# Patient Record
Sex: FEMALE | Race: WHITE | Employment: OTHER | URBAN - METROPOLITAN AREA
[De-identification: names, ages, dates, MRNs, and addresses within clinical notes are randomized per-mention and may not be internally consistent; named-entity substitution may affect disease eponyms.]

---

## 2017-01-08 DIAGNOSIS — I10 ESSENTIAL HYPERTENSION: Chronic | ICD-10-CM

## 2017-01-08 RX ORDER — LOSARTAN POTASSIUM AND HYDROCHLOROTHIAZIDE 25; 100 MG/1; MG/1
TABLET ORAL
Qty: 90 TAB | Refills: 2 | Status: SHIPPED | OUTPATIENT
Start: 2017-01-08 | End: 2017-10-09 | Stop reason: SDUPTHER

## 2017-01-18 RX ORDER — FENOFIBRATE 145 MG/1
145 TABLET, COATED ORAL DAILY
Qty: 30 TAB | Refills: 5 | Status: SHIPPED | OUTPATIENT
Start: 2017-01-18 | End: 2017-10-01 | Stop reason: SDUPTHER

## 2017-01-18 NOTE — TELEPHONE ENCOUNTER
[place] [facility] Office Pool                     Pt [facility] for \"Tricor 145 mg\".  She is requesting a 30 day supply be sent to [place] in [place].    She can be reached at [phone].

## 2017-02-02 ENCOUNTER — TELEPHONE (OUTPATIENT)
Dept: ENDOCRINOLOGY | Age: 77
End: 2017-02-02

## 2017-02-02 RX ORDER — ONDANSETRON 4 MG/1
4 TABLET, FILM COATED ORAL
Qty: 15 TAB | Refills: 1 | Status: SHIPPED | OUTPATIENT
Start: 2017-02-02 | End: 2018-06-20

## 2017-02-02 NOTE — TELEPHONE ENCOUNTER
Zofran 4 mg tab sent - if nausea doesnot improve in 2 weeks to call - may have to stop  Medication    Emelina to talk to pt

## 2017-02-02 NOTE — TELEPHONE ENCOUNTER
Patient says she is experiencing nausae from [facility] and wants Zofran sent to [facility].

## 2017-02-03 ENCOUNTER — OFFICE VISIT (OUTPATIENT)
Dept: FAMILY MEDICINE CLINIC | Age: 77
End: 2017-02-03

## 2017-02-03 VITALS
SYSTOLIC BLOOD PRESSURE: 133 MMHG | OXYGEN SATURATION: 94 % | BODY MASS INDEX: 42.13 KG/M2 | HEIGHT: 59 IN | RESPIRATION RATE: 22 BRPM | HEART RATE: 92 BPM | TEMPERATURE: 98.7 F | WEIGHT: 209 LBS | DIASTOLIC BLOOD PRESSURE: 76 MMHG

## 2017-02-03 DIAGNOSIS — Z13.39 SCREENING FOR ALCOHOLISM: ICD-10-CM

## 2017-02-03 DIAGNOSIS — K21.9 GASTROESOPHAGEAL REFLUX DISEASE WITHOUT ESOPHAGITIS: Chronic | ICD-10-CM

## 2017-02-03 DIAGNOSIS — Z13.31 SCREENING FOR DEPRESSION: ICD-10-CM

## 2017-02-03 DIAGNOSIS — E11.8 TYPE 2 DIABETES MELLITUS WITH COMPLICATION, WITHOUT LONG-TERM CURRENT USE OF INSULIN (HCC): Chronic | ICD-10-CM

## 2017-02-03 DIAGNOSIS — Z00.00 MEDICARE ANNUAL WELLNESS VISIT, SUBSEQUENT: Primary | ICD-10-CM

## 2017-02-03 RX ORDER — EZETIMIBE 10 MG
TABLET ORAL
Qty: 90 TAB | Refills: 2 | Status: SHIPPED | OUTPATIENT
Start: 2017-02-03 | End: 2017-11-09 | Stop reason: SDUPTHER

## 2017-02-03 NOTE — MR AVS SNAPSHOT
Visit Information Date & Time Provider Department Dept. Phone Encounter #  
 2/3/2017  3:05 PM Alfredo Cooper  [facility] [phone] [id number] Follow-up Instructions Return in about 3 months (around 5/3/2017). Your Appointments 3/20/2017  1:30 PM  
ROUTINE CARE with Amy Pedro MD  
[facility] [address] [place] [address]) Appt Note: 3mo fu dm II  
 [address] [facility] [address]  
[phone]  
  
   
 [address] [address]  
  
    
 4/3/2017 10:10 AM  
ROUTINE CARE with Alfredo Cooper MD  
704 [facility] ([address]) Appt Note: 4 mo f/u-Diabetes HTN  
 [address] [address]  
[place] [address] [place] [id number] Upcoming Health Maintenance Date Due  
 MEDICARE YEARLY EXAM 2/3/2017* HEMOGLOBIN A1C Q6M 6/2/2017 EYE EXAM RETINAL OR DILATED Q1 10/27/2017 FOOT EXAM Q1 12/2/2017 MICROALBUMIN Q1 12/2/2017 LIPID PANEL Q1 12/2/2017 GLAUCOMA SCREENING Q2Y 10/27/2018 COLONOSCOPY 6/27/2019 DTaP/Tdap/Td series (2 - Td) 12/14/2021 *Topic was postponed. The date shown is not the original due date. Allergies as of 2/3/2017  Review Complete On: 2/3/2017 By: Alfredo Cooper MD  
  
 Severity Noted Reaction Type Reactions Ciprofloxacin  05/20/2010    Nausea Only Erythromycin  05/20/2010    Nausea Only Metformin  09/03/2012    Other (comments) Felt bad  
 Pcn [Penicillins]  05/20/2010    Nausea Only, Swelling Current Immunizations  Reviewed on 12/2/2016 Name Date H1N1 FLU VACCINE 3/4/2010 Influenza Vaccine 10/8/2015, 10/17/2014  2:48 PM, 9/26/2013 Influenza Vaccine (Quad) PF 12/2/2016 Influenza Vaccine Split 11/16/2012, 11/18/2011, 10/28/2010 Pneumococcal Conjugate (PCV-13) 5/5/2016 Pneumococcal Polysaccharide (PPSV-23) 9/9/2015 Pneumococcal Vaccine (Unspecified Type) 12/1/2010 TDAP Vaccine 12/14/2011 Zoster Vaccine, Live 1/10/2013 Not reviewed this visit You Were Diagnosed With   
  
 Codes Comments Medicare annual wellness visit, subsequent    -  Primary ICD-10-CM: Z00.00 ICD-9-CM: V70.0 Screening for alcoholism     ICD-10-CM: Z13.89 ICD-9-CM: V79.1 Screening for depression     ICD-10-CM: Z13.89 ICD-9-CM: V79.0 Type 2 diabetes mellitus with complication, without long-term current use of insulin (HCC)     ICD-10-CM: E11.8 ICD-9-CM: 250.90 Gastroesophageal reflux disease without esophagitis     ICD-10-CM: K21.9 ICD-9-CM: 530.81 Vitals BP Pulse Temp Resp Height(growth percentile) Weight(growth percentile) 133/76 (BP 1 Location: Left arm, BP Patient Position: Sitting) 92 98.7 °F (37.1 °C) (Oral) 22 4' 11\" (1.499 m) 209 lb (94.8 kg) SpO2 BMI OB Status Smoking Status 94% 42.21 kg/m2 Hysterectomy Former Smoker Vitals History BMI and BSA Data Body Mass Index Body Surface Area  
 42.21 kg/m 2 1.99 m 2 Preferred Pharmacy Pharmacy Name Phone [facility] [address] [place] [address] [address] [phone] Your Updated Medication List  
  
   
This list is accurate as of: 2/3/17  3:54 PM.  Always use your most recent med list.  
  
  
  
  
 aspirin delayed-release 81 mg tablet Take 325 mg by mouth daily. cloNIDine HCl 0.1 mg tablet Commonly known as:  CATAPRES  
TAKE 1 TABLET NIGHTLY  
  
 dilTIAZem  mg ER capsule Commonly known as:  CARDIZEM CD  
TAKE 1 CAPSULE DAILY exenatide microspheres 2 mg/0.65 mL Pnij Commonly known as:  BYDUREON  
1 Syringe by SubCUTAneous route every seven (7) days. fenofibrate nanocrystallized 145 mg tablet Commonly known as:  [employer] Take 1 Tab by mouth daily. FISH OIL 1,000 mg Cap Generic drug:  omega-3 fatty acids-vitamin e Take 1 Cap by mouth. FREESTYLE LITE STRIPS strip Generic drug:  glucose blood VI test strips TEST TWICE A DAY  
  
 JANUVIA 100 mg tablet Generic drug:  SITagliptin TAKE 1 TABLET EVERY MORNING  
  
 loratadine 10 mg tablet Commonly known as:  CLARITIN  
TAKE 1 TABLET DAILY losartan-hydroCHLOROthiazide 100-25 mg per tablet Commonly known as:  HYZAAR  
TAKE 1 TABLET DAILY (STOP TRIAMTERENE/HCTZ) * \[facility] [place] [facility]\"" Generic drug:  Lancets USE TO TEST TWICE A DAY  
  
 * FREESTYLE LANCETS 28 gauge Misc Generic drug:  lancets USE TO TEST TWICE A DAY NexIUM 20 mg capsule Generic drug:  esomeprazole Take  by mouth daily. ondansetron hcl 4 mg tablet Commonly known as:  Lutricia Rye Take 1 Tab by mouth every eight (8) hours as needed for Nausea. pioglitazone 30 mg tablet Commonly known as:  ACTOS  
TAKE 1 TABLET DAILY  
  
 traMADol 50 mg tablet Commonly known as:  ULTRAM  
Take 1 Tab by mouth every six (6) hours as needed for Pain. Max Daily Amount: 200 mg. VITAMIN D3 1,000 unit tablet Generic drug:  cholecalciferol Take 1,000 Units by mouth two (2) times a day. ZETIA 10 mg tablet Generic drug:  ezetimibe TAKE 1 TABLET DAILY * Notice: This list has 2 medication(s) that are the same as other medications prescribed for you. Read the directions carefully, and ask your doctor or other care provider to review them with you. We Performed the Following SD ANNUAL ALCOHOL SCREEN 15 MIN [id number] [facility]] Follow-up Instructions Return in about 3 months (around 5/3/2017). Please provide this summary of care documentation to your next provider. Your primary care clinician is listed as Πάνου 90. If you have any questions after today's visit, please call [phone].

## 2017-02-03 NOTE — PROGRESS NOTES
Chief Complaint   Patient presents with    Annual Wellness Visit     Reviewed record in preparation for visit and have necessary documentation  Pt did not bring medication to office visit for review  Opportunity was given for questions  Goals that were addressed and/or need to be completed after this appointment include: There are no preventive care reminders to display for this patient. Body mass index is 42.21 kg/(m^2).

## 2017-02-03 NOTE — PROGRESS NOTES
[address]  [phone]           Date of visit: 2/3/2017     Josh French MD    This is an Subsequent Medicare Annual Wellness Visit (AWV), (Performed more than 12 months after effective date of Medicare Part B enrollment and 12 months after last preventive visit, Once in a lifetime)    I have reviewed the patient's medical history in detail and updated the computerized patient record. Daniela See is a 77 y.o. female   History obtained from: the patient. 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    Essential hypertension I10    Mixed hyperlipidemia E78.2    Morbid obesity ([address] E66.01    Type 2 diabetes mellitus with hyperglycemia, without long-term current use of insulin (HCC) E11.65     Past Medical History   Diagnosis Date    Arthritis 2/28/2011     OSTEO    Chronic obstructive pulmonary disease (HCC)      VERY MILD    Chronic pain      LEFT KNEE    Diabetes (HCC)     GERD (gastroesophageal reflux disease)     Hot flashes, menopausal     Hypercholesterolemia     Hypertension     Seizures ([address] 1980'S     AFTER DRINKING 2 MARGARITAS    Vitamin D deficiency 6/28/2010      Past Surgical History   Procedure Laterality Date    Hx appendectomy  2001    Hx orthopaedic       rotator cuff- jorge.     Hx hysterectomy  1975    Hx oophorectomy  2001     bilateral    Hx cataract removal Bilateral     Hx other surgical       LIPOMA LEFT SIDE    Hx knee replacement       left knee     Allergies   Allergen Reactions    Ciprofloxacin Nausea Only    Erythromycin Nausea Only    Metformin Other (comments)     Felt bad      Pcn [Penicillins] Nausea Only and Swelling     Current Outpatient Prescriptions   Medication Sig Dispense Refill    ZETIA 10 mg tablet TAKE 1 TABLET DAILY 90 Tab 2    ondansetron hcl (ZOFRAN) 4 mg tablet Take 1 Tab by mouth every eight (8) hours as needed for Nausea. 15 Tab 1    fenofibrate nanocrystallized (TRICOR) 145 mg tablet Take 1 Tab by mouth daily. 30 Tab 5    losartan-hydroCHLOROthiazide (HYZAAR) 100-25 mg per tablet TAKE 1 TABLET DAILY (STOP TRIAMTERENE/HCTZ) 90 Tab 2    FREESTYLE LANCETS 28 gauge misc USE TO TEST TWICE A  Lancet 0    exenatide microspheres (BYDUREON) 2 mg/0.65 mL pnij 1 Syringe by SubCUTAneous route every seven (7) days. 7.8 mL 3    FREESTYLE LITE STRIPS strip TEST TWICE A  Strip 10    cloNIDine HCl (CATAPRES) 0.1 mg tablet TAKE 1 TABLET NIGHTLY 90 Tab 2    loratadine (CLARITIN) 10 mg tablet TAKE 1 TABLET DAILY 90 Tab 3    pioglitazone (ACTOS) 30 mg tablet TAKE 1 TABLET DAILY 90 Tab 2    traMADol (ULTRAM) 50 mg tablet Take 1 Tab by mouth every six (6) hours as needed for Pain. Max Daily Amount: 200 mg. 90 Tab 4    diltiazem CD (CARDIZEM CD) 180 mg ER capsule TAKE 1 CAPSULE DAILY 90 Cap 3    MICROLET LANCET misc USE TO TEST TWICE A DAY 2 Package 2    aspirin delayed-release 81 mg tablet Take 325 mg by mouth daily.  esomeprazole (NEXIUM) 20 mg capsule Take  by mouth daily.  omega-3 fatty acids-vitamin e (FISH OIL) 1,000 mg cap Take 1 Cap by mouth.  cholecalciferol, vitamin d3, (VITAMIN D) 1,000 unit tablet Take 1,000 Units by mouth two (2) times a day.       JANUVIA 100 mg tablet TAKE 1 TABLET EVERY MORNING 90 Tab 2     Family History   Problem Relation Age of Onset    Pulmonary Embolism Father    [facility] Arthritis-rheumatoid Paternal Grandmother     Alzheimer Mother     Arthritis-rheumatoid Sister     Emphysema Brother     Arthritis-rheumatoid Sister     Anesth Problems Neg Hx      Social History   Substance Use Topics    Smoking status: Former Smoker     Packs/day: 1.00     Years: 20.00     Quit date: 11/18/1984    Smokeless tobacco: Never Used    Alcohol use Yes      Comment: rarely       Specialists/Care Team   Buddy Little has established care with the following healthcare providers:  Patient Care Team:  Ifeoma Mccallum MD as PCP - Emmanuel Be MD as Physician (Cardiology)  Fredis Handley MD as Physician (Orthopedic Surgery)  Coni Bustamante MD (Endocrinology)      Health Risk Assessment     Demographics   female  77 y.o. General Health Questions   -During the past 4 weeks:   -how would you rate your health in general? Good   -how often have you been bothered by feeling dizzy when standing up? never   -how much have you been bothered by bodily pain? moderately   -Have you noticed any hearing difficulties? yes   -has your physical and emotional health limited your social activities with family or friends? no    Emotional Health Questions   -Do you have a history of depression, anxiety, or emotional problems? no  -Over the past 2 weeks, have you felt down, depressed or hopeless? no  -Over the past 2 weeks, have you felt little interest or pleasure in doing things? no    Health Habits   Please describe your diet habits: A-  Do you get 5 servings of fruits or vegetables daily? yes  Do you exercise regularly? yes    Alcohol Risk Factor Screening: On any occasion during the past 3 months, have you had more than 4 drinks containing alcohol? No     Do you average more than 14 drinks per week?  No       Activities of Daily Living and Functional Status   -Do you need help with eating, walking, dressing, bathing, toileting, the phone, transportation, shopping, preparing meals, housework, laundry, medications or managing money? no  -In the past four weeks, was someone available to help you if you needed and wanted help with anything? yes  -Are you confident are you that you can control and manage most of your health problems? yes  -Have you been given information to help you keep track of your medications? yes  -How often do you have trouble taking your medications as prescribed? never    Fall Risk and Home Safety   Have you fallen 2 or more times in the past year? no  Does your home have rugs in the hallway, lack grab bars in the bathroom, lack handrails on the stairs or have poor lighting? no  Do you have smoke detectors and check them regularly? yes  Do you have difficulties driving a car? no  Do you always fasten your seat belt when you are in a car? yes    Review of Systems (if indicated for problems addressed today)   Review of Systems   Constitutional: Negative. Negative for chills, fever, malaise/fatigue and weight loss. HENT: Negative. Negative for hearing loss. Eyes: Negative. Negative for blurred vision and double vision. Respiratory: Negative. Negative for cough, hemoptysis, sputum production and shortness of breath. Cardiovascular: Negative. Negative for chest pain, palpitations and orthopnea. Gastrointestinal: Negative. Negative for abdominal pain, blood in stool, heartburn, nausea and vomiting. Genitourinary: Negative. Negative for dysuria, frequency and urgency. Musculoskeletal: Positive for joint pain. Negative for back pain, myalgias and neck pain. Right knee pain. Skin: Negative. Negative for rash. Neurological: Negative. Negative for dizziness, tingling, tremors, weakness and headaches. Endo/Heme/Allergies: Negative. Psychiatric/Behavioral: Negative. Negative for depression.      MMSE normal    Physical Examination     Wt Readings from Last 3 Encounters:   02/03/17 209 lb (94.8 kg)   12/19/16 216 lb (98 kg)   12/02/16 213 lb (96.6 kg) Temp Readings from Last 3 Encounters:   02/03/17 98.7 °F (37.1 °C) (Oral)   12/19/16 97.2 °F (36.2 °C) (Oral)   12/02/16 97.4 °F (36.3 °C) (Oral)     BP Readings from Last 3 Encounters:   02/03/17 133/76   12/19/16 129/69   12/02/16 155/80     Pulse Readings from Last 3 Encounters:   02/03/17 92   12/19/16 73   12/02/16 70       Body mass index is 42.21 kg/(m^2). No exam data present  Was the patient's timed Up & Go test unsteady or longer than 10 seconds? no    Evaluation of Cognitive Function   Mood/affect:  happy  Orientation: Person, Place, Time and Situation  Appearance: age appropriate and casually dressed  Family member/caregiver input: no    Additional exam if indicated for problems addressed today:  Physical Exam   Constitutional: She is oriented to person, place, and time. She appears well-developed and well-nourished. No distress. HENT:   Head: Normocephalic and atraumatic. Mouth/Throat: Oropharynx is clear and moist.   Eyes: Conjunctivae are normal. No scleral icterus. Neck: No thyromegaly present. No carotid bruit. Cardiovascular: Normal rate, regular rhythm and normal heart sounds. Exam reveals no gallop and no friction rub. No murmur heard. Pulmonary/Chest: Effort normal and breath sounds normal. She has no wheezes. She has no rales. Abdominal: Soft. Bowel sounds are normal. She exhibits no distension. There is no tenderness. There is no rebound and no guarding. Musculoskeletal: She exhibits no edema. Lymphadenopathy:     She has no cervical adenopathy. Neurological: She is alert and oriented to person, place, and time. Skin: Skin is warm and dry. No rash noted. She is not diaphoretic. Psychiatric: She has a normal mood and affect. Her behavior is normal. Thought content normal.   Nursing note and vitals reviewed.         Advice/Referrals/Counseling (as indicated)   Education and counseling provided for any problems identified above:     Preventive Services (Preventive care checklist to be included in patient instructions)  Discussed today Done Previously Not Needed     x  Pneumococcal vaccines    x  Flu vaccine     x Hepatitis B vaccine (if at risk)    x  Shingles vaccine    x  TDAP vaccine    x  Mammogram     x Pap smear     x Colorectal cancer screening     x Low-dose CT for lung cancer screening     x Bone density test     x Glaucoma screening    x  Cholesterol test    x  Diabetes screening test     x  Diabetes self-management class    x  Nutritionist referral for diabetes or renal disease     Discussion of Advance Directive   Discussed with Jason Velez her ability to prepare and advance directive in the case that an injury or illness causes her to be unable to make health care decisions. Assessment/Plan   Z00.00    ICD-10-CM ICD-9-CM    1. Medicare annual wellness visit, subsequent   Z00.00 V70.0    2. Screening for alcoholism  -negative     Z13.89 V79.1 [place] ANNUAL ALCOHOL SCREEN 15 MIN   3. Screening for depression-negative    Z13.89 V79.0    4. Type 2 diabetes mellitus with complication, without long-term current use of insulin (HCC)-blood sugars are coming down now that she is on Bydureon    E11.8 250.90    5.  Gastroesophageal reflux disease without esophagitis-controlled  K21.9 530.81        Orders Placed This Encounter    [place] ANNUAL ALCOHOL SCREEN 15 MIN       Patient Care Team:  Miguel Angel Evans MD as PCP - Medina Lawler MD as Physician (Cardiology)  Jun Burton MD as Physician (Orthopedic Surgery)  Hector Lee MD (Endocrinology)    Follow-up Disposition: 3 months    Future Appointments  Date Time Provider Hay Nathalia   3/20/2017 1:30 PM Hector Lee MD [facility]   4/3/2017 10:10 AM MD Madelin Downing MD

## 2017-02-03 NOTE — TELEPHONE ENCOUNTER
Called pt and informed her that Zofran was sent in to her pharmacy. She stated she has not had any nausea today but will keep the zofran on hand just in case. Also informed her Martin Sorensen, a [employer] rep will contact her and talk to her about the [address]. Pt verbalized understanding with no further questions or concerns at this time. Called Martin Sorensen and gave her pt's information. She will contact pt. No further questions or concerns at this time.

## 2017-03-16 LAB — HEMOCCULT STL QL IA: NEGATIVE

## 2017-03-20 ENCOUNTER — OFFICE VISIT (OUTPATIENT)
Dept: ENDOCRINOLOGY | Age: 77
End: 2017-03-20

## 2017-03-20 VITALS
HEART RATE: 73 BPM | DIASTOLIC BLOOD PRESSURE: 91 MMHG | TEMPERATURE: 98.1 F | RESPIRATION RATE: 16 BRPM | BODY MASS INDEX: 40.86 KG/M2 | SYSTOLIC BLOOD PRESSURE: 144 MMHG | HEIGHT: 59 IN | WEIGHT: 202.7 LBS

## 2017-03-20 DIAGNOSIS — E11.65 TYPE 2 DIABETES MELLITUS WITH HYPERGLYCEMIA, WITHOUT LONG-TERM CURRENT USE OF INSULIN (HCC): Primary | ICD-10-CM

## 2017-03-20 DIAGNOSIS — E78.00 HYPERCHOLESTEROLEMIA: Chronic | ICD-10-CM

## 2017-03-20 DIAGNOSIS — I10 ESSENTIAL HYPERTENSION: Chronic | ICD-10-CM

## 2017-03-20 LAB
GLUCOSE POC: 103 MG/DL
HBA1C MFR BLD HPLC: 5.6 %

## 2017-03-20 NOTE — PROGRESS NOTES
Maye Armijo is a 77 y.o. female here for   Chief Complaint   Patient presents with    Diabetes    Cholesterol Problem    Hypertension       Functional glucose monitor and record keeping system? - yes  Eye exam within last year? - yes  Foot exam within last year? - yes, PCP    Lab Results   Component Value Date/Time    Hemoglobin A1c 6.4 12/02/2016 11:35 AM    Hemoglobin A1c (POC) 6.0 07/25/2016 02:57 PM       Wt Readings from Last 3 Encounters:   02/03/17 209 lb (94.8 kg)   12/19/16 216 lb (98 kg)   12/02/16 213 lb (96.6 kg)     Temp Readings from Last 3 Encounters:   02/03/17 98.7 °F (37.1 °C) (Oral)   12/19/16 97.2 °F (36.2 °C) (Oral)   12/02/16 97.4 °F (36.3 °C) (Oral)     BP Readings from Last 3 Encounters:   02/03/17 133/76   12/19/16 129/69   12/02/16 155/80     Pulse Readings from Last 3 Encounters:   02/03/17 92   12/19/16 73   12/02/16 70

## 2017-03-20 NOTE — MR AVS SNAPSHOT
Visit Information Date & Time Provider Department Dept. Phone Encounter #  
 3/20/2017  1:30 PM Ana Cristina Bennett MD [facility] [phone] [id number] Follow-up Instructions Return in about 4 months (around 7/20/2017). Your Appointments 4/3/2017 10:10 AM  
ROUTINE CARE with Pascual Presley MD  
[facility]) Appt Note: 4 mo f/u-Diabetes HTN  
 [address] [address]  
[place] [address] [address] Upcoming Health Maintenance Date Due HEMOGLOBIN A1C Q6M 6/2/2017 EYE EXAM RETINAL OR DILATED Q1 10/27/2017 FOOT EXAM Q1 12/2/2017 MICROALBUMIN Q1 12/2/2017 LIPID PANEL Q1 12/2/2017 MEDICARE YEARLY EXAM 2/4/2018 GLAUCOMA SCREENING Q2Y 10/27/2018 COLONOSCOPY 6/27/2019 DTaP/Tdap/Td series (2 - Td) 12/14/2021 Allergies as of 3/20/2017  Review Complete On: 3/20/2017 By: Mallika Gage LPN Severity Noted Reaction Type Reactions Ciprofloxacin  05/20/2010    Nausea Only Erythromycin  05/20/2010    Nausea Only Metformin  09/03/2012    Other (comments) Felt bad  
 Pcn [Penicillins]  05/20/2010    Nausea Only, Swelling Current Immunizations  Reviewed on 12/2/2016 Name Date H1N1 FLU VACCINE 3/4/2010 Influenza Vaccine 10/8/2015, 10/17/2014  2:48 PM, 9/26/2013 Influenza Vaccine (Quad) PF 12/2/2016 Influenza Vaccine Split 11/16/2012, 11/18/2011, 10/28/2010 Pneumococcal Conjugate (PCV-13) 5/5/2016 Pneumococcal Polysaccharide (PPSV-23) 9/9/2015 Pneumococcal Vaccine (Unspecified Type) 12/1/2010 TDAP Vaccine 12/14/2011 Zoster Vaccine, Live 1/10/2013 Not reviewed this visit You Were Diagnosed With   
  
 Codes Comments  Type 2 diabetes mellitus with hyperglycemia, without long-term current use of insulin (HCC)    -  Primary ICD-10-CM: E11.65 
 ICD-9-CM: 250.00, 790.29 Essential hypertension     ICD-10-CM: I10 
ICD-9-CM: 401.9 Hypercholesterolemia     ICD-10-CM: E78.00 ICD-9-CM: 272.0 Vitals BP Pulse Temp Resp Height(growth percentile) Weight(growth percentile) (!) 144/91 (BP 1 Location: Right arm, BP Patient Position: Sitting) 73 98.1 °F (36.7 °C) (Oral) 16 4' 11\" (1.499 m) 202 lb 11.2 oz (91.9 kg) BMI OB Status Smoking Status 40.94 kg/m2 Hysterectomy Former Smoker Vitals History BMI and BSA Data Body Mass Index Body Surface Area 40.94 kg/m 2 1.96 m 2 Preferred Pharmacy Pharmacy Name Phone [address] [facility] [phone] Your Updated Medication List  
  
   
This list is accurate as of: 3/20/17  1:39 PM.  Always use your most recent med list.  
  
  
  
  
 aspirin delayed-release 81 mg tablet Take 325 mg by mouth daily. cloNIDine HCl 0.1 mg tablet Commonly known as:  CATAPRES  
TAKE 1 TABLET NIGHTLY  
  
 dilTIAZem  mg ER capsule Commonly known as:  CARDIZEM CD  
TAKE 1 CAPSULE DAILY exenatide microspheres 2 mg/0.65 mL Pnij Commonly known as:  BYDUREON  
1 Syringe by SubCUTAneous route every seven (7) days. fenofibrate nanocrystallized 145 mg tablet Commonly known as:  [employer] Take 1 Tab by mouth daily. FISH OIL 1,000 mg Cap Generic drug:  omega-3 fatty acids-vitamin e Take 1 Cap by mouth. FREESTYLE LITE STRIPS strip Generic drug:  glucose blood VI test strips TEST TWICE A DAY  
  
 loratadine 10 mg tablet Commonly known as:  CLARITIN  
TAKE 1 TABLET DAILY losartan-hydroCHLOROthiazide 100-25 mg per tablet Commonly known as:  HYZAAR  
TAKE 1 TABLET DAILY (STOP TRIAMTERENE/HCTZ) * [facility] Generic drug:  Lancets USE TO TEST TWICE A DAY  
  
 * FREESTYLE LANCETS 28 gauge Misc Generic drug:  lancets USE TO TEST TWICE A DAY  
  
 NexIUM 20 mg capsule Generic drug:  esomeprazole Take  by mouth daily. ondansetron hcl 4 mg tablet Commonly known as:  Pichardo Raciel Take 1 Tab by mouth every eight (8) hours as needed for Nausea. pioglitazone 30 mg tablet Commonly known as:  ACTOS  
TAKE 1 TABLET DAILY  
  
 traMADol 50 mg tablet Commonly known as:  ULTRAM  
Take 1 Tab by mouth every six (6) hours as needed for Pain. Max Daily Amount: 200 mg. VITAMIN D3 1,000 unit tablet Generic drug:  cholecalciferol Take 1,000 Units by mouth two (2) times a day. ZETIA 10 mg tablet Generic drug:  ezetimibe TAKE 1 TABLET DAILY * Notice: This list has 2 medication(s) that are the same as other medications prescribed for you. Read the directions carefully, and ask your doctor or other care provider to review them with you. We Performed the Following AMB POC GLUCOSE, QUANTITATIVE, BLOOD [32804 CPT(R)] AMB POC HEMOGLOBIN A1C [44155 CPT(R)] Follow-up Instructions Return in about 4 months (around 7/20/2017). Please provide this summary of care documentation to your next provider. Your primary care clinician is listed as Πάνου 90. If you have any questions after today's visit, please call [phone].

## 2017-03-20 NOTE — PROGRESS NOTES
[facility]               Gustavo Knig MD        [address] 78 [phone] Fax [phone] ( KPFLV) [address] [address] [address] YL:[phone] Fax [phone] ( Monday)          Patient Information  Date:3/20/2017  Name : Severiano Evener 77 y.o.     YOB: 1940         Referred by: Peace Parish MD         Chief Complaint   Patient presents with    Diabetes    Cholesterol Problem    Hypertension       History of Present Illness: Severiano Evener is a 77 y.o. female here for fu of  Type 2 Diabetes Mellitus. Type 2 Diabetes was diagnosed in 10/2014 . End organ effects of diabetes: none. Cardiovascular risk factors: family history, dyslipidemia, diabetes mellitus   Monitoring frequency:2/ day     . Could not tolerate Metformin IR, XR formualtion and on Actos, glucose not at goal      Checking glucose at home  Has not noticed significant hypoglycemia     Compliant with medications    No acute issues            Hyperlipidemia - was on tricor, zetia, colestipol, Niacin, no statin     Wt Readings from Last 3 Encounters:   03/20/17 202 lb 11.2 oz (91.9 kg)   02/03/17 209 lb (94.8 kg)   12/19/16 216 lb (98 kg)       BP Readings from Last 3 Encounters:   03/20/17 (!) 144/91   02/03/17 133/76   12/19/16 129/69           Past Medical History:   Diagnosis Date    Arthritis 2/28/2011    OSTEO    Chronic obstructive pulmonary disease (HCC)     VERY MILD    Chronic pain     LEFT KNEE    Diabetes (HCC)     GERD (gastroesophageal reflux disease)     Hot flashes, menopausal     Hypercholesterolemia     Hypertension     Seizures (HCC) 1980'S    AFTER DRINKING 2 MARGARITAS    Vitamin D deficiency 6/28/2010     Current Outpatient Prescriptions   Medication Sig    pioglitazone (ACTOS) 30 mg tablet TAKE 1 TABLET DAILY    dilTIAZem CD (CARDIZEM CD) 180 mg ER capsule TAKE 1 CAPSULE DAILY    ZETIA 10 mg tablet TAKE 1 TABLET DAILY    ondansetron hcl (ZOFRAN) 4 mg tablet Take 1 Tab by mouth every eight (8) hours as needed for Nausea.  fenofibrate nanocrystallized (TRICOR) 145 mg tablet Take 1 Tab by mouth daily.  losartan-hydroCHLOROthiazide (HYZAAR) 100-25 mg per tablet TAKE 1 TABLET DAILY (STOP TRIAMTERENE/HCTZ)    FREESTYLE LANCETS 28 gauge misc USE TO TEST TWICE A DAY    exenatide microspheres (BYDUREON) 2 mg/0.65 mL pnij 1 Syringe by SubCUTAneous route every seven (7) days.  FREESTYLE LITE STRIPS strip TEST TWICE A DAY    cloNIDine HCl (CATAPRES) 0.1 mg tablet TAKE 1 TABLET NIGHTLY    loratadine (CLARITIN) 10 mg tablet TAKE 1 TABLET DAILY    traMADol (ULTRAM) 50 mg tablet Take 1 Tab by mouth every six (6) hours as needed for Pain. Max Daily Amount: 200 mg.  MICROLET LANCET misc USE TO TEST TWICE A DAY    aspirin delayed-release 81 mg tablet Take 325 mg by mouth daily.  esomeprazole (NEXIUM) 20 mg capsule Take  by mouth daily.  omega-3 fatty acids-vitamin e (FISH OIL) 1,000 mg cap Take 1 Cap by mouth.  cholecalciferol, vitamin d3, (VITAMIN D) 1,000 unit tablet Take 1,000 Units by mouth two (2) times a day.  JANUVIA 100 mg tablet TAKE 1 TABLET EVERY MORNING     No current facility-administered medications for this visit. Allergies   Allergen Reactions    Ciprofloxacin Nausea Only    Erythromycin Nausea Only    Metformin Other (comments)     Felt bad      Pcn [Penicillins] Nausea Only and Swelling         Review of Systems:  - Constitutional Symptoms: no fevers, no chills,   - Eyes: no blurry vision no double vision  - Cardiovascular: no chest pain ,no palpitations  - Respiratory: no cough no shortness of breath  - Gastrointestinal: no dysphagia no  abdominal pain  - Musculoskeletal: no joint pains no  weakness  - Integumentary: no rashes  -     Physical Examination:   Blood pressure (!) 144/91, pulse 73, temperature 98.1 °F (36.7 °C), temperature source Oral, resp.  rate 16, height 4' 11\" (1.499 m), weight 202 lb 11.2 oz (91.9 kg). Estimated body mass index is 40.94 kg/(m^2) as calculated from the following:    Height as of this encounter: 4' 11\" (1.499 m). -   Weight as of this encounter: 202 lb 11.2 oz (91.9 kg). - General: pleasant, no distress, good eye contact  - HEENT: no pallor, no periorbital edema, EOMI  - Neck: supple, no thyromegaly  - Cardiovascular: regular, normal rate, normal S1 and S2  - Respiratory: clear to auscultation bilaterally  - Gastrointestinal: soft, nontender, nondistended,  BS +  - Musculoskeletal: no proximal muscle weakness in upper or lower extremities  - Integumentary: alert and oriented  - Psychiatric: normal mood and affect  - Skin: color, texture, turgor normal.       Data Reviewed:     [] Glucose records reviewed. [] See glucose records for details (to be scanned). [] A1C  [] Reviewed labs    Lab Results   Component Value Date/Time    Hemoglobin A1c 6.4 12/02/2016 11:35 AM    Hemoglobin A1c 6.5 04/11/2016 11:39 AM    Hemoglobin A1c 6.5 12/10/2015 10:56 AM    Glucose 110 12/02/2016 11:35 AM    Glucose (POC) 146 10/17/2014 11:06 AM    Glucose  03/20/2017 01:11 PM    Microalb/Creat ratio (ug/mg creat.) 269.2 12/02/2016 11:35 AM    LDL, calculated 62 12/02/2016 11:35 AM    Creatinine 1.13 12/02/2016 11:35 AM      Lab Results   Component Value Date/Time    Cholesterol, total 155 12/02/2016 11:35 AM    HDL Cholesterol 57 12/02/2016 11:35 AM    LDL, calculated 62 12/02/2016 11:35 AM    Triglyceride 182 12/02/2016 11:35 AM    CHOL/HDL Ratio 2.4 10/28/2010 09:07 AM     Lab Results   Component Value Date/Time    ALT (SGPT) 30 12/02/2016 11:35 AM    AST (SGOT) 25 12/02/2016 11:35 AM    Alk.  phosphatase 46 12/02/2016 11:35 AM    Bilirubin, total 0.4 12/02/2016 11:35 AM     Lab Results   Component Value Date/Time    TSH 1.280 12/02/2016 11:35 AM    T4, Free 1.55 09/09/2015 02:16 PM      Lab Results   Component Value Date/Time    Glucose 110 12/02/2016 11:35 AM    Glucose (POC) 146 10/17/2014 11:06 AM    Glucose  03/20/2017 01:11 PM        Assessment/Plan:     1. Type 2 diabetes mellitus with hyperglycemia, without long-term current use of insulin ([facility] [address]    2. Essential hypertension    3. Hypercholesterolemia        1. 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 HTN : Continue current therapy     3. Mixed Hyperlipidemia : low carb diet. Statin intolerance hx,   Continue Zetia, Tricor    4. Obesity:Body mass index is 40.94 kg/(m^2). Discussed about the importance of exercise and carbohydrate portion control. There are no Patient Instructions on file for this visit. Follow-up Disposition: Not on File    Thank you for allowing me to participate in the care of this patient.     Shannon Craven MD

## 2017-03-21 RX ORDER — LANCETS 28 GAUGE
EACH MISCELLANEOUS
Qty: 200 LANCET | Refills: 5 | Status: SHIPPED | OUTPATIENT
Start: 2017-03-21 | End: 2018-05-11 | Stop reason: SDUPTHER

## 2017-04-03 ENCOUNTER — OFFICE VISIT (OUTPATIENT)
Dept: FAMILY MEDICINE CLINIC | Age: 77
End: 2017-04-03

## 2017-04-03 VITALS
RESPIRATION RATE: 16 BRPM | BODY MASS INDEX: 40.52 KG/M2 | OXYGEN SATURATION: 94 % | SYSTOLIC BLOOD PRESSURE: 115 MMHG | HEIGHT: 59 IN | TEMPERATURE: 98.5 F | DIASTOLIC BLOOD PRESSURE: 71 MMHG | WEIGHT: 201 LBS | HEART RATE: 69 BPM

## 2017-04-03 DIAGNOSIS — E11.8 TYPE 2 DIABETES MELLITUS WITH COMPLICATION, WITHOUT LONG-TERM CURRENT USE OF INSULIN (HCC): Primary | Chronic | ICD-10-CM

## 2017-04-03 DIAGNOSIS — E78.2 MIXED HYPERLIPIDEMIA: ICD-10-CM

## 2017-04-03 DIAGNOSIS — I10 ESSENTIAL HYPERTENSION: Chronic | ICD-10-CM

## 2017-04-03 DIAGNOSIS — K21.9 GASTROESOPHAGEAL REFLUX DISEASE WITHOUT ESOPHAGITIS: Chronic | ICD-10-CM

## 2017-04-03 DIAGNOSIS — E78.00 HYPERCHOLESTEROLEMIA: Chronic | ICD-10-CM

## 2017-04-03 DIAGNOSIS — D17.1 LIPOMA OF TORSO: ICD-10-CM

## 2017-04-03 DIAGNOSIS — M19.90 ARTHRITIS: Chronic | ICD-10-CM

## 2017-04-03 NOTE — PATIENT INSTRUCTIONS

## 2017-04-03 NOTE — MR AVS SNAPSHOT
Visit Information Date & Time Provider Department Dept. Phone Encounter #  
 4/3/2017 10:10 AM Марина Swann MD [address] [place] [address] [id number] Follow-up Instructions Return in about 5 months (around 9/3/2017) for fasting. Mary Ann Starling Your Appointments 7/21/2017  1:30 PM  
ROUTINE CARE with Anisha Bauer MD  
[facility] [address] [facility]) Appt Note: f/u 4 month DM  
 [address] [facility] [address]  
[phone]  
  
   
 [address] [place] [id number] Upcoming Health Maintenance Date Due HEMOGLOBIN A1C Q6M 9/20/2017 EYE EXAM RETINAL OR DILATED Q1 10/27/2017 FOOT EXAM Q1 12/2/2017 MICROALBUMIN Q1 12/2/2017 LIPID PANEL Q1 12/2/2017 MEDICARE YEARLY EXAM 2/4/2018 GLAUCOMA SCREENING Q2Y 10/27/2018 COLONOSCOPY 6/27/2019 DTaP/Tdap/Td series (2 - Td) 12/14/2021 Allergies as of 4/3/2017  Review Complete On: 4/3/2017 By: Sanam Bardales LPN Severity Noted Reaction Type Reactions Ciprofloxacin  05/20/2010    Nausea Only Erythromycin  05/20/2010    Nausea Only Metformin  09/03/2012    Other (comments) Felt bad  
 Pcn [Penicillins]  05/20/2010    Nausea Only, Swelling Current Immunizations  Reviewed on 12/2/2016 Name Date H1N1 FLU VACCINE 3/4/2010 Influenza Vaccine 10/8/2015, 10/17/2014  2:48 PM, 9/26/2013 Influenza Vaccine (Quad) PF 12/2/2016 Influenza Vaccine Split 11/16/2012, 11/18/2011, 10/28/2010 Pneumococcal Conjugate (PCV-13) 5/5/2016 Pneumococcal Polysaccharide (PPSV-23) 9/9/2015 Pneumococcal Vaccine (Unspecified Type) 12/1/2010 TDAP Vaccine 12/14/2011 Zoster Vaccine, Live 1/10/2013 Not reviewed this visit You Were Diagnosed With   
  
 Codes Comments Type 2 diabetes mellitus with complication, without long-term current use of insulin (HCC)    -  Primary ICD-10-CM: E11.8 ICD-9-CM: 250.90 Essential hypertension     ICD-10-CM: I10 
ICD-9-CM: 401.9 Hypercholesterolemia     ICD-10-CM: E78.00 ICD-9-CM: 272.0 Gastroesophageal reflux disease without esophagitis     ICD-10-CM: K21.9 ICD-9-CM: 530.81 Arthritis     ICD-10-CM: M19.90 ICD-9-CM: 716.90 Mixed hyperlipidemia     ICD-10-CM: E78.2 ICD-9-CM: 272.2 Vitals BP Pulse Temp Resp Height(growth percentile) Weight(growth percentile) 115/71 (BP 1 Location: Right arm, BP Patient Position: Sitting) 69 98.5 °F (36.9 °C) (Oral) 16 4' 11\" (1.499 m) 201 lb (91.2 kg) SpO2 BMI OB Status Smoking Status 94% 40.6 kg/m2 Hysterectomy Former Smoker Vitals History BMI and BSA Data Body Mass Index Body Surface Area  
 40.6 kg/m 2 1.95 m 2 Preferred Pharmacy Pharmacy Name Phone [address] [facility] [phone] Your Updated Medication List  
  
   
This list is accurate as of: 4/3/17 10:46 AM.  Always use your most recent med list.  
  
  
  
  
 aspirin delayed-release 81 mg tablet Take 325 mg by mouth daily. cloNIDine HCl 0.1 mg tablet Commonly known as:  CATAPRES  
TAKE 1 TABLET NIGHTLY  
  
 dilTIAZem  mg ER capsule Commonly known as:  CARDIZEM CD  
TAKE 1 CAPSULE DAILY exenatide microspheres 2 mg/0.65 mL Pnij Commonly known as:  BYDUREON  
1 Syringe by SubCUTAneous route every seven (7) days. fenofibrate nanocrystallized 145 mg tablet Commonly known as:  [employer] Take 1 Tab by mouth daily. FISH OIL 1,000 mg Cap Generic drug:  omega-3 fatty acids-vitamin e Take 1 Cap by mouth. FREESTYLE LITE STRIPS strip Generic drug:  glucose blood VI test strips TEST TWICE A DAY  
  
 loratadine 10 mg tablet Commonly known as:  CLARITIN  
TAKE 1 TABLET DAILY losartan-hydroCHLOROthiazide 100-25 mg per tablet Commonly known as:  HYZAAR  
 TAKE 1 TABLET DAILY (STOP TRIAMTERENE/HCTZ) * [facility] [place] Generic drug:  Lancets USE TO TEST TWICE A DAY  
  
 * lancets 28 gauge Misc Commonly known as:  FREESTYLE LANCETS Use as directed to check blood sugar twice daily. DX: E11.65 NexIUM 20 mg capsule Generic drug:  esomeprazole Take  by mouth daily. ondansetron hcl 4 mg tablet Commonly known as:  Ardia Pineda Take 1 Tab by mouth every eight (8) hours as needed for Nausea. pioglitazone 30 mg tablet Commonly known as:  ACTOS  
TAKE 1 TABLET DAILY  
  
 traMADol 50 mg tablet Commonly known as:  ULTRAM  
Take 1 Tab by mouth every six (6) hours as needed for Pain. Max Daily Amount: 200 mg. VITAMIN D3 1,000 unit tablet Generic drug:  cholecalciferol Take 1,000 Units by mouth two (2) times a day. ZETIA 10 mg tablet Generic drug:  ezetimibe TAKE 1 TABLET DAILY * Notice: This list has 2 medication(s) that are the same as other medications prescribed for you. Read the directions carefully, and ask your doctor or other care provider to review them with you. We Performed the Following HEMOGLOBIN A1C WITH EAG [12130 CPT(R)] LIPID PANEL [25422 CPT(R)] METABOLIC PANEL, COMPREHENSIVE [21037 CPT(R)] Follow-up Instructions Return in about 5 months (around 9/3/2017) for fasting. Mary Ann Varela Patient Instructions Learning About Diabetes Food Guidelines Your Care Instructions Meal planning is important to manage diabetes. It helps keep your blood sugar at a target level (which you set with your doctor). You don't have to eat special foods. You can eat what your family eats, including sweets once in a while. But you do have to pay attention to how often you eat and how much you eat of certain foods. You may want to work with a dietitian or a certified diabetes educator (CDE) to help you plan meals and snacks.  A dietitian or CDE can also help you lose weight if that is one of your goals. What should you know about eating carbs? Managing the amount of carbohydrate (carbs) you eat is an important part of healthy meals when you have diabetes. Carbohydrate is found in many foods. · Learn which foods have carbs. And learn the amounts of carbs in different foods. ¨ Bread, cereal, pasta, and rice have about 15 grams of carbs in a serving. A serving is 1 slice of bread (1 ounce), ½ cup of cooked cereal, or 1/3 cup of cooked pasta or rice. ¨ Fruits have 15 grams of carbs in a serving. A serving is 1 small fresh fruit, such as an apple or orange; ½ of a banana; ½ cup of cooked or canned fruit; ½ cup of fruit juice; 1 cup of melon or raspberries; or 2 tablespoons of dried fruit. ¨ Milk and no-sugar-added yogurt have 15 grams of carbs in a serving. A serving is 1 cup of milk or 2/3 cup of no-sugar-added yogurt. ¨ Starchy vegetables have 15 grams of carbs in a serving. A serving is ½ cup of mashed potatoes or sweet potato; 1 cup winter squash; ½ of a small baked potato; ½ cup of cooked beans; or ½ cup cooked corn or green peas. · Learn how much carbs to eat each day and at each meal. A dietitian or CDE can teach you how to keep track of the amount of carbs you eat. This is called carbohydrate counting. · If you are not sure how to count carbohydrate grams, use the Plate Method to plan meals. It is a good, quick way to make sure that you have a balanced meal. It also helps you spread carbs throughout the day. ¨ Divide your plate by types of foods. Put non-starchy vegetables on half the plate, meat or other protein food on one-quarter of the plate, and a grain or starchy vegetable in the final quarter of the plate.  To this you can add a small piece of fruit and 1 cup of milk or yogurt, depending on how many carbs you are supposed to eat at a meal. 
· Try to eat about the same amount of carbs at each meal. Do not \"save up\" your daily allowance of carbs to eat at one meal. 
· Proteins have very little or no carbs per serving. Examples of proteins are beef, chicken, turkey, fish, eggs, tofu, cheese, cottage cheese, and peanut butter. A serving size of meat is 3 ounces, which is about the size of a deck of cards. Examples of meat substitute serving sizes (equal to 1 ounce of meat) are 1/4 cup of cottage cheese, 1 egg, 1 tablespoon of peanut butter, and ½ cup of tofu. How can you eat out and still eat healthy? · Learn to estimate the serving sizes of foods that have carbohydrate. If you measure food at home, it will be easier to estimate the amount in a serving of restaurant food. · If the meal you order has too much carbohydrate (such as potatoes, corn, or baked beans), ask to have a low-carbohydrate food instead. Ask for a salad or green vegetables. · If you use insulin, check your blood sugar before and after eating out to help you plan how much to eat in the future. · If you eat more carbohydrate at a meal than you had planned, take a walk or do other exercise. This will help lower your blood sugar. What else should you know? · Limit saturated fat, such as the fat from meat and dairy products. This is a healthy choice because people who have diabetes are at higher risk of heart disease. So choose lean cuts of meat and nonfat or low-fat dairy products. Use olive or canola oil instead of butter or shortening when cooking. · Don't skip meals. Your blood sugar may drop too low if you skip meals and take insulin or certain medicines for diabetes. · Check with your doctor before you drink alcohol. Alcohol can cause your blood sugar to drop too low. Alcohol can also cause a bad reaction if you take certain diabetes medicines. Follow-up care is a key part of your treatment and safety.  Be sure to make and go to all appointments, and call your doctor if you are having problems. It's also a good idea to know your test results and keep a list of the medicines you take. Where can you learn more? Go to http://reid-shanell.info/. Enter X823 in the search box to learn more about \"Learning About Diabetes Food Guidelines. \" Current as of: May 23, 2016 Content Version: 11.2 © 2615-2394 [facility]. Care instructions adapted under license by [employer] (which disclaims liability or warranty for this information). If you have questions about a medical condition or this instruction, always ask your healthcare professional. [place] [address] any warranty or liability for your use of this information. Please provide this summary of care documentation to your next provider. Your primary care clinician is listed as [address]. If you have any questions after today's visit, please call [phone].

## 2017-04-03 NOTE — PROGRESS NOTES
Reviewed record in preparation for visit and have necessary documentation  Pt did not bring medication to office visit for review    Goals that were addressed and/or need to be completed during or after this appointment include   There are no preventive care reminders to display for this patient. - check for functional glucose monitor and record keeping system- yes and testing BID  Pt was given BS record log to document home readings and return to office for review  Diabetic Bundle:  LDL-62  A1C-6.4  BP-  Smoking?no  Anticoagulation medication? yes  Eye exam dilated? completed  Foot exam?completed

## 2017-04-04 LAB
ALBUMIN SERPL-MCNC: 4.4 G/DL (ref 3.5–4.8)
ALBUMIN/GLOB SERPL: 1.8 {RATIO} (ref 1.2–2.2)
ALP SERPL-CCNC: 51 IU/L (ref 39–117)
ALT SERPL-CCNC: 31 IU/L (ref 0–32)
AST SERPL-CCNC: 22 IU/L (ref 0–40)
BILIRUB SERPL-MCNC: 0.4 MG/DL (ref 0–1.2)
BUN SERPL-MCNC: 17 MG/DL (ref 8–27)
BUN/CREAT SERPL: 18 (ref 12–28)
CALCIUM SERPL-MCNC: 10.1 MG/DL (ref 8.7–10.3)
CHLORIDE SERPL-SCNC: 96 MMOL/L (ref 96–106)
CHOLEST SERPL-MCNC: 141 MG/DL (ref 100–199)
CO2 SERPL-SCNC: 28 MMOL/L (ref 18–29)
CREAT SERPL-MCNC: 0.96 MG/DL (ref 0.57–1)
EST. AVERAGE GLUCOSE BLD GHB EST-MCNC: 131 MG/DL
GLOBULIN SER CALC-MCNC: 2.5 G/DL (ref 1.5–4.5)
GLUCOSE SERPL-MCNC: 99 MG/DL (ref 65–99)
HBA1C MFR BLD: 6.2 % (ref 4.8–5.6)
HDLC SERPL-MCNC: 49 MG/DL
LDLC SERPL CALC-MCNC: 58 MG/DL (ref 0–99)
POTASSIUM SERPL-SCNC: 4.1 MMOL/L (ref 3.5–5.2)
PROT SERPL-MCNC: 6.9 G/DL (ref 6–8.5)
SODIUM SERPL-SCNC: 140 MMOL/L (ref 134–144)
TRIGL SERPL-MCNC: 169 MG/DL (ref 0–149)
VLDLC SERPL CALC-MCNC: 34 MG/DL (ref 5–40)

## 2017-04-04 NOTE — PROGRESS NOTES
Progress Note    Patient: Ervin Abarca MRN: [id number]  SSN: [id number]    YOB: 1940  Age: 77 y.o. Sex: female        Chief Complaint   Patient presents with    Diabetes    Hypertension    Skin Problem     area to back         Subjective:     Encounter Diagnoses   Name Primary?  Type 2 diabetes mellitus with complication, without long-term current use of insulin ([facility] [address]): This patient is managed under a comprehensive plan of care for Diabetes. Overall the patient feels well with good energy level. Pertinent Labs:   Lab Results   Component Value Date/Time    Hemoglobin A1c 6.4 12/02/2016 11:35 AM    Hemoglobin A1c 6.5 04/11/2016 11:39 AM    Hemoglobin A1c 6.5 12/10/2015 10:56 AM      Body mass index is 40.6 kg/(m^2). Lab Results   Component Value Date/Time    LDL, calculated 62 12/02/2016 11:35 AM         Lab Results   Component Value Date/Time    Sodium 141 12/02/2016 11:35 AM    Potassium 4.0 12/02/2016 11:35 AM    Chloride 96 12/02/2016 11:35 AM    CO2 26 12/02/2016 11:35 AM    Anion gap 5 10/17/2014 03:31 AM    Glucose 110 12/02/2016 11:35 AM    BUN 25 12/02/2016 11:35 AM    Creatinine 1.13 12/02/2016 11:35 AM    BUN/Creatinine ratio 22 12/02/2016 11:35 AM    GFR est AA 55 12/02/2016 11:35 AM    GFR est non-AA 48 12/02/2016 11:35 AM    Calcium 9.9 12/02/2016 11:35 AM    AST (SGOT) 25 12/02/2016 11:35 AM    Alk. phosphatase 46 12/02/2016 11:35 AM    Protein, total 6.9 12/02/2016 11:35 AM    Albumin 4.4 12/02/2016 11:35 AM    Globulin 3.2 10/28/2010 09:07 AM    A-G Ratio 1.8 12/02/2016 11:35 AM    ALT (SGPT) 30 12/02/2016 11:35 AM     Lab Results   Component Value Date/Time    Microalb/Creat ratio (ug/mg creat.) 269.2 12/02/2016 11:35 AM      Frequency of home glucose testing:daily   Blood Sugar range at home: Less than 130 in general   Last eye exam: In past 12 months. Last foot exam: This year.    Polyuria, polyphagia or polydipsia: No   Retinopathy: No   Neuropathy SX: No    Low blood sugar symptoms: No   Dietary compliance: Good   Medication compliance:Good   On ASA: Yes   Depression: No   CKD:no     Wt Readings from Last 3 Encounters:   04/03/17 201 lb (91.2 kg)   03/20/17 202 lb 11.2 oz (91.9 kg)   02/03/17 209 lb (94.8 kg)        History   Smoking Status    Former Smoker    Packs/day: 1.00    Years: 20.00    Quit date: 11/18/1984   Smokeless Tobacco    Never Used         All the patient's questions regarding medications, diet and exercise were answered. Goal of A1C of less than 7.5% is our goal.   Our overall goal is to reduce or eliminate the long term consequences of poorly controlled diabetes. Yes    Essential hypertension:   BP Readings from Last 3 Encounters:   04/03/17 115/71   03/20/17 (!) 144/91   02/03/17 133/76     The patient reports:  taking medications as instructed, no medication side effects noted, no TIA's, no chest pain on exertion, no dyspnea on exertion, no swelling of ankles. Lab Results   Component Value Date/Time    Sodium 141 12/02/2016 11:35 AM    Potassium 4.0 12/02/2016 11:35 AM    Chloride 96 12/02/2016 11:35 AM    CO2 26 12/02/2016 11:35 AM    Anion gap 5 10/17/2014 03:31 AM    Glucose 110 12/02/2016 11:35 AM    BUN 25 12/02/2016 11:35 AM    Creatinine 1.13 12/02/2016 11:35 AM    BUN/Creatinine ratio 22 12/02/2016 11:35 AM    GFR est AA 55 12/02/2016 11:35 AM    GFR est non-AA 48 12/02/2016 11:35 AM    Calcium 9.9 12/02/2016 11:35 AM    Bilirubin, total 0.4 12/02/2016 11:35 AM    AST (SGOT) 25 12/02/2016 11:35 AM    Alk. phosphatase 46 12/02/2016 11:35 AM    Protein, total 6.9 12/02/2016 11:35 AM    Albumin 4.4 12/02/2016 11:35 AM    Globulin 3.2 10/28/2010 09:07 AM    A-G Ratio 1.8 12/02/2016 11:35 AM    ALT (SGPT) 30 12/02/2016 11:35 AM     Our goal is to normalize the blood pressure to decrease the risks of strokes and heart attacks. The patient is in agreement with the plan.        Hypercholesterolemia:  Cardiovascular risks for her are: LDL goal is under 80  diabetic  hypertension  hyperlipidemia. Currently she takes zetia and fenofibrate. Lab Results   Component Value Date/Time    Cholesterol, total 155 12/02/2016 11:35 AM    HDL Cholesterol 57 12/02/2016 11:35 AM    LDL, calculated 62 12/02/2016 11:35 AM    Triglyceride 182 12/02/2016 11:35 AM    CHOL/HDL Ratio 2.4 10/28/2010 09:07 AM     Lab Results   Component Value Date/Time    ALT (SGPT) 30 12/02/2016 11:35 AM    AST (SGOT) 25 12/02/2016 11:35 AM    Alk. phosphatase 46 12/02/2016 11:35 AM    Bilirubin, total 0.4 12/02/2016 11:35 AM      Myalgias: No   Fatigue: No   Other side effects: no  Wt Readings from Last 3 Encounters:   04/03/17 201 lb (91.2 kg)   03/20/17 202 lb 11.2 oz (91.9 kg)   02/03/17 209 lb (94.8 kg)     The patient is aware of our goal to reduce or eliminate the long term problems (such as strokes and heart attacks) related to poorly controlled hyperlipidemia.  Gastroesophageal reflux disease without esophagitis:  Current control of Symptoms:good  Hiatal Hernia:no  Current Medications:omeprazole  The patient has no history melena or bright red blood in the stools. The patient avoids high dose aspirin and NSAID therapy. The patient is aware of diet changes needed, elevating the head of the bed and appropriate use of antacids.  Arthritis:   Worst symptoms:knee, back, hips. Sx Stable.  Mixed hyperlipidemia:see above  Lab Results   Component Value Date/Time    Cholesterol, total 155 12/02/2016 11:35 AM    HDL Cholesterol 57 12/02/2016 11:35 AM    LDL, calculated 62 12/02/2016 11:35 AM    Triglyceride 182 12/02/2016 11:35 AM    CHOL/HDL Ratio 2.4 10/28/2010 09:07 AM     Lab Results   Component Value Date/Time    ALT (SGPT) 30 12/02/2016 11:35 AM    AST (SGOT) 25 12/02/2016 11:35 AM    Alk.  phosphatase 46 12/02/2016 11:35 AM    Bilirubin, total 0.4 12/02/2016 11:35 AM      Myalgias: No   Fatigue: No   Other side effects: no  Wt Readings from Last 3 Encounters: 04/03/17 201 lb (91.2 kg)   03/20/17 202 lb 11.2 oz (91.9 kg)   02/03/17 209 lb (94.8 kg)     The patient is aware of our goal to reduce or eliminate the long term problems (such as strokes and heart attacks) related to poorly controlled hyperlipidemia.  Lipoma of torso: she has a 1 x 2 cm  Lump on her lower back. Is not changed since her last office visit. There is no cyst tract. Will observe for now. Current and past medical information:    Current Medications after this visit[de-identified]   Current Outpatient Prescriptions   Medication Sig    lancets (FREESTYLE LANCETS) 28 gauge misc Use as directed to check blood sugar twice daily. DX: E11.65    dilTIAZem CD (CARDIZEM CD) 180 mg ER capsule TAKE 1 CAPSULE DAILY    ZETIA 10 mg tablet TAKE 1 TABLET DAILY    ondansetron hcl (ZOFRAN) 4 mg tablet Take 1 Tab by mouth every eight (8) hours as needed for Nausea.  fenofibrate nanocrystallized (TRICOR) 145 mg tablet Take 1 Tab by mouth daily.  losartan-hydroCHLOROthiazide (HYZAAR) 100-25 mg per tablet TAKE 1 TABLET DAILY (STOP TRIAMTERENE/HCTZ)    exenatide microspheres (BYDUREON) 2 mg/0.65 mL pnij 1 Syringe by SubCUTAneous route every seven (7) days.  FREESTYLE LITE STRIPS strip TEST TWICE A DAY    cloNIDine HCl (CATAPRES) 0.1 mg tablet TAKE 1 TABLET NIGHTLY    loratadine (CLARITIN) 10 mg tablet TAKE 1 TABLET DAILY    traMADol (ULTRAM) 50 mg tablet Take 1 Tab by mouth every six (6) hours as needed for Pain. Max Daily Amount: 200 mg.  MICROLET LANCET misc USE TO TEST TWICE A DAY    aspirin delayed-release 81 mg tablet Take 325 mg by mouth daily.  esomeprazole (NEXIUM) 20 mg capsule Take  by mouth daily.  omega-3 fatty acids-vitamin e (FISH OIL) 1,000 mg cap Take 1 Cap by mouth.  cholecalciferol, vitamin d3, (VITAMIN D) 1,000 unit tablet Take 1,000 Units by mouth two (2) times a day.     pioglitazone (ACTOS) 30 mg tablet TAKE 1 TABLET DAILY     No current facility-administered medications for this visit. Patient Active Problem List    Diagnosis Date Noted    Hypertension      Priority: 1 - One    Hypercholesterolemia      Priority: 1 - One    GERD (gastroesophageal reflux disease)      Priority: 1 - One    Hot flashes, menopausal      Priority: 2 - Two    Type 2 diabetes mellitus with hyperglycemia, without long-term current use of insulin ([address] 12/19/2016    Essential hypertension 10/18/2015    Mixed hyperlipidemia 10/18/2015    Morbid obesity ([address] 10/18/2015    S/P total knee replacement using cement 09/09/2015    Type 2 diabetes mellitus with complication ([address] 38/11/0836    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    Diabetes mellitus ([address] 09/03/2012    Allergic rhinitis 11/18/2011    Arthritis 02/28/2011    Vitamin D deficiency 06/28/2010       Past Medical History:   Diagnosis Date    Arthritis 2/28/2011    OSTEO    Chronic obstructive pulmonary disease (HCC)     VERY MILD    Chronic pain     LEFT KNEE    Diabetes (HCC)     GERD (gastroesophageal reflux disease)     Hot flashes, menopausal     Hypercholesterolemia     Hypertension     Seizures ([address] 1980'S    AFTER DRINKING 2 MARGARITAS    Vitamin D deficiency 6/28/2010       Allergies   Allergen Reactions    Ciprofloxacin Nausea Only    Erythromycin Nausea Only    Metformin Other (comments)     Felt bad      Pcn [Penicillins] Nausea Only and Swelling       Past Surgical History:   Procedure Laterality Date    HX APPENDECTOMY  2001    HX CATARACT REMOVAL Bilateral     HX HYSTERECTOMY  1975    HX KNEE REPLACEMENT      left knee    HX OOPHORECTOMY  2001    bilateral    HX ORTHOPAEDIC      rotator cuff- jorge.     HX OTHER SURGICAL      LIPOMA LEFT SIDE       Social History     Social History    Marital status:      Spouse name: N/A    Number of children: N/A    Years of education: N/A     Social History Main Topics    Smoking status: Former Smoker     Packs/day: 1.00     Years: 20.00     Quit date: 11/18/1984    Smokeless tobacco: Never Used    Alcohol use Yes      Comment: rarely    Drug use: No    Sexual activity: Yes     Other Topics Concern    None     Social History Narrative       Review of Systems   Constitutional: Negative. Negative for chills, fever, malaise/fatigue and weight loss. HENT: Negative. Negative for hearing loss. Eyes: Negative. Negative for blurred vision and double vision. Respiratory: Negative. Negative for cough, hemoptysis, sputum production and shortness of breath. Cardiovascular: Negative. Negative for chest pain, palpitations and orthopnea. Gastrointestinal: Negative. Negative for abdominal pain, blood in stool, heartburn, nausea and vomiting. Genitourinary: Negative. Negative for dysuria, frequency and urgency. Musculoskeletal: Negative. Negative for back pain, myalgias and neck pain. Skin: Negative. Negative for rash. Lump lower right back   Neurological: Negative. Negative for dizziness, tingling, tremors, weakness and headaches. Endo/Heme/Allergies: Negative. Psychiatric/Behavioral: Negative. Negative for depression. Objective:     Vitals:    04/03/17 1027   BP: 115/71   Pulse: 69   Resp: 16   Temp: 98.5 °F (36.9 °C)   TempSrc: Oral   SpO2: 94%   Weight: 201 lb (91.2 kg)   Height: 4' 11\" (1.499 m)      Body mass index is 40.6 kg/(m^2). Physical Exam   Constitutional: She is oriented to person, place, and time and well-developed, well-nourished, and in no distress. No distress. HENT:   Head: Normocephalic and atraumatic. Mouth/Throat: Oropharynx is clear and moist.   Eyes: Conjunctivae are normal.   Neck: No tracheal deviation present. No thyromegaly present. Cardiovascular: Normal rate, regular rhythm and normal heart sounds. No murmur heard. Pulmonary/Chest: Effort normal and breath sounds normal. No respiratory distress. Abdominal: Soft. She exhibits no distension.    Musculoskeletal:        Arms:  [place], mobile lump, sq and no cyst tract. Lymphadenopathy:     She has no cervical adenopathy. Neurological: She is alert and oriented to person, place, and time. Skin: Skin is warm. No rash noted. She is not diaphoretic. No erythema. Psychiatric: Mood and affect normal.   Nursing note and vitals reviewed. There are no preventive care reminders to display for this patient. Assessment and orders:       ICD-10-CM ICD-9-CM    1. Type 2 diabetes mellitus with complication, without long-term current use of insulin (HCC)-recheck E11.8 250.90 LIPID PANEL      METABOLIC PANEL, COMPREHENSIVE      HEMOGLOBIN A1C WITH EAG   2. Essential hypertension  -well-controlled I10 401.9 LIPID PANEL      METABOLIC PANEL, COMPREHENSIVE   3. Hypercholesterolemia-retest E78.00 272.0 LIPID PANEL      METABOLIC PANEL, COMPREHENSIVE   4. Gastroesophageal reflux disease without esophagitis-controlled   K21.9 530.81    5. Arthritis-stable   M19.90 716.90    6. Mixed hyperlipidemia E78.2 272.2 LIPID PANEL      METABOLIC PANEL, COMPREHENSIVE   7. Lipoma of torso-observe she is having lipoma removed before the size of her fist but not at this location D17.1 214.1          Plan of care:  Discussed diagnoses in detail with patient. Medication risks/benefits/side effects discussed with patient. All of the patient's questions were addressed. The patient understands and agrees with our plan of care. The patient knows to call back if they are unsure of or forget any changes we discussed today or if the symptoms change. The patient received an After-Visit Summary which contains VS, orders, medication list and allergy list. This can be used as a \"mini-medical record\" should they have to seek medical care while out of town.     Patient Care Team:  Kristopher Ugalde MD as PCP - Justo Ornelas MD as Physician (Cardiology)  Mariam De Leon MD as Physician (Orthopedic Surgery)  Michelle Lee MD (Endocrinology)    Follow-up Disposition:  Return in about 5 months (around 9/3/2017) for fasting. .    Future Appointments  Date Time Provider [place] Sloan   7/21/2017 1:30 PM Gabriel Jiménez MD CDE [facility]   9/8/2017 9:30 AM Kristi Weber MD [facility] SCHED       Signed By: Kristi Weber MD     April 3, 2017

## 2017-07-11 RX ORDER — CLONIDINE HYDROCHLORIDE 0.1 MG/1
TABLET ORAL
Qty: 90 TAB | Refills: 1 | Status: SHIPPED | OUTPATIENT
Start: 2017-07-11 | End: 2018-01-08 | Stop reason: SDUPTHER

## 2017-07-21 ENCOUNTER — OFFICE VISIT (OUTPATIENT)
Dept: ENDOCRINOLOGY | Age: 77
End: 2017-07-21

## 2017-07-21 VITALS
HEIGHT: 59 IN | BODY MASS INDEX: 40.62 KG/M2 | HEART RATE: 78 BPM | WEIGHT: 201.5 LBS | RESPIRATION RATE: 16 BRPM | SYSTOLIC BLOOD PRESSURE: 123 MMHG | TEMPERATURE: 97.5 F | OXYGEN SATURATION: 95 % | DIASTOLIC BLOOD PRESSURE: 82 MMHG

## 2017-07-21 DIAGNOSIS — E78.2 MIXED HYPERLIPIDEMIA: ICD-10-CM

## 2017-07-21 DIAGNOSIS — I10 ESSENTIAL HYPERTENSION: Chronic | ICD-10-CM

## 2017-07-21 DIAGNOSIS — E11.8 TYPE 2 DIABETES MELLITUS WITH COMPLICATION, WITHOUT LONG-TERM CURRENT USE OF INSULIN (HCC): Primary | Chronic | ICD-10-CM

## 2017-07-21 LAB
GLUCOSE POC: 158 MG/DL
HBA1C MFR BLD HPLC: 6.2 %

## 2017-07-21 NOTE — PROGRESS NOTES
[facility]               Anurag Abel MD        [address] [phone] Fax [phone] ( [id number]AIR)        [address] [address] [address] [phone] Fax [phone] ( Monday)          Patient Information  Date:7/21/2017  Name : Johnie Lebron 77 y.o.     YOB: 1940         Referred by: Luciana Sidhu MD         Chief Complaint   Patient presents with    Diabetes       History of Present Illness: Johnie Lebron is a 77 y.o. female here for fu of  Type 2 Diabetes Mellitus. Type 2 Diabetes was diagnosed in 10/2014 . End organ effects of diabetes: none.     Cardiovascular risk factors: family history, dyslipidemia, diabetes mellitus   Monitoring frequency:2/ day   Could not tolerate Metformin IR, XR formualtion and was on Actos,     Switched to [employer] and she is doing good, has subcutaneous nodules       Checking glucose at home  Has not noticed significant hypoglycemia     Compliant with medications    No acute issues            Hyperlipidemia - was on tricor, zetia, colestipol, Niacin, no statin     Wt Readings from Last 3 Encounters:   07/21/17 201 lb 8 oz (91.4 kg)   04/03/17 201 lb (91.2 kg)   03/20/17 202 lb 11.2 oz (91.9 kg)       BP Readings from Last 3 Encounters:   07/21/17 123/82   04/03/17 115/71   03/20/17 (!) 144/91           Past Medical History:   Diagnosis Date    Arthritis 2/28/2011    OSTEO    Chronic obstructive pulmonary disease (HCC)     VERY MILD    Chronic pain     LEFT KNEE    Diabetes (HCC)     GERD (gastroesophageal reflux disease)     Hot flashes, menopausal     Hypercholesterolemia     Hypertension     Seizures (HCC) 1980'S    AFTER DRINKING 2 MARGARITAS    Vitamin D deficiency 6/28/2010     Current Outpatient Prescriptions   Medication Sig    cloNIDine HCl (CATAPRES) 0.1 mg tablet TAKE 1 TABLET NIGHTLY    lancets (FREESTYLE LANCETS) 28 gauge misc Use as directed to check blood sugar twice daily. DX: E11.65    dilTIAZem CD (CARDIZEM CD) 180 mg ER capsule TAKE 1 CAPSULE DAILY    ZETIA 10 mg tablet TAKE 1 TABLET DAILY    fenofibrate nanocrystallized (TRICOR) 145 mg tablet Take 1 Tab by mouth daily.  losartan-hydroCHLOROthiazide (HYZAAR) 100-25 mg per tablet TAKE 1 TABLET DAILY (STOP TRIAMTERENE/HCTZ)    exenatide microspheres (BYDUREON) 2 mg/0.65 mL pnij 1 Syringe by SubCUTAneous route every seven (7) days.  FREESTYLE LITE STRIPS strip TEST TWICE A DAY    loratadine (CLARITIN) 10 mg tablet TAKE 1 TABLET DAILY    traMADol (ULTRAM) 50 mg tablet Take 1 Tab by mouth every six (6) hours as needed for Pain. Max Daily Amount: 200 mg.  MICROLET LANCET misc USE TO TEST TWICE A DAY    aspirin delayed-release 81 mg tablet Take 325 mg by mouth daily.  esomeprazole (NEXIUM) 20 mg capsule Take  by mouth daily.  omega-3 fatty acids-vitamin e (FISH OIL) 1,000 mg cap Take 1 Cap by mouth.  cholecalciferol, vitamin d3, (VITAMIN D) 1,000 unit tablet Take 1,000 Units by mouth two (2) times a day.  pioglitazone (ACTOS) 30 mg tablet TAKE 1 TABLET DAILY    ondansetron hcl (ZOFRAN) 4 mg tablet Take 1 Tab by mouth every eight (8) hours as needed for Nausea. No current facility-administered medications for this visit. Allergies   Allergen Reactions    Ciprofloxacin Nausea Only    Erythromycin Nausea Only    Metformin Other (comments)     Felt bad      Pcn [Penicillins] Nausea Only and Swelling         Review of Systems:  - Constitutional Symptoms: no fevers, no chills,   - Eyes: no blurry vision no double vision  - Cardiovascular: no chest pain ,no palpitations  - Respiratory: no cough no shortness of breath  - Gastrointestinal: no dysphagia no  abdominal pain  - Musculoskeletal: no joint pains no  weakness  - Integumentary: no rashes  -     Physical Examination:   Blood pressure 123/82, pulse 78, temperature 97.5 °F (36.4 °C), temperature source Oral, resp.  rate 16, height 4' 11\" (1.499 m), weight 201 lb 8 oz (91.4 kg), SpO2 95 %. Estimated body mass index is 40.7 kg/(m^2) as calculated from the following:    Height as of this encounter: 4' 11\" (1.499 m). -   Weight as of this encounter: 201 lb 8 oz (91.4 kg). - General: pleasant, no distress, good eye contact  - HEENT: no pallor, no periorbital edema, EOMI  - Neck: supple, no thyromegaly  - Cardiovascular: regular, normal rate, normal S1 and S2  - Respiratory: clear to auscultation bilaterally  - Gastrointestinal: soft, nontender, nondistended,  BS +  - Musculoskeletal: no proximal muscle weakness in upper or lower extremities  - Integumentary: alert and oriented  - Psychiatric: normal mood and affect  - Skin: color, texture, turgor normal.       Data Reviewed:     [] Glucose records reviewed. [] See glucose records for details (to be scanned). [] A1C  [] Reviewed labs    Lab Results   Component Value Date/Time    Hemoglobin A1c 6.2 04/03/2017 04:03 PM    Hemoglobin A1c 6.4 12/02/2016 11:35 AM    Hemoglobin A1c 6.5 04/11/2016 11:39 AM    Glucose 99 04/03/2017 04:03 PM    Glucose (POC) 146 10/17/2014 11:06 AM    Glucose  07/21/2017 01:36 PM    Microalb/Creat ratio (ug/mg creat.) 269.2 12/02/2016 11:35 AM    LDL, calculated 58 04/03/2017 04:03 PM    Creatinine 0.96 04/03/2017 04:03 PM      Lab Results   Component Value Date/Time    Cholesterol, total 141 04/03/2017 04:03 PM    HDL Cholesterol 49 04/03/2017 04:03 PM    LDL, calculated 58 04/03/2017 04:03 PM    Triglyceride 169 04/03/2017 04:03 PM    CHOL/HDL Ratio 2.4 10/28/2010 09:07 AM     Lab Results   Component Value Date/Time    ALT (SGPT) 31 04/03/2017 04:03 PM    AST (SGOT) 22 04/03/2017 04:03 PM    Alk.  phosphatase 51 04/03/2017 04:03 PM    Bilirubin, total 0.4 04/03/2017 04:03 PM     Lab Results   Component Value Date/Time    TSH 1.280 12/02/2016 11:35 AM    T4, Free 1.55 09/09/2015 02:16 PM      Lab Results   Component Value Date/Time    Glucose 99 04/03/2017 04:03 PM Glucose (POC) 146 10/17/2014 11:06 AM    Glucose  07/21/2017 01:36 PM        Assessment/Plan:     1. Type 2 diabetes mellitus with complication, without long-term current use of insulin ([facility]        1. Type 2 Diabetes Mellitus with no nephropathy,neuropathy,retinopathy  Lab Results   Component Value Date/Time    Hemoglobin A1c 6.2 04/03/2017 04:03 PM    Hemoglobin A1c (POC) 6.2 07/21/2017 01:35 PM     Controlled   Bydureon   FLU annually ,Pneumovax ,aspirin daily,annual eye exam,microalbumin    2. HTN : Continue current therapy     3. Mixed Hyperlipidemia : low carb diet. Statin intolerance hx,   Continue Zetia, Tricor    4. Obesity:Body mass index is 40.7 kg/(m^2). Discussed about the importance of exercise and carbohydrate portion control. There are no Patient Instructions on file for this visit. Follow-up Disposition: Not on File    Thank you for allowing me to participate in the care of this patient.     Ricarda Jessica MD

## 2017-07-21 NOTE — PROGRESS NOTES
Naye Mena is a 77 y.o. female here for   Chief Complaint   Patient presents with    Diabetes       Functional glucose monitor and record keeping system? - yes  Eye exam within last year? - yes  Foot exam within last year? - yes    1. Have you been to the ER, urgent care clinic since your last visit? Hospitalized since your last visit? - no    2. Have you seen or consulted any other health care providers outside of the [address] [place] [address] since your last visit?   Include any pap smears or colon screening.- no      Lab Results   Component Value Date/Time    Hemoglobin A1c 6.2 04/03/2017 04:03 PM    Hemoglobin A1c (POC) 5.6 03/20/2017 01:11 PM       Wt Readings from Last 3 Encounters:   07/21/17 201 lb 8 oz (91.4 kg)   04/03/17 201 lb (91.2 kg)   03/20/17 202 lb 11.2 oz (91.9 kg)     Temp Readings from Last 3 Encounters:   07/21/17 97.5 °F (36.4 °C) (Oral)   04/03/17 98.5 °F (36.9 °C) (Oral)   03/20/17 98.1 °F (36.7 °C) (Oral)     BP Readings from Last 3 Encounters:   07/21/17 123/82   04/03/17 115/71   03/20/17 (!) 144/91     Pulse Readings from Last 3 Encounters:   07/21/17 78   04/03/17 69   03/20/17 73

## 2017-07-21 NOTE — MR AVS SNAPSHOT
Visit Information Date & Time Provider Department Dept. Phone Encounter #  
 7/21/2017  1:30 PM Angelita Perez MD [facility] [phone] [id number] Follow-up Instructions Return in about 5 months (around 12/21/2017). Your Appointments 9/8/2017  9:30 AM  
ROUTINE CARE with Maty Kelley MD  
[address] [facility] [address] [place] [address]) Appt Note: 5 mo f/u-Diabetes HTN  
 [address] [address]  
[place] [address] [address] Upcoming Health Maintenance Date Due INFLUENZA AGE 9 TO ADULT 8/1/2017 HEMOGLOBIN A1C Q6M 10/3/2017 EYE EXAM RETINAL OR DILATED Q1 10/27/2017 FOOT EXAM Q1 12/2/2017 MICROALBUMIN Q1 12/2/2017 MEDICARE YEARLY EXAM 2/4/2018 LIPID PANEL Q1 4/3/2018 GLAUCOMA SCREENING Q2Y 10/27/2018 COLONOSCOPY 6/27/2019 DTaP/Tdap/Td series (2 - Td) 12/14/2021 Allergies as of 7/21/2017  Review Complete On: 7/21/2017 By: Angelita Perez MD  
  
 Severity Noted Reaction Type Reactions Ciprofloxacin  05/20/2010    Nausea Only Erythromycin  05/20/2010    Nausea Only Metformin  09/03/2012    Other (comments) Felt bad  
 Pcn [Penicillins]  05/20/2010    Nausea Only, Swelling Current Immunizations  Reviewed on 12/2/2016 Name Date H1N1 FLU VACCINE 3/4/2010 Influenza Vaccine 10/8/2015, 10/17/2014  2:48 PM, 9/26/2013 Influenza Vaccine (Quad) PF 12/2/2016 Influenza Vaccine Split 11/16/2012, 11/18/2011, 10/28/2010 Pneumococcal Conjugate (PCV-13) 5/5/2016 Pneumococcal Polysaccharide (PPSV-23) 9/9/2015 Pneumococcal Vaccine (Unspecified Type) 12/1/2010 TDAP Vaccine 12/14/2011 Zoster Vaccine, Live 1/10/2013 Not reviewed this visit You Were Diagnosed With   
  
 Codes Comments  Type 2 diabetes mellitus with complication, without long-term current use of insulin ([facility].)    -  Primary ICD-10-CM: E11.8 ICD-9-CM: 250.90 Essential hypertension     ICD-10-CM: I10 
ICD-9-CM: 401.9 Mixed hyperlipidemia     ICD-10-CM: E78.2 ICD-9-CM: 272.2 Vitals BP Pulse Temp Resp Height(growth percentile) Weight(growth percentile) 123/82 (BP 1 Location: Left arm, BP Patient Position: Sitting) 78 97.5 °F (36.4 °C) (Oral) 16 4' 11\" (1.499 m) 201 lb 8 oz (91.4 kg) SpO2 BMI OB Status Smoking Status 95% 40.7 kg/m2 Hysterectomy Former Smoker Vitals History BMI and BSA Data Body Mass Index Body Surface Area 40.7 kg/m 2 1.95 m 2 Preferred Pharmacy Pharmacy Name Phone [address] [facility] [phone] Your Updated Medication List  
  
   
This list is accurate as of: 7/21/17  1:52 PM.  Always use your most recent med list.  
  
  
  
  
 aspirin delayed-release 81 mg tablet Take 325 mg by mouth daily. cloNIDine HCl 0.1 mg tablet Commonly known as:  CATAPRES  
TAKE 1 TABLET NIGHTLY  
  
 dilTIAZem  mg ER capsule Commonly known as:  CARDIZEM CD  
TAKE 1 CAPSULE DAILY exenatide microspheres 2 mg/0.65 mL Pnij Commonly known as:  BYDUREON  
1 Syringe by SubCUTAneous route every seven (7) days. fenofibrate nanocrystallized 145 mg tablet Commonly known as:  [employer] Take 1 Tab by mouth daily. FISH OIL 1,000 mg Cap Generic drug:  omega-3 fatty acids-vitamin e Take 1 Cap by mouth. FREESTYLE LITE STRIPS strip Generic drug:  glucose blood VI test strips TEST TWICE A DAY  
  
 loratadine 10 mg tablet Commonly known as:  CLARITIN  
TAKE 1 TABLET DAILY losartan-hydroCHLOROthiazide 100-25 mg per tablet Commonly known as:  HYZAAR  
TAKE 1 TABLET DAILY (STOP TRIAMTERENE/HCTZ) * [facility] Generic drug:  Lancets USE TO TEST TWICE A DAY  
  
 * lancets 28 gauge Misc Commonly known as:  FREESTYLE LANCETS  
 Use as directed to check blood sugar twice daily. DX: E11.65 NexIUM 20 mg capsule Generic drug:  esomeprazole Take  by mouth daily. ondansetron hcl 4 mg tablet Commonly known as:  Domi Sami Take 1 Tab by mouth every eight (8) hours as needed for Nausea. traMADol 50 mg tablet Commonly known as:  ULTRAM  
Take 1 Tab by mouth every six (6) hours as needed for Pain. Max Daily Amount: 200 mg. VITAMIN D3 1,000 unit tablet Generic drug:  cholecalciferol Take 1,000 Units by mouth two (2) times a day. ZETIA 10 mg tablet Generic drug:  ezetimibe TAKE 1 TABLET DAILY * Notice: This list has 2 medication(s) that are the same as other medications prescribed for you. Read the directions carefully, and ask your doctor or other care provider to review them with you. We Performed the Following AMB POC GLUCOSE BLOOD, BY GLUCOSE MONITORING DEVICE [93502 CPT(R)] AMB POC HEMOGLOBIN A1C [76341 CPT(R)] Follow-up Instructions Return in about 5 months (around 12/21/2017). Please provide this summary of care documentation to your next provider. Your primary care clinician is listed as [address]. If you have any questions after today's visit, please call [phone].

## 2017-08-24 ENCOUNTER — OFFICE VISIT (OUTPATIENT)
Dept: FAMILY MEDICINE CLINIC | Age: 77
End: 2017-08-24

## 2017-08-24 VITALS
BODY MASS INDEX: 40.12 KG/M2 | OXYGEN SATURATION: 93 % | HEART RATE: 73 BPM | RESPIRATION RATE: 20 BRPM | TEMPERATURE: 97.1 F | WEIGHT: 199 LBS | HEIGHT: 59 IN | DIASTOLIC BLOOD PRESSURE: 72 MMHG | SYSTOLIC BLOOD PRESSURE: 124 MMHG

## 2017-08-24 DIAGNOSIS — M25.552 LEFT HIP PAIN: Primary | ICD-10-CM

## 2017-08-24 RX ORDER — ACETAMINOPHEN 500 MG
1000 TABLET ORAL 2 TIMES DAILY
Qty: 20 TAB | Refills: 0 | Status: SHIPPED | OUTPATIENT
Start: 2017-08-24 | End: 2017-08-29

## 2017-08-24 RX ORDER — TEA TREE OIL 100 %
1 OIL (ML) TOPICAL
COMMUNITY
End: 2017-12-08 | Stop reason: SDUPTHER

## 2017-08-24 RX ORDER — NAPROXEN SODIUM 220 MG
220 TABLET ORAL 2 TIMES DAILY WITH MEALS
Qty: 30 TAB | Refills: 2 | Status: SHIPPED | OUTPATIENT
Start: 2017-08-24 | End: 2018-05-11 | Stop reason: ALTCHOICE

## 2017-08-24 RX ORDER — TRAMADOL HYDROCHLORIDE 50 MG/1
50 TABLET ORAL
Qty: 90 TAB | Refills: 4 | Status: SHIPPED | OUTPATIENT
Start: 2017-08-24 | End: 2019-05-31

## 2017-08-24 NOTE — PATIENT INSTRUCTIONS
Hip Pain: Care Instructions  Your Care Instructions  Hip pain may be caused by many things, including overuse, a fall, or a twisting movement. Another cause of hip pain is arthritis. Your pain may increase when you stand up, walk, or squat. The pain may come and go or may be constant. Home treatment can help relieve hip pain, swelling, and stiffness. If your pain is ongoing, you may need more tests and treatment. Follow-up care is a key part of your treatment and safety. Be sure to make and go to all appointments, and call your doctor if you are having problems. Its also a good idea to know your test results and keep a list of the medicines you take. How can you care for yourself at home? · Take pain medicines exactly as directed. ¨ If the doctor gave you a prescription medicine for pain, take it as prescribed. ¨ If you are not taking a prescription pain medicine, ask your doctor if you can take an over-the-counter medicine. · Rest and protect your hip. Take a break from any activity, including standing or walking, that may cause pain. · Put ice or a cold pack against your hip for 10 to 20 minutes at a time. Try to do this every 1 to 2 hours for the next 3 days (when you are awake) or until the swelling goes down. Put a thin cloth between the ice and your skin. · Sleep on your healthy side with a pillow between your knees, or sleep on your back with pillows under your knees. · If there is no swelling, you can put moist heat, a heating pad, or a warm cloth on your hip. Do gentle stretching exercises to help keep your hip flexible. · Learn how to prevent falls. Have your vision and hearing checked regularly. Wear slippers or shoes with a nonskid sole. · Stay at a healthy weight. · Wear comfortable shoes. When should you call for help? Call 911 anytime you think you may need emergency care. For example, call if:  · You have sudden chest pain and shortness of breath, or you cough up blood.   · You are not able to stand or walk or bear weight. · Your buttocks, legs, or feet feel numb or tingly. · Your leg or foot is cool or pale or changes color. · You have severe pain. Call your doctor now or seek immediate medical care if:  · You have signs of infection, such as:  ¨ Increased pain, swelling, warmth, or redness in the hip area. ¨ Red streaks leading from the hip area. ¨ Pus draining from the hip area. ¨ A fever. · You have signs of a blood clot, such as:  ¨ Pain in your calf, back of the knee, thigh, or groin. ¨ Redness and swelling in your leg or groin. · You are not able to bend, straighten, or move your leg normally. · You have trouble urinating or having bowel movements. Watch closely for changes in your health, and be sure to contact your doctor if:  · You do not get better as expected. Where can you learn more? Go to http://reid-shanell.info/. Enter U240 in the search box to learn more about \"Hip Pain: Care Instructions. \"  Current as of: March 20, 2017  Content Version: 11.3  © 5015-6258 [employer]. Care instructions adapted under license by [employer] (which disclaims liability or warranty for this information). If you have questions about a medical condition or this instruction, always ask your healthcare professional. [address] any warranty or liability for your use of this information.

## 2017-08-24 NOTE — MR AVS SNAPSHOT
Visit Information Date & Time Provider Department Dept. Phone Encounter #  
 8/24/2017 11:00 AM Bora Cavanaugh MD [address] [id number] Your Appointments 8/24/2017 11:00 AM  
SAME DAY SICK with Bora Cavanaugh MD  
[address]) Appt Note: pain in left hip Cp$0 sj  8.24.17  
 [address]  
  
   
 [address] [address]  
  
    
 9/8/2017  9:30 AM  
ROUTINE CARE with Laura Angeles MD  
[address]) Appt Note: 5 mo f/u-Diabetes HTN  
 [address]  
  
   
 [address] [address]  
  
    
 12/8/2017  1:30 PM  
ROUTINE CARE with Kwaku Triplett MD  
[facility] [address] [place] [address]) Appt Note: f/u  5 month  
 [address] [facility] [address]  
[phone]  
  
   
 Shawn Alvarado [address] [place] [id number] Upcoming Health Maintenance Date Due INFLUENZA AGE 9 TO ADULT 8/1/2017 EYE EXAM RETINAL OR DILATED Q1 10/27/2017 FOOT EXAM Q1 12/2/2017 MICROALBUMIN Q1 12/2/2017 HEMOGLOBIN A1C Q6M 1/21/2018 MEDICARE YEARLY EXAM 2/4/2018 LIPID PANEL Q1 4/3/2018 GLAUCOMA SCREENING Q2Y 10/27/2018 COLONOSCOPY 6/27/2019 DTaP/Tdap/Td series (2 - Td) 12/14/2021 Allergies as of 8/24/2017  Review Complete On: 7/21/2017 By: Kwaku Triplett MD  
  
 Severity Noted Reaction Type Reactions Ciprofloxacin  05/20/2010    Nausea Only Erythromycin  05/20/2010    Nausea Only Metformin  09/03/2012    Other (comments), Nausea Only Felt bad Felt bad  
 Pcn [Penicillins]  05/20/2010    Nausea Only, Swelling Current Immunizations  Reviewed on 12/2/2016 Name Date H1N1 FLU VACCINE 3/4/2010 Influenza Vaccine 10/8/2015, 10/17/2014  2:48 PM, 9/26/2013 Influenza Vaccine (Quad) PF 12/2/2016 Influenza Vaccine Split 11/16/2012, 11/18/2011, 10/28/2010 Pneumococcal Conjugate (PCV-13) 5/5/2016 Pneumococcal Polysaccharide (PPSV-23) 9/9/2015 TDAP Vaccine 12/14/2011 ZZZ-RETIRED (DO NOT USE) Pneumococcal Vaccine (Unspecified Type) 12/1/2010 Zoster Vaccine, Live 1/10/2013 Not reviewed this visit Vitals BP Pulse Temp Resp Height(growth percentile) Weight(growth percentile) 124/72 (BP 1 Location: Right arm, BP Patient Position: Sitting) 73 97.1 °F (36.2 °C) (Oral) 20 4' 11\" (1.499 m) 199 lb (90.3 kg) SpO2 BMI OB Status Smoking Status 93% 40.19 kg/m2 Hysterectomy Former Smoker Vitals History BMI and BSA Data Body Mass Index Body Surface Area  
 40.19 kg/m 2 1.94 m 2 Preferred Pharmacy Pharmacy Name Phone [address] [facility] [phone] Your Updated Medication List  
  
   
This list is accurate as of: 8/24/17 10:46 AM.  Always use your most recent med list.  
  
  
  
  
 acetaminophen 500 mg tablet Commonly known as:  TYLENOL Take 2 Tabs by mouth two (2) times a day for 5 days. aspirin delayed-release 81 mg tablet Take 325 mg by mouth daily. aspirin-calcium carbonate 81 mg-300 mg calcium(777 mg) Tab Take 325 mg by mouth.  
  
 cloNIDine HCl 0.1 mg tablet Commonly known as:  CATAPRES  
TAKE 1 TABLET NIGHTLY  
  
 dilTIAZem  mg ER capsule Commonly known as:  CARDIZEM CD  
TAKE 1 CAPSULE DAILY exenatide microspheres 2 mg/0.65 mL Pnij Commonly known as:  BYDUREON  
1 Syringe by SubCUTAneous route every seven (7) days. fenofibrate nanocrystallized 145 mg tablet Commonly known as:  [employer] Take 1 Tab by mouth daily. FISH OIL 1,000 mg Cap Generic drug:  omega-3 fatty acids-vitamin e Take 1 Cap by mouth. FREESTYLE LITE STRIPS strip Generic drug:  glucose blood VI test strips TEST TWICE A DAY  
  
 loratadine 10 mg tablet Commonly known as:  CLARITIN  
TAKE 1 TABLET DAILY losartan-hydroCHLOROthiazide 100-25 mg per tablet Commonly known as:  HYZAAR  
TAKE 1 TABLET DAILY (STOP TRIAMTERENE/HCTZ) * [facility] Generic drug:  Lancets USE TO TEST TWICE A DAY  
  
 * lancets 28 gauge Misc Commonly known as:  FREESTYLE LANCETS Use as directed to check blood sugar twice daily. DX: E11.65  
  
 naproxen sodium 220 mg tablet Commonly known as:  Barney Span Take 1 Tab by mouth two (2) times daily (with meals). NexIUM 20 mg capsule Generic drug:  esomeprazole Take  by mouth daily. OM-3-DHA-EPA-Fish Oil-Vit D3 300-1,000-1,000 mg-mg-unit Cap Take 1 Cap by mouth. ondansetron hcl 4 mg tablet Commonly known as:  [employer] Take 1 Tab by mouth every eight (8) hours as needed for Nausea. traMADol 50 mg tablet Commonly known as:  ULTRAM  
Take 1 Tab by mouth every six (6) hours as needed for Pain. Max Daily Amount: 200 mg. VITAMIN D3 1,000 unit tablet Generic drug:  cholecalciferol Take 1,000 Units by mouth two (2) times a day. ZETIA 10 mg tablet Generic drug:  ezetimibe TAKE 1 TABLET DAILY * Notice: This list has 2 medication(s) that are the same as other medications prescribed for you. Read the directions carefully, and ask your doctor or other care provider to review them with you. Prescriptions Printed Refills  
 naproxen sodium (ALEVE) 220 mg tablet 2 Sig: Take 1 Tab by mouth two (2) times daily (with meals). Class: Print Route: Oral  
 acetaminophen (TYLENOL) 500 mg tablet 0 Sig: Take 2 Tabs by mouth two (2) times a day for 5 days. Class: Print Route: Oral  
 traMADol (ULTRAM) 50 mg tablet 4 Sig: Take 1 Tab by mouth every six (6) hours as needed for Pain. Max Daily Amount: 200 mg. Class: Print Route: Oral  
  
Patient Instructions Hip Pain: Care Instructions Your Care Instructions Hip pain may be caused by many things, including overuse, a fall, or a twisting movement. Another cause of hip pain is arthritis. Your pain may increase when you stand up, walk, or squat. The pain may come and go or may be constant. Home treatment can help relieve hip pain, swelling, and stiffness. If your pain is ongoing, you may need more tests and treatment. Follow-up care is a key part of your treatment and safety. Be sure to make and go to all appointments, and call your doctor if you are having problems. Its also a good idea to know your test results and keep a list of the medicines you take. How can you care for yourself at home? · Take pain medicines exactly as directed. ¨ If the doctor gave you a prescription medicine for pain, take it as prescribed. ¨ If you are not taking a prescription pain medicine, ask your doctor if you can take an over-the-counter medicine. · Rest and protect your hip. Take a break from any activity, including standing or walking, that may cause pain. · Put ice or a cold pack against your hip for 10 to 20 minutes at a time. Try to do this every 1 to 2 hours for the next 3 days (when you are awake) or until the swelling goes down. Put a thin cloth between the ice and your skin. · Sleep on your healthy side with a pillow between your knees, or sleep on your back with pillows under your knees. · If there is no swelling, you can put moist heat, a heating pad, or a warm cloth on your hip. Do gentle stretching exercises to help keep your hip flexible. · Learn how to prevent falls. Have your vision and hearing checked regularly. Wear slippers or shoes with a nonskid sole. · Stay at a healthy weight. · Wear comfortable shoes. When should you call for help? Call 911 anytime you think you may need emergency care. For example, call if: · You have sudden chest pain and shortness of breath, or you cough up blood. · You are not able to stand or walk or bear weight. · Your buttocks, legs, or feet feel numb or tingly. · Your leg or foot is cool or pale or changes color. · You have severe pain. Call your doctor now or seek immediate medical care if: 
· You have signs of infection, such as: 
¨ Increased pain, swelling, warmth, or redness in the hip area. ¨ Red streaks leading from the hip area. ¨ Pus draining from the hip area. ¨ A fever. · You have signs of a blood clot, such as: 
¨ Pain in your calf, back of the knee, thigh, or groin. ¨ Redness and swelling in your leg or groin. · You are not able to bend, straighten, or move your leg normally. · You have trouble urinating or having bowel movements. Watch closely for changes in your health, and be sure to contact your doctor if: 
· You do not get better as expected. Where can you learn more? Go to http://reid-shanell.info/. Enter A666 in the search box to learn more about \"Hip Pain: Care Instructions. \" Current as of: March 20, 2017 Content Version: 11.3 © 5239-2597 [employer]. Care instructions adapted under license by [employer] (which disclaims liability or warranty for this information). If you have questions about a medical condition or this instruction, always ask your healthcare professional. [place] [address] any warranty or liability for your use of this information. Please provide this summary of care documentation to your next provider. Your primary care clinician is listed as [address]. If you have any questions after today's visit, please call [phone].

## 2017-08-26 NOTE — PROGRESS NOTES
Progress Note    Patient: Romi Acuña MRN: [id number]  SSN: [id number]    YOB: 1940  Age: 77 y.o. Sex: female        Chief Complaint   Patient presents with    Hip Pain     left hip pain         Subjective:     Hip pain since yesterday morning upon wakening  Moderate to severe   Left sided  Deep, no superficial  Achy  Slight improvement with 100mg tramadol  Not chronic, never had hip pains before  No apparent injury      Current and past medical information:    Current Medications after this visit[de-identified]     Current Outpatient Prescriptions   Medication Sig    naproxen sodium (ALEVE) 220 mg tablet Take 1 Tab by mouth two (2) times daily (with meals).  acetaminophen (TYLENOL) 500 mg tablet Take 2 Tabs by mouth two (2) times a day for 5 days.  traMADol (ULTRAM) 50 mg tablet Take 1 Tab by mouth every six (6) hours as needed for Pain. Max Daily Amount: 200 mg.  cloNIDine HCl (CATAPRES) 0.1 mg tablet TAKE 1 TABLET NIGHTLY    lancets (FREESTYLE LANCETS) 28 gauge misc Use as directed to check blood sugar twice daily. DX: E11.65    dilTIAZem CD (CARDIZEM CD) 180 mg ER capsule TAKE 1 CAPSULE DAILY    ZETIA 10 mg tablet TAKE 1 TABLET DAILY    ondansetron hcl (ZOFRAN) 4 mg tablet Take 1 Tab by mouth every eight (8) hours as needed for Nausea.  fenofibrate nanocrystallized (TRICOR) 145 mg tablet Take 1 Tab by mouth daily.  losartan-hydroCHLOROthiazide (HYZAAR) 100-25 mg per tablet TAKE 1 TABLET DAILY (STOP TRIAMTERENE/HCTZ)    exenatide microspheres (BYDUREON) 2 mg/0.65 mL pnij 1 Syringe by SubCUTAneous route every seven (7) days.  FREESTYLE LITE STRIPS strip TEST TWICE A DAY    loratadine (CLARITIN) 10 mg tablet TAKE 1 TABLET DAILY    MICROLET LANCET misc USE TO TEST TWICE A DAY    aspirin delayed-release 81 mg tablet Take 325 mg by mouth daily.  esomeprazole (NEXIUM) 20 mg capsule Take  by mouth daily.     omega-3 fatty acids-vitamin e (FISH OIL) 1,000 mg cap Take 1 Cap by mouth.    cholecalciferol, vitamin d3, (VITAMIN D) 1,000 unit tablet Take 1,000 Units by mouth two (2) times a day.  OM-3-DHA-EPA-Fish Oil-Vit D3 300-1,000-1,000 mg-mg-unit cap Take 1 Cap by mouth.  aspirin-calcium carbonate 81 mg-300 mg calcium(777 mg) tab Take 325 mg by mouth. No current facility-administered medications for this visit. Patient Active Problem List    Diagnosis Date Noted    Type 2 diabetes mellitus with hyperglycemia, without long-term current use of insulin ([address] 12/19/2016    Essential hypertension 10/18/2015    Mixed hyperlipidemia 10/18/2015    Morbid obesity ([address] 10/18/2015    S/P total knee replacement using cement 09/09/2015    Type 2 diabetes mellitus with complication ([address] 89/25/4510    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    Diabetes mellitus ([address] 09/03/2012    Allergic rhinitis 11/18/2011    Arthritis 02/28/2011    Vitamin D deficiency 06/28/2010    Hypertension     Hypercholesterolemia     GERD (gastroesophageal reflux disease)     Hot flashes, menopausal        Past Medical History:   Diagnosis Date    Arthritis 2/28/2011    OSTEO    Chronic obstructive pulmonary disease (HCC)     VERY MILD    Chronic pain     LEFT KNEE    Diabetes (HCC)     GERD (gastroesophageal reflux disease)     Hot flashes, menopausal     Hypercholesterolemia     Hypertension     Seizures ([address] 1980'S    AFTER DRINKING 2 MARGARITAS    Vitamin D deficiency 6/28/2010       Allergies   Allergen Reactions    Ciprofloxacin Nausea Only    Erythromycin Nausea Only    Metformin Other (comments) and Nausea Only     Felt bad  Felt bad      Pcn [Penicillins] Nausea Only and Swelling       Past Surgical History:   Procedure Laterality Date    HX APPENDECTOMY  2001    HX CATARACT REMOVAL Bilateral     HX HYSTERECTOMY  1975    HX KNEE REPLACEMENT      left knee    HX OOPHORECTOMY  2001    bilateral    HX ORTHOPAEDIC      rotator cuff- jorge.     HX OTHER SURGICAL      LIPOMA LEFT SIDE Social History     Social History    Marital status:      Spouse name: N/A    Number of children: N/A    Years of education: N/A     Social History Main Topics    Smoking status: Former Smoker     Packs/day: 1.00     Years: 20.00     Quit date: 11/18/1984    Smokeless tobacco: Never Used    Alcohol use Yes      Comment: rarely    Drug use: No    Sexual activity: Yes     Other Topics Concern    None     Social History Narrative       Review of Systems   Constitutional: Negative for chills and fever. Gastrointestinal: Negative for nausea and vomiting. Skin: Negative for itching and rash. Objective:     Vitals:    08/24/17 1002   BP: 124/72   Pulse: 73   Resp: 20   Temp: 97.1 °F (36.2 °C)   TempSrc: Oral   SpO2: 93%   Weight: 199 lb (90.3 kg)   Height: 4' 11\" (1.499 m)      Body mass index is 40.19 kg/(m^2). Physical Exam   Constitutional: She is oriented to person, place, and time. No distress. HENT:   Head: Normocephalic and atraumatic. Eyes: Pupils are equal, round, and reactive to light. Right eye exhibits no discharge. Left eye exhibits no discharge. No scleral icterus. Cardiovascular: Normal rate and regular rhythm. Exam reveals no gallop and no friction rub. No murmur heard. Pulmonary/Chest: Effort normal. No respiratory distress. She has no wheezes. She has no rales. Abdominal: Soft. She exhibits no distension. There is no tenderness. Musculoskeletal: She exhibits no edema or tenderness. Passive range of motion at hip does not elicit pain. No tenderness at greater trochanter. Straight leg raise negative   Lymphadenopathy:     She has no cervical adenopathy. Neurological: She is alert and oriented to person, place, and time. Skin: Skin is warm and dry. No rash noted. She is not diaphoretic. Psychiatric: She has a normal mood and affect. Her behavior is normal.   Nursing note and vitals reviewed.       Assessment and Plan:       ICD-10-CM ICD-9-CM 1. Left hip pain M25.552 719.45      The way patient describes the pain makes it seem like it is in the joint (classic \"c\" form with hand). However, the hip is placed through full ROM and no pain elicited. No injury. Expectant management for 3 days with OTCs and tramadol. Will send to her orthopedist if pain not improving. Seen with Dr. Mirian Cadet. Plan of care:  Discussed diagnoses in detail with patient. Medication risks/benefits/side effects discussed with patient. All of the patient's questions were addressed. The patient understands and agrees with our plan of care. The patient knows to call back if they are unsure of or forget any changes we discussed today or if the symptoms change. The patient received an After-Visit Summary which contains VS, orders, medication list and allergy list. This can be used as a \"mini-medical record\" should they have to seek medical care while out of town.     Patient Care Team:  Mauri Dennis MD as PCP - Monique Chand MD as Physician (Cardiology)  Siva Mcclelland MD as Physician (Orthopedic Surgery)  Yaneth Queen MD (Endocrinology)    Follow-up Disposition: Not on File    Future Appointments  Date Time Provider [place]   9/8/2017 9:30 AM Mauri Dennis MD [facility]   12/8/2017 1:30 PM Yaneth Queen MD [address]       Signed By: Sydna Dance, MD     August 24, 2017

## 2017-08-28 NOTE — PROGRESS NOTES
I saw and evaluated the patient idependently, performing the key elements of the exam and service. I discussed the findings, assessment and plan with the resident and agree with the resident's findings and plan as documented in the resident's note. Lucinda Anderson M.D.

## 2017-08-29 ENCOUNTER — TELEPHONE (OUTPATIENT)
Dept: FAMILY MEDICINE CLINIC | Age: 77
End: 2017-08-29

## 2017-08-29 RX ORDER — HYDROCODONE BITARTRATE AND ACETAMINOPHEN 5; 325 MG/1; MG/1
1 TABLET ORAL
Qty: 14 TAB | Refills: 0 | Status: SHIPPED | OUTPATIENT
Start: 2017-08-29 | End: 2017-09-05

## 2017-08-29 NOTE — TELEPHONE ENCOUNTER
Patient was seen last week for hip pain and told to call back if it didn't get any better which it is not. Please advise at [phone]. She called her orthopedist but she couldn't get in until September 7th. Tramadol is not helping the pain.

## 2017-09-08 ENCOUNTER — OFFICE VISIT (OUTPATIENT)
Dept: FAMILY MEDICINE CLINIC | Age: 77
End: 2017-09-08

## 2017-09-08 VITALS
HEIGHT: 59 IN | RESPIRATION RATE: 20 BRPM | DIASTOLIC BLOOD PRESSURE: 92 MMHG | TEMPERATURE: 98.7 F | WEIGHT: 193.6 LBS | HEART RATE: 86 BPM | SYSTOLIC BLOOD PRESSURE: 147 MMHG | OXYGEN SATURATION: 96 % | BODY MASS INDEX: 39.03 KG/M2

## 2017-09-08 DIAGNOSIS — K21.9 GASTROESOPHAGEAL REFLUX DISEASE WITHOUT ESOPHAGITIS: Chronic | ICD-10-CM

## 2017-09-08 DIAGNOSIS — E78.00 HYPERCHOLESTEROLEMIA: Chronic | ICD-10-CM

## 2017-09-08 DIAGNOSIS — E55.9 VITAMIN D DEFICIENCY: Chronic | ICD-10-CM

## 2017-09-08 DIAGNOSIS — E11.65 TYPE 2 DIABETES MELLITUS WITH HYPERGLYCEMIA, WITHOUT LONG-TERM CURRENT USE OF INSULIN (HCC): Primary | ICD-10-CM

## 2017-09-08 DIAGNOSIS — I10 ESSENTIAL HYPERTENSION: Chronic | ICD-10-CM

## 2017-09-08 NOTE — MR AVS SNAPSHOT
Visit Information Date & Time Provider Department Dept. Phone Encounter #  
 9/8/2017  9:30 AM Baljinder Lima MD [address] [place] [address] [phone] [id number] Follow-up Instructions Return in about 4 months (around 1/8/2018). Your Appointments 12/8/2017  1:30 PM  
ROUTINE CARE with Pavithra Padgett MD  
[facility]) Appt Note: f/u  5 month  
 [address] [facility] [address]  
[phone]  
  
   
 Shawn Montilla [address] [place] [id number] Upcoming Health Maintenance Date Due INFLUENZA AGE 9 TO ADULT 8/1/2017 EYE EXAM RETINAL OR DILATED Q1 10/27/2017 FOOT EXAM Q1 12/2/2017 MICROALBUMIN Q1 12/2/2017 HEMOGLOBIN A1C Q6M 1/21/2018 MEDICARE YEARLY EXAM 2/4/2018 LIPID PANEL Q1 4/3/2018 GLAUCOMA SCREENING Q2Y 10/27/2018 COLONOSCOPY 6/27/2019 DTaP/Tdap/Td series (2 - Td) 12/14/2021 Allergies as of 9/8/2017  Review Complete On: 9/8/2017 By: Raheel Fajardo Severity Noted Reaction Type Reactions Ciprofloxacin  05/20/2010    Nausea Only Erythromycin  05/20/2010    Nausea Only Metformin  09/03/2012    Other (comments), Nausea Only Felt bad Felt bad  
 Pcn [Penicillins]  05/20/2010    Nausea Only, Swelling Current Immunizations  Reviewed on 12/2/2016 Name Date H1N1 FLU VACCINE 3/4/2010 Influenza Vaccine 10/8/2015, 10/17/2014  2:48 PM, 9/26/2013 Influenza Vaccine (Quad) PF 12/2/2016 Influenza Vaccine Split 11/16/2012, 11/18/2011, 10/28/2010 Pneumococcal Conjugate (PCV-13) 5/5/2016 Pneumococcal Polysaccharide (PPSV-23) 9/9/2015 TDAP Vaccine 12/14/2011 ZZZ-RETIRED (DO NOT USE) Pneumococcal Vaccine (Unspecified Type) 12/1/2010 Zoster Vaccine, Live 1/10/2013 Not reviewed this visit You Were Diagnosed With   
  
 Codes Comments Type 2 diabetes mellitus with hyperglycemia, without long-term current use of insulin (HCC)    -  Primary ICD-10-CM: E11.65 ICD-9-CM: 250.00, 790.29 Essential hypertension     ICD-10-CM: I10 
ICD-9-CM: 401.9 Hypercholesterolemia     ICD-10-CM: E78.00 ICD-9-CM: 272.0 Gastroesophageal reflux disease without esophagitis     ICD-10-CM: K21.9 ICD-9-CM: 530.81 Vitamin D deficiency     ICD-10-CM: E55.9 ICD-9-CM: 268.9 Vitals BP Pulse Temp Resp Height(growth percentile) Weight(growth percentile) 167/82 86 98.7 °F (37.1 °C) (Oral) 20 4' 11\" (1.499 m) 193 lb 9.6 oz (87.8 kg) SpO2 BMI OB Status Smoking Status 96% 39.1 kg/m2 Hysterectomy Former Smoker Vitals History BMI and BSA Data Body Mass Index Body Surface Area  
 39.1 kg/m 2 1.91 m 2 Preferred Pharmacy Pharmacy Name [address] [facility] [phone] Your Updated Medication List  
  
   
This list is accurate as of: 9/8/17 10:11 AM.  Always use your most recent med list.  
  
  
  
  
 aspirin delayed-release 81 mg tablet Take 325 mg by mouth daily. aspirin-calcium carbonate 81 mg-300 mg calcium(777 mg) Tab Take 325 mg by mouth.  
  
 cloNIDine HCl 0.1 mg tablet Commonly known as:  CATAPRES  
TAKE 1 TABLET NIGHTLY  
  
 dilTIAZem  mg ER capsule Commonly known as:  CARDIZEM CD  
TAKE 1 CAPSULE DAILY exenatide microspheres 2 mg/0.65 mL Pnij Commonly known as:  BYDUREON  
1 Syringe by SubCUTAneous route every seven (7) days. fenofibrate nanocrystallized 145 mg tablet Commonly known as:  [employer] Take 1 Tab by mouth daily. FISH OIL 1,000 mg Cap Generic drug:  omega-3 fatty acids-vitamin e Take 1 Cap by mouth. FREESTYLE LITE STRIPS strip Generic drug:  glucose blood VI test strips TEST TWICE A DAY  
  
 loratadine 10 mg tablet Commonly known as:  CLARITIN  
TAKE 1 TABLET DAILY losartan-hydroCHLOROthiazide 100-25 mg per tablet Commonly known as:  HYZAAR  
TAKE 1 TABLET DAILY (STOP TRIAMTERENE/HCTZ) * [facility] Generic drug:  Lancets USE TO TEST TWICE A DAY  
  
 * lancets 28 gauge Misc Commonly known as:  FREESTYLE LANCETS Use as directed to check blood sugar twice daily. DX: E11.65  
  
 naproxen sodium 220 mg tablet Commonly known as:  Sindy Fine Take 1 Tab by mouth two (2) times daily (with meals). NexIUM 20 mg capsule Generic drug:  esomeprazole Take  by mouth daily. OM-3-DHA-EPA-Fish Oil-Vit D3 300-1,000-1,000 mg-mg-unit Cap Take 1 Cap by mouth. ondansetron hcl 4 mg tablet Commonly known as:  Zannie Alto Take 1 Tab by mouth every eight (8) hours as needed for Nausea. traMADol 50 mg tablet Commonly known as:  ULTRAM  
Take 1 Tab by mouth every six (6) hours as needed for Pain. Max Daily Amount: 200 mg. VITAMIN D3 1,000 unit tablet Generic drug:  cholecalciferol Take 1,000 Units by mouth two (2) times a day. ZETIA 10 mg tablet Generic drug:  ezetimibe TAKE 1 TABLET DAILY * Notice: This list has 2 medication(s) that are the same as other medications prescribed for you. Read the directions carefully, and ask your doctor or other care provider to review them with you. We Performed the Following HEMOGLOBIN A1C WITH EAG [77411 CPT(R)] HEMOGLOBIN [id number] CPT(R)] LIPID PANEL [50681 CPT(R)] METABOLIC PANEL, COMPREHENSIVE [88327 CPT(R)] MICROALBUMIN, UR, RAND W/ MICROALBUMIN/CREA RATIO [id number] CPT(R)] Follow-up Instructions Return in about 4 months (around 1/8/2018). Patient Instructions Learning About Diabetes Food Guidelines Your Care Instructions Meal planning is important to manage diabetes. It helps keep your blood sugar at a target level (which you set with your doctor).  You don't have to eat special foods. You can eat what your family eats, including sweets once in a while. But you do have to pay attention to how often you eat and how much you eat of certain foods. You may want to work with a dietitian or a certified diabetes educator (CDE) to help you plan meals and snacks. A dietitian or CDE can also help you lose weight if that is one of your goals. What should you know about eating carbs? Managing the amount of carbohydrate (carbs) you eat is an important part of healthy meals when you have diabetes. Carbohydrate is found in many foods. · Learn which foods have carbs. And learn the amounts of carbs in different foods. ¨ Bread, cereal, pasta, and rice have about 15 grams of carbs in a serving. A serving is 1 slice of bread (1 ounce), ½ cup of cooked cereal, or 1/3 cup of cooked pasta or rice. ¨ Fruits have 15 grams of carbs in a serving. A serving is 1 small fresh fruit, such as an apple or orange; ½ of a banana; ½ cup of cooked or canned fruit; ½ cup of fruit juice; 1 cup of melon or raspberries; or 2 tablespoons of dried fruit. ¨ Milk and no-sugar-added yogurt have 15 grams of carbs in a serving. A serving is 1 cup of milk or 2/3 cup of no-sugar-added yogurt. ¨ Starchy vegetables have 15 grams of carbs in a serving. A serving is ½ cup of mashed potatoes or sweet potato; 1 cup winter squash; ½ of a small baked potato; ½ cup of cooked beans; or ½ cup cooked corn or green peas. · Learn how much carbs to eat each day and at each meal. A dietitian or CDE can teach you how to keep track of the amount of carbs you eat. This is called carbohydrate counting. · If you are not sure how to count carbohydrate grams, use the Plate Method to plan meals. It is a good, quick way to make sure that you have a balanced meal. It also helps you spread carbs throughout the day. ¨ Divide your plate by types of foods.  Put non-starchy vegetables on half the plate, meat or other protein food on one-quarter of the plate, and a grain or starchy vegetable in the final quarter of the plate. To this you can add a small piece of fruit and 1 cup of milk or yogurt, depending on how many carbs you are supposed to eat at a meal. 
· Try to eat about the same amount of carbs at each meal. Do not \"save up\" your daily allowance of carbs to eat at one meal. 
· Proteins have very little or no carbs per serving. Examples of proteins are beef, chicken, turkey, fish, eggs, tofu, cheese, cottage cheese, and peanut butter. A serving size of meat is 3 ounces, which is about the size of a deck of cards. Examples of meat substitute serving sizes (equal to 1 ounce of meat) are 1/4 cup of cottage cheese, 1 egg, 1 tablespoon of peanut butter, and ½ cup of tofu. How can you eat out and still eat healthy? · Learn to estimate the serving sizes of foods that have carbohydrate. If you measure food at home, it will be easier to estimate the amount in a serving of restaurant food. · If the meal you order has too much carbohydrate (such as potatoes, corn, or baked beans), ask to have a low-carbohydrate food instead. Ask for a salad or green vegetables. · If you use insulin, check your blood sugar before and after eating out to help you plan how much to eat in the future. · If you eat more carbohydrate at a meal than you had planned, take a walk or do other exercise. This will help lower your blood sugar. What else should you know? · Limit saturated fat, such as the fat from meat and dairy products. This is a healthy choice because people who have diabetes are at higher risk of heart disease. So choose lean cuts of meat and nonfat or low-fat dairy products. Use olive or canola oil instead of butter or shortening when cooking. · Don't skip meals. Your blood sugar may drop too low if you skip meals and take insulin or certain medicines for diabetes. · Check with your doctor before you drink alcohol. Alcohol can cause your blood sugar to drop too low. Alcohol can also cause a bad reaction if you take certain diabetes medicines. Follow-up care is a key part of your treatment and safety. Be sure to make and go to all appointments, and call your doctor if you are having problems. It's also a good idea to know your test results and keep a list of the medicines you take. Where can you learn more? Go to http://reid-shanell.info/. Enter K563 in the search box to learn more about \"Learning About Diabetes Food Guidelines. \" Current as of: March 13, 2017 Content Version: 11.3 © 4395-2504 [employer]. Care instructions adapted under license by [employer] (which disclaims liability or warranty for this information). If you have questions about a medical condition or this instruction, always ask your healthcare professional. [address] any warranty or liability for your use of this information. Please provide this summary of care documentation to your next provider. Your primary care clinician is listed as [address]. If you have any questions after today's visit, please call [phone].

## 2017-09-08 NOTE — PROGRESS NOTES
Progress Note    Patient: Javier Menard MRN: [id number]  SSN: [id number]    YOB: 1940  Age: 77 y.o. Sex: female        Chief Complaint   Patient presents with    Hypertension    Diabetes    Cholesterol Problem    Labs    Immunization/Injection     flu         Subjective:     Encounter Diagnoses   Name Primary?  Type 2 diabetes mellitus with hyperglycemia, without long-term current use of insulin ([facility] [address]): This patient is managed under a comprehensive plan of care for Diabetes. Overall the patient feels well with good energy level. Pertinent Labs:   Lab Results   Component Value Date/Time    Hemoglobin A1c 6.2 04/03/2017 04:03 PM    Hemoglobin A1c 6.4 12/02/2016 11:35 AM    Hemoglobin A1c 6.5 04/11/2016 11:39 AM      Body mass index is 39.1 kg/(m^2). Lab Results   Component Value Date/Time    LDL, calculated 58 04/03/2017 04:03 PM         Lab Results   Component Value Date/Time    Sodium 140 04/03/2017 04:03 PM    Potassium 4.1 04/03/2017 04:03 PM    Chloride 96 04/03/2017 04:03 PM    CO2 28 04/03/2017 04:03 PM    Anion gap 5 10/17/2014 03:31 AM    Glucose 99 04/03/2017 04:03 PM    BUN 17 04/03/2017 04:03 PM    Creatinine 0.96 04/03/2017 04:03 PM    BUN/Creatinine ratio 18 04/03/2017 04:03 PM    GFR est AA 67 04/03/2017 04:03 PM    GFR est non-AA 58 04/03/2017 04:03 PM    Calcium 10.1 04/03/2017 04:03 PM    AST (SGOT) 22 04/03/2017 04:03 PM    Alk. phosphatase 51 04/03/2017 04:03 PM    Protein, total 6.9 04/03/2017 04:03 PM    Albumin 4.4 04/03/2017 04:03 PM    Globulin 3.2 10/28/2010 09:07 AM    A-G Ratio 1.8 04/03/2017 04:03 PM    ALT (SGPT) 31 04/03/2017 04:03 PM     Lab Results   Component Value Date/Time    Microalb/Creat ratio (ug/mg creat.) 269.2 12/02/2016 11:35 AM      Frequency of home glucose testing: 3 times weekly   Blood Sugar range at home: High as 115   Last eye exam: In past 12 months. Last foot exam: This year.    Polyuria, polyphagia or polydipsia: No   Retinopathy: No   Neuropathy SX: No    Low blood sugar symptoms: No   Dietary compliance: Good   Medication compliance:Good   On ASA: Yes   Depression: No   CKD:no     Wt Readings from Last 3 Encounters:   09/08/17 193 lb 9.6 oz (87.8 kg)   08/24/17 199 lb (90.3 kg)   07/21/17 201 lb 8 oz (91.4 kg)        History   Smoking Status    Former Smoker    Packs/day: 1.00    Years: 20.00    Quit date: 11/18/1984   Smokeless Tobacco    Never Used     All the patient's questions regarding medications, diet and exercise were answered. Goal of A1C of less than 7.5% is our goal.   Our overall goal is to reduce or eliminate the long term consequences of poorly controlled diabetes. Yes    Essential hypertension: Elevated today-she will repeat pressures at home  BP Readings from Last 3 Encounters:   09/08/17 167/82   08/24/17 124/72   07/21/17 123/82     The patient reports:  taking medications as instructed, no medication side effects noted, no TIA's, no chest pain on exertion, no dyspnea on exertion, no swelling of ankles. Lab Results   Component Value Date/Time    Sodium 140 04/03/2017 04:03 PM    Potassium 4.1 04/03/2017 04:03 PM    Chloride 96 04/03/2017 04:03 PM    CO2 28 04/03/2017 04:03 PM    Anion gap 5 10/17/2014 03:31 AM    Glucose 99 04/03/2017 04:03 PM    BUN 17 04/03/2017 04:03 PM    Creatinine 0.96 04/03/2017 04:03 PM    BUN/Creatinine ratio 18 04/03/2017 04:03 PM    GFR est AA 67 04/03/2017 04:03 PM    GFR est non-AA 58 04/03/2017 04:03 PM    Calcium 10.1 04/03/2017 04:03 PM    Bilirubin, total 0.4 04/03/2017 04:03 PM    AST (SGOT) 22 04/03/2017 04:03 PM    Alk. phosphatase 51 04/03/2017 04:03 PM    Protein, total 6.9 04/03/2017 04:03 PM    Albumin 4.4 04/03/2017 04:03 PM    Globulin 3.2 10/28/2010 09:07 AM    A-G Ratio 1.8 04/03/2017 04:03 PM    ALT (SGPT) 31 04/03/2017 04:03 PM     Our goal is to normalize the blood pressure to decrease the risks of strokes and heart attacks.  The patient is in agreement with the plan.  Hypercholesterolemia:  Cardiovascular risks for her are: LDL goal is under 80  diabetic  hypertension  hyperlipidemia. Currently she takes Zetia  Lab Results   Component Value Date/Time    Cholesterol, total 141 04/03/2017 04:03 PM    HDL Cholesterol 49 04/03/2017 04:03 PM    LDL, calculated 58 04/03/2017 04:03 PM    Triglyceride 169 04/03/2017 04:03 PM    CHOL/HDL Ratio 2.4 10/28/2010 09:07 AM     Lab Results   Component Value Date/Time    ALT (SGPT) 31 04/03/2017 04:03 PM    AST (SGOT) 22 04/03/2017 04:03 PM    Alk. phosphatase 51 04/03/2017 04:03 PM    Bilirubin, total 0.4 04/03/2017 04:03 PM      Myalgias: No   Fatigue: No   Other side effects: no  Wt Readings from Last 3 Encounters:   09/08/17 193 lb 9.6 oz (87.8 kg)   08/24/17 199 lb (90.3 kg)   07/21/17 201 lb 8 oz (91.4 kg)     The patient is aware of our goal to reduce or eliminate the long term problems (such as strokes and heart attacks) related to poorly controlled hyperlipidemia.  Gastroesophageal reflux disease without esophagitis:  Current control of Symptoms:good  Hiatal Hernia:no  Current Medications:esomeprazole  The patient has no history melena or bright red blood in the stools. The patient avoids high dose aspirin and NSAID therapy. The patient is aware of diet changes needed, elevating the head of the bed and appropriate use of antacids.  Vitamin D deficiency:  No sx. Due for testing. Lab Results   Component Value Date/Time    Vitamin D 25-Hydroxy 41.1 11/18/2011 09:22 AM    VITAMIN D, 25-HYDROXY 36.3 12/02/2016 11:35 AM                     Current and past medical information:    Current Medications after this visit[de-identified]   Current Outpatient Prescriptions   Medication Sig    OM-3-DHA-EPA-Fish Oil-Vit D3 300-1,000-1,000 mg-mg-unit cap Take 1 Cap by mouth.  aspirin-calcium carbonate 81 mg-300 mg calcium(777 mg) tab Take 325 mg by mouth.     naproxen sodium (ALEVE) 220 mg tablet Take 1 Tab by mouth two (2) times daily (with meals).  traMADol (ULTRAM) 50 mg tablet Take 1 Tab by mouth every six (6) hours as needed for Pain. Max Daily Amount: 200 mg.  cloNIDine HCl (CATAPRES) 0.1 mg tablet TAKE 1 TABLET NIGHTLY    lancets (FREESTYLE LANCETS) 28 gauge misc Use as directed to check blood sugar twice daily. DX: E11.65    dilTIAZem CD (CARDIZEM CD) 180 mg ER capsule TAKE 1 CAPSULE DAILY    ZETIA 10 mg tablet TAKE 1 TABLET DAILY    ondansetron hcl (ZOFRAN) 4 mg tablet Take 1 Tab by mouth every eight (8) hours as needed for Nausea.  fenofibrate nanocrystallized (TRICOR) 145 mg tablet Take 1 Tab by mouth daily.  losartan-hydroCHLOROthiazide (HYZAAR) 100-25 mg per tablet TAKE 1 TABLET DAILY (STOP TRIAMTERENE/HCTZ)    exenatide microspheres (BYDUREON) 2 mg/0.65 mL pnij 1 Syringe by SubCUTAneous route every seven (7) days.  FREESTYLE LITE STRIPS strip TEST TWICE A DAY    loratadine (CLARITIN) 10 mg tablet TAKE 1 TABLET DAILY    MICROLET LANCET misc USE TO TEST TWICE A DAY    aspirin delayed-release 81 mg tablet Take 325 mg by mouth daily.  esomeprazole (NEXIUM) 20 mg capsule Take  by mouth daily.  omega-3 fatty acids-vitamin e (FISH OIL) 1,000 mg cap Take 1 Cap by mouth.  cholecalciferol, vitamin d3, (VITAMIN D) 1,000 unit tablet Take 1,000 Units by mouth two (2) times a day. No current facility-administered medications for this visit.         Patient Active Problem List    Diagnosis Date Noted    Hypertension      Priority: 1 - One    Hypercholesterolemia      Priority: 1 - One    GERD (gastroesophageal reflux disease)      Priority: 1 - One    Hot flashes, menopausal      Priority: 2 - Two    Type 2 diabetes mellitus with hyperglycemia, without long-term current use of insulin ([facility] [address] 12/19/2016    Essential hypertension 10/18/2015    Mixed hyperlipidemia 10/18/2015    Morbid obesity ([facility] [address] 10/18/2015    S/P total knee replacement using cement 09/09/2015    Type 2 diabetes mellitus with complication ([facility] 12/63/4627    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    Diabetes mellitus ([facility] 09/03/2012    Allergic rhinitis 11/18/2011    Arthritis 02/28/2011    Vitamin D deficiency 06/28/2010       Past Medical History:   Diagnosis Date    Arthritis 2/28/2011    OSTEO    Chronic obstructive pulmonary disease (HCC)     VERY MILD    Chronic pain     LEFT KNEE    Diabetes (HCC)     GERD (gastroesophageal reflux disease)     Hot flashes, menopausal     Hypercholesterolemia     Hypertension     Seizures ([facility] 1980'S    AFTER DRINKING 2 MARGARITAS    Vitamin D deficiency 6/28/2010       Allergies   Allergen Reactions    Ciprofloxacin Nausea Only    Erythromycin Nausea Only    Metformin Other (comments) and Nausea Only     Felt bad  Felt bad      Pcn [Penicillins] Nausea Only and Swelling       Past Surgical History:   Procedure Laterality Date    HX APPENDECTOMY  2001    HX CATARACT REMOVAL Bilateral     HX HYSTERECTOMY  1975    HX OOPHORECTOMY  2001    bilateral    HX ORTHOPAEDIC      HX OTHER SURGICAL      LIPOMA LEFT SIDE       Social History     Social History    Marital status:      Spouse name: N/A    Number of children: N/A    Years of education: N/A     Social History Main Topics    Smoking status: Former Smoker     Packs/day: 1.00     Years: 20.00     Quit date: 11/18/1984    Smokeless tobacco: Never Used    Alcohol use Yes      Comment: rarely    Drug use: No    Sexual activity: Yes     Other Topics Concern    None     Social History Narrative       Review of Systems   Constitutional: Negative. Negative for chills, fever, malaise/fatigue and weight loss. HENT: Negative. Negative for hearing loss. Eyes: Negative. Negative for blurred vision and double vision. Respiratory: Negative. Negative for cough, hemoptysis, sputum production and shortness of breath. Cardiovascular: Negative. Negative for chest pain, palpitations and orthopnea.    Gastrointestinal: Negative. Negative for abdominal pain, blood in stool, heartburn, nausea and vomiting. Genitourinary: Negative. Negative for dysuria, frequency and urgency. Musculoskeletal: Positive for joint pain. Negative for back pain, myalgias and neck pain. She has seen Dr. Emelina Solares for her left hip pain. Tramadol did not help. Her x-ray was unremarkable so he thinks this is a form of olecranon bursitis. He gave her a steroid injection this week. She will have to postpone her flu shot next week. Skin: Negative. Negative for rash. Neurological: Negative. Negative for dizziness, tingling, tremors, weakness and headaches. Endo/Heme/Allergies: Negative. Psychiatric/Behavioral: Negative. Negative for depression. Objective:     Vitals:    09/08/17 0939   BP: 167/82, 147/92-in pain. Pulse: 86   Resp: 20   Temp: 98.7 °F (37.1 °C)   TempSrc: Oral   SpO2: 96%   Weight: 193 lb 9.6 oz (87.8 kg)   Height: 4' 11\" (1.499 m)      Body mass index is 39.1 kg/(m^2). Physical Exam   Constitutional: She is oriented to person, place, and time and well-developed, well-nourished, and in no distress. No distress. She is in a wheelchair. She cannot walk without hip discomfort. It felt pretty good this morning but rapidly got worse. HENT:   Head: Normocephalic and atraumatic. Mouth/Throat: Oropharynx is clear and moist.   Eyes: Conjunctivae are normal.   Neck: No tracheal deviation present. No thyromegaly present. Cardiovascular: Normal rate, regular rhythm and normal heart sounds. No murmur heard. Pulmonary/Chest: Effort normal and breath sounds normal. No respiratory distress. Abdominal: Soft. She exhibits no distension. Lymphadenopathy:     She has no cervical adenopathy. Neurological: She is alert and oriented to person, place, and time. Skin: Skin is warm. No rash noted. She is not diaphoretic. No erythema.    Psychiatric: Mood and affect normal.   Nursing note and vitals reviewed. Health Maintenance Due   Topic Date Due    INFLUENZA AGE 9 TO ADULT  08/01/2017         Assessment and orders:       ICD-10-CM ICD-9-CM    1. Type 2 diabetes mellitus with hyperglycemia, without long-term current use of insulin (HCC)-retest. E11.65 250.00 HEMOGLOBIN A1C WITH EAG     790.29 LIPID PANEL      METABOLIC PANEL, COMPREHENSIVE      MICROALBUMIN, UR, RAND W/ MICROALBUMIN/CREA RATIO   2. Essential hypertension-currently in pain we will have to recheck blood pressures at home. I10 401.9 HEMOGLOBIN   3. Hypercholesterolemia-retest E78.00 272.0 LIPID PANEL      METABOLIC PANEL, COMPREHENSIVE   4. Gastroesophageal reflux disease without esophagitis-clinically stable   K21.9 530.81 HEMOGLOBIN   5. Vitamin D deficiency-corrected E55.9 268.9          Plan of care:  Discussed diagnoses in detail with patient. Medication risks/benefits/side effects discussed with patient. All of the patient's questions were addressed. The patient understands and agrees with our plan of care. The patient knows to call back if they are unsure of or forget any changes we discussed today or if the symptoms change. The patient received an After-Visit Summary which contains VS, orders, medication list and allergy list. This can be used as a \"mini-medical record\" should they have to seek medical care while out of town. Patient Care Team:  Xavier Stewart MD as PCP - Jade Huffman MD as Physician (Cardiology)  Evan Colon MD as Physician (Orthopedic Surgery)  Melody Nelson MD (Endocrinology)    Follow-up Disposition:  Return in about 4 months (around 1/8/2018).     Future Appointments  Date Time Provider [place]   12/8/2017 1:30 PM Melody Nelson MD [address]       Signed By: Xavier Stewart MD     September 8, 2017

## 2017-09-08 NOTE — PATIENT INSTRUCTIONS

## 2017-09-08 NOTE — PROGRESS NOTES
Health Maintenance Due   Topic Date Due    INFLUENZA AGE 9 TO ADULT  08/01/2017       Diabetic Bundle:  LDL-58  A1C-6.2  BP-  Smoking?no  Anticoagulation medication? yes  Eye exam dilated?   Foot exam?

## 2017-09-09 LAB
ALBUMIN SERPL-MCNC: 4.6 G/DL (ref 3.5–4.8)
ALBUMIN/CREAT UR: 153.3 MG/G CREAT (ref 0–30)
ALBUMIN/GLOB SERPL: 1.7 {RATIO} (ref 1.2–2.2)
ALP SERPL-CCNC: 49 IU/L (ref 39–117)
ALT SERPL-CCNC: 37 IU/L (ref 0–32)
AST SERPL-CCNC: 22 IU/L (ref 0–40)
BILIRUB SERPL-MCNC: 0.4 MG/DL (ref 0–1.2)
BUN SERPL-MCNC: 17 MG/DL (ref 8–27)
BUN/CREAT SERPL: 18 (ref 12–28)
CALCIUM SERPL-MCNC: 10.5 MG/DL (ref 8.7–10.3)
CHLORIDE SERPL-SCNC: 97 MMOL/L (ref 96–106)
CHOLEST SERPL-MCNC: 161 MG/DL (ref 100–199)
CO2 SERPL-SCNC: 25 MMOL/L (ref 18–29)
CREAT SERPL-MCNC: 0.93 MG/DL (ref 0.57–1)
CREAT UR-MCNC: 201.1 MG/DL
EST. AVERAGE GLUCOSE BLD GHB EST-MCNC: 140 MG/DL
GLOBULIN SER CALC-MCNC: 2.7 G/DL (ref 1.5–4.5)
GLUCOSE SERPL-MCNC: 169 MG/DL (ref 65–99)
HBA1C MFR BLD: 6.5 % (ref 4.8–5.6)
HDLC SERPL-MCNC: 57 MG/DL
HGB BLD-MCNC: 15.1 G/DL (ref 11.1–15.9)
LDLC SERPL CALC-MCNC: 76 MG/DL (ref 0–99)
Lab: NORMAL
MICROALBUMIN UR-MCNC: 308.2 UG/ML
POTASSIUM SERPL-SCNC: 4.1 MMOL/L (ref 3.5–5.2)
PROT SERPL-MCNC: 7.3 G/DL (ref 6–8.5)
SODIUM SERPL-SCNC: 140 MMOL/L (ref 134–144)
TRIGL SERPL-MCNC: 139 MG/DL (ref 0–149)
VLDLC SERPL CALC-MCNC: 28 MG/DL (ref 5–40)

## 2017-09-20 ENCOUNTER — CLINICAL SUPPORT (OUTPATIENT)
Dept: FAMILY MEDICINE CLINIC | Age: 77
End: 2017-09-20

## 2017-09-20 VITALS — TEMPERATURE: 98.3 F

## 2017-09-20 DIAGNOSIS — Z23 ENCOUNTER FOR IMMUNIZATION: Primary | ICD-10-CM

## 2017-09-21 RX ORDER — LORATADINE 10 MG/1
TABLET ORAL
Qty: 90 TAB | Refills: 3 | Status: SHIPPED | OUTPATIENT
Start: 2017-09-21 | End: 2018-09-16 | Stop reason: SDUPTHER

## 2017-10-01 RX ORDER — FENOFIBRATE 145 MG/1
TABLET ORAL
Qty: 90 TAB | Refills: 3 | Status: SHIPPED | OUTPATIENT
Start: 2017-10-01 | End: 2018-10-06 | Stop reason: SDUPTHER

## 2017-10-09 DIAGNOSIS — I10 ESSENTIAL HYPERTENSION: Chronic | ICD-10-CM

## 2017-10-09 RX ORDER — LOSARTAN POTASSIUM AND HYDROCHLOROTHIAZIDE 25; 100 MG/1; MG/1
TABLET ORAL
Qty: 90 TAB | Refills: 2 | Status: SHIPPED | OUTPATIENT
Start: 2017-10-09 | End: 2018-07-07 | Stop reason: SDUPTHER

## 2017-11-01 DIAGNOSIS — E11.65 UNCONTROLLED TYPE 2 DIABETES MELLITUS WITH HYPERGLYCEMIA, WITHOUT LONG-TERM CURRENT USE OF INSULIN (HCC): ICD-10-CM

## 2017-11-01 RX ORDER — EXENATIDE 2 MG/.65ML
INJECTION, SUSPENSION, EXTENDED RELEASE SUBCUTANEOUS
Qty: 12 PEN | Refills: 3 | Status: SHIPPED | OUTPATIENT
Start: 2017-11-01 | End: 2018-10-03 | Stop reason: SDUPTHER

## 2017-11-10 RX ORDER — EZETIMIBE 10 MG/1
TABLET ORAL
Qty: 90 TAB | Refills: 2 | Status: SHIPPED | OUTPATIENT
Start: 2017-11-10 | End: 2018-08-19 | Stop reason: SDUPTHER

## 2017-11-17 ENCOUNTER — HOSPITAL ENCOUNTER (EMERGENCY)
Age: 77
Discharge: HOME | End: 2017-11-17
Attending: EMERGENCY MEDICINE
Payer: MEDICARE

## 2017-11-17 ENCOUNTER — APPOINTMENT (OUTPATIENT)
Dept: GENERAL RADIOLOGY | Age: 77
End: 2017-11-17
Attending: NURSE PRACTITIONER
Payer: MEDICARE

## 2017-11-17 VITALS
HEIGHT: 59 IN | HEART RATE: 96 BPM | SYSTOLIC BLOOD PRESSURE: 158 MMHG | WEIGHT: 195 LBS | DIASTOLIC BLOOD PRESSURE: 86 MMHG | OXYGEN SATURATION: 94 % | BODY MASS INDEX: 39.31 KG/M2 | RESPIRATION RATE: 18 BRPM | TEMPERATURE: 98 F

## 2017-11-17 DIAGNOSIS — S89.92XA INJURY OF LEFT KNEE, INITIAL ENCOUNTER: Primary | ICD-10-CM

## 2017-11-17 PROCEDURE — L1830 KO IMMOB CANVAS LONG PRE OTS: HCPCS

## 2017-11-17 PROCEDURE — 99283 EMERGENCY DEPT VISIT LOW MDM: CPT

## 2017-11-17 PROCEDURE — 73562 X-RAY EXAM OF KNEE 3: CPT

## 2017-11-17 RX ORDER — HYDROCODONE BITARTRATE AND ACETAMINOPHEN 5; 325 MG/1; MG/1
1 TABLET ORAL
Qty: 10 TAB | Refills: 0 | Status: SHIPPED | OUTPATIENT
Start: 2017-11-17 | End: 2018-06-20

## 2017-11-17 NOTE — ED PROVIDER NOTES
HPI Comments: Chip Zhang is a 77 y.o. female with Hx of HTN, HLD, GERD, COPD, DMII, chronic pain who presents in a WC w/ her SO to [facility] ED with cc of L knee pain. Pt reports she was in a step stool reaching to get something and the stool came out from under her feet and she fell on her knees. Pt reports L knee pain/ swelling that is worse with ROM or any weight bearing activity. She states that she has hx of L TKR w/ Dr. Penny Vasquez 3 years ago. Pt states no pain w/ rest, primarily also pain worsens w/ any lateral movements of the LLE, she states the pain is mainly on the L side and behind her knee. Pt did not take any pain medication PTA. (-) tobacco/ ETOH/ substance abuse. Denies any recent hx of illness. PCP: Lupe Mercado MD    There are no other complaints, changes or physical findings at this time. The history is provided by the patient.         Past Medical History:   Diagnosis Date    Arthritis 2/28/2011    OSTEO    Chronic obstructive pulmonary disease (HCC)     VERY MILD    Chronic pain     LEFT KNEE    Diabetes (HCC)     GERD (gastroesophageal reflux disease)     Hot flashes, menopausal     Hypercholesterolemia     Hypertension     Seizures (HCC) 1980'S    AFTER DRINKING 2 MARGARITAS    Vitamin D deficiency 6/28/2010       Past Surgical History:   Procedure Laterality Date    HX APPENDECTOMY  2001    HX CATARACT REMOVAL Bilateral     HX HYSTERECTOMY  1975    HX OOPHORECTOMY  2001    bilateral    HX ORTHOPAEDIC      HX OTHER SURGICAL      LIPOMA LEFT SIDE         Family History:   Problem Relation Age of Onset    Pulmonary Embolism Father     Arthritis-rheumatoid Paternal Grandmother    [facility] Alzheimer Mother     Arthritis-rheumatoid Sister     Emphysema Brother     Arthritis-rheumatoid Sister     Anesth Problems Neg Hx        Social History     Social History    Marital status:      Spouse name: N/A    Number of children: N/A    Years of education: N/A Occupational History    Not on file. Social History Main Topics    Smoking status: Former Smoker     Packs/day: 1.00     Years: 20.00     Quit date: 11/18/1984    Smokeless tobacco: Never Used    Alcohol use Yes      Comment: rarely    Drug use: No    Sexual activity: Yes     Other Topics Concern    Not on file     Social History Narrative         ALLERGIES: Ciprofloxacin; Erythromycin; Metformin; and Pcn [penicillins]    Review of Systems   Constitutional: Negative for activity change, appetite change, chills and fever. HENT: Negative for congestion, rhinorrhea, sinus pressure, sneezing and sore throat. Eyes: Negative for pain, discharge and visual disturbance. Respiratory: Negative for cough and shortness of breath. Cardiovascular: Negative for chest pain. Gastrointestinal: Negative for abdominal pain, diarrhea, nausea and vomiting. Genitourinary: Negative for dysuria, flank pain, frequency and urgency. Musculoskeletal: Positive for arthralgias and joint swelling. Negative for back pain, gait problem, myalgias and neck pain. Skin: Negative for color change and rash. Neurological: Negative for dizziness, speech difficulty, weakness, light-headedness, numbness and headaches. Psychiatric/Behavioral: Negative for agitation, behavioral problems and confusion. All other systems reviewed and are negative. Vitals:    11/17/17 1640   BP: 158/86   Pulse: 96   Resp: 18   Temp: 98 °F (36.7 °C)   SpO2: 94%   Weight: 88.5 kg (195 lb)   Height: 4' 11\" (1.499 m)            Physical Exam   Constitutional: She is oriented to person, place, and time. She appears well-developed and well-nourished. No distress. HENT:   Head: Normocephalic and atraumatic. Right Ear: External ear normal.   Left Ear: External ear normal.   Nose: Nose normal.   Mouth/Throat: Oropharynx is clear and moist. No oropharyngeal exudate.    Eyes: Conjunctivae and EOM are normal. Pupils are equal, round, and reactive to light. Neck: Normal range of motion. Neck supple. Cardiovascular: Normal rate, regular rhythm, normal heart sounds and intact distal pulses. Pulmonary/Chest: Effort normal and breath sounds normal.   Musculoskeletal:        Left knee: She exhibits decreased range of motion and swelling. She exhibits no effusion and no erythema. Tenderness found. Lateral joint line tenderness noted. Neurological: She is alert and oriented to person, place, and time. Skin: Skin is warm and dry. Psychiatric: She has a normal mood and affect. Her behavior is normal. Judgment and thought content normal.   Nursing note and vitals reviewed. MDM  Number of Diagnoses or Management Options  Injury of left knee, initial encounter:   Diagnosis management comments: DDx: fx, dislocation, ligament vs meniscus injury, sprain    78 yo F presents w/ hx of TKR on L knee  (3yr ago) w/ hx of GLF and L knee injury. (-) x-ray for fx/ hardware from TKR intact. Placed in immobilizer, has walker at home. The patient has been educated on the use of NSAIDS/ RICE to assist with relief of current symptoms. If narcotics were prescribed at time of discharge, the patient has been made aware of the risks of operating heavy machinery and or drinking alcohol while using these medications. Patient has been instructed to f/u with Orthopedic Surgery for further tx of symptoms. Reasons to return to the ED have been reviewed at time of discharge. Amount and/or Complexity of Data Reviewed  Tests in the radiology section of CPT®: ordered and reviewed  Review and summarize past medical records: yes  Discuss the patient with other providers: yes      ED Course       Procedures     LABORATORY TESTS:  No results found for this or any previous visit (from the past 12 hour(s)).     IMAGING RESULTS:  XR KNEE LT 3 V   Final Result      EXAM:  XR KNEE LT 3 V     INDICATION:   fall.     COMPARISON: None.     FINDINGS: Three views of the left knee demonstrate no fracture or other acute  osseous or articular abnormality. There is a large joint effusion. Prosthesis  is in place.     IMPRESSION   IMPRESSION:  No acute bony abnormality    MEDICATIONS GIVEN:  Medications - No data to display    IMPRESSION:  1. Injury of left knee, initial encounter        PLAN:  1. Discharge Medication List as of 11/17/2017  5:55 PM      START taking these medications    Details   HYDROcodone-acetaminophen (NORCO) 5-325 mg per tablet Take 1 Tab by mouth every four (4) hours as needed for Pain. Max Daily Amount: 6 Tabs., Print, Disp-10 Tab, R-0         CONTINUE these medications which have NOT CHANGED    Details   ezetimibe (ZETIA) 10 mg tablet TAKE 1 TABLET DAILY, Normal, Disp-90 Tab, R-2      BYDUREON 2 mg/0.65 mL pnij INJECT THE CONTENTS OF 1 PEN UNDER THE SKIN EVERY 7 DAYS, Normal, Disp-12 Pen, R-3      losartan-hydroCHLOROthiazide (HYZAAR) 100-25 mg per tablet TAKE 1 TABLET DAILY. STOP TRIAMTERENCE/HCTZ, Normal, Disp-90 Tab, R-2      TRICOR 145 mg tablet TAKE 1 TABLET DAILY, Normal, Disp-90 Tab, R-3      loratadine (CLARITIN) 10 mg tablet TAKE 1 TABLET DAILY, Normal, Disp-90 Tab, R-3      OM-3-DHA-EPA-Fish Oil-Vit D3 300-1,000-1,000 mg-mg-unit cap Take 1 Cap by mouth., Historical Med      aspirin-calcium carbonate 81 mg-300 mg calcium(777 mg) tab Take 325 mg by mouth., Historical Med      naproxen sodium (ALEVE) 220 mg tablet Take 1 Tab by mouth two (2) times daily (with meals). , Print, Disp-30 Tab, R-2      traMADol (ULTRAM) 50 mg tablet Take 1 Tab by mouth every six (6) hours as needed for Pain. Max Daily Amount: 200 mg., Print, Disp-90 Tab, R-4      cloNIDine HCl (CATAPRES) 0.1 mg tablet TAKE 1 TABLET NIGHTLY, Normal, Disp-90 Tab, R-1      !! lancets (FREESTYLE LANCETS) 28 gauge misc Use as directed to check blood sugar twice daily.  DX: E11.65, Normal, Disp-200 Lancet, R-5      dilTIAZem CD (CARDIZEM CD) 180 mg ER capsule TAKE 1 CAPSULE DAILY, Normal, Disp-90 Cap, R-3 ondansetron hcl (ZOFRAN) 4 mg tablet Take 1 Tab by mouth every eight (8) hours as needed for Nausea., Normal, Disp-15 Tab, R-1      FREESTYLE LITE STRIPS strip TEST TWICE A DAY, Print, Disp-100 Strip, R-10      !! MICROLET LANCET misc USE TO TEST TWICE A DAY, Normal, Disp-2 Package, R-2      aspirin delayed-release 81 mg tablet Take 325 mg by mouth daily. , Historical Med      esomeprazole (NEXIUM) 20 mg capsule Take  by mouth daily. , Historical Med      omega-3 fatty acids-vitamin e (FISH OIL) 1,000 mg cap Take 1 Cap by mouth., Historical Med      cholecalciferol, vitamin d3, (VITAMIN D) 1,000 unit tablet Take 1,000 Units by mouth two (2) times a day., Historical Med       !! - Potential duplicate medications found. Please discuss with provider. 2.   Follow-up Information     Follow up With Details Comments Garrick Low MD Schedule an appointment as soon as possible for a visit  [address] [place] [address] [address] [address]      [facility] EMERGENCY DEPT Go to As needed, If symptoms worsen [address] [facility]  [phone]        3. Return to ED if worse     Discharge Note:    The patient is ready for discharge. The patient's signs, symptoms, diagnosis, and discharge instruction have been discussed and the patient has conveyed their understanding. The patient is to follow up as recommended or return to the ER should their symptoms worsen. Plan has been discussed and the patient is in agreement.     Soha Wu NP

## 2017-11-17 NOTE — ED TRIAGE NOTES
Patient presents 90 min after fall while reaching to get something off of a high shelf. Patient was standing on small stool and fell off, landing on butt with left leg twisted at knee. Patient concerned because of past knee issues.

## 2017-11-20 ENCOUNTER — PATIENT OUTREACH (OUTPATIENT)
Dept: FAMILY MEDICINE CLINIC | Age: 77
End: 2017-11-20

## 2017-11-20 NOTE — PROGRESS NOTES
[facility] ED 11/17/2017 Left knee injury    Unable to reach by phone. Voicemail left requesting return call.

## 2017-11-20 NOTE — PROGRESS NOTES
NNTOCED [facility] 11/17/2017 Left knee injury    TRANSITIONS OF CARE NOTE     Please note functional assessment. Patient reports she followed up with ortho today and will wear brace for next 5 days. If not better in two weeks, will follow up again. Did not need follow up appointment today. Has PCP appointment 12/8/2017. Using walker at this time. RRAT score: ED admission     Advance Medical Directive on file in EMR? yes has an advanced directive - a copy has been provided    Barriers to care?  none    Support System consists of: family    [address], if so what agency? no    Medical History:     Past Medical History:   Diagnosis Date    Arthritis 2/28/2011    OSTEO    Chronic obstructive pulmonary disease (HCC)     VERY MILD    Chronic pain     LEFT KNEE    Diabetes ([address]     GERD (gastroesophageal reflux disease)     Hot flashes, menopausal     Hypercholesterolemia     Hypertension     Seizures ([address] 1980'S    AFTER DRINKING 2 MARGARITAS    Vitamin D deficiency 6/28/2010       Called patient on 11/20/2017 and verified patient with 2 identifiers. Fall Risk Assessment:    Fall Risk Assessment, last 12 mths 7/21/2017   Able to walk? Yes   Fall in past 12 months? No     Patient presenting symptoms (referenced by Joseph Sidhu, NP):   presents in a [facility] w/ her SO to [facility] ED with cc of L knee pain. Pt reports she was in a step stool reaching to get something and the stool came out from under her feet and she fell on her knees. Pt reports L knee pain/ swelling that is worse with ROM or any weight bearing activity. She states that she has hx of L TKR w/ Dr. Robbin Stanley 3 years ago. Pt states no pain w/ rest, primarily also pain worsens w/ any lateral movements of the LLE, she states the pain is mainly on the L side and behind her knee. Pt did not take any pain medication PTA. (-) tobacco/ ETOH/ substance abuse. Denies any recent hx of illness.      Course of current Hospitalization (referenced by Veronica Blunt Collin Cancino NP note dated 11/17/2017):   MDM  Number of Diagnoses or Management Options  Injury of left knee, initial encounter:   Diagnosis management comments: DDx: fx, dislocation, ligament vs meniscus injury, sprain     78 yo F presents w/ hx of TKR on L knee  (3yr ago) w/ hx of GLF and L knee injury. (-) x-ray for fx/ hardware from TKR intact. Placed in immobilizer, has walker at home. The patient has been educated on the use of NSAIDS/ RICE to assist with relief of current symptoms. If narcotics were prescribed at time of discharge, the patient has been made aware of the risks of operating heavy machinery and or drinking alcohol while using these medications. Patient has been instructed to f/u with Orthopedic Surgery for further tx of symptoms. Reasons to return to the ED have been reviewed at time of discharge. Lab/Diagnostics at time of Discharge:   EXAM:  XR KNEE LT 3 V     INDICATION:   fall.     COMPARISON: None.     FINDINGS: Three views of the left knee demonstrate no fracture or other acute  osseous or articular abnormality. There is a large joint effusion. Prosthesis  is in place.     IMPRESSION   IMPRESSION:  No acute bony abnormality. Medication Reconciliation completed: yes    Patient Goals:  Goals      Attends follow-up appointments as directed.  Knowledge and adherence of prescribed medication (ie. action, side effects, missed dose, etc.).  Understands red flags post discharge. NN remains available to patient.

## 2017-12-08 ENCOUNTER — OFFICE VISIT (OUTPATIENT)
Dept: ENDOCRINOLOGY | Age: 77
End: 2017-12-08

## 2017-12-08 VITALS
OXYGEN SATURATION: 95 % | HEART RATE: 70 BPM | SYSTOLIC BLOOD PRESSURE: 126 MMHG | RESPIRATION RATE: 16 BRPM | WEIGHT: 197.5 LBS | BODY MASS INDEX: 39.81 KG/M2 | HEIGHT: 59 IN | DIASTOLIC BLOOD PRESSURE: 71 MMHG | TEMPERATURE: 97.3 F

## 2017-12-08 DIAGNOSIS — E78.00 HYPERCHOLESTEROLEMIA: Chronic | ICD-10-CM

## 2017-12-08 DIAGNOSIS — E55.9 VITAMIN D DEFICIENCY: Chronic | ICD-10-CM

## 2017-12-08 DIAGNOSIS — E11.65 TYPE 2 DIABETES MELLITUS WITH HYPERGLYCEMIA, WITHOUT LONG-TERM CURRENT USE OF INSULIN (HCC): Primary | Chronic | ICD-10-CM

## 2017-12-08 DIAGNOSIS — I10 ESSENTIAL HYPERTENSION: Chronic | ICD-10-CM

## 2017-12-08 LAB
GLUCOSE POC: 82 MG/DL
HBA1C MFR BLD HPLC: 6 %

## 2017-12-08 NOTE — MR AVS SNAPSHOT
Visit Information Date & Time Provider Department Dept. Phone Encounter #  
 12/8/2017  1:30 PM Maki Villarreal MD [facility] [phone] [id number] Follow-up Instructions Return in about 5 months (around 5/8/2018). Your Appointments 1/9/2018 10:30 AM  
ROUTINE CARE with Yennifer Campbell MD  
[address] [place] [address] Appt Note: 4 mnth fu Diabetes/HTn  
 [address] [address]  
[place] [address] [address] Upcoming Health Maintenance Date Due  
 FOOT EXAM Q1 12/2/2017 MEDICARE YEARLY EXAM 2/4/2018 HEMOGLOBIN A1C Q6M 3/8/2018 MICROALBUMIN Q1 9/8/2018 LIPID PANEL Q1 9/8/2018 EYE EXAM RETINAL OR DILATED Q1 10/26/2018 COLONOSCOPY 6/27/2019 GLAUCOMA SCREENING Q2Y 10/26/2019 DTaP/Tdap/Td series (2 - Td) 12/14/2021 Allergies as of 12/8/2017  Review Complete On: 12/8/2017 By: Maki Villarreal MD  
  
 Severity Noted Reaction Type Reactions Ciprofloxacin  05/20/2010    Nausea Only Erythromycin  05/20/2010    Nausea Only Metformin  09/03/2012    Other (comments), Nausea Only Felt bad Felt bad  
 Pcn [Penicillins]  05/20/2010    Nausea Only, Swelling Current Immunizations  Reviewed on 12/2/2016 Name Date H1N1 FLU VACCINE 3/4/2010 Influenza High Dose Vaccine PF 9/20/2017 Influenza Vaccine 10/8/2015, 10/17/2014  2:48 PM, 9/26/2013 Influenza Vaccine (Quad) PF 12/2/2016 Influenza Vaccine Split 11/16/2012, 11/18/2011, 10/28/2010 Pneumococcal Conjugate (PCV-13) 5/5/2016 Pneumococcal Polysaccharide (PPSV-23) 9/9/2015 TDAP Vaccine 12/14/2011 ZZZ-RETIRED (DO NOT USE) Pneumococcal Vaccine (Unspecified Type) 12/1/2010 Zoster Vaccine, Live 1/10/2013 Not reviewed this visit You Were Diagnosed With   
  
 Codes Comments Type 2 diabetes mellitus with hyperglycemia, without long-term current use of insulin (HCC)    -  Primary ICD-10-CM: E11.65 ICD-9-CM: 250.00, 790.29 Essential hypertension     ICD-10-CM: I10 
ICD-9-CM: 401.9 Vitamin D deficiency     ICD-10-CM: E55.9 ICD-9-CM: 268.9 Hypercholesterolemia     ICD-10-CM: E78.00 ICD-9-CM: 272.0 Vitals BP Pulse Temp Resp Height(growth percentile) Weight(growth percentile) 126/71 (BP 1 Location: Right arm, BP Patient Position: Sitting) 70 97.3 °F (36.3 °C) (Oral) 16 4' 11\" (1.499 m) 197 lb 8 oz (89.6 kg) SpO2 BMI OB Status Smoking Status 95% 39.89 kg/m2 Hysterectomy Former Smoker Vitals History BMI and BSA Data Body Mass Index Body Surface Area  
 39.89 kg/m 2 1.93 m 2 Preferred Pharmacy Pharmacy Name Phone [address] [facility] [phone] Your Updated Medication List  
  
   
This list is accurate as of: 12/8/17  1:52 PM.  Always use your most recent med list.  
  
  
  
  
 aspirin delayed-release 81 mg tablet Take 325 mg by mouth daily. BYDUREON 2 mg/0.65 mL Pnij Generic drug:  exenatide microspheres INJECT THE CONTENTS OF 1 PEN UNDER THE SKIN EVERY 7 DAYS  
  
 cloNIDine HCl 0.1 mg tablet Commonly known as:  CATAPRES  
TAKE 1 TABLET NIGHTLY  
  
 dilTIAZem  mg ER capsule Commonly known as:  CARDIZEM CD  
TAKE 1 CAPSULE DAILY  
  
 ezetimibe 10 mg tablet Commonly known as:  [place] Carbo TAKE 1 TABLET DAILY FISH OIL 1,000 mg Cap Generic drug:  omega-3 fatty acids-vitamin e Take 1 Cap by mouth four (4) times daily. FREESTYLE LITE STRIPS strip Generic drug:  glucose blood VI test strips TEST TWICE A DAY HYDROcodone-acetaminophen 5-325 mg per tablet Commonly known as:  March Castles Take 1 Tab by mouth every four (4) hours as needed for Pain. Max Daily Amount: 6 Tabs.  
  
 loratadine 10 mg tablet Commonly known as:  CLARITIN  
TAKE 1 TABLET DAILY losartan-hydroCHLOROthiazide 100-25 mg per tablet Commonly known as:  HYZAAR  
TAKE 1 TABLET DAILY. STOP TRIAMTERENCE/HCTZ * [facility] Generic drug:  Lancets USE TO TEST TWICE A DAY  
  
 * lancets 28 gauge Misc Commonly known as:  FREESTYLE LANCETS Use as directed to check blood sugar twice daily. DX: E11.65  
  
 naproxen sodium 220 mg tablet Commonly known as:  Karlis Collie Take 1 Tab by mouth two (2) times daily (with meals). NexIUM 20 mg capsule Generic drug:  esomeprazole Take  by mouth daily. ondansetron hcl 4 mg tablet Commonly known as:  Karlis Offer Take 1 Tab by mouth every eight (8) hours as needed for Nausea. traMADol 50 mg tablet Commonly known as:  ULTRAM  
Take 1 Tab by mouth every six (6) hours as needed for Pain. Max Daily Amount: 200 mg. TRICOR 145 mg tablet Generic drug:  fenofibrate nanocrystallized TAKE 1 TABLET DAILY  
  
 VITAMIN D3 1,000 unit tablet Generic drug:  cholecalciferol Take 1,000 Units by mouth two (2) times a day. * Notice: This list has 2 medication(s) that are the same as other medications prescribed for you. Read the directions carefully, and ask your doctor or other care provider to review them with you. We Performed the Following AMB POC GLUCOSE, QUANTITATIVE, BLOOD [38266 CPT(R)] AMB POC HEMOGLOBIN A1C [96200 CPT(R)] Follow-up Instructions Return in about 5 months (around 5/8/2018). Introducing [facility]! Dear Brice Meigs: Thank you for requesting a [employer] account. Our records indicate that you have previously registered for a [employer] account but its currently inactive. Please call our [employer] support line at [phone]. Additional Information If you have questions, please visit the Frequently Asked Questions section of the [employer] website at [email or web address] [email or web address] Remember, MyChart is NOT to be used for urgent needs. For medical emergencies, dial 911. Now available from your iPhone and Android! Please provide this summary of care documentation to your next provider. Your primary care clinician is listed as [address]. If you have any questions after today's visit, please call [phone].

## 2017-12-08 NOTE — PROGRESS NOTES
Presley Ferguson is a 77 y.o. female here for   Chief Complaint   Patient presents with    Diabetes       Functional glucose monitor and record keeping system? - yes  Eye exam within last year? - yes  Foot exam within last year? - PCP    1. Have you been to the ER, urgent care clinic since your last visit? Hospitalized since your last visit? -[facility] 11/17/17 for fall    2. Have you seen or consulted any other health care providers outside of the [address] [place] [address] since your last visit?   Include any pap smears or colon screening.-no      Lab Results   Component Value Date/Time    Hemoglobin A1c 6.5 09/08/2017 10:53 AM    Hemoglobin A1c (POC) 6.2 07/21/2017 01:35 PM       Wt Readings from Last 3 Encounters:   11/17/17 195 lb (88.5 kg)   09/08/17 193 lb 9.6 oz (87.8 kg)   08/24/17 199 lb (90.3 kg)     Temp Readings from Last 3 Encounters:   11/17/17 98 °F (36.7 °C)   09/20/17 98.3 °F (36.8 °C) (Oral)   09/08/17 98.7 °F (37.1 °C) (Oral)     BP Readings from Last 3 Encounters:   11/17/17 158/86   09/08/17 (!) 147/92   08/24/17 124/72     Pulse Readings from Last 3 Encounters:   11/17/17 96   09/08/17 86   08/24/17 73

## 2017-12-08 NOTE — PROGRESS NOTES
Nathalia Ellis MD        Patient Information  Date:12/8/2017  Name : Radha Allen 77 y.o.     YOB: 1940         Referred by: Mike Isaacs MD         Chief Complaint   Patient presents with    Diabetes       History of Present Illness: Radha Allen is a 77 y.o. female here for fu of  Type 2 Diabetes Mellitus. Type 2 Diabetes was diagnosed in 10/2014 . End organ effects of diabetes: none. Cardiovascular risk factors: family history, dyslipidemia, diabetes mellitus   Monitoring frequency:2/ day   Could not tolerate Metformin IR, XR formualtion and was on Actos,     She is doing very well on the medications, has subcutaneous nodules which is not really bothering her  No weight loss      Hyperlipidemia - was on tricor, zetia, colestipol, Niacin,    Wt Readings from Last 3 Encounters:   12/08/17 197 lb 8 oz (89.6 kg)   11/17/17 195 lb (88.5 kg)   09/08/17 193 lb 9.6 oz (87.8 kg)       BP Readings from Last 3 Encounters:   12/08/17 126/71   11/17/17 158/86   09/08/17 (!) 147/92           Past Medical History:   Diagnosis Date    Arthritis 2/28/2011    OSTEO    Chronic obstructive pulmonary disease (HCC)     VERY MILD    Chronic pain     LEFT KNEE    Diabetes (HCC)     GERD (gastroesophageal reflux disease)     Hot flashes, menopausal     Hypercholesterolemia     Hypertension     Seizures (HCC) 1980'S    AFTER DRINKING 2 MARGARITAS    Vitamin D deficiency 6/28/2010     Current Outpatient Prescriptions   Medication Sig    HYDROcodone-acetaminophen (NORCO) 5-325 mg per tablet Take 1 Tab by mouth every four (4) hours as needed for Pain. Max Daily Amount: 6 Tabs.  ezetimibe (ZETIA) 10 mg tablet TAKE 1 TABLET DAILY    BYDUREON 2 mg/0.65 mL pnij INJECT THE CONTENTS OF 1 PEN UNDER THE SKIN EVERY 7 DAYS    losartan-hydroCHLOROthiazide (HYZAAR) 100-25 mg per tablet TAKE 1 TABLET DAILY.  STOP TRIAMTERENCE/HCTZ    TRICOR 145 mg tablet TAKE 1 TABLET DAILY    loratadine (CLARITIN) 10 mg tablet TAKE 1 TABLET DAILY    naproxen sodium (ALEVE) 220 mg tablet Take 1 Tab by mouth two (2) times daily (with meals). (Patient taking differently: Take 220 mg by mouth as needed.)    traMADol (ULTRAM) 50 mg tablet Take 1 Tab by mouth every six (6) hours as needed for Pain. Max Daily Amount: 200 mg.  cloNIDine HCl (CATAPRES) 0.1 mg tablet TAKE 1 TABLET NIGHTLY    lancets (FREESTYLE LANCETS) 28 gauge misc Use as directed to check blood sugar twice daily. DX: E11.65    dilTIAZem CD (CARDIZEM CD) 180 mg ER capsule TAKE 1 CAPSULE DAILY    FREESTYLE LITE STRIPS strip TEST TWICE A DAY    MICROLET LANCET misc USE TO TEST TWICE A DAY    aspirin delayed-release 81 mg tablet Take 325 mg by mouth daily.  esomeprazole (NEXIUM) 20 mg capsule Take  by mouth daily.  omega-3 fatty acids-vitamin e (FISH OIL) 1,000 mg cap Take 1 Cap by mouth four (4) times daily.  cholecalciferol, vitamin d3, (VITAMIN D) 1,000 unit tablet Take 1,000 Units by mouth two (2) times a day.  ondansetron hcl (ZOFRAN) 4 mg tablet Take 1 Tab by mouth every eight (8) hours as needed for Nausea. No current facility-administered medications for this visit. Allergies   Allergen Reactions    Ciprofloxacin Nausea Only    Erythromycin Nausea Only    Metformin Other (comments) and Nausea Only     Felt bad  Felt bad      Pcn [Penicillins] Nausea Only and Swelling         Review of Systems:  - Constitutional Symptoms: no fevers, no chills,   - Eyes: no blurry vision no double vision  - Cardiovascular: no chest pain ,no palpitations  - Respiratory: no cough no shortness of breath  - Gastrointestinal: no dysphagia no  abdominal pain  - Musculoskeletal: + joint pains no  weakness  - Integumentary: no rashes  -     Physical Examination:   Blood pressure 126/71, pulse 70, temperature 97.3 °F (36.3 °C), temperature source Oral, resp.  rate 16, height 4' 11\" (1.499 m), weight 197 lb 8 oz (89.6 kg), SpO2 95 %. Estimated body mass index is 39.89 kg/(m^2) as calculated from the following:    Height as of this encounter: 4' 11\" (1.499 m). -   Weight as of this encounter: 197 lb 8 oz (89.6 kg). - General: pleasant, no distress, good eye contact  - HEENT: no pallor, no periorbital edema, EOMI  - Neck: supple, no thyromegaly  - Cardiovascular: regular, normal rate, normal S1 and S2  - Respiratory: clear to auscultation bilaterally  - Gastrointestinal: soft, nontender, nondistended,  BS +  - Musculoskeletal: no proximal muscle weakness in upper or lower extremities  - Integumentary: alert and oriented , foot 12/17   - Psychiatric: normal mood and affect  - Skin: color, texture, turgor normal.       Data Reviewed:     [] Glucose records reviewed. [] See glucose records for details (to be scanned). [] A1C  [] Reviewed labs    Lab Results   Component Value Date/Time    Hemoglobin A1c 6.5 09/08/2017 10:53 AM    Hemoglobin A1c 6.2 04/03/2017 04:03 PM    Hemoglobin A1c 6.4 12/02/2016 11:35 AM    Glucose 169 09/08/2017 10:53 AM    Glucose (POC) 146 10/17/2014 11:06 AM    Glucose POC 82 12/08/2017 01:32 PM    Microalb/Creat ratio (ug/mg creat.) 153.3 09/08/2017 10:53 AM    LDL, calculated 76 09/08/2017 10:53 AM    Creatinine 0.93 09/08/2017 10:53 AM      Lab Results   Component Value Date/Time    Cholesterol, total 161 09/08/2017 10:53 AM    HDL Cholesterol 57 09/08/2017 10:53 AM    LDL, calculated 76 09/08/2017 10:53 AM    Triglyceride 139 09/08/2017 10:53 AM    CHOL/HDL Ratio 2.4 10/28/2010 09:07 AM     Lab Results   Component Value Date/Time    ALT (SGPT) 37 09/08/2017 10:53 AM    AST (SGOT) 22 09/08/2017 10:53 AM    Alk.  phosphatase 49 09/08/2017 10:53 AM    Bilirubin, total 0.4 09/08/2017 10:53 AM     Lab Results   Component Value Date/Time    TSH 1.280 12/02/2016 11:35 AM    T4, Free 1.55 09/09/2015 02:16 PM      Lab Results   Component Value Date/Time    Glucose 169 09/08/2017 10:53 AM    Glucose (POC) 146 10/17/2014 11:06 AM    Glucose POC 82 12/08/2017 01:32 PM        Assessment/Plan:     1. Type 2 diabetes mellitus with hyperglycemia, without long-term current use of insulin ([facility] [address]    2. Essential hypertension    3. Vitamin D deficiency    4. Hypercholesterolemia        1. Type 2 Diabetes Mellitus   Lab Results   Component Value Date/Time    Hemoglobin A1c 6.5 09/08/2017 10:53 AM    Hemoglobin A1c (POC) 6 12/08/2017 01:32 PM     Controlled   Bydureon   FLU annually ,Pneumovax ,aspirin daily,annual eye exam,microalbumin    2. HTN : Continue current therapy     3. Mixed Hyperlipidemia : low carb diet. Statin intolerance hx,   Continue Zetia, Tricor    4. Obesity:Body mass index is 39.89 kg/(m^2). Discussed about the importance of exercise and carbohydrate portion control. There are no Patient Instructions on file for this visit. Follow-up Disposition: Not on File    Thank you for allowing me to participate in the care of this patient.     Drea Guillermo MD

## 2018-01-08 DIAGNOSIS — I10 ESSENTIAL HYPERTENSION: Primary | ICD-10-CM

## 2018-01-08 RX ORDER — CLONIDINE HYDROCHLORIDE 0.1 MG/1
TABLET ORAL
Qty: 90 TAB | Refills: 3 | Status: SHIPPED | OUTPATIENT
Start: 2018-01-08 | End: 2019-01-03 | Stop reason: SDUPTHER

## 2018-01-08 RX ORDER — CLONIDINE HYDROCHLORIDE 0.1 MG/1
TABLET ORAL
Qty: 90 TAB | Refills: 1 | OUTPATIENT
Start: 2018-01-08

## 2018-01-23 ENCOUNTER — OFFICE VISIT (OUTPATIENT)
Dept: FAMILY MEDICINE CLINIC | Age: 78
End: 2018-01-23

## 2018-01-23 VITALS
SYSTOLIC BLOOD PRESSURE: 126 MMHG | RESPIRATION RATE: 20 BRPM | WEIGHT: 199.6 LBS | DIASTOLIC BLOOD PRESSURE: 64 MMHG | HEART RATE: 75 BPM | HEIGHT: 59 IN | OXYGEN SATURATION: 93 % | TEMPERATURE: 98.3 F | BODY MASS INDEX: 40.24 KG/M2

## 2018-01-23 DIAGNOSIS — E11.65 TYPE 2 DIABETES MELLITUS WITH HYPERGLYCEMIA, WITHOUT LONG-TERM CURRENT USE OF INSULIN (HCC): Primary | Chronic | ICD-10-CM

## 2018-01-23 DIAGNOSIS — K21.00 GASTROESOPHAGEAL REFLUX DISEASE WITH ESOPHAGITIS: Chronic | ICD-10-CM

## 2018-01-23 DIAGNOSIS — E55.9 VITAMIN D DEFICIENCY: Chronic | ICD-10-CM

## 2018-01-23 DIAGNOSIS — I10 ESSENTIAL HYPERTENSION: Chronic | ICD-10-CM

## 2018-01-23 DIAGNOSIS — E78.00 HYPERCHOLESTEROLEMIA: Chronic | ICD-10-CM

## 2018-01-23 PROBLEM — E11.21 TYPE 2 DIABETES MELLITUS WITH NEPHROPATHY (HCC): Status: ACTIVE | Noted: 2018-01-23

## 2018-01-23 RX ORDER — ASPIRIN 325 MG
325 TABLET ORAL DAILY
COMMUNITY
End: 2018-06-20

## 2018-01-23 NOTE — MR AVS SNAPSHOT
[address] [facility] 
 
 
 2005 [facility] [address] [address] 
[phone] Patient: Benson Ganser MRN: [id number] ZQS:[id number] Visit Information Date & Time Provider Department Dept. Phone Encounter #  
 1/23/2018  2:45 PM Delicia Cummings  [facility] [phone] [id number] Follow-up Instructions Return in about 4 months (around 5/23/2018). Your Appointments 2/5/2018  3:05 PM  
Medicare Physical with Delicia Cummings MD  
[facility] ([facility]) Appt Note: -letter mailed [address] [place] [address]  
  
    
 5/11/2018 11:45 AM  
ROUTINE CARE with Foster Roth MD  
[facility]) Appt Note: f/u 5 month  
 [address] [facility] [address]  
[phone]  
  
   
 [address] [place] [address] Upcoming Health Maintenance Date Due  
 MEDICARE YEARLY EXAM 2/4/2018 HEMOGLOBIN A1C Q6M 6/8/2018 MICROALBUMIN Q1 9/8/2018 LIPID PANEL Q1 9/8/2018 EYE EXAM RETINAL OR DILATED Q1 10/26/2018 FOOT EXAM Q1 12/8/2018 COLONOSCOPY 6/27/2019 GLAUCOMA SCREENING Q2Y 10/26/2019 DTaP/Tdap/Td series (2 - Td) 12/14/2021 Allergies as of 1/23/2018  Review Complete On: 1/23/2018 By: Janine Cortez LPN Severity Noted Reaction Type Reactions Ciprofloxacin  05/20/2010    Nausea Only Erythromycin  05/20/2010    Nausea Only Metformin  09/03/2012    Other (comments), Nausea Only Felt bad Felt bad  
 Pcn [Penicillins]  05/20/2010    Nausea Only, Swelling Current Immunizations  Reviewed on 12/2/2016 Name Date H1N1 FLU VACCINE 3/4/2010 Influenza High Dose Vaccine PF 9/20/2017 Influenza Vaccine 10/8/2015, 10/17/2014  2:48 PM, 9/26/2013 Influenza Vaccine (Quad) PF 12/2/2016 Influenza Vaccine Split 11/16/2012, 11/18/2011, 10/28/2010 Pneumococcal Conjugate (PCV-13) 5/5/2016 Pneumococcal Polysaccharide (PPSV-23) 9/9/2015 TDAP Vaccine 12/14/2011 ZZZ-RETIRED (DO NOT USE) Pneumococcal Vaccine (Unspecified Type) 12/1/2010 Zoster Vaccine, Live 1/10/2013 Not reviewed this visit You Were Diagnosed With   
  
 Codes Comments Type 2 diabetes mellitus with hyperglycemia, without long-term current use of insulin (HCC)    -  Primary ICD-10-CM: E11.65 ICD-9-CM: 250.00, 790.29 Essential hypertension     ICD-10-CM: I10 
ICD-9-CM: 401.9 Hypercholesterolemia     ICD-10-CM: E78.00 ICD-9-CM: 272.0 Gastroesophageal reflux disease with esophagitis     ICD-10-CM: K21.0 ICD-9-CM: 530.11 Vitamin D deficiency     ICD-10-CM: E55.9 ICD-9-CM: 268.9 Vitals BP Pulse Temp Resp Height(growth percentile) Weight(growth percentile) 126/64 (BP 1 Location: Left arm, BP Patient Position: Sitting) 75 98.3 °F (36.8 °C) (Oral) 20 4' 11\" (1.499 m) 199 lb 9.6 oz (90.5 kg) SpO2 BMI OB Status Smoking Status 93% 40.31 kg/m2 Hysterectomy Former Smoker Vitals History BMI and BSA Data Body Mass Index Body Surface Area  
 40.31 kg/m 2 1.94 m 2 Preferred Pharmacy Pharmacy Name Phone [address] [facility] [phone] Your Updated Medication List  
  
   
This list is accurate as of: 1/23/18  3:21 PM.  Always use your most recent med list.  
  
  
  
  
 aspirin 325 mg tablet Commonly known as:  ASPIRIN Take 325 mg by mouth daily. BYDUREON 2 mg/0.65 mL Pnij Generic drug:  exenatide microspheres INJECT THE CONTENTS OF 1 PEN UNDER THE SKIN EVERY 7 DAYS  
  
 cloNIDine HCl 0.1 mg tablet Commonly known as:  CATAPRES  
TAKE 1 TABLET NIGHTLY  
  
 dilTIAZem  mg ER capsule Commonly known as:  CARDIZEM CD  
 TAKE 1 CAPSULE DAILY  
  
 ezetimibe 10 mg tablet Commonly known as:  Nav Sneddon TAKE 1 TABLET DAILY FISH OIL 1,000 mg Cap Generic drug:  omega-3 fatty acids-vitamin e Take 1 Cap by mouth four (4) times daily. FREESTYLE LITE STRIPS strip Generic drug:  glucose blood VI test strips TEST TWICE A DAY HYDROcodone-acetaminophen 5-325 mg per tablet Commonly known as:  Luster Payor Take 1 Tab by mouth every four (4) hours as needed for Pain. Max Daily Amount: 6 Tabs.  
  
 loratadine 10 mg tablet Commonly known as:  CLARITIN  
TAKE 1 TABLET DAILY losartan-hydroCHLOROthiazide 100-25 mg per tablet Commonly known as:  HYZAAR  
TAKE 1 TABLET DAILY. STOP TRIAMTERENCE/HCTZ * [facility] Generic drug:  Lancets USE TO TEST TWICE A DAY  
  
 * lancets 28 gauge Misc Commonly known as:  FREESTYLE LANCETS Use as directed to check blood sugar twice daily. DX: E11.65  
  
 naproxen sodium 220 mg tablet Commonly known as:  David Greener Take 1 Tab by mouth two (2) times daily (with meals). NexIUM 20 mg capsule Generic drug:  esomeprazole Take  by mouth daily. ondansetron hcl 4 mg tablet Commonly known as:  Clide Sharp Take 1 Tab by mouth every eight (8) hours as needed for Nausea. traMADol 50 mg tablet Commonly known as:  ULTRAM  
Take 1 Tab by mouth every six (6) hours as needed for Pain. Max Daily Amount: 200 mg. TRICOR 145 mg tablet Generic drug:  fenofibrate nanocrystallized TAKE 1 TABLET DAILY  
  
 VITAMIN D3 1,000 unit tablet Generic drug:  cholecalciferol Take 1,000 Units by mouth two (2) times a day. * Notice: This list has 2 medication(s) that are the same as other medications prescribed for you. Read the directions carefully, and ask your doctor or other care provider to review them with you. We Performed the Following HEMOGLOBIN A1C WITH EAG [81589 CPT(R)] HEMOGLOBIN [id number] CPT(R)] LIPID PANEL [30614 CPT(R)] METABOLIC PANEL, COMPREHENSIVE [50363 CPT(R)] TSH 3RD GENERATION [81821 CPT(R)] VITAMIN D, 25 HYDROXY [id number] CPT(R)] Follow-up Instructions Return in about 4 months (around 5/23/2018). Patient Instructions Learning About Diabetes Food Guidelines Your Care Instructions Meal planning is important to manage diabetes. It helps keep your blood sugar at a target level (which you set with your doctor). You don't have to eat special foods. You can eat what your family eats, including sweets once in a while. But you do have to pay attention to how often you eat and how much you eat of certain foods. You may want to work with a dietitian or a certified diabetes educator (CDE) to help you plan meals and snacks. A dietitian or CDE can also help you lose weight if that is one of your goals. What should you know about eating carbs? Managing the amount of carbohydrate (carbs) you eat is an important part of healthy meals when you have diabetes. Carbohydrate is found in many foods. · Learn which foods have carbs. And learn the amounts of carbs in different foods. ¨ Bread, cereal, pasta, and rice have about 15 grams of carbs in a serving. A serving is 1 slice of bread (1 ounce), ½ cup of cooked cereal, or 1/3 cup of cooked pasta or rice. ¨ Fruits have 15 grams of carbs in a serving. A serving is 1 small fresh fruit, such as an apple or orange; ½ of a banana; ½ cup of cooked or canned fruit; ½ cup of fruit juice; 1 cup of melon or raspberries; or 2 tablespoons of dried fruit. ¨ Milk and no-sugar-added yogurt have 15 grams of carbs in a serving. A serving is 1 cup of milk or 2/3 cup of no-sugar-added yogurt. ¨ Starchy vegetables have 15 grams of carbs in a serving. A serving is ½ cup of mashed potatoes or sweet potato; 1 cup winter squash; ½ of a small baked potato; ½ cup of cooked beans; or ½ cup cooked corn or green peas. · Learn how much carbs to eat each day and at each meal. A dietitian or CDE can teach you how to keep track of the amount of carbs you eat. This is called carbohydrate counting. · If you are not sure how to count carbohydrate grams, use the Plate Method to plan meals. It is a good, quick way to make sure that you have a balanced meal. It also helps you spread carbs throughout the day. ¨ Divide your plate by types of foods. Put non-starchy vegetables on half the plate, meat or other protein food on one-quarter of the plate, and a grain or starchy vegetable in the final quarter of the plate. To this you can add a small piece of fruit and 1 cup of milk or yogurt, depending on how many carbs you are supposed to eat at a meal. 
· Try to eat about the same amount of carbs at each meal. Do not \"save up\" your daily allowance of carbs to eat at one meal. 
· Proteins have very little or no carbs per serving. Examples of proteins are beef, chicken, turkey, fish, eggs, tofu, cheese, cottage cheese, and peanut butter. A serving size of meat is 3 ounces, which is about the size of a deck of cards. Examples of meat substitute serving sizes (equal to 1 ounce of meat) are 1/4 cup of cottage cheese, 1 egg, 1 tablespoon of peanut butter, and ½ cup of tofu. How can you eat out and still eat healthy? · Learn to estimate the serving sizes of foods that have carbohydrate. If you measure food at home, it will be easier to estimate the amount in a serving of restaurant food. · If the meal you order has too much carbohydrate (such as potatoes, corn, or baked beans), ask to have a low-carbohydrate food instead. Ask for a salad or green vegetables. · If you use insulin, check your blood sugar before and after eating out to help you plan how much to eat in the future. · If you eat more carbohydrate at a meal than you had planned, take a walk or do other exercise. This will help lower your blood sugar. What else should you know? · Limit saturated fat, such as the fat from meat and dairy products. This is a healthy choice because people who have diabetes are at higher risk of heart disease. So choose lean cuts of meat and nonfat or low-fat dairy products. Use olive or canola oil instead of butter or shortening when cooking. · Don't skip meals. Your blood sugar may drop too low if you skip meals and take insulin or certain medicines for diabetes. · Check with your doctor before you drink alcohol. Alcohol can cause your blood sugar to drop too low. Alcohol can also cause a bad reaction if you take certain diabetes medicines. Follow-up care is a key part of your treatment and safety. Be sure to make and go to all appointments, and call your doctor if you are having problems. It's also a good idea to know your test results and keep a list of the medicines you take. Where can you learn more? Go to http://reid-shanell.info/. Enter K681 in the search box to learn more about \"Learning About Diabetes Food Guidelines. \" Current as of: March 13, 2017 Content Version: 11.4 © 3407-7048 [employer]. Care instructions adapted under license by [employer] (which disclaims liability or warranty for this information). If you have questions about a medical condition or this instruction, always ask your healthcare professional. [address] any warranty or liability for your use of this information. Introducing [facility]! Dear Sean Diaz: Thank you for requesting a [employer] account. Our records indicate that you have previously registered for a [employer] account but its currently inactive. Please call our [employer] support line at [phone]. Additional Information If you have questions, please visit the Frequently Asked Questions section of the [employer] website at [email or web address] Remember, [employer] is NOT to be used for urgent needs.  For medical emergencies, dial 911. Now available from your iPhone and Android! Please provide this summary of care documentation to your next provider. Your primary care clinician is listed as [address]. If you have any questions after today's visit, please call [phone].

## 2018-01-23 NOTE — PROGRESS NOTES
Chief Complaint   Patient presents with    Hypertension    Labs    Hip Pain     Left - worse at night     Body mass index is 40.31 kg/(m^2). 1. Have you been to the ER, urgent care clinic since your last visit? Hospitalized since your last visit? No    2. Have you seen or consulted any other health care providers outside of the [address] [place] [address] since your last visit? Include any pap smears or colon screening. No    Reviewed record in preparation for visit and have necessary documentation  Pt did not bring medication to office visit for review  Information was given to pt on Advanced Directives, Living Will  Information was given on Shingles Vaccine  Opportunity was given for questions  Goals that were addressed and/or need to be completed after this appointment include: There are no preventive care reminders to display for this patient.

## 2018-01-23 NOTE — PROGRESS NOTES
Progress Note    Patient: Levy Barrios MRN: [id number]  SSN: [id number]    YOB: 1940  Age: 69 y.o. Sex: female        Chief Complaint   Patient presents with    Hypertension    Labs    Hip Pain     Left - worse at night         Subjective:     Encounter Diagnoses   Name Primary?  Type 2 diabetes mellitus with hyperglycemia, without long-term current use of insulin ([address]): A1c's have been well controlled. This patient is managed under a comprehensive plan of care for Diabetes. Overall the patient feels well with good energy level. Pertinent Labs:   Lab Results   Component Value Date/Time    Hemoglobin A1c 6.5 09/08/2017 10:53 AM    Hemoglobin A1c 6.2 04/03/2017 04:03 PM    Hemoglobin A1c 6.4 12/02/2016 11:35 AM      Body mass index is 40.31 kg/(m^2). Lab Results   Component Value Date/Time    LDL, calculated 76 09/08/2017 10:53 AM         Lab Results   Component Value Date/Time    Sodium 140 09/08/2017 10:53 AM    Potassium 4.1 09/08/2017 10:53 AM    Chloride 97 09/08/2017 10:53 AM    CO2 25 09/08/2017 10:53 AM    Anion gap 5 10/17/2014 03:31 AM    Glucose 169 09/08/2017 10:53 AM    BUN 17 09/08/2017 10:53 AM    Creatinine 0.93 09/08/2017 10:53 AM    BUN/Creatinine ratio 18 09/08/2017 10:53 AM    GFR est AA 70 09/08/2017 10:53 AM    GFR est non-AA 60 09/08/2017 10:53 AM    Calcium 10.5 09/08/2017 10:53 AM    AST (SGOT) 22 09/08/2017 10:53 AM    Alk. phosphatase 49 09/08/2017 10:53 AM    Protein, total 7.3 09/08/2017 10:53 AM    Albumin 4.6 09/08/2017 10:53 AM    Globulin 3.2 10/28/2010 09:07 AM    A-G Ratio 1.7 09/08/2017 10:53 AM    ALT (SGPT) 37 09/08/2017 10:53 AM     Lab Results   Component Value Date/Time    Microalb/Creat ratio (ug/mg creat.) 153.3 09/08/2017 10:53 AM      Frequency of home glucose testing: Every 3 days   Blood Sugar range at home: Highest at home 138   Last eye exam: In past 12 months. Last foot exam: This year.    Polyuria, polyphagia or polydipsia: No   Retinopathy: No   Neuropathy SX: No    Low blood sugar symptoms: No   Dietary compliance: Good   Medication compliance:Good   On ASA: Yes   Depression: No   CKD:no     Wt Readings from Last 3 Encounters:   01/23/18 199 lb 9.6 oz (90.5 kg)   12/08/17 197 lb 8 oz (89.6 kg)   11/17/17 195 lb (88.5 kg)        History   Smoking Status    Former Smoker    Packs/day: 1.00    Years: 20.00    Quit date: 11/18/1984   Smokeless Tobacco    Never Used       Diabetic Consultants:Dr. Hyacinth Michael. All the patient's questions regarding medications, diet and exercise were answered. Goal of A1C of less than 7.5% is our goal.   Our overall goal is to reduce or eliminate the long term consequences of poorly controlled diabetes. Yes    Essential hypertension: Controlled  BP Readings from Last 3 Encounters:   01/23/18 126/64   12/08/17 126/71   11/17/17 158/86     The patient reports:  taking medications as instructed, no medication side effects noted, no TIA's, no chest pain on exertion, no dyspnea on exertion, no swelling of ankles. Lab Results   Component Value Date/Time    Sodium 140 09/08/2017 10:53 AM    Potassium 4.1 09/08/2017 10:53 AM    Chloride 97 09/08/2017 10:53 AM    CO2 25 09/08/2017 10:53 AM    Anion gap 5 10/17/2014 03:31 AM    Glucose 169 09/08/2017 10:53 AM    BUN 17 09/08/2017 10:53 AM    Creatinine 0.93 09/08/2017 10:53 AM    BUN/Creatinine ratio 18 09/08/2017 10:53 AM    GFR est AA 70 09/08/2017 10:53 AM    GFR est non-AA 60 09/08/2017 10:53 AM    Calcium 10.5 09/08/2017 10:53 AM    Bilirubin, total 0.4 09/08/2017 10:53 AM    AST (SGOT) 22 09/08/2017 10:53 AM    Alk. phosphatase 49 09/08/2017 10:53 AM    Protein, total 7.3 09/08/2017 10:53 AM    Albumin 4.6 09/08/2017 10:53 AM    Globulin 3.2 10/28/2010 09:07 AM    A-G Ratio 1.7 09/08/2017 10:53 AM    ALT (SGPT) 37 09/08/2017 10:53 AM     Our goal is to normalize the blood pressure to decrease the risks of strokes and heart attacks.  The patient is in agreement with the plan.  Hypercholesterolemia:  Cardiovascular risks for her are: LDL goal is under 80  diabetic  hypertension  hyperlipidemia. Currently she takes Zetia and TriCor. Lab Results   Component Value Date/Time    Cholesterol, total 161 09/08/2017 10:53 AM    HDL Cholesterol 57 09/08/2017 10:53 AM    LDL, calculated 76 09/08/2017 10:53 AM    Triglyceride 139 09/08/2017 10:53 AM    CHOL/HDL Ratio 2.4 10/28/2010 09:07 AM     Lab Results   Component Value Date/Time    ALT (SGPT) 37 09/08/2017 10:53 AM    AST (SGOT) 22 09/08/2017 10:53 AM    Alk. phosphatase 49 09/08/2017 10:53 AM    Bilirubin, total 0.4 09/08/2017 10:53 AM      Myalgias: No   Fatigue: No   Other side effects: no  Wt Readings from Last 3 Encounters:   01/23/18 199 lb 9.6 oz (90.5 kg)   12/08/17 197 lb 8 oz (89.6 kg)   11/17/17 195 lb (88.5 kg)     The patient is aware of our goal to reduce or eliminate the long term problems (such as strokes and heart attacks) related to poorly controlled hyperlipidemia.  Gastroesophageal reflux disease with esophagitis:Current control of Symptoms:good  Hiatal Hernia:no  Current Medications:omeprazole  The patient has no history melena or bright red blood in the stools. The patient avoids high dose aspirin and NSAID therapy. The patient is aware of diet changes needed, elevating the head of the bed and appropriate use of antacids.  Vitamin D deficiency:  No sx. Due for testing. Lab Results   Component Value Date/Time    Vitamin D 25-Hydroxy 41.1 11/18/2011 09:22 AM    VITAMIN D, 25-HYDROXY 36.3 12/02/2016 11:35 AM               Current and past medical information:    Current Medications after this visit[de-identified]   Current Outpatient Prescriptions   Medication Sig    aspirin (ASPIRIN) 325 mg tablet Take 325 mg by mouth daily.     cloNIDine HCl (CATAPRES) 0.1 mg tablet TAKE 1 TABLET NIGHTLY    HYDROcodone-acetaminophen (NORCO) 5-325 mg per tablet Take 1 Tab by mouth every four (4) hours as needed for Pain. Max Daily Amount: 6 Tabs.  ezetimibe (ZETIA) 10 mg tablet TAKE 1 TABLET DAILY    BYDUREON 2 mg/0.65 mL pnij INJECT THE CONTENTS OF 1 PEN UNDER THE SKIN EVERY 7 DAYS    losartan-hydroCHLOROthiazide (HYZAAR) 100-25 mg per tablet TAKE 1 TABLET DAILY. STOP TRIAMTERENCE/HCTZ    TRICOR 145 mg tablet TAKE 1 TABLET DAILY    loratadine (CLARITIN) 10 mg tablet TAKE 1 TABLET DAILY    naproxen sodium (ALEVE) 220 mg tablet Take 1 Tab by mouth two (2) times daily (with meals). (Patient taking differently: Take 220 mg by mouth as needed.)    traMADol (ULTRAM) 50 mg tablet Take 1 Tab by mouth every six (6) hours as needed for Pain. Max Daily Amount: 200 mg.    lancets (FREESTYLE LANCETS) 28 gauge misc Use as directed to check blood sugar twice daily. DX: E11.65    dilTIAZem CD (CARDIZEM CD) 180 mg ER capsule TAKE 1 CAPSULE DAILY    FREESTYLE LITE STRIPS strip TEST TWICE A DAY    MICROLET LANCET misc USE TO TEST TWICE A DAY    esomeprazole (NEXIUM) 20 mg capsule Take  by mouth daily.  omega-3 fatty acids-vitamin e (FISH OIL) 1,000 mg cap Take 1 Cap by mouth four (4) times daily.  cholecalciferol, vitamin d3, (VITAMIN D) 1,000 unit tablet Take 1,000 Units by mouth two (2) times a day.  ondansetron hcl (ZOFRAN) 4 mg tablet Take 1 Tab by mouth every eight (8) hours as needed for Nausea. No current facility-administered medications for this visit.         Patient Active Problem List    Diagnosis Date Noted    Hypertension      Priority: 1 - One    Hypercholesterolemia      Priority: 1 - One    GERD (gastroesophageal reflux disease)      Priority: 1 - One    Hot flashes, menopausal      Priority: 2 - Two    Type 2 diabetes mellitus with nephropathy ([facility] 01/23/2018    Type 2 diabetes mellitus with hyperglycemia, without long-term current use of insulin ([facility] 12/19/2016    Essential hypertension 10/18/2015    Mixed hyperlipidemia 10/18/2015    Morbid obesity ([facility] 10/18/2015    S/P total knee replacement using cement 09/09/2015    Type 2 diabetes mellitus with complication ([facility] 91/59/7472    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    Diabetes mellitus ([facility] 09/03/2012    Allergic rhinitis 11/18/2011    Arthritis 02/28/2011    Vitamin D deficiency 06/28/2010       Past Medical History:   Diagnosis Date    Arthritis 2/28/2011    OSTEO    Chronic obstructive pulmonary disease (HCC)     VERY MILD    Chronic pain     LEFT KNEE    Diabetes (HCC)     GERD (gastroesophageal reflux disease)     Hot flashes, menopausal     Hypercholesterolemia     Hypertension     Seizures ([facility] 1980'S    AFTER DRINKING 2 MARGARITAS    Vitamin D deficiency 6/28/2010       Allergies   Allergen Reactions    Ciprofloxacin Nausea Only    Erythromycin Nausea Only    Metformin Other (comments) and Nausea Only     Felt bad  Felt bad      Pcn [Penicillins] Nausea Only and Swelling       Past Surgical History:   Procedure Laterality Date    HX APPENDECTOMY  2001    HX CATARACT REMOVAL Bilateral     HX HYSTERECTOMY  1975    HX OOPHORECTOMY  2001    bilateral    HX ORTHOPAEDIC      HX OTHER SURGICAL      LIPOMA LEFT SIDE       Social History     Social History    Marital status:      Spouse name: N/A    Number of children: N/A    Years of education: N/A     Social History Main Topics    Smoking status: Former Smoker     Packs/day: 1.00     Years: 20.00     Quit date: 11/18/1984    Smokeless tobacco: Never Used    Alcohol use Yes      Comment: rarely    Drug use: No    Sexual activity: Yes     Other Topics Concern    None     Social History Narrative       Review of Systems   Constitutional: Negative. Negative for chills, fever, malaise/fatigue and weight loss. HENT: Negative. Negative for hearing loss. Eyes: Negative. Negative for blurred vision and double vision. Respiratory: Negative. Negative for cough, hemoptysis, sputum production and shortness of breath. Cardiovascular: Negative. Negative for chest pain, palpitations and orthopnea. Gastrointestinal: Negative. Negative for abdominal pain, blood in stool, heartburn, nausea and vomiting. Genitourinary: Negative. Negative for dysuria, frequency and urgency. Musculoskeletal: Positive for joint pain. Negative for back pain, myalgias and neck pain. She is having some left hip pain. Skin: Negative. Negative for rash. Neurological: Negative. Negative for dizziness, tingling, tremors, weakness and headaches. Endo/Heme/Allergies: Negative. Psychiatric/Behavioral: Negative. Negative for depression. Objective:     Vitals:    01/23/18 1458   BP: 126/64   Pulse: 75   Resp: 20   Temp: 98.3 °F (36.8 °C)   TempSrc: Oral   SpO2: 93%   Weight: 199 lb 9.6 oz (90.5 kg)   Height: 4' 11\" (1.499 m)      Body mass index is 40.31 kg/(m^2). Physical Exam   Constitutional: She is oriented to person, place, and time and well-developed, well-nourished, and in no distress. No distress. HENT:   Head: Normocephalic and atraumatic. Mouth/Throat: Oropharynx is clear and moist.   Eyes: Conjunctivae are normal.   Neck: No tracheal deviation present. No thyromegaly present. Cardiovascular: Normal rate, regular rhythm and normal heart sounds. No murmur heard. Pulmonary/Chest: Effort normal and breath sounds normal. No respiratory distress. Abdominal: Soft. She exhibits no distension. Lymphadenopathy:     She has no cervical adenopathy. Neurological: She is alert and oriented to person, place, and time. Skin: Skin is warm. No rash noted. She is not diaphoretic. No erythema. Psychiatric: Mood and affect normal.   Nursing note and vitals reviewed. There are no preventive care reminders to display for this patient. Assessment and orders:     Encounter Diagnoses     ICD-10-CM ICD-9-CM   1. Type 2 diabetes mellitus with hyperglycemia, without long-term current use of insulin (HCC) E11.65 250.00     790.29   2. Essential hypertension I10 401.9   3. Hypercholesterolemia E78.00 272.0   4. Gastroesophageal reflux disease with esophagitis K21.0 530.11   5. Vitamin D deficiency E55.9 268.9     Diagnoses and all orders for this visit:    1. Type 2 diabetes mellitus with hyperglycemia, without long-term current use of insulin (HCC)-due for repeat labs  -     HEMOGLOBIN A1C WITH EAG  -     LIPID PANEL  -     METABOLIC PANEL, COMPREHENSIVE    2. Essential hypertension-well-controlled  -     METABOLIC PANEL, COMPREHENSIVE  -     TSH 3RD GENERATION  -     HEMOGLOBIN    3. Hypercholesterolemia-repeat labs  -     METABOLIC PANEL, COMPREHENSIVE    4. Gastroesophageal reflux disease with esophagitis-symptoms controlled  -     HEMOGLOBIN    5. Vitamin D deficiency-recheck  -     VITAMIN D, 25 HYDROXY            Plan of care:  Discussed diagnoses in detail with patient. Medication risks/benefits/side effects discussed with patient. All of the patient's questions were addressed. The patient understands and agrees with our plan of care. The patient knows to call back if they are unsure of or forget any changes we discussed today or if the symptoms change. The patient received an After-Visit Summary which contains VS, orders, medication list and allergy list. This can be used as a \"mini-medical record\" should they have to seek medical care while out of town. Patient Care Team:  Delicia Cummings MD as PCP - Lynn Cobb MD as Physician (Cardiology)  Nona Franklin MD as Physician (Orthopedic Surgery)  Foster Roth MD (Endocrinology)  Stefan Wood RN as Ambulatory Care Navigator    Follow-up Disposition:  Return in about 4 months (around 5/23/2018).     Future Appointments  Date Time Provider [facility]   2/5/2018 3:05 PM Delicia Cummings MD [facility] SCHED   5/11/2018 11:45 AM Foster Roth MD CDE [address]       Signed By: Delicia Cummings MD     January 23, 2018

## 2018-01-23 NOTE — PATIENT INSTRUCTIONS

## 2018-01-30 LAB
25(OH)D3+25(OH)D2 SERPL-MCNC: 47.6 NG/ML (ref 30–100)
ALBUMIN SERPL-MCNC: 4.6 G/DL (ref 3.5–4.8)
ALBUMIN/GLOB SERPL: 1.9 {RATIO} (ref 1.2–2.2)
ALP SERPL-CCNC: 65 IU/L (ref 39–117)
ALT SERPL-CCNC: 37 IU/L (ref 0–32)
AST SERPL-CCNC: 31 IU/L (ref 0–40)
BILIRUB SERPL-MCNC: 0.4 MG/DL (ref 0–1.2)
BUN SERPL-MCNC: 19 MG/DL (ref 8–27)
BUN/CREAT SERPL: 21 (ref 12–28)
CALCIUM SERPL-MCNC: 9.9 MG/DL (ref 8.7–10.3)
CHLORIDE SERPL-SCNC: 99 MMOL/L (ref 96–106)
CHOLEST SERPL-MCNC: 148 MG/DL (ref 100–199)
CO2 SERPL-SCNC: 26 MMOL/L (ref 18–29)
CREAT SERPL-MCNC: 0.9 MG/DL (ref 0.57–1)
EST. AVERAGE GLUCOSE BLD GHB EST-MCNC: 140 MG/DL
GFR SERPLBLD CREATININE-BSD FMLA CKD-EPI: 62 ML/MIN/1.73
GFR SERPLBLD CREATININE-BSD FMLA CKD-EPI: 72 ML/MIN/1.73
GLOBULIN SER CALC-MCNC: 2.4 G/DL (ref 1.5–4.5)
GLUCOSE SERPL-MCNC: 133 MG/DL (ref 65–99)
HBA1C MFR BLD: 6.5 % (ref 4.8–5.6)
HDLC SERPL-MCNC: 48 MG/DL
HGB BLD-MCNC: 14.9 G/DL (ref 11.1–15.9)
LDLC SERPL CALC-MCNC: 62 MG/DL (ref 0–99)
POTASSIUM SERPL-SCNC: 3.9 MMOL/L (ref 3.5–5.2)
PROT SERPL-MCNC: 7 G/DL (ref 6–8.5)
SODIUM SERPL-SCNC: 142 MMOL/L (ref 134–144)
TRIGL SERPL-MCNC: 188 MG/DL (ref 0–149)
TSH SERPL DL<=0.005 MIU/L-ACNC: 1.22 UIU/ML (ref 0.45–4.5)
VLDLC SERPL CALC-MCNC: 38 MG/DL (ref 5–40)

## 2018-02-05 ENCOUNTER — OFFICE VISIT (OUTPATIENT)
Dept: FAMILY MEDICINE CLINIC | Age: 78
End: 2018-02-05

## 2018-02-05 VITALS
TEMPERATURE: 98 F | HEIGHT: 59 IN | DIASTOLIC BLOOD PRESSURE: 88 MMHG | SYSTOLIC BLOOD PRESSURE: 178 MMHG | OXYGEN SATURATION: 98 % | RESPIRATION RATE: 20 BRPM | BODY MASS INDEX: 40.24 KG/M2 | WEIGHT: 199.6 LBS | HEART RATE: 78 BPM

## 2018-02-05 DIAGNOSIS — Z00.00 MEDICARE ANNUAL WELLNESS VISIT, SUBSEQUENT: Primary | ICD-10-CM

## 2018-02-05 NOTE — PROGRESS NOTES
[address], [address] [place] [address]  [phone]           Date of visit: 2/5/2018     Deja uD MD    This is an Subsequent Medicare Annual Wellness Visit (AWV), (Performed more than 12 months after effective date of Medicare Part B enrollment and 12 months after last preventive visit, Once in a lifetime)    I have reviewed the patient's medical history in detail and updated the computerized patient record. Danelle Whatley is a 69 y.o. female   History obtained from: the patient.     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    Essential hypertension I10    Mixed hyperlipidemia E78.2    Morbid obesity ([address] E66.01    Type 2 diabetes mellitus with hyperglycemia, without long-term current use of insulin (HCC) E11.65    Type 2 diabetes mellitus with nephropathy ([address] E11.21     Past Medical History:   Diagnosis Date    Arthritis 2/28/2011    OSTEO    Chronic obstructive pulmonary disease (HCC)     VERY MILD    Chronic pain     LEFT KNEE    Diabetes (HCC)     GERD (gastroesophageal reflux disease)     Hot flashes, menopausal     Hypercholesterolemia     Hypertension     Seizures ([address] 1980'S    AFTER DRINKING 2 MARGARITAS    Vitamin D deficiency 6/28/2010      Past Surgical History:   Procedure Laterality Date    HX APPENDECTOMY  2001    HX CATARACT REMOVAL Bilateral     HX HYSTERECTOMY  1975    HX OOPHORECTOMY  2001    bilateral    HX ORTHOPAEDIC      HX OTHER SURGICAL      LIPOMA LEFT SIDE     Allergies   Allergen Reactions    Ciprofloxacin Nausea Only    Erythromycin Nausea Only    Metformin Other (comments) and Nausea Only     Felt bad  Felt bad      Pcn [Penicillins] Nausea Only and Swelling     Current Outpatient Prescriptions   Medication Sig Dispense Refill    aspirin (ASPIRIN) 325 mg tablet Take 325 mg by mouth daily.  cloNIDine HCl (CATAPRES) 0.1 mg tablet TAKE 1 TABLET NIGHTLY 90 Tab 3    ezetimibe (ZETIA) 10 mg tablet TAKE 1 TABLET DAILY 90 Tab 2    BYDUREON 2 mg/0.65 mL pnij INJECT THE CONTENTS OF 1 PEN UNDER THE SKIN EVERY 7 DAYS 12 Pen 3    losartan-hydroCHLOROthiazide (HYZAAR) 100-25 mg per tablet TAKE 1 TABLET DAILY. STOP TRIAMTERENCE/HCTZ 90 Tab 2    TRICOR 145 mg tablet TAKE 1 TABLET DAILY 90 Tab 3    loratadine (CLARITIN) 10 mg tablet TAKE 1 TABLET DAILY 90 Tab 3    naproxen sodium (ALEVE) 220 mg tablet Take 1 Tab by mouth two (2) times daily (with meals). (Patient taking differently: Take 220 mg by mouth as needed.) 30 Tab 2    traMADol (ULTRAM) 50 mg tablet Take 1 Tab by mouth every six (6) hours as needed for Pain. Max Daily Amount: 200 mg. 90 Tab 4    lancets (FREESTYLE LANCETS) 28 gauge misc Use as directed to check blood sugar twice daily. DX: E11.65 200 Lancet 5    dilTIAZem CD (CARDIZEM CD) 180 mg ER capsule TAKE 1 CAPSULE DAILY 90 Cap 3    FREESTYLE LITE STRIPS strip TEST TWICE A  Strip 10    MICROLET LANCET misc USE TO TEST TWICE A DAY 2 Package 2    esomeprazole (NEXIUM) 20 mg capsule Take  by mouth daily.  omega-3 fatty acids-vitamin e (FISH OIL) 1,000 mg cap Take 1 Cap by mouth four (4) times daily.  cholecalciferol, vitamin d3, (VITAMIN D) 1,000 unit tablet Take 1,000 Units by mouth two (2) times a day.  HYDROcodone-acetaminophen (NORCO) 5-325 mg per tablet Take 1 Tab by mouth every four (4) hours as needed for Pain.  Max Daily Amount: 6 Tabs. 10 Tab 0    ondansetron hcl (ZOFRAN) 4 mg tablet Take 1 Tab by mouth every eight (8) hours as needed for Nausea. 13 Tab 1     Family History   Problem Relation Age of Onset    Pulmonary Embolism Father     Arthritis-rheumatoid Paternal Grandmother    Tonio Bauer Alzheimer Mother     Arthritis-rheumatoid Sister     Emphysema Brother     Arthritis-rheumatoid Sister     Anesth Problems Neg Hx      Social History   Substance Use Topics    Smoking status: Former Smoker     Packs/day: 1.00     Years: 20.00     Quit date: 11/18/1984    Smokeless tobacco: Never Used    Alcohol use Yes      Comment: rarely       Specialists/Care Team   Evgeny Li has established care with the following healthcare providers:  Patient Care Team:  Thiago York MD as PCP - Leyla Purdy MD as Physician (Cardiology)  Misbah Price MD as Physician (Orthopedic Surgery)  Valentin Lane MD (Endocrinology)  Kassy Long RN as [address] Assessment     Demographics   female  69 y.o. General Health Questions   -During the past 4 weeks:   -how would you rate your health in general? Good   -how often have you been bothered by feeling dizzy when standing up? never   -how much have you been bothered by bodily pain? mildly   -Have you noticed any hearing difficulties? yes   -has your physical and emotional health limited your social activities with family or friends? no    Emotional Health Questions   -Do you have a history of depression, anxiety, or emotional problems? no  -Over the past 2 weeks, have you felt down, depressed or hopeless? no  -Over the past 2 weeks, have you felt little interest or pleasure in doing things? no    Health Habits   Please describe your diet habits: Self Grade B  Do you get 5 servings of fruits or vegetables daily? no  Do you exercise regularly? no    Alcohol Risk Factor Screening:    On any occasion during the past 3 months, have you had more than 4 drinks containing alcohol? No     Do you average more than 14 drinks per week? No       Activities of Daily Living and Functional Status   -Do you need help with eating, walking, dressing, bathing, toileting, the phone, transportation, shopping, preparing meals, housework, laundry, medications or managing money? no  -In the past four weeks, was someone available to help you if you needed and wanted help with anything? yes  -Are you confident are you that you can control and manage most of your health problems? yes  -Have you been given information to help you keep track of your medications? yes  -How often do you have trouble taking your medications as prescribed? occasionally    Fall Risk and Home Safety   Have you fallen 2 or more times in the past year? no  Does your home have rugs in the hallway, lack grab bars in the bathroom, lack handrails on the stairs or have poor lighting? no  Do you have smoke detectors and check them regularly? yes  Do you have difficulties driving a car? no  Do you always fasten your seat belt when you are in a car? yes    Review of Systems (if indicated for problems addressed today)   Review of Systems   Constitutional: Negative. Negative for chills, fever, malaise/fatigue and weight loss. HENT: Negative. Negative for hearing loss. Eyes: Negative. Negative for blurred vision and double vision. Respiratory: Negative. Negative for cough, hemoptysis, sputum production and shortness of breath. Cardiovascular: Negative. Negative for chest pain, palpitations and orthopnea. Gastrointestinal: Negative. Negative for abdominal pain, blood in stool, heartburn, nausea and vomiting. Genitourinary: Negative. Negative for dysuria, frequency and urgency. Musculoskeletal: Negative. Negative for back pain, myalgias and neck pain. Skin: Negative. Negative for rash. Neurological: Negative. Negative for dizziness, tingling, tremors, weakness and headaches. Endo/Heme/Allergies: Negative. Psychiatric/Behavioral: Negative. Negative for depression. Physical Examination     Wt Readings from Last 3 Encounters:   02/05/18 199 lb 9.6 oz (90.5 kg)   01/23/18 199 lb 9.6 oz (90.5 kg)   12/08/17 197 lb 8 oz (89.6 kg)     Temp Readings from Last 3 Encounters:   02/05/18 98 °F (36.7 °C) (Oral)   01/23/18 98.3 °F (36.8 °C) (Oral)   12/08/17 97.3 °F (36.3 °C) (Oral)     BP Readings from Last 3 Encounters:   02/05/18 178/88   01/23/18 126/64   12/08/17 126/71     Pulse Readings from Last 3 Encounters:   02/05/18 78   01/23/18 75   12/08/17 70       Body mass index is 40.31 kg/(m^2). No exam data present  Was the patient's timed Up & Go test unsteady or longer than 10 seconds? no    Evaluation of Cognitive Function   Mood/affect:  happy  Orientation: Person, Place, Time and Situation  Appearance: age appropriate and casually dressed  Family member/caregiver input: no    Additional exam if indicated for problems addressed today:  Physical Exam   Constitutional: She is oriented to person, place, and time. She appears well-developed and well-nourished. No distress. HENT:   Head: Normocephalic and atraumatic. Mouth/Throat: Oropharynx is clear and moist.   Eyes: Conjunctivae are normal. No scleral icterus. Neck: No thyromegaly present. No carotid bruit. Cardiovascular: Normal rate, regular rhythm and normal heart sounds. Exam reveals no gallop and no friction rub. No murmur heard. Fortunately she has not had any recurrence of her uncontrolled fib/flutter since her ablation   Pulmonary/Chest: Effort normal and breath sounds normal. She has no wheezes. She has no rales. Abdominal: Soft. Bowel sounds are normal. She exhibits no distension. There is no tenderness. There is no rebound and no guarding. Musculoskeletal: She exhibits no edema. Lymphadenopathy:     She has no cervical adenopathy.    Neurological: She is alert and oriented to person, place, and time.   Skin: Skin is warm and dry. No rash noted. She is not diaphoretic. Psychiatric: She has a normal mood and affect. Her behavior is normal. Thought content normal.   Nursing note and vitals reviewed. Advice/Referrals/Counseling (as indicated)   Education and counseling provided for any problems identified above:     Preventive Services     (Preventive care checklist to be included in patient instructions)  Discussed today Done Previously Not Needed     x  Pneumococcal vaccines    x  Flu vaccine     x Hepatitis B vaccine (if at risk)    x  Shingles vaccine    x  TDAP vaccine    x  Mammogram     x Pap smear    x  Colorectal cancer screening     x Low-dose CT for lung cancer screening    x  Bone density test    x  Glaucoma screening    x  Cholesterol test    x  Diabetes screening test     x  Diabetes self-management class    x  Nutritionist referral for diabetes or renal disease     Discussion of Advance Directive   Discussed with Mike Levin her ability to prepare and advance directive in the case that an injury or illness causes her to be unable to make health care decisions. Assessment/Plan   Z00.00    ICD-10-CM ICD-9-CM    1. Medicare annual wellness visit, subsequent Z00.00 V70.0        No orders of the defined types were placed in this encounter.       Patient Care Team:  Elsi Houston MD as PCP - Marci Engle MD as Physician (Cardiology)  Kevin Baptiste MD as Physician (Orthopedic Surgery)  Mable Rubio MD (Endocrinology)  Renaldo Brown, RN as Ambulatory Care Navigator    Follow-up Disposition: Not on File    Future Appointments  Date Time Provider Hay Sloan   5/11/2018 11:45 AM Mable Rubio MD CDE [facility]   5/23/2018 10:30 AM MD Mike Karimi MD

## 2018-02-05 NOTE — MR AVS SNAPSHOT
[address] 
 
 
 [address] [place] [id number] 
[phone] Patient: Leonarda Mendez MRN: [id number] Visit Information Date & Time Provider Department Dept. Phone Encounter #  
 2/5/2018  3:05 PM Roberto Lanes, MD [address] [facility] [id number] Follow-up Instructions Return in about 1 month (around 3/5/2018). Your Appointments 5/11/2018 11:45 AM  
ROUTINE CARE with Cheryl Santos MD  
[facility] [address] [place] [address]) Appt Note: f/u 5 month  
 [address] [place] [address] [facility] [address]  
[phone]  
  
   
 [phone]  
  
    
 5/23/2018 10:30 AM  
ROUTINE CARE with Roberto Lanes, MD  
[address] [facility] ([address]) Appt Note: 4 mnth fu  
 [address] [facility] [address] [place] [id number]  
[place] [address] [place] [id number] Upcoming Health Maintenance Date Due  
 MEDICARE YEARLY EXAM 2/4/2018 HEMOGLOBIN A1C Q6M 7/29/2018 MICROALBUMIN Q1 9/8/2018 EYE EXAM RETINAL OR DILATED Q1 10/26/2018 FOOT EXAM Q1 12/8/2018 LIPID PANEL Q1 1/29/2019 COLONOSCOPY 6/27/2019 GLAUCOMA SCREENING Q2Y 10/26/2019 DTaP/Tdap/Td series (2 - Td) 12/14/2021 Allergies as of 2/5/2018  Review Complete On: 2/5/2018 By: Roberto Lanes, MD  
  
 Severity Noted Reaction Type Reactions Ciprofloxacin  05/20/2010    Nausea Only Erythromycin  05/20/2010    Nausea Only Metformin  09/03/2012    Other (comments), Nausea Only Felt bad Felt bad  
 Pcn [Penicillins]  05/20/2010    Nausea Only, Swelling Current Immunizations  Reviewed on 12/2/2016 Name Date H1N1 FLU VACCINE 3/4/2010 Influenza High Dose Vaccine PF 9/20/2017 Influenza Vaccine 10/8/2015, 10/17/2014  2:48 PM, 9/26/2013 Influenza Vaccine (Quad) PF 12/2/2016 Influenza Vaccine Split 11/16/2012, 11/18/2011, 10/28/2010 Pneumococcal Conjugate (PCV-13) 5/5/2016 Pneumococcal Polysaccharide (PPSV-23) 9/9/2015 TDAP Vaccine 12/14/2011 ZZZ-RETIRED (DO NOT USE) Pneumococcal Vaccine (Unspecified Type) 12/1/2010 Zoster Vaccine, Live 1/10/2013 Not reviewed this visit You Were Diagnosed With   
  
 Codes Comments Medicare annual wellness visit, subsequent    -  Primary ICD-10-CM: Z00.00 ICD-9-CM: V70.0 Vitals BP Pulse Temp Resp Height(growth percentile) Weight(growth percentile) 178/88 (BP 1 Location: Right arm, BP Patient Position: Sitting) 78 98 °F (36.7 °C) (Oral) 20 4' 11\" (1.499 m) 199 lb 9.6 oz (90.5 kg) SpO2 BMI OB Status Smoking Status 98% 40.31 kg/m2 Hysterectomy Former Smoker Vitals History BMI and BSA Data Body Mass Index Body Surface Area  
 40.31 kg/m 2 1.94 m 2 Preferred Pharmacy Pharmacy Name Phone [address] [facility] [phone] Your Updated Medication List  
  
   
This list is accurate as of: 2/5/18  3:51 PM.  Always use your most recent med list.  
  
  
  
  
 aspirin 325 mg tablet Commonly known as:  ASPIRIN Take 325 mg by mouth daily. BYDUREON 2 mg/0.65 mL Pnij Generic drug:  exenatide microspheres INJECT THE CONTENTS OF 1 PEN UNDER THE SKIN EVERY 7 DAYS  
  
 cloNIDine HCl 0.1 mg tablet Commonly known as:  CATAPRES  
TAKE 1 TABLET NIGHTLY  
  
 dilTIAZem  mg ER capsule Commonly known as:  CARDIZEM CD  
TAKE 1 CAPSULE DAILY  
  
 ezetimibe 10 mg tablet Commonly known as:  Media Men TAKE 1 TABLET DAILY FISH OIL 1,000 mg Cap Generic drug:  omega-3 fatty acids-vitamin e Take 1 Cap by mouth four (4) times daily. FREESTYLE LITE STRIPS strip Generic drug:  glucose blood VI test strips TEST TWICE A DAY HYDROcodone-acetaminophen 5-325 mg per tablet Commonly known as:  Alejandrina Forbesast  
 Take 1 Tab by mouth every four (4) hours as needed for Pain. Max Daily Amount: 6 Tabs.  
  
 loratadine 10 mg tablet Commonly known as:  CLARITIN  
TAKE 1 TABLET DAILY losartan-hydroCHLOROthiazide 100-25 mg per tablet Commonly known as:  HYZAAR  
TAKE 1 TABLET DAILY. STOP TRIAMTERENCE/HCTZ * [facility] Generic drug:  Lancets USE TO TEST TWICE A DAY  
  
 * lancets 28 gauge Misc Commonly known as:  FREESTYLE LANCETS Use as directed to check blood sugar twice daily. DX: E11.65  
  
 naproxen sodium 220 mg tablet Commonly known as:  Cyntha Vinnie Take 1 Tab by mouth two (2) times daily (with meals). NexIUM 20 mg capsule Generic drug:  esomeprazole Take  by mouth daily. ondansetron hcl 4 mg tablet Commonly known as:  Diasobiaah Zenaida Take 1 Tab by mouth every eight (8) hours as needed for Nausea. traMADol 50 mg tablet Commonly known as:  ULTRAM  
Take 1 Tab by mouth every six (6) hours as needed for Pain. Max Daily Amount: 200 mg. TRICOR 145 mg tablet Generic drug:  fenofibrate nanocrystallized TAKE 1 TABLET DAILY  
  
 VITAMIN D3 1,000 unit tablet Generic drug:  cholecalciferol Take 1,000 Units by mouth two (2) times a day. * Notice: This list has 2 medication(s) that are the same as other medications prescribed for you. Read the directions carefully, and ask your doctor or other care provider to review them with you. Follow-up Instructions Return in about 1 month (around 3/5/2018). Patient Instructions Well Visit, Over 72: Care Instructions Your Care Instructions Physical exams can help you stay healthy. Your doctor has checked your overall health and may have suggested ways to take good care of yourself. He or she also may have recommended tests. At home, you can help prevent illness with healthy eating, regular exercise, and other steps. Follow-up care is a key part of your treatment and safety.  Be sure to make and go to all appointments, and call your doctor if you are having problems. It's also a good idea to know your test results and keep a list of the medicines you take. How can you care for yourself at home? · Reach and stay at a healthy weight. This will lower your risk for many problems, such as obesity, diabetes, heart disease, and high blood pressure. · Get at least 30 minutes of exercise on most days of the week. Walking is a good choice. You also may want to do other activities, such as running, swimming, cycling, or playing tennis or team sports. · Do not smoke. Smoking can make health problems worse. If you need help quitting, talk to your doctor about stop-smoking programs and medicines. These can increase your chances of quitting for good. · Protect your skin from too much sun. When you're outdoors from 10 a.m. to 4 p.m., stay in the shade or cover up with clothing and a hat with a wide brim. Wear sunglasses that block UV rays. Even when it's cloudy, put broad-spectrum sunscreen (SPF 30 or higher) on any exposed skin. · See a dentist one or two times a year for checkups and to have your teeth cleaned. · Wear a seat belt in the car. · Limit alcohol to 2 drinks a day for men and 1 drink a day for women. Too much alcohol can cause health problems. Follow your doctor's advice about when to have certain tests. These tests can spot problems early. For men and women · Cholesterol. Your doctor will tell you how often to have this done based on your overall health and other things that can increase your risk for heart attack and stroke. · Blood pressure. Have your blood pressure checked during a routine doctor visit. Your doctor will tell you how often to check your blood pressure based on your age, your blood pressure results, and other factors. · Diabetes. Ask your doctor whether you should have tests for diabetes. · Vision.  Experts recommend that you have yearly exams for glaucoma and other age-related eye problems. · Hearing. Tell your doctor if you notice any change in your hearing. You can have tests to find out how well you hear. · Colon cancer tests. Keep having colon cancer tests as your doctor recommends. You can have one of several types of tests. · Heart attack and stroke risk. At least every 4 to 6 years, you should have your risk for heart attack and stroke assessed. Your doctor uses factors such as your age, blood pressure, cholesterol, and whether you smoke or have diabetes to show what your risk for a heart attack or stroke is over the next 10 years. · Osteoporosis. Talk to your doctor about whether you should have a bone density test to find out whether you have thinning bones. Also ask your doctor about whether you should take calcium and vitamin D supplements. For women · Pap test and pelvic exam. You may no longer need a Pap test. Talk with your doctor about whether to stop or continue to have Pap tests. · Breast exam and mammogram. Ask how often you should have a mammogram, which is an X-ray of your breasts. A mammogram can spot breast cancer before it can be felt and when it is easiest to treat. · Thyroid disease. Talk to your doctor about whether to have your thyroid checked as part of a regular physical exam. Women have an increased chance of a thyroid problem. For men · Prostate exam. Talk to your doctor about whether you should have a blood test (called a PSA test) for prostate cancer. Experts disagree on whether men should have this test. Some experts recommend that you discuss the benefits and risks of the test with your doctor. · Abdominal aortic aneurysm. Ask your doctor whether you should have a test to check for an aneurysm. You may need a test if you ever smoked or if your parent, brother, sister, or child has had an aneurysm. When should you call for help?  
Watch closely for changes in your health, and be sure to contact your doctor if you have any problems or symptoms that concern you. Where can you learn more? Go to http://reid-shanell.info/. Enter I133 in the search box to learn more about \"Well Visit, Over 65: Care Instructions. \" Current as of: May 12, 2017 Content Version: 11.4 © 2288-1171 [employer]. Care instructions adapted under license by [employer] (which disclaims liability or warranty for this information). If you have questions about a medical condition or this instruction, always ask your healthcare professional. [address] any warranty or liability for your use of this information. Introducing hospitals & HEALTH SERVICES! Dear Sivakumar Noriega: Thank you for requesting a [employer] account. Our records indicate that you already have an active [employer] account. You can access your account anytime at [email or web address] There/[employer] Did you know that you can access your hospital and ER discharge instructions at any time in [employer]? You can also review all of your test results from your hospital stay or ER visit. Additional Information If you have questions, please visit the Frequently Asked Questions section of the [employer] website at [email or web address] There/[email or web address]/. Remember, [employer] is NOT to be used for urgent needs. For medical emergencies, dial 911. Now available from your iPhone and Android! Please provide this summary of care documentation to your next provider. Your primary care clinician is listed as [address]. If you have any questions after today's visit, please call [phone].

## 2018-02-05 NOTE — PATIENT INSTRUCTIONS

## 2018-02-05 NOTE — PROGRESS NOTES
Chief Complaint   Patient presents with   [facility]     1. Have you been to the ER, urgent care clinic since your last visit? Hospitalized since your last visit? No    2. Have you seen or consulted any other health care providers outside of the [address] [place] [address] since your last visit? Include any pap smears or colon screening. No    Reviewed record in preparation for visit and have necessary documentation  Pt did not bring medication to office visit for review  Information was given to pt on Advanced Directives, Living Will  Information was given on Shingles Vaccine  Opportunity was given for questions  Goals that were addressed and/or need to be completed after this appointment include:     Health Maintenance Due   Topic Date Due    MEDICARE YEARLY EXAM  02/04/2018     Body mass index is 40.31 kg/(m^2).

## 2018-02-13 RX ORDER — DILTIAZEM HYDROCHLORIDE 180 MG/1
CAPSULE, COATED, EXTENDED RELEASE ORAL
Qty: 90 CAP | Refills: 3 | Status: SHIPPED | OUTPATIENT
Start: 2018-02-13 | End: 2019-02-08 | Stop reason: SDUPTHER

## 2018-05-11 ENCOUNTER — OFFICE VISIT (OUTPATIENT)
Dept: ENDOCRINOLOGY | Age: 78
End: 2018-05-11

## 2018-05-11 VITALS
SYSTOLIC BLOOD PRESSURE: 131 MMHG | HEART RATE: 68 BPM | HEIGHT: 59 IN | TEMPERATURE: 97.2 F | OXYGEN SATURATION: 92 % | WEIGHT: 200 LBS | RESPIRATION RATE: 16 BRPM | DIASTOLIC BLOOD PRESSURE: 61 MMHG | BODY MASS INDEX: 40.32 KG/M2

## 2018-05-11 DIAGNOSIS — E11.65 TYPE 2 DIABETES MELLITUS WITH HYPERGLYCEMIA, WITHOUT LONG-TERM CURRENT USE OF INSULIN (HCC): Primary | Chronic | ICD-10-CM

## 2018-05-11 DIAGNOSIS — E78.00 HYPERCHOLESTEROLEMIA: Chronic | ICD-10-CM

## 2018-05-11 DIAGNOSIS — I10 ESSENTIAL HYPERTENSION: Chronic | ICD-10-CM

## 2018-05-11 DIAGNOSIS — E11.65 TYPE 2 DIABETES MELLITUS WITH HYPERGLYCEMIA, UNSPECIFIED WHETHER LONG TERM INSULIN USE (HCC): Primary | ICD-10-CM

## 2018-05-11 LAB
GLUCOSE POC: 176 MG/DL
HBA1C MFR BLD HPLC: 6.5 %

## 2018-05-11 RX ORDER — LANCETS 28 GAUGE
EACH MISCELLANEOUS
Qty: 100 LANCET | Refills: 3 | Status: SHIPPED | OUTPATIENT
Start: 2018-05-11

## 2018-05-11 NOTE — PROGRESS NOTES
Mary Gonsalez is a 69 y.o. female here for   Chief Complaint   Patient presents with    Diabetes       Functional glucose monitor and record keeping system? - yes  Eye exam within last year? - on file  Foot exam within last year? - on file    1. Have you been to the ER, urgent care clinic since your last visit? Hospitalized since your last visit? -no    2. Have you seen or consulted any other health care providers outside of the [address] [place] [address] since your last visit?   Include any pap smears or colon screening.-no      Lab Results   Component Value Date/Time    Hemoglobin A1c 6.5 (H) 01/29/2018 02:47 PM    Hemoglobin A1c (POC) 6 12/08/2017 01:32 PM       Wt Readings from Last 3 Encounters:   02/05/18 199 lb 9.6 oz (90.5 kg)   01/23/18 199 lb 9.6 oz (90.5 kg)   12/08/17 197 lb 8 oz (89.6 kg)     Temp Readings from Last 3 Encounters:   02/05/18 98 °F (36.7 °C) (Oral)   01/23/18 98.3 °F (36.8 °C) (Oral)   12/08/17 97.3 °F (36.3 °C) (Oral)     BP Readings from Last 3 Encounters:   02/05/18 178/88   01/23/18 126/64   12/08/17 126/71     Pulse Readings from Last 3 Encounters:   02/05/18 78   01/23/18 75   12/08/17 70

## 2018-05-11 NOTE — MR AVS SNAPSHOT
[facility] [address] 
[phone] Patient: Adela Cooley MRN: [id number] RYY:10/55/2047 Visit Information Date & Time Provider Department Dept. Phone Encounter #  
 5/11/2018 11:45 AM Pavithra Padgett MD [facility] [phone] [id number] Follow-up Instructions Return in about 5 months (around 10/11/2018). Your Appointments 5/23/2018 10:30 AM  
ROUTINE CARE with Baljinder Lima MD  
[address]) Appt Note: 4 mnth fu  
 [address] [address]  
[place] [address] [place] [id number] Upcoming Health Maintenance Date Due HEMOGLOBIN A1C Q6M 7/29/2018 Influenza Age 5 to Adult 8/1/2018 MICROALBUMIN Q1 9/8/2018 EYE EXAM RETINAL OR DILATED Q1 10/26/2018 FOOT EXAM Q1 12/8/2018 LIPID PANEL Q1 1/29/2019 MEDICARE YEARLY EXAM 2/6/2019 COLONOSCOPY 6/27/2019 GLAUCOMA SCREENING Q2Y 10/26/2019 DTaP/Tdap/Td series (2 - Td) 12/14/2021 Allergies as of 5/11/2018  Review Complete On: 5/11/2018 By: Pavithra Padgett MD  
  
 Severity Noted Reaction Type Reactions Ciprofloxacin  05/20/2010    Nausea Only Erythromycin  05/20/2010    Nausea Only Metformin  09/03/2012    Other (comments), Nausea Only Felt bad Felt bad  
 Pcn [Penicillins]  05/20/2010    Nausea Only, Swelling Current Immunizations  Reviewed on 12/2/2016 Name Date H1N1 FLU VACCINE 3/4/2010 Influenza High Dose Vaccine PF 9/20/2017 Influenza Vaccine 10/8/2015, 10/17/2014  2:48 PM, 9/26/2013 Influenza Vaccine (Quad) PF 12/2/2016 Influenza Vaccine Split 11/16/2012, 11/18/2011, 10/28/2010 Pneumococcal Conjugate (PCV-13) 5/5/2016 Pneumococcal Polysaccharide (PPSV-23) 9/9/2015 TDAP Vaccine 12/14/2011 ZZZ-RETIRED (DO NOT USE) Pneumococcal Vaccine (Unspecified Type) 12/1/2010 Zoster Vaccine, Live 1/10/2013 Not reviewed this visit You Were Diagnosed With   
  
 Codes Comments Type 2 diabetes mellitus with hyperglycemia, without long-term current use of insulin (HCC)    -  Primary ICD-10-CM: E11.65 ICD-9-CM: 250.00, 790.29 Essential hypertension     ICD-10-CM: I10 
ICD-9-CM: 401.9 Hypercholesterolemia     ICD-10-CM: E78.00 ICD-9-CM: 272.0 Vitals BP Pulse Temp Resp Height(growth percentile) Weight(growth percentile) 131/61 (BP 1 Location: Left arm, BP Patient Position: Sitting) 68 97.2 °F (36.2 °C) (Oral) 16 4' 11\" (1.499 m) 200 lb (90.7 kg) SpO2 BMI OB Status Smoking Status 92% 40.4 kg/m2 Hysterectomy Former Smoker Vitals History BMI and BSA Data Body Mass Index Body Surface Area 40.4 kg/m 2 1.94 m 2 Preferred Pharmacy Pharmacy Name Phone [facility], [facility] [phone] Your Updated Medication List  
  
   
This list is accurate as of 5/11/18 12:02 PM.  Always use your most recent med list.  
  
  
  
  
 aspirin 325 mg tablet Commonly known as:  ASPIRIN Take 325 mg by mouth daily. BYDUREON 2 mg/0.65 mL Pnij Generic drug:  exenatide microspheres INJECT THE CONTENTS OF 1 PEN UNDER THE SKIN EVERY 7 DAYS  
  
 cloNIDine HCl 0.1 mg tablet Commonly known as:  CATAPRES  
TAKE 1 TABLET NIGHTLY  
  
 dilTIAZem  mg ER capsule Commonly known as:  CARDIZEM CD  
TAKE 1 CAPSULE DAILY  
  
 ezetimibe 10 mg tablet Commonly known as:  Eligha Cashing TAKE 1 TABLET DAILY FISH OIL 1,000 mg Cap Generic drug:  omega-3 fatty acids-vitamin e Take 1 Cap by mouth four (4) times daily. FREESTYLE LITE STRIPS strip Generic drug:  glucose blood VI test strips TEST TWICE A DAY HYDROcodone-acetaminophen 5-325 mg per tablet Commonly known as:  Alex Points  
 Take 1 Tab by mouth every four (4) hours as needed for Pain. Max Daily Amount: 6 Tabs.  
  
 loratadine 10 mg tablet Commonly known as:  CLARITIN  
TAKE 1 TABLET DAILY losartan-hydroCHLOROthiazide 100-25 mg per tablet Commonly known as:  HYZAAR  
TAKE 1 TABLET DAILY. STOP TRIAMTERENCE/HCTZ * \[facility] [place] [facility]\"" Generic drug:  Lancets USE TO TEST TWICE A DAY  
  
 * lancets 28 gauge Misc Commonly known as:  FREESTYLE LANCETS Use as directed to check blood sugar twice daily. DX: E11.65 NexIUM 20 mg capsule Generic drug:  esomeprazole Take  by mouth daily. ondansetron hcl 4 mg tablet Commonly known as:  Lissy Sames Take 1 Tab by mouth every eight (8) hours as needed for Nausea. traMADol 50 mg tablet Commonly known as:  ULTRAM  
Take 1 Tab by mouth every six (6) hours as needed for Pain. Max Daily Amount: 200 mg. TRICOR 145 mg tablet Generic drug:  fenofibrate nanocrystallized TAKE 1 TABLET DAILY  
  
 VITAMIN D3 1,000 unit tablet Generic drug:  cholecalciferol Take 1,000 Units by mouth two (2) times a day. * Notice: This list has 2 medication(s) that are the same as other medications prescribed for you. Read the directions carefully, and ask your doctor or other care provider to review them with you. We Performed the Following AMB POC GLUCOSE, QUANTITATIVE, BLOOD [02661 CPT(R)] AMB POC HEMOGLOBIN A1C [59823 CPT(R)] Follow-up Instructions Return in about 5 months (around 10/11/2018). Patient Instructions Please remember to bring your meter and/or log to every visit. Also, be sure to have a dilated diabetic eye exam done annually. Refills -Please call your pharmacy and have them send us a refill request. 
Results - Allow up to a week for lab results to be processed and reviewed. Phone calls - Allow up to 24 hours for non-urgent calls to be returned. Prior Authorization - May take up to 2 weeks to process depending on your insurance. Forms - FMLA, DMV, Patient Assistance, etc. will take up to 2 weeks to process. Cancellations - Please notify the office in advance if you cannot keep your appointment. Samples - Will only be dispensed at visits as supplies are limited. If you are having a medical emergency, call 911. Introducing [facility] SERVICES! Dear Tacos Garcia: Thank you for requesting a [employer] account. Our records indicate that you already have an active [employer] account. You can access your account anytime at [email or web address] Did you know that you can access your hospital and ER discharge instructions at any time in [employer]? You can also review all of your test results from your hospital stay or ER visit. Additional Information If you have questions, please visit the Frequently Asked Questions section of the [employer] website at [email or web address]. Remember, [employer] is NOT to be used for urgent needs. For medical emergencies, dial 911. Now available from your iPhone and Android! Please provide this summary of care documentation to your next provider. Your primary care clinician is listed as [address]. If you have any questions after today's visit, please call [phone].

## 2018-05-11 NOTE — PROGRESS NOTES
Aydni Ewing MD        Patient Information  Date:5/11/2018  Name : Adam Gómez 69 y.o.     YOB: 1940         Referred by: Víctor Lazaro MD         Chief Complaint   Patient presents with    Diabetes       History of Present Illness: Adam Gómez is a 69 y.o. female here for fu of  Type 2 Diabetes Mellitus. Type 2 Diabetes was diagnosed in 10/2014 . End organ effects of diabetes: none. Cardiovascular risk factors: family history, dyslipidemia, diabetes mellitus   Monitoring frequency:2/ day   Could not tolerate Metformin IR, XR formualtion and was on Actos,     Checking glucose at home  Has not noticed significant hypoglycemia     Compliant with medications    No acute issues      Has subcutaneous nodules with Bydureon which is not bothering her      Hyperlipidemia - was on tricor, zetia, colestipol, Niacin,    Wt Readings from Last 3 Encounters:   05/11/18 200 lb (90.7 kg)   02/05/18 199 lb 9.6 oz (90.5 kg)   01/23/18 199 lb 9.6 oz (90.5 kg)       BP Readings from Last 3 Encounters:   05/11/18 131/61   02/05/18 178/88   01/23/18 126/64           Past Medical History:   Diagnosis Date    Arthritis 2/28/2011    OSTEO    Chronic obstructive pulmonary disease (HCC)     VERY MILD    Chronic pain     LEFT KNEE    Diabetes (HCC)     GERD (gastroesophageal reflux disease)     Hot flashes, menopausal     Hypercholesterolemia     Hypertension     Seizures (HCC) 1980'S    AFTER DRINKING 2 MARGARITAS    Vitamin D deficiency 6/28/2010     Current Outpatient Prescriptions   Medication Sig    dilTIAZem CD (CARDIZEM CD) 180 mg ER capsule TAKE 1 CAPSULE DAILY    aspirin (ASPIRIN) 325 mg tablet Take 325 mg by mouth daily.     cloNIDine HCl (CATAPRES) 0.1 mg tablet TAKE 1 TABLET NIGHTLY    ezetimibe (ZETIA) 10 mg tablet TAKE 1 TABLET DAILY    BYDUREON 2 mg/0.65 mL pnij INJECT THE CONTENTS OF 1 PEN UNDER THE SKIN EVERY 7 DAYS    losartan-hydroCHLOROthiazide (HYZAAR) 100-25 mg per tablet TAKE 1 TABLET DAILY. STOP TRIAMTERENCE/HCTZ    TRICOR 145 mg tablet TAKE 1 TABLET DAILY    loratadine (CLARITIN) 10 mg tablet TAKE 1 TABLET DAILY    traMADol (ULTRAM) 50 mg tablet Take 1 Tab by mouth every six (6) hours as needed for Pain. Max Daily Amount: 200 mg.    lancets (FREESTYLE LANCETS) 28 gauge misc Use as directed to check blood sugar twice daily. DX: E11.65    FREESTYLE LITE STRIPS strip TEST TWICE A DAY    MICROLET LANCET misc USE TO TEST TWICE A DAY    esomeprazole (NEXIUM) 20 mg capsule Take  by mouth daily.  omega-3 fatty acids-vitamin e (FISH OIL) 1,000 mg cap Take 1 Cap by mouth four (4) times daily.  cholecalciferol, vitamin d3, (VITAMIN D) 1,000 unit tablet Take 1,000 Units by mouth two (2) times a day.  HYDROcodone-acetaminophen (NORCO) 5-325 mg per tablet Take 1 Tab by mouth every four (4) hours as needed for Pain. Max Daily Amount: 6 Tabs.  ondansetron hcl (ZOFRAN) 4 mg tablet Take 1 Tab by mouth every eight (8) hours as needed for Nausea. No current facility-administered medications for this visit. Allergies   Allergen Reactions    Ciprofloxacin Nausea Only    Erythromycin Nausea Only    Metformin Other (comments) and Nausea Only     Felt bad  Felt bad      Pcn [Penicillins] Nausea Only and Swelling         Review of Systems:  - Constitutional Symptoms: no fevers, no chills,   - Eyes: no blurry vision no double vision  - Cardiovascular: no chest pain ,no palpitations  - Respiratory: no cough no shortness of breath  - Gastrointestinal: no dysphagia no  abdominal pain  - Musculoskeletal: + joint pains no  weakness  - Integumentary: no rashes  -     Physical Examination:   Blood pressure 131/61, pulse 68, temperature 97.2 °F (36.2 °C), temperature source Oral, resp. rate 16, height 4' 11\" (1.499 m), weight 200 lb (90.7 kg), SpO2 92 %.  Estimated body mass index is 40.4 kg/(m^2) as calculated from the following:    Height as of this encounter: 4' 11\" (1.499 m). -   Weight as of this encounter: 200 lb (90.7 kg). - General: pleasant, no distress, good eye contact  - HEENT: no pallor, no periorbital edema, EOMI  - Neck: supple, no thyromegaly  - Cardiovascular: regular, normal rate, normal S1 and S2  - Respiratory: clear to auscultation bilaterally  - Gastrointestinal: soft, nontender, nondistended,  BS +  - Musculoskeletal: no proximal muscle weakness in upper or lower extremities  - Integumentary: alert and oriented , foot 12/17   - Psychiatric: normal mood and affect  - Skin: color, texture, turgor normal.       Data Reviewed:     [] Glucose records reviewed. [] See glucose records for details (to be scanned). [] A1C  [] Reviewed labs    Lab Results   Component Value Date/Time    Hemoglobin A1c 6.5 (H) 01/29/2018 02:47 PM    Hemoglobin A1c 6.5 (H) 09/08/2017 10:53 AM    Hemoglobin A1c 6.2 (H) 04/03/2017 04:03 PM    Glucose 133 (H) 01/29/2018 02:47 PM    Glucose (POC) 146 (H) 10/17/2014 11:06 AM    Glucose  05/11/2018 11:36 AM    Microalb/Creat ratio (ug/mg creat.) 153.3 (H) 09/08/2017 10:53 AM    LDL, calculated 62 01/29/2018 02:47 PM    Creatinine 0.90 01/29/2018 02:47 PM      Lab Results   Component Value Date/Time    Cholesterol, total 148 01/29/2018 02:47 PM    HDL Cholesterol 48 01/29/2018 02:47 PM    LDL, calculated 62 01/29/2018 02:47 PM    Triglyceride 188 (H) 01/29/2018 02:47 PM    CHOL/HDL Ratio 2.4 10/28/2010 09:07 AM     Lab Results   Component Value Date/Time    ALT (SGPT) 37 (H) 01/29/2018 02:47 PM    AST (SGOT) 31 01/29/2018 02:47 PM    Alk.  phosphatase 65 01/29/2018 02:47 PM    Bilirubin, total 0.4 01/29/2018 02:47 PM     Lab Results   Component Value Date/Time    TSH 1.220 01/29/2018 02:47 PM    T4, Free 1.55 09/09/2015 02:16 PM      Lab Results   Component Value Date/Time    Glucose 133 (H) 01/29/2018 02:47 PM    Glucose (POC) 146 (H) 10/17/2014 11:06 AM    Glucose  05/11/2018 11:36 AM        Assessment/Plan:     1. Type 2 diabetes mellitus with hyperglycemia, without long-term current use of insulin ([facility] [address]    2. Essential hypertension    3. Hypercholesterolemia        1. Type 2 Diabetes Mellitus   Lab Results   Component Value Date/Time    Hemoglobin A1c 6.5 (H) 01/29/2018 02:47 PM    Hemoglobin A1c (POC) 6.5 05/11/2018 11:37 AM   Controlled   Bydureon   FLU annually ,Pneumovax ,aspirin daily,annual eye exam,microalbumin    2. HTN : Continue current therapy     3. Mixed Hyperlipidemia : low carb diet. Statin intolerance hx,   Continue Zetia, Tricor    4. Obesity:Body mass index is 40.4 kg/(m^2). Discussed about the importance of exercise and carbohydrate portion control. Follow-up Disposition: Not on File    Thank you for allowing me to participate in the care of this patient.     Christene Goodpasture, MD    Patient verbalized understanding

## 2018-05-23 ENCOUNTER — OFFICE VISIT (OUTPATIENT)
Dept: FAMILY MEDICINE CLINIC | Age: 78
End: 2018-05-23

## 2018-05-23 VITALS
BODY MASS INDEX: 40.12 KG/M2 | TEMPERATURE: 98.3 F | WEIGHT: 199 LBS | DIASTOLIC BLOOD PRESSURE: 81 MMHG | RESPIRATION RATE: 16 BRPM | SYSTOLIC BLOOD PRESSURE: 123 MMHG | OXYGEN SATURATION: 95 % | HEIGHT: 59 IN | HEART RATE: 70 BPM

## 2018-05-23 DIAGNOSIS — E78.00 HYPERCHOLESTEROLEMIA: Chronic | ICD-10-CM

## 2018-05-23 DIAGNOSIS — I10 ESSENTIAL HYPERTENSION: Chronic | ICD-10-CM

## 2018-05-23 DIAGNOSIS — E11.21 TYPE 2 DIABETES MELLITUS WITH NEPHROPATHY (HCC): ICD-10-CM

## 2018-05-23 DIAGNOSIS — K21.00 GASTROESOPHAGEAL REFLUX DISEASE WITH ESOPHAGITIS: Chronic | ICD-10-CM

## 2018-05-23 DIAGNOSIS — E55.9 VITAMIN D DEFICIENCY: Chronic | ICD-10-CM

## 2018-05-23 DIAGNOSIS — E11.65 TYPE 2 DIABETES MELLITUS WITH HYPERGLYCEMIA, WITHOUT LONG-TERM CURRENT USE OF INSULIN (HCC): Primary | Chronic | ICD-10-CM

## 2018-05-23 DIAGNOSIS — E66.01 MORBID OBESITY (HCC): ICD-10-CM

## 2018-05-23 RX ORDER — PANTOPRAZOLE SODIUM 40 MG/1
40 TABLET, DELAYED RELEASE ORAL DAILY
Qty: 90 TAB | Refills: 1 | Status: SHIPPED | OUTPATIENT
Start: 2018-05-23 | End: 2018-08-27

## 2018-05-23 RX ORDER — GUAIFENESIN 100 MG/5ML
81 LIQUID (ML) ORAL DAILY
COMMUNITY
End: 2021-10-08

## 2018-05-23 RX ORDER — CALCIUM CARBONATE 200(500)MG
1 TABLET,CHEWABLE ORAL DAILY
COMMUNITY
End: 2018-06-20

## 2018-05-23 NOTE — MR AVS SNAPSHOT
[address] [address] [address] [address] 
[phone] Patient: Zay Mata MRN: [id number] TVR:40/95/1999 Visit Information Date & Time Provider Department Dept. Phone Encounter #  
 5/23/2018 10:30 AM Willard Gurrola MD [address] [id number] Follow-up Instructions Return in about 3 months (around 8/23/2018). Your Appointments 10/19/2018  1:30 PM  
ROUTINE CARE with Rachel Angeles MD  
[facility] [facility]) Appt Note: 5mo fu  dm  
 [address] [facility] [address]  
[phone]  
  
   
 [address] [place] [id number] Upcoming Health Maintenance Date Due Influenza Age 5 to Adult 8/1/2018 MICROALBUMIN Q1 9/8/2018 EYE EXAM RETINAL OR DILATED Q1 10/26/2018 HEMOGLOBIN A1C Q6M 11/11/2018 FOOT EXAM Q1 12/8/2018 LIPID PANEL Q1 1/29/2019 MEDICARE YEARLY EXAM 2/6/2019 COLONOSCOPY 6/27/2019 GLAUCOMA SCREENING Q2Y 10/26/2019 DTaP/Tdap/Td series (2 - Td) 12/14/2021 Allergies as of 5/23/2018  Review Complete On: 5/11/2018 By: Rachel Angeles MD  
  
 Severity Noted Reaction Type Reactions Ciprofloxacin  05/20/2010    Nausea Only Erythromycin  05/20/2010    Nausea Only Metformin  09/03/2012    Other (comments), Nausea Only Felt bad Felt bad  
 Pcn [Penicillins]  05/20/2010    Nausea Only, Swelling Current Immunizations  Reviewed on 12/2/2016 Name Date H1N1 FLU VACCINE 3/4/2010 Influenza High Dose Vaccine PF 9/20/2017 Influenza Vaccine 10/8/2015, 10/17/2014  2:48 PM, 9/26/2013 Influenza Vaccine (Quad) PF 12/2/2016 Influenza Vaccine Split 11/16/2012, 11/18/2011, 10/28/2010 Pneumococcal Conjugate (PCV-13) 5/5/2016 Pneumococcal Polysaccharide (PPSV-23) 9/9/2015 TDAP Vaccine 12/14/2011 ZZZ-RETIRED (DO NOT USE) Pneumococcal Vaccine (Unspecified Type) 12/1/2010 Zoster Vaccine, Live 1/10/2013 Not reviewed this visit You Were Diagnosed With   
  
 Codes Comments Type 2 diabetes mellitus with hyperglycemia, without long-term current use of insulin (HCC)    -  Primary ICD-10-CM: E11.65 ICD-9-CM: 250.00, 790.29 Essential hypertension     ICD-10-CM: I10 
ICD-9-CM: 401.9 Hypercholesterolemia     ICD-10-CM: E78.00 ICD-9-CM: 272.0 Gastroesophageal reflux disease with esophagitis     ICD-10-CM: K21.0 ICD-9-CM: 530.11 Vitamin D deficiency     ICD-10-CM: E55.9 ICD-9-CM: 268.9 Vitals BP Pulse Temp Resp Height(growth percentile) Weight(growth percentile) 123/81 (BP 1 Location: Right arm, BP Patient Position: Sitting) 70 98.3 °F (36.8 °C) (Oral) 16 4' 11\" (1.499 m) 199 lb (90.3 kg) SpO2 BMI OB Status Smoking Status 95% 40.19 kg/m2 Hysterectomy Former Smoker Vitals History BMI and BSA Data Body Mass Index Body Surface Area  
 40.19 kg/m 2 1.94 m 2 Preferred Pharmacy Pharmacy Name Phone [facility], [facility] [phone] Your Updated Medication List  
  
   
This list is accurate as of 5/23/18 11:00 AM.  Always use your most recent med list.  
  
  
  
  
 * aspirin 325 mg tablet Commonly known as:  ASPIRIN Take 325 mg by mouth daily. * aspirin 81 mg chewable tablet Take 81 mg by mouth daily. BYDUREON 2 mg/0.65 mL Pnij Generic drug:  exenatide microspheres INJECT THE CONTENTS OF 1 PEN UNDER THE SKIN EVERY 7 DAYS  
  
 calcium carbonate 200 mg calcium (500 mg) Chew Commonly known as:  TUMS Take 1 Tab by mouth daily. cloNIDine HCl 0.1 mg tablet Commonly known as:  CATAPRES  
TAKE 1 TABLET NIGHTLY  
  
 dilTIAZem  mg ER capsule Commonly known as:  CARDIZEM CD  
TAKE 1 CAPSULE DAILY  
  
 ezetimibe 10 mg tablet Commonly known as:  [place] TAKE 1 TABLET DAILY FISH OIL 1,000 mg Cap Generic drug:  omega-3 fatty acids-vitamin e Take 1 Cap by mouth four (4) times daily. glucose blood VI test strips strip Commonly known as:  FREESTYLE LITE STRIPS  
TEST ONCE A DAY DX CODE: E11.65 HYDROcodone-acetaminophen 5-325 mg per tablet Commonly known as:  1463 Demetrius Jain Take 1 Tab by mouth every four (4) hours as needed for Pain. Max Daily Amount: 6 Tabs. lancets 28 gauge Misc Commonly known as:  FREESTYLE LANCETS  
TEST ONCE DAILY DX: E11.65  
  
 loratadine 10 mg tablet Commonly known as:  CLARITIN  
TAKE 1 TABLET DAILY losartan-hydroCHLOROthiazide 100-25 mg per tablet Commonly known as:  HYZAAR  
TAKE 1 TABLET DAILY. STOP TRIAMTERENCE/HCTZ  
  
 ondansetron hcl 4 mg tablet Commonly known as:  Candiss Raveling Take 1 Tab by mouth every eight (8) hours as needed for Nausea. pantoprazole 40 mg tablet Commonly known as:  PROTONIX Take 1 Tab by mouth daily. Indications: gastroesophageal reflux disease  
  
 traMADol 50 mg tablet Commonly known as:  ULTRAM  
Take 1 Tab by mouth every six (6) hours as needed for Pain. Max Daily Amount: 200 mg. TRICOR 145 mg tablet Generic drug:  fenofibrate nanocrystallized TAKE 1 TABLET DAILY  
  
 VITAMIN D3 1,000 unit tablet Generic drug:  cholecalciferol Take 1,000 Units by mouth two (2) times a day. * Notice: This list has 2 medication(s) that are the same as other medications prescribed for you. Read the directions carefully, and ask your doctor or other care provider to review them with you. Prescriptions Sent to Pharmacy Refills  
 pantoprazole (PROTONIX) 40 mg tablet 1 Sig: Take 1 Tab by mouth daily. Indications: gastroesophageal reflux disease Class: Normal  
 [facility] [address] [place] [address] #: [phone] Route: Oral  
  
We Performed the Following HEMOGLOBIN A1C WITH EAG [90771 CPT(R)] HGB & HCT [32979 CPT(R)] LIPID PANEL [69136 CPT(R)] METABOLIC PANEL, COMPREHENSIVE [49603 CPT(R)] VITAMIN D, 25 HYDROXY [id number] CPT(R)] Follow-up Instructions Return in about 3 months (around 8/23/2018). Patient Instructions Preventing Falls: Care Instructions Your Care Instructions Getting around your home safely can be a challenge if you have injuries or health problems that make it easy for you to fall. Loose rugs and furniture in walkways are among the dangers for many older people who have problems walking or who have poor eyesight. People who have conditions such as arthritis, osteoporosis, or dementia also have to be careful not to fall. You can make your home safer with a few simple measures. Follow-up care is a key part of your treatment and safety. Be sure to make and go to all appointments, and call your doctor if you are having problems. It's also a good idea to know your test results and keep a list of the medicines you take. How can you care for yourself at home? Taking care of yourself · You may get dizzy if you do not drink enough water. To prevent dehydration, drink plenty of fluids, enough so that your urine is light yellow or clear like water. Choose water and other caffeine-free clear liquids. If you have kidney, heart, or liver disease and have to limit fluids, talk with your doctor before you increase the amount of fluids you drink. · Exercise regularly to improve your strength, muscle tone, and balance. Walk if you can. Swimming may be a good choice if you cannot walk easily. · Have your vision and hearing checked each year or any time you notice a change. If you have trouble seeing and hearing, you might not be able to avoid objects and could lose your balance. · Know the side effects of the medicines you take. Ask your doctor or pharmacist whether the medicines you take can affect your balance. Sleeping pills or sedatives can affect your balance. · Limit the amount of alcohol you drink. Alcohol can impair your balance and other senses. · Ask your doctor whether calluses or corns on your feet need to be removed. If you wear loose-fitting shoes because of calluses or corns, you can lose your balance and fall. · Talk to your doctor if you have numbness in your feet. Preventing falls at home · Remove raised doorway thresholds, throw rugs, and clutter. Repair loose carpet or raised areas in the floor. · Move furniture and electrical cords to keep them out of walking paths. · Use nonskid floor wax, and wipe up spills right away, especially on ceramic tile floors. · If you use a walker or cane, put rubber tips on it. If you use crutches, clean the bottoms of them regularly with an abrasive pad, such as steel wool. · Keep your house well lit, especially Jase Standard, and outside walkways. Use night-lights in areas such as hallways and bathrooms. Add extra light switches or use remote switches (such as switches that go on or off when you clap your hands) to make it easier to turn lights on if you have to get up during the night. · Install sturdy handrails on stairways. · Move items in your cabinets so that the things you use a lot are on the lower shelves (about waist level). · Keep a cordless phone and a flashlight with new batteries by your bed. If possible, put a phone in each of the main rooms of your house, or carry a cell phone in case you fall and cannot reach a phone. Or, you can wear a device around your neck or wrist. You push a button that sends a signal for help. · Wear low-heeled shoes that fit well and give your feet good support. Use footwear with nonskid soles. Check the heels and soles of your shoes for wear. Repair or replace worn heels or soles. · Do not wear socks without shoes on wood floors. · Walk on the grass when the sidewalks are slippery.  If you live in an area that gets snow and ice in the winter, sprinkle salt on slippery steps and sidewalks. Preventing falls in the bath · Install grab bars and nonskid mats inside and outside your shower or tub and near the toilet and sinks. · Use shower chairs and bath benches. · Use a hand-held shower head that will allow you to sit while showering. · Get into a tub or shower by putting the weaker leg in first. Get out of a tub or shower with your strong side first. 
· Repair loose toilet seats and consider installing a raised toilet seat to make getting on and off the toilet easier. · Keep your bathroom door unlocked while you are in the shower. Where can you learn more? Go to http://reid-shanell.info/. Enter [phone] [id number] [phone] in the search box to learn more about \"Preventing Falls: Care Instructions. \" Current as of: May 12, 2017 Content Version: 11.4 © 8532-0303 [employer]. Care instructions adapted under license by [employer] (which disclaims liability or warranty for this information). If you have questions about a medical condition or this instruction, always ask your healthcare professional. [address] [place] [address] any warranty or liability for your use of this information. Introducing [facility]! Dear Olive Noyola: Thank you for requesting a [employer] account. Our records indicate that you already have an active [employer] account. You can access your account anytime at [email or web address] [email or web address] Did you know that you can access your hospital and ER discharge instructions at any time in [employer]? You can also review all of your test results from your hospital stay or ER visit. Additional Information If you have questions, please visit the Frequently Asked Questions section of the [employer] website at [email or web address]/. Remember, [employer] is NOT to be used for urgent needs. For medical emergencies, dial 911. Now available from your iPhone and Android! Please provide this summary of care documentation to your next provider. Your primary care clinician is listed as [address]. If you have any questions after today's visit, please call [phone].

## 2018-05-23 NOTE — ASSESSMENT & PLAN NOTE
Well Controlled, based on history, physical exam and review of pertinent labs, studies and medications; meds reconciled; continue current treatment plan. Key Antihyperglycemic Medications             BYDUREON 2 mg/0.65 mL pnij  (Taking) INJECT THE CONTENTS OF 1 PEN UNDER THE SKIN EVERY 7 DAYS        Other Key Diabetic Medications             ezetimibe (ZETIA) 10 mg tablet  (Taking) TAKE 1 TABLET DAILY    losartan-hydroCHLOROthiazide (HYZAAR) 100-25 mg per tablet  (Taking) TAKE 1 TABLET DAILY. STOP TRIAMTERENCE/HCTZ    TRICOR 145 mg tablet  (Taking) TAKE 1 TABLET DAILY    omega-3 fatty acids-vitamin e (FISH OIL) 1,000 mg cap  (Taking) Take 1 Cap by mouth four (4) times daily.         Lab Results   Component Value Date/Time    Hemoglobin A1c 6.5 01/29/2018 02:47 PM    Hemoglobin A1c (POC) 6.5 05/11/2018 11:37 AM    Glucose 133 01/29/2018 02:47 PM    Creatinine 0.90 01/29/2018 02:47 PM    Microalb/Creat ratio (ug/mg creat.) 153.3 09/08/2017 10:53 AM    Cholesterol, total 148 01/29/2018 02:47 PM    HDL Cholesterol 48 01/29/2018 02:47 PM    LDL, calculated 62 01/29/2018 02:47 PM    Triglyceride 188 01/29/2018 02:47 PM     Diabetic Foot and Eye Exam HM Status   Topic Date Due    Eye Exam  10/26/2018    Diabetic Foot Care  12/08/2018

## 2018-05-23 NOTE — PROGRESS NOTES
Chief Complaint   Patient presents with    Heartburn     Acid Reflux     Body mass index is 40.19 kg/(m^2). 1. Have you been to the ER, urgent care clinic since your last visit? Hospitalized since your last visit? No    2. Have you seen or consulted any other health care providers outside of the [address] [place] [address] since your last visit? Include any pap smears or colon screening. No    Reviewed record in preparation for visit and have necessary documentation  Pt did not bring medication to office visit for review  Information was given to pt on Advanced Directives, Living Will  Opportunity was given for questions  Goals that were addressed and/or need to be completed after this appointment include: There are no preventive care reminders to display for this patient.

## 2018-05-23 NOTE — PROGRESS NOTES
Progress Note    Patient: Kunal Ambrosio MRN: [id number]  SSN: [id number]    YOB: 1940  Age: 69 y.o. Sex: female        Chief Complaint   Patient presents with    Heartburn     Acid Reflux         Subjective:     Encounter Diagnoses   Name Primary?  Type 2 diabetes mellitus with hyperglycemia, without long-term current use of insulin ([facility] [address]): A1c's have been well controlled. This patient is managed under a comprehensive plan of care for Diabetes. Overall the patient feels well with good energy level. Key Antihyperglycemic Medications             BYDUREON 2 mg/0.65 mL pnij  (Taking) INJECT THE CONTENTS OF 1 PEN UNDER THE SKIN EVERY 7 DAYS           Pertinent Labs:   Lab Results   Component Value Date/Time    Hemoglobin A1c 6.5 (H) 01/29/2018 02:47 PM    Hemoglobin A1c 6.5 (H) 09/08/2017 10:53 AM    Hemoglobin A1c 6.2 (H) 04/03/2017 04:03 PM      Body mass index is 40.19 kg/(m^2). Lab Results   Component Value Date/Time    LDL, calculated 62 01/29/2018 02:47 PM         Lab Results   Component Value Date/Time    Sodium 142 01/29/2018 02:47 PM    Potassium 3.9 01/29/2018 02:47 PM    Chloride 99 01/29/2018 02:47 PM    CO2 26 01/29/2018 02:47 PM    Anion gap 5 10/17/2014 03:31 AM    Glucose 133 (H) 01/29/2018 02:47 PM    BUN 19 01/29/2018 02:47 PM    Creatinine 0.90 01/29/2018 02:47 PM    BUN/Creatinine ratio 21 01/29/2018 02:47 PM    GFR est AA 72 01/29/2018 02:47 PM    GFR est non-AA 62 01/29/2018 02:47 PM    Calcium 9.9 01/29/2018 02:47 PM    AST (SGOT) 31 01/29/2018 02:47 PM    Alk.  phosphatase 65 01/29/2018 02:47 PM    Protein, total 7.0 01/29/2018 02:47 PM    Albumin 4.6 01/29/2018 02:47 PM    Globulin 3.2 10/28/2010 09:07 AM    A-G Ratio 1.9 01/29/2018 02:47 PM    ALT (SGPT) 37 (H) 01/29/2018 02:47 PM     Lab Results   Component Value Date/Time    Microalb/Creat ratio (ug/mg creat.) 153.3 (H) 09/08/2017 10:53 AM      Frequency of home glucose testing:daily   Blood Sugar range at home:    Last eye exam: In past 12 months. Last foot exam: This year. Polyuria, polyphagia or polydipsia: No   Retinopathy: No   Neuropathy SX: No    Low blood sugar symptoms: No   Dietary compliance: Good   Medication compliance:Good   On ASA: Yes   Depression: No   CKD:no     Wt Readings from Last 3 Encounters:   05/23/18 199 lb (90.3 kg)   05/11/18 200 lb (90.7 kg)   02/05/18 199 lb 9.6 oz (90.5 kg)        History   Smoking Status    Former Smoker    Packs/day: 1.00    Years: 20.00    Quit date: 11/18/1984   Smokeless Tobacco    Never Used     Body mass index is 40.19 kg/(m^2). Diabetic Consultants: All the patient's questions regarding medications, diet and exercise were answered. Goal of A1C of less than 7.5% is our goal.   Our overall goal is to reduce or eliminate the long term consequences of poorly controlled diabetes. Yes    Essential hypertension:  BP Readings from Last 3 Encounters:   05/23/18 123/81   05/11/18 131/61   02/05/18 178/88     The patient reports:  taking medications as instructed, no medication side effects noted, no TIA's, no chest pain on exertion, no dyspnea on exertion, no swelling of ankles. Key CAD CHF Meds             aspirin 81 mg chewable tablet  (Taking) Take 81 mg by mouth daily. dilTIAZem CD (CARDIZEM CD) 180 mg ER capsule  (Taking) TAKE 1 CAPSULE DAILY    cloNIDine HCl (CATAPRES) 0.1 mg tablet  (Taking) TAKE 1 TABLET NIGHTLY    ezetimibe (ZETIA) 10 mg tablet  (Taking) TAKE 1 TABLET DAILY    losartan-hydroCHLOROthiazide (HYZAAR) 100-25 mg per tablet  (Taking) TAKE 1 TABLET DAILY. STOP TRIAMTERENCE/HCTZ    TRICOR 145 mg tablet  (Taking) TAKE 1 TABLET DAILY    omega-3 fatty acids-vitamin e (FISH OIL) 1,000 mg cap  (Taking) Take 1 Cap by mouth four (4) times daily. aspirin (ASPIRIN) 325 mg tablet Take 325 mg by mouth daily.            Lab Results   Component Value Date/Time    Sodium 142 01/29/2018 02:47 PM    Potassium 3.9 01/29/2018 02:47 PM    Chloride 99 01/29/2018 02:47 PM    CO2 26 01/29/2018 02:47 PM    Anion gap 5 10/17/2014 03:31 AM    Glucose 133 (H) 01/29/2018 02:47 PM    BUN 19 01/29/2018 02:47 PM    Creatinine 0.90 01/29/2018 02:47 PM    BUN/Creatinine ratio 21 01/29/2018 02:47 PM    GFR est AA 72 01/29/2018 02:47 PM    GFR est non-AA 62 01/29/2018 02:47 PM    Calcium 9.9 01/29/2018 02:47 PM    Bilirubin, total 0.4 01/29/2018 02:47 PM    AST (SGOT) 31 01/29/2018 02:47 PM    Alk. phosphatase 65 01/29/2018 02:47 PM    Protein, total 7.0 01/29/2018 02:47 PM    Albumin 4.6 01/29/2018 02:47 PM    Globulin 3.2 10/28/2010 09:07 AM    A-G Ratio 1.9 01/29/2018 02:47 PM    ALT (SGPT) 37 (H) 01/29/2018 02:47 PM     Low salt diet? yes  Aerobic exercise? yes  Our goal is to normalize the blood pressure to decrease the risks of strokes and heart attacks. The patient is in agreement with the plan.  Hypercholesterolemia:  Cardiovascular risks for her are: LDL goal is under 80  diabetic  hypertension  hyperlipidemia. Key Antihyperlipidemia Meds             ezetimibe (ZETIA) 10 mg tablet  (Taking) TAKE 1 TABLET DAILY    TRICOR 145 mg tablet  (Taking) TAKE 1 TABLET DAILY    omega-3 fatty acids-vitamin e (FISH OIL) 1,000 mg cap  (Taking) Take 1 Cap by mouth four (4) times daily. Lab Results   Component Value Date/Time    Cholesterol, total 148 01/29/2018 02:47 PM    HDL Cholesterol 48 01/29/2018 02:47 PM    LDL, calculated 62 01/29/2018 02:47 PM    Triglyceride 188 (H) 01/29/2018 02:47 PM    CHOL/HDL Ratio 2.4 10/28/2010 09:07 AM     Lab Results   Component Value Date/Time    ALT (SGPT) 37 (H) 01/29/2018 02:47 PM    AST (SGOT) 31 01/29/2018 02:47 PM    Alk.  phosphatase 65 01/29/2018 02:47 PM    Bilirubin, total 0.4 01/29/2018 02:47 PM      Myalgias: No   Fatigue: No   Other side effects: no  Wt Readings from Last 3 Encounters:   05/23/18 199 lb (90.3 kg)   05/11/18 200 lb (90.7 kg)   02/05/18 199 lb 9.6 oz (90.5 kg)     The patient is aware of our goal to reduce or eliminate the long term problems (such as strokes and heart attacks) related to poorly controlled hyperlipidemia.  Gastroesophageal reflux disease with esophagitis:  Current control of Symptoms:good  Hiatal Hernia:no  Current Medications:omeprazole  The patient has no history melena or bright red blood in the stools. The patient avoids high dose aspirin and NSAID therapy. The patient is aware of diet changes needed, elevating the head of the bed and appropriate use of antacids.  Vitamin D deficiency:  No sx. Due for testing. Lab Results   Component Value Date/Time    Vitamin D 25-Hydroxy 41.1 11/18/2011 09:22 AM    VITAMIN D, 25-HYDROXY 47.6 01/29/2018 02:47 PM             If it is not better is not getting better by tomorrow we do need to see him to be pretty busy  Current and past medical information:    Current Medications after this visit[de-identified]   Current Outpatient Prescriptions   Medication Sig    calcium carbonate (TUMS) 200 mg calcium (500 mg) chew Take 1 Tab by mouth daily.  aspirin 81 mg chewable tablet Take 81 mg by mouth daily.  pantoprazole (PROTONIX) 40 mg tablet Take 1 Tab by mouth daily. Indications: gastroesophageal reflux disease    lancets (FREESTYLE LANCETS) 28 gauge misc TEST ONCE DAILY DX: E11.65    glucose blood VI test strips (FREESTYLE LITE STRIPS) strip TEST ONCE A DAY DX CODE: E11.65    dilTIAZem CD (CARDIZEM CD) 180 mg ER capsule TAKE 1 CAPSULE DAILY    cloNIDine HCl (CATAPRES) 0.1 mg tablet TAKE 1 TABLET NIGHTLY    ezetimibe (ZETIA) 10 mg tablet TAKE 1 TABLET DAILY    BYDUREON 2 mg/0.65 mL pnij INJECT THE CONTENTS OF 1 PEN UNDER THE SKIN EVERY 7 DAYS    losartan-hydroCHLOROthiazide (HYZAAR) 100-25 mg per tablet TAKE 1 TABLET DAILY. STOP TRIAMTERENCE/HCTZ    TRICOR 145 mg tablet TAKE 1 TABLET DAILY    loratadine (CLARITIN) 10 mg tablet TAKE 1 TABLET DAILY    traMADol (ULTRAM) 50 mg tablet Take 1 Tab by mouth every six (6) hours as needed for Pain. Max Daily Amount: 200 mg.    omega-3 fatty acids-vitamin e (FISH OIL) 1,000 mg cap Take 1 Cap by mouth four (4) times daily.  cholecalciferol, vitamin d3, (VITAMIN D) 1,000 unit tablet Take 1,000 Units by mouth two (2) times a day.  aspirin (ASPIRIN) 325 mg tablet Take 325 mg by mouth daily.  HYDROcodone-acetaminophen (NORCO) 5-325 mg per tablet Take 1 Tab by mouth every four (4) hours as needed for Pain. Max Daily Amount: 6 Tabs.  ondansetron hcl (ZOFRAN) 4 mg tablet Take 1 Tab by mouth every eight (8) hours as needed for Nausea. No current facility-administered medications for this visit.         Patient Active Problem List    Diagnosis Date Noted    Hypertension      Priority: 1 - One    Hypercholesterolemia      Priority: 1 - One    GERD (gastroesophageal reflux disease)      Priority: 1 - One    Hot flashes, menopausal      Priority: 2 - Two    Type 2 diabetes mellitus with nephropathy ([facility] [address] 01/23/2018    Type 2 diabetes mellitus with hyperglycemia, without long-term current use of insulin ([facility] [address] 12/19/2016    Essential hypertension 10/18/2015    Mixed hyperlipidemia 10/18/2015    Morbid obesity ([facility] [address] 10/18/2015    S/P total knee replacement using cement 09/09/2015    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    Allergic rhinitis 11/18/2011    Arthritis 02/28/2011    Vitamin D deficiency 06/28/2010       Past Medical History:   Diagnosis Date    Arthritis 2/28/2011    OSTEO    Chronic obstructive pulmonary disease (HCC)     VERY MILD    Chronic pain     LEFT KNEE    Diabetes (HCC)     GERD (gastroesophageal reflux disease)     Hot flashes, menopausal     Hypercholesterolemia     Hypertension     Seizures (HCC) 1980'S    AFTER DRINKING 2 MARGARITAS    Vitamin D deficiency 6/28/2010       Allergies   Allergen Reactions    Ciprofloxacin Nausea Only    Erythromycin Nausea Only    Metformin Other (comments) and Nausea Only     Felt bad  Felt bad      Pcn [Penicillins] Nausea Only and Swelling       Past Surgical History:   Procedure Laterality Date    HX APPENDECTOMY  2001    HX CATARACT REMOVAL Bilateral     HX HYSTERECTOMY  1975    HX OOPHORECTOMY  2001    bilateral    HX ORTHOPAEDIC      HX OTHER SURGICAL      LIPOMA LEFT SIDE       Social History     Social History    Marital status:      Spouse name: N/A    Number of children: N/A    Years of education: N/A     Social History Main Topics    Smoking status: Former Smoker     Packs/day: 1.00     Years: 20.00     Quit date: 11/18/1984    Smokeless tobacco: Never Used    Alcohol use Yes      Comment: rarely    Drug use: No    Sexual activity: Yes     Other Topics Concern    Not on file     Social History Narrative       Review of Systems   Constitutional: Negative. Negative for chills, fever, malaise/fatigue and weight loss. HENT: Negative. Negative for hearing loss. Eyes: Negative. Negative for blurred vision and double vision. Respiratory: Negative. Negative for cough, hemoptysis, sputum production and shortness of breath. Cardiovascular: Negative. Negative for chest pain, palpitations and orthopnea. Gastrointestinal: Negative. Negative for abdominal pain, blood in stool, heartburn, nausea and vomiting. Genitourinary: Negative. Negative for dysuria, frequency and urgency. Musculoskeletal: Negative. Negative for back pain, myalgias and neck pain. Skin: Negative. Negative for rash. Neurological: Negative. Negative for dizziness, tingling, tremors, weakness and headaches. Endo/Heme/Allergies: Negative. Psychiatric/Behavioral: Negative. Negative for depression. Objective:     Vitals:    05/23/18 1029   BP: 123/81   Pulse: 70   Resp: 16   Temp: 98.3 °F (36.8 °C)   TempSrc: Oral   SpO2: 95%   Weight: 199 lb (90.3 kg)   Height: 4' 11\" (1.499 m)      Body mass index is 40.19 kg/(m^2).     Physical Exam   Constitutional: She is oriented to person, place, and time and well-developed, well-nourished, and in no distress. No distress. HENT:   Head: Normocephalic and atraumatic. Mouth/Throat: Oropharynx is clear and moist.   Eyes: Conjunctivae are normal.   Neck: No tracheal deviation present. No thyromegaly present. Cardiovascular: Normal rate, regular rhythm and normal heart sounds. No murmur heard. Pulmonary/Chest: Effort normal and breath sounds normal. No respiratory distress. Abdominal: Soft. She exhibits no distension. Lymphadenopathy:     She has no cervical adenopathy. Neurological: She is alert and oriented to person, place, and time. Skin: Skin is warm. No rash noted. She is not diaphoretic. No erythema. Psychiatric: Mood and affect normal.   Nursing note and vitals reviewed. There are no preventive care reminders to display for this patient. Assessment and orders:     Encounter Diagnoses     ICD-10-CM ICD-9-CM   1. Type 2 diabetes mellitus with hyperglycemia, without long-term current use of insulin ([facility]) E11.65 250.00     790.29   2. Essential hypertension I10 401.9   3. Hypercholesterolemia E78.00 272.0   4. Gastroesophageal reflux disease with esophagitis K21.0 530.11   5. Vitamin D deficiency E55.9 268.9     Diagnoses and all orders for this visit:    1. Type 2 diabetes mellitus with hyperglycemia, without long-term current use of [facility]  Assessment & Plan:  Well Controlled, based on history, physical exam and review of pertinent labs, studies and medications; meds reconciled; continue current treatment plan. Key Antihyperglycemic Medications             BYDUREON 2 mg/0.65 mL pnij  (Taking) INJECT THE CONTENTS OF 1 PEN UNDER THE SKIN EVERY 7 DAYS        Other Key Diabetic Medications             ezetimibe (ZETIA) 10 mg tablet  (Taking) TAKE 1 TABLET DAILY    losartan-hydroCHLOROthiazide (HYZAAR) 100-25 mg per tablet  (Taking) TAKE 1 TABLET DAILY.  STOP TRIAMTERENCE/HCTZ    TRICOR 145 mg tablet  (Taking) TAKE 1 TABLET DAILY    omega-3 fatty acids-vitamin e (FISH OIL) 1,000 mg cap  (Taking) Take 1 Cap by mouth four (4) times daily. Lab Results   Component Value Date/Time    Hemoglobin A1c 6.5 01/29/2018 02:47 PM    Hemoglobin A1c (POC) 6.5 05/11/2018 11:37 AM    Glucose 133 01/29/2018 02:47 PM    Creatinine 0.90 01/29/2018 02:47 PM    Microalb/Creat ratio (ug/mg creat.) 153.3 09/08/2017 10:53 AM    Cholesterol, total 148 01/29/2018 02:47 PM    HDL Cholesterol 48 01/29/2018 02:47 PM    LDL, calculated 62 01/29/2018 02:47 PM    Triglyceride 188 01/29/2018 02:47 PM     Diabetic Foot and Eye Exam HM Status   Topic Date Due    Eye Exam  10/26/2018    Diabetic Foot Care  12/08/2018       Orders:  -     METABOLIC PANEL, COMPREHENSIVE  -     HEMOGLOBIN A1C WITH EAG  -     LIPID PANEL    2. Essential hypertension-controlled  -     METABOLIC PANEL, COMPREHENSIVE  -     LIPID PANEL    3. Hypercholesterolemia-recheck  -     METABOLIC PANEL, COMPREHENSIVE  -     LIPID PANEL    4. Gastroesophageal reflux disease with esophagitis-breakthrough symptoms on Nexium 22 mg so we will change to Protonix 40 mg by prescription.        -     pantoprazole (PROTONIX) 40 mg tablet; Take 1 Tab by mouth daily. Indications: gastroesophageal reflux disease  -     METABOLIC PANEL, COMPREHENSIVE  -     HGB & HCT    5. Vitamin D deficiency-corrected      6. Type 2 diabetes mellitus with nephropathy (HCC)-problems reviewed  Assessment & Plan:  Well Controlled, based on history, physical exam and review of pertinent labs, studies and medications; meds reconciled; continue current treatment plan. Key Antihyperglycemic Medications             BYDUREON 2 mg/0.65 mL pnij  (Taking) INJECT THE CONTENTS OF 1 PEN UNDER THE SKIN EVERY 7 DAYS        Other Key Diabetic Medications             ezetimibe (ZETIA) 10 mg tablet  (Taking) TAKE 1 TABLET DAILY    losartan-hydroCHLOROthiazide (HYZAAR) 100-25 mg per tablet  (Taking) TAKE 1 TABLET DAILY.  STOP TRIAMTERENCE/HCTZ    TRICOR 145 mg tablet  (Taking) TAKE 1 TABLET DAILY    omega-3 fatty acids-vitamin e (FISH OIL) 1,000 mg cap  (Taking) Take 1 Cap by mouth four (4) times daily. Lab Results   Component Value Date/Time    Hemoglobin A1c 6.5 01/29/2018 02:47 PM    Hemoglobin A1c (POC) 6.5 05/11/2018 11:37 AM    Glucose 133 01/29/2018 02:47 PM    Creatinine 0.90 01/29/2018 02:47 PM    Microalb/Creat ratio (ug/mg creat.) 153.3 09/08/2017 10:53 AM    Cholesterol, total 148 01/29/2018 02:47 PM    HDL Cholesterol 48 01/29/2018 02:47 PM    LDL, calculated 62 01/29/2018 02:47 PM    Triglyceride 188 01/29/2018 02:47 PM     Diabetic Foot and Eye Exam HM Status   Topic Date Due    Eye Exam  10/26/2018    Diabetic Foot Care  12/08/2018         7. Morbid obesity (HCC)-problem list reviewe  Assessment & Plan:  Stable, based on history, physical exam and review of pertinent labs, studies and medications; meds reconciled; continue current treatment plan. Key Obesity Meds             BYDUREON 2 mg/0.65 mL pnij  (Taking) INJECT THE CONTENTS OF 1 PEN UNDER THE SKIN EVERY 7 DAYS        Lab Results   Component Value Date/Time    Hemoglobin A1c 6.5 01/29/2018 02:47 PM    Glucose 133 01/29/2018 02:47 PM    Cholesterol, total 148 01/29/2018 02:47 PM    HDL Cholesterol 48 01/29/2018 02:47 PM    LDL, calculated 62 01/29/2018 02:47 PM    Triglyceride 188 01/29/2018 02:47 PM    TSH 1.220 01/29/2018 02:47 PM    Sodium 142 01/29/2018 02:47 PM    Potassium 3.9 01/29/2018 02:47 PM    ALT (SGPT) 37 01/29/2018 02:47 PM    AST (SGOT) 31 01/29/2018 02:47 PM    VITAMIN D, 25-HYDROXY 47.6 01/29/2018 02:47 PM       Plan of care:  Discussed diagnoses in detail with patient. Medication risks/benefits/side effects discussed with patient. All of the patient's questions were addressed. The patient understands and agrees with our plan of care. The patient knows to call back if they are unsure of or forget any changes we discussed today or if the symptoms change.      The patient received an After-Visit Summary which contains VS, orders, medication list and allergy list. This can be used as a \"mini-medical record\" should they have to seek medical care while out of town. Patient Care Team:  Tara Aviles MD as PCP - Carol Reeder MD as Physician (Cardiology)  Catia Washington MD as Physician (Orthopedic Surgery)  Genny Brantley MD (Endocrinology)  Juleen Halsted, RN as Ambulatory Care Navigator    Follow-up Disposition:  Return in about 3 months (around 8/23/2018).     Future Appointments  Date Time Provider Department Center   8/24/2018 10:30 AM Tara Aviles MD [facility] SCH[facility]   10/19/2018 1:30 PM Genny Brantley MD CDE [facility] SCHED       Signed By: Tara Aviles MD     May 23, 2018

## 2018-05-23 NOTE — ASSESSMENT & PLAN NOTE
Stable, based on history, physical exam and review of pertinent labs, studies and medications; meds reconciled; continue current treatment plan.   Key Obesity Meds             BYDUREON 2 mg/0.65 mL pnij  (Taking) INJECT THE CONTENTS OF 1 PEN UNDER THE SKIN EVERY 7 DAYS        Lab Results   Component Value Date/Time    Hemoglobin A1c 6.5 01/29/2018 02:47 PM    Glucose 133 01/29/2018 02:47 PM    Cholesterol, total 148 01/29/2018 02:47 PM    HDL Cholesterol 48 01/29/2018 02:47 PM    LDL, calculated 62 01/29/2018 02:47 PM    Triglyceride 188 01/29/2018 02:47 PM    TSH 1.220 01/29/2018 02:47 PM    Sodium 142 01/29/2018 02:47 PM    Potassium 3.9 01/29/2018 02:47 PM    ALT (SGPT) 37 01/29/2018 02:47 PM    AST (SGOT) 31 01/29/2018 02:47 PM    VITAMIN D, 25-HYDROXY 47.6 01/29/2018 02:47 PM

## 2018-05-23 NOTE — PATIENT INSTRUCTIONS

## 2018-05-24 ENCOUNTER — TELEPHONE (OUTPATIENT)
Dept: FAMILY MEDICINE CLINIC | Age: 78
End: 2018-05-24

## 2018-05-24 DIAGNOSIS — R79.89 ELEVATED LFTS: Primary | ICD-10-CM

## 2018-05-24 LAB
ALBUMIN SERPL-MCNC: 4.2 G/DL (ref 3.5–4.8)
ALBUMIN/GLOB SERPL: 1.7 {RATIO} (ref 1.2–2.2)
ALP SERPL-CCNC: 63 IU/L (ref 39–117)
ALT SERPL-CCNC: 58 IU/L (ref 0–32)
AST SERPL-CCNC: 71 IU/L (ref 0–40)
BILIRUB SERPL-MCNC: 0.4 MG/DL (ref 0–1.2)
BUN SERPL-MCNC: 19 MG/DL (ref 8–27)
BUN/CREAT SERPL: 19 (ref 12–28)
CALCIUM SERPL-MCNC: 9.8 MG/DL (ref 8.7–10.3)
CHLORIDE SERPL-SCNC: 98 MMOL/L (ref 96–106)
CHOLEST SERPL-MCNC: 140 MG/DL (ref 100–199)
CO2 SERPL-SCNC: 26 MMOL/L (ref 18–29)
CREAT SERPL-MCNC: 0.99 MG/DL (ref 0.57–1)
EST. AVERAGE GLUCOSE BLD GHB EST-MCNC: 157 MG/DL
GFR SERPLBLD CREATININE-BSD FMLA CKD-EPI: 56 ML/MIN/1.73
GFR SERPLBLD CREATININE-BSD FMLA CKD-EPI: 64 ML/MIN/1.73
GLOBULIN SER CALC-MCNC: 2.5 G/DL (ref 1.5–4.5)
GLUCOSE SERPL-MCNC: 118 MG/DL (ref 65–99)
HBA1C MFR BLD: 7.1 % (ref 4.8–5.6)
HCT VFR BLD AUTO: 43.2 % (ref 34–46.6)
HDLC SERPL-MCNC: 43 MG/DL
HGB BLD-MCNC: 14.5 G/DL (ref 11.1–15.9)
LDLC SERPL CALC-MCNC: 59 MG/DL (ref 0–99)
POTASSIUM SERPL-SCNC: 4.2 MMOL/L (ref 3.5–5.2)
PROT SERPL-MCNC: 6.7 G/DL (ref 6–8.5)
SODIUM SERPL-SCNC: 141 MMOL/L (ref 134–144)
TRIGL SERPL-MCNC: 190 MG/DL (ref 0–149)
VLDLC SERPL CALC-MCNC: 38 MG/DL (ref 5–40)

## 2018-05-24 NOTE — TELEPHONE ENCOUNTER
Spoke with patient and informed her per Dr. Brittany Forbes: \"Dear Russel Stacy:    Please find your most recent results below. With exception of slightly elevated liver function test your labs are acceptable. I would like for you to have an ultrasound of your liver. Your elevated liver test were most likely due to fatty infiltration of your liver which is frequently seen with diabetes and high cholesterol. Call me if it is okay to set this up for you. In the meantime I want you to avoid taking Tylenol. \"    Patient verbalized understanding and she is in agreement to plan. She reports that she has had elevated liver enzymes before and has had workup in the past but says they could never find a reason why her enzymes were elvated. She says that both of her brothers have elevated liver enzymes as well. She cannot remember is she has ever had an ultrasound but is willing to go.

## 2018-06-04 ENCOUNTER — HOSPITAL ENCOUNTER (OUTPATIENT)
Dept: ULTRASOUND IMAGING | Age: 78
Discharge: HOME | End: 2018-06-04
Attending: FAMILY MEDICINE
Payer: MEDICARE

## 2018-06-04 DIAGNOSIS — R79.89 ELEVATED LFTS: ICD-10-CM

## 2018-06-04 PROCEDURE — 76705 ECHO EXAM OF ABDOMEN: CPT

## 2018-06-06 NOTE — PROGRESS NOTES
She does have a fatty liver but she also has gallstones and sludge in her GB and possible polyp. The radiologist recommended ct with contrast or MRI. Does she have any problems with Contrast? If not will schedule CT.

## 2018-06-07 ENCOUNTER — TELEPHONE (OUTPATIENT)
Dept: FAMILY MEDICINE CLINIC | Age: 78
End: 2018-06-07

## 2018-06-07 DIAGNOSIS — K80.20 CALCULUS OF GALLBLADDER WITHOUT CHOLECYSTITIS WITHOUT OBSTRUCTION: Primary | ICD-10-CM

## 2018-06-07 NOTE — TELEPHONE ENCOUNTER
Called pt. Pt advised of ultrasound results and recommendations of radiologist. She verbalized understanding. States no reaction to contract and is agreeable to CT.

## 2018-06-07 NOTE — TELEPHONE ENCOUNTER
----- Message from Elaine Fowler MD sent at 6/6/2018  4:47 PM EDT -----  She does have a fatty liver but she also has gallstones and sludge in her GB and possible polyp. The radiologist recommended ct with contrast or MRI. Does she have any problems with Contrast? If not will schedule CT.

## 2018-06-14 ENCOUNTER — HOSPITAL ENCOUNTER (OUTPATIENT)
Dept: CT IMAGING | Age: 78
Discharge: HOME | End: 2018-06-14
Attending: FAMILY MEDICINE
Payer: MEDICARE

## 2018-06-14 DIAGNOSIS — K80.20 CALCULUS OF GALLBLADDER WITHOUT CHOLECYSTITIS WITHOUT OBSTRUCTION: ICD-10-CM

## 2018-06-14 PROCEDURE — 74011636320 HC RX REV CODE- 636/320: Performed by: RADIOLOGY

## 2018-06-14 PROCEDURE — 74160 CT ABDOMEN W/CONTRAST: CPT

## 2018-06-14 RX ADMIN — IOPAMIDOL 100 ML: 755 INJECTION, SOLUTION INTRAVENOUS at 14:21

## 2018-06-14 NOTE — PROGRESS NOTES
Call and read above. She will need to see a surgeon to review this test and see what is recommended. Does she have a preference of surgeons at [facility]?   Thank you,  Dr. Radu Beatty

## 2018-06-15 ENCOUNTER — TELEPHONE (OUTPATIENT)
Dept: FAMILY MEDICINE CLINIC | Age: 78
End: 2018-06-15

## 2018-06-15 DIAGNOSIS — K80.21 CALCULUS OF GALLBLADDER WITH BILIARY OBSTRUCTION BUT WITHOUT CHOLECYSTITIS: Primary | ICD-10-CM

## 2018-06-15 NOTE — TELEPHONE ENCOUNTER
----- Message from Willard Gurrola MD sent at 6/14/2018  4:48 PM EDT -----  Call and read above. She will need to see a surgeon to review this test and see what is recommended. Does she have a preference of surgeons at [facility]?   Thank you,  Dr. Muñoz Bi

## 2018-06-15 NOTE — TELEPHONE ENCOUNTER
Called patient and reviewed ultrasound results and choice of surgeon. Patient states she would like to see Dr. Eder Kumari at [address] [place] [address] if this is an appropriate doctor.

## 2018-06-20 ENCOUNTER — HOSPITAL ENCOUNTER (OUTPATIENT)
Dept: PREADMISSION TESTING | Age: 78
Discharge: HOME | End: 2018-06-20
Payer: MEDICARE

## 2018-06-20 VITALS
RESPIRATION RATE: 18 BRPM | BODY MASS INDEX: 38.69 KG/M2 | HEIGHT: 60 IN | SYSTOLIC BLOOD PRESSURE: 124 MMHG | OXYGEN SATURATION: 93 % | TEMPERATURE: 98.1 F | DIASTOLIC BLOOD PRESSURE: 61 MMHG | WEIGHT: 197.1 LBS | HEART RATE: 69 BPM

## 2018-06-20 LAB
ANION GAP SERPL CALC-SCNC: 9 MMOL/L (ref 5–15)
ATRIAL RATE: 73 BPM
BUN SERPL-MCNC: 16 MG/DL (ref 6–20)
BUN/CREAT SERPL: 15 (ref 12–20)
CALCIUM SERPL-MCNC: 9.4 MG/DL (ref 8.5–10.1)
CALCULATED P AXIS, ECG09: 36 DEGREES
CALCULATED R AXIS, ECG10: -35 DEGREES
CALCULATED T AXIS, ECG11: 128 DEGREES
CHLORIDE SERPL-SCNC: 102 MMOL/L (ref 97–108)
CO2 SERPL-SCNC: 29 MMOL/L (ref 21–32)
CREAT SERPL-MCNC: 1.08 MG/DL (ref 0.55–1.02)
DIAGNOSIS, 93000: NORMAL
GLUCOSE SERPL-MCNC: 104 MG/DL (ref 65–100)
P-R INTERVAL, ECG05: 216 MS
POTASSIUM SERPL-SCNC: 3.9 MMOL/L (ref 3.5–5.1)
Q-T INTERVAL, ECG07: 414 MS
QRS DURATION, ECG06: 92 MS
QTC CALCULATION (BEZET), ECG08: 456 MS
SODIUM SERPL-SCNC: 140 MMOL/L (ref 136–145)
VENTRICULAR RATE, ECG03: 73 BPM

## 2018-06-20 PROCEDURE — 93005 ELECTROCARDIOGRAM TRACING: CPT

## 2018-06-20 PROCEDURE — 80048 BASIC METABOLIC PNL TOTAL CA: CPT | Performed by: ANESTHESIOLOGY

## 2018-06-20 PROCEDURE — 36415 COLL VENOUS BLD VENIPUNCTURE: CPT | Performed by: ANESTHESIOLOGY

## 2018-06-20 NOTE — PERIOP NOTES
1977 [facility] [address], [address]    MAIN OR [phone]    MAIN PRE OP [phone]    AMBULATORY PRE OP [phone]    PRE-ADMISSION TESTING [phone]       Surgery Date:   6-26-18 Tuesday      Is surgery arrival time given by surgeon? NO  If NO, [facility] staff will call you between 3 and 7pm the day before your surgery with your arrival time. (If your surgery is on a Monday, we will call you the Friday before.)    Call [phone] after 7pm Monday-Friday if you did not receive your arrival time. Answers to Common Questions   When You  Arrive   Arrive at the [facility] [address] [facility] on the day of your surgery  Have your insurance card, photo ID, and any copayment (if needed)     Food   and   Drink   NO food or drink after midnight the night before surgery    This means NO water, gum, mints, coffee, juice, etc.  No alcohol (beer, wine, liquor) 24 hours before and after surgery     Medicine to   TAKE   Morning of Surgery   MEDICATIONS TO TAKE THE MORNING OF SURGERY WITH A SIP OF WATER:    Loratadine, Pantoprazole, Cardizem, Tramadol if needed. The morning of surgery ONLY do NOT take your losartan-hctz. Stop your aspirin, vitamins and supplements today. Check with your endocrinologist for instructions regarding your Bydureon.      Medicine  To  STOP   FOR PAIN   NO Aspirin for pain    NO Non-Steroidal Anti-Inflammatory Drugs (NSAIDs:   for example, Ibuprofen (Advil, Motrin), Naproxen (Aleve)   STOP herbal supplements and vitamins 1 week before surgery   You can take Tylenol  follow instructions on the bottle     Blood  Thinners    If you take Aspirin, Plavix, Coumadin, blood-thinning or anti-clot medicine, talk to your surgeon and/or follow the instructions from the doctor who told you to take that medicine     Clothing  Jewelry  Valuables  Bathing     CLOTHING   Wear loose, comfortable clothes   Wear glasses instead of contacts  [address] [facility], jewelry and valuables at home   No make-up, particularly mascara, the day of surgery   REMOVE ALL piercings, rings, and jewelry - leave at home   Wear hair loose or down; no pony-tails, buns, or metal hair clips    BATHING   Follow all special bath instructions (for total joint replacement, spine and bowel surgeries.)   If you shower the morning of surgery, please do not apply any lotions, powders, or deodorants afterwards. Do not shave or trim anywhere 24 hours before surgery. Going Home  or  Spending the Night    SAME-DAY SURGERY: You must have a responsible adult drive you home and stay with you 24 hours after surgery   ADMITS: If your doctor is keeping you into the hospital after surgery, leave personal belongings/luggage in your car until you have a hospital room number. Hospital discharge time is 12 noon  Drivers must be here before 12 noon unless you are told differently         Follow all instructions so your surgery wont be cancelled. Please, be on time. If a situation occurs and you are delayed the day of surgery, call [phone] or [phone]. If your physical condition changes (like a fever, cold, flu, etc.) call your surgeon as soon as possible. The [facility] staff can be reached at [phone]. OTHER SPECIAL INSTRUCTIONS:  Free  parking 7am-5pm    The patient was contacted  in person. She  verbalize  understanding of all instructions and does not need reinforcement.

## 2018-06-25 ENCOUNTER — ANESTHESIA EVENT (OUTPATIENT)
Dept: SURGERY | Age: 78
End: 2018-06-25
Payer: MEDICARE

## 2018-06-26 ENCOUNTER — HOSPITAL ENCOUNTER (OUTPATIENT)
Age: 78
Setting detail: OUTPATIENT SURGERY
Discharge: HOME | End: 2018-06-26
Attending: SURGERY | Admitting: SURGERY
Payer: MEDICARE

## 2018-06-26 ENCOUNTER — ANESTHESIA (OUTPATIENT)
Dept: SURGERY | Age: 78
End: 2018-06-26
Payer: MEDICARE

## 2018-06-26 ENCOUNTER — APPOINTMENT (OUTPATIENT)
Dept: GENERAL RADIOLOGY | Age: 78
End: 2018-06-26
Attending: SURGERY
Payer: MEDICARE

## 2018-06-26 VITALS
BODY MASS INDEX: 38.69 KG/M2 | WEIGHT: 197.09 LBS | OXYGEN SATURATION: 97 % | SYSTOLIC BLOOD PRESSURE: 118 MMHG | RESPIRATION RATE: 14 BRPM | TEMPERATURE: 97.6 F | DIASTOLIC BLOOD PRESSURE: 56 MMHG | HEIGHT: 60 IN | HEART RATE: 73 BPM

## 2018-06-26 DIAGNOSIS — K80.20 CALCULUS OF GALLBLADDER WITHOUT CHOLECYSTITIS WITHOUT OBSTRUCTION: Primary | ICD-10-CM

## 2018-06-26 LAB
GLUCOSE BLD STRIP.AUTO-MCNC: 111 MG/DL (ref 65–100)
GLUCOSE BLD STRIP.AUTO-MCNC: 138 MG/DL (ref 65–100)
SERVICE CMNT-IMP: ABNORMAL
SERVICE CMNT-IMP: ABNORMAL

## 2018-06-26 PROCEDURE — 76060000034 HC ANESTHESIA 1.5 TO 2 HR: Performed by: SURGERY

## 2018-06-26 PROCEDURE — 77030009852 HC PCH RTVR ENDOSC COVD -B: Performed by: SURGERY

## 2018-06-26 PROCEDURE — 74011000250 HC RX REV CODE- 250: Performed by: SURGERY

## 2018-06-26 PROCEDURE — 76210000020 HC REC RM PH II FIRST 0.5 HR: Performed by: SURGERY

## 2018-06-26 PROCEDURE — 77030019908 HC STETH ESOPH SIMS -A: Performed by: ANESTHESIOLOGY

## 2018-06-26 PROCEDURE — 76010000153 HC OR TIME 1.5 TO 2 HR: Performed by: SURGERY

## 2018-06-26 PROCEDURE — 77030035045 HC TRCR ENDOSC VRSPRT BLDLSS COVD -B: Performed by: SURGERY

## 2018-06-26 PROCEDURE — 74011250636 HC RX REV CODE- 250/636: Performed by: SURGERY

## 2018-06-26 PROCEDURE — 77030011640 HC PAD GRND REM COVD -A: Performed by: SURGERY

## 2018-06-26 PROCEDURE — 77030013079 HC BLNKT BAIR HGGR 3M -A: Performed by: ANESTHESIOLOGY

## 2018-06-26 PROCEDURE — 77030032490 HC SLV COMPR SCD KNE COVD -B: Performed by: SURGERY

## 2018-06-26 PROCEDURE — 74011250636 HC RX REV CODE- 250/636: Performed by: ANESTHESIOLOGY

## 2018-06-26 PROCEDURE — 77030018836 HC SOL IRR NACL ICUM -A: Performed by: SURGERY

## 2018-06-26 PROCEDURE — 88304 TISSUE EXAM BY PATHOLOGIST: CPT | Performed by: SURGERY

## 2018-06-26 PROCEDURE — 77030025088 HC APPL CLP LIG 1 COVD -B: Performed by: SURGERY

## 2018-06-26 PROCEDURE — 74011000250 HC RX REV CODE- 250

## 2018-06-26 PROCEDURE — 74011250636 HC RX REV CODE- 250/636

## 2018-06-26 PROCEDURE — C9290 INJ, BUPIVACAINE LIPOSOME: HCPCS | Performed by: SURGERY

## 2018-06-26 PROCEDURE — 77030032490 HC SLV COMPR SCD KNE COVD -B

## 2018-06-26 PROCEDURE — 77030020782 HC GWN BAIR PAWS FLX 3M -B

## 2018-06-26 PROCEDURE — 74011250637 HC RX REV CODE- 250/637: Performed by: SURGERY

## 2018-06-26 PROCEDURE — 76210000006 HC OR PH I REC 0.5 TO 1 HR: Performed by: SURGERY

## 2018-06-26 PROCEDURE — 77030002933 HC SUT MCRYL J&J -A: Performed by: SURGERY

## 2018-06-26 PROCEDURE — 77030035048 HC TRCR ENDOSC OPTCL COVD -B: Performed by: SURGERY

## 2018-06-26 PROCEDURE — 82962 GLUCOSE BLOOD TEST: CPT

## 2018-06-26 PROCEDURE — 77030008684 HC TU ET CUF COVD -B: Performed by: ANESTHESIOLOGY

## 2018-06-26 PROCEDURE — 77030035051: Performed by: SURGERY

## 2018-06-26 PROCEDURE — 74300 X-RAY BILE DUCTS/PANCREAS: CPT

## 2018-06-26 PROCEDURE — 77030026438 HC STYL ET INTUB CARD -A: Performed by: ANESTHESIOLOGY

## 2018-06-26 PROCEDURE — 74011636320 HC RX REV CODE- 636/320: Performed by: SURGERY

## 2018-06-26 PROCEDURE — 77030037032 HC INSRT SCIS CLICKLLINE DISP STOR -B: Performed by: SURGERY

## 2018-06-26 PROCEDURE — 77030020747 HC TU INSUF ENDOSC TELE -A: Performed by: SURGERY

## 2018-06-26 RX ORDER — ONDANSETRON 4 MG/1
4 TABLET, ORALLY DISINTEGRATING ORAL
Qty: 20 TAB | Refills: 1 | Status: SHIPPED | OUTPATIENT
Start: 2018-06-26 | End: 2020-02-24

## 2018-06-26 RX ORDER — ONDANSETRON 2 MG/ML
INJECTION INTRAMUSCULAR; INTRAVENOUS AS NEEDED
Status: DISCONTINUED | OUTPATIENT
Start: 2018-06-26 | End: 2018-06-26 | Stop reason: HOSPADM

## 2018-06-26 RX ORDER — FENTANYL CITRATE 50 UG/ML
INJECTION, SOLUTION INTRAMUSCULAR; INTRAVENOUS AS NEEDED
Status: DISCONTINUED | OUTPATIENT
Start: 2018-06-26 | End: 2018-06-26 | Stop reason: HOSPADM

## 2018-06-26 RX ORDER — ONDANSETRON 2 MG/ML
4 INJECTION INTRAMUSCULAR; INTRAVENOUS AS NEEDED
Status: DISCONTINUED | OUTPATIENT
Start: 2018-06-26 | End: 2018-06-26 | Stop reason: HOSPADM

## 2018-06-26 RX ORDER — SODIUM CHLORIDE 0.9 % (FLUSH) 0.9 %
5-10 SYRINGE (ML) INJECTION AS NEEDED
Status: DISCONTINUED | OUTPATIENT
Start: 2018-06-26 | End: 2018-06-26 | Stop reason: HOSPADM

## 2018-06-26 RX ORDER — HYDROMORPHONE HYDROCHLORIDE 1 MG/ML
.5-1 INJECTION, SOLUTION INTRAMUSCULAR; INTRAVENOUS; SUBCUTANEOUS
Status: DISCONTINUED | OUTPATIENT
Start: 2018-06-26 | End: 2018-06-26 | Stop reason: CLARIF

## 2018-06-26 RX ORDER — POLYETHYLENE GLYCOL 3350 17 G/17G
17 POWDER, FOR SOLUTION ORAL DAILY
Qty: 10 PACKET | Refills: 0 | Status: SHIPPED | OUTPATIENT
Start: 2018-06-26 | End: 2020-02-24

## 2018-06-26 RX ORDER — HYDROCODONE BITARTRATE AND ACETAMINOPHEN 5; 325 MG/1; MG/1
1-2 TABLET ORAL
Qty: 30 TAB | Refills: 0 | Status: SHIPPED | OUTPATIENT
Start: 2018-06-26 | End: 2019-05-31

## 2018-06-26 RX ORDER — LIDOCAINE HYDROCHLORIDE 10 MG/ML
0.1 INJECTION, SOLUTION EPIDURAL; INFILTRATION; INTRACAUDAL; PERINEURAL AS NEEDED
Status: DISCONTINUED | OUTPATIENT
Start: 2018-06-26 | End: 2018-06-26 | Stop reason: HOSPADM

## 2018-06-26 RX ORDER — SUCCINYLCHOLINE CHLORIDE 20 MG/ML
INJECTION INTRAMUSCULAR; INTRAVENOUS AS NEEDED
Status: DISCONTINUED | OUTPATIENT
Start: 2018-06-26 | End: 2018-06-26 | Stop reason: HOSPADM

## 2018-06-26 RX ORDER — CEFAZOLIN SODIUM 1 G/3ML
INJECTION, POWDER, FOR SOLUTION INTRAMUSCULAR; INTRAVENOUS AS NEEDED
Status: DISCONTINUED | OUTPATIENT
Start: 2018-06-26 | End: 2018-06-26 | Stop reason: HOSPADM

## 2018-06-26 RX ORDER — PROPOFOL 10 MG/ML
INJECTION, EMULSION INTRAVENOUS AS NEEDED
Status: DISCONTINUED | OUTPATIENT
Start: 2018-06-26 | End: 2018-06-26 | Stop reason: HOSPADM

## 2018-06-26 RX ORDER — SODIUM CHLORIDE, SODIUM LACTATE, POTASSIUM CHLORIDE, CALCIUM CHLORIDE 600; 310; 30; 20 MG/100ML; MG/100ML; MG/100ML; MG/100ML
125 INJECTION, SOLUTION INTRAVENOUS CONTINUOUS
Status: DISCONTINUED | OUTPATIENT
Start: 2018-06-26 | End: 2018-06-26 | Stop reason: HOSPADM

## 2018-06-26 RX ORDER — EPHEDRINE SULFATE 50 MG/ML
INJECTION, SOLUTION INTRAVENOUS AS NEEDED
Status: DISCONTINUED | OUTPATIENT
Start: 2018-06-26 | End: 2018-06-26 | Stop reason: HOSPADM

## 2018-06-26 RX ORDER — ROCURONIUM BROMIDE 10 MG/ML
INJECTION, SOLUTION INTRAVENOUS AS NEEDED
Status: DISCONTINUED | OUTPATIENT
Start: 2018-06-26 | End: 2018-06-26 | Stop reason: HOSPADM

## 2018-06-26 RX ORDER — HYDROCODONE BITARTRATE AND ACETAMINOPHEN 5; 325 MG/1; MG/1
1 TABLET ORAL ONCE
Status: COMPLETED | OUTPATIENT
Start: 2018-06-26 | End: 2018-06-26

## 2018-06-26 RX ORDER — NEOSTIGMINE METHYLSULFATE 1 MG/ML
INJECTION INTRAVENOUS AS NEEDED
Status: DISCONTINUED | OUTPATIENT
Start: 2018-06-26 | End: 2018-06-26 | Stop reason: HOSPADM

## 2018-06-26 RX ORDER — FENTANYL CITRATE 50 UG/ML
50 INJECTION, SOLUTION INTRAMUSCULAR; INTRAVENOUS
Status: DISCONTINUED | OUTPATIENT
Start: 2018-06-26 | End: 2018-06-26 | Stop reason: HOSPADM

## 2018-06-26 RX ORDER — LIDOCAINE HYDROCHLORIDE 20 MG/ML
INJECTION, SOLUTION EPIDURAL; INFILTRATION; INTRACAUDAL; PERINEURAL AS NEEDED
Status: DISCONTINUED | OUTPATIENT
Start: 2018-06-26 | End: 2018-06-26 | Stop reason: HOSPADM

## 2018-06-26 RX ORDER — CEFAZOLIN SODIUM/WATER 2 G/20 ML
2 SYRINGE (ML) INTRAVENOUS ONCE
Status: DISCONTINUED | OUTPATIENT
Start: 2018-06-26 | End: 2018-06-26 | Stop reason: HOSPADM

## 2018-06-26 RX ORDER — GLYCOPYRROLATE 0.2 MG/ML
INJECTION INTRAMUSCULAR; INTRAVENOUS AS NEEDED
Status: DISCONTINUED | OUTPATIENT
Start: 2018-06-26 | End: 2018-06-26 | Stop reason: HOSPADM

## 2018-06-26 RX ORDER — SODIUM CHLORIDE 0.9 % (FLUSH) 0.9 %
5-10 SYRINGE (ML) INJECTION EVERY 8 HOURS
Status: DISCONTINUED | OUTPATIENT
Start: 2018-06-26 | End: 2018-06-26 | Stop reason: HOSPADM

## 2018-06-26 RX ORDER — MIDAZOLAM HYDROCHLORIDE 1 MG/ML
INJECTION, SOLUTION INTRAMUSCULAR; INTRAVENOUS AS NEEDED
Status: DISCONTINUED | OUTPATIENT
Start: 2018-06-26 | End: 2018-06-26 | Stop reason: HOSPADM

## 2018-06-26 RX ADMIN — EPHEDRINE SULFATE 10 MG: 50 INJECTION, SOLUTION INTRAVENOUS at 15:00

## 2018-06-26 RX ADMIN — HYDROCODONE BITARTRATE AND ACETAMINOPHEN 1 TABLET: 5; 325 TABLET ORAL at 17:43

## 2018-06-26 RX ADMIN — SODIUM CHLORIDE, SODIUM LACTATE, POTASSIUM CHLORIDE, AND CALCIUM CHLORIDE 125 ML/HR: 600; 310; 30; 20 INJECTION, SOLUTION INTRAVENOUS at 12:39

## 2018-06-26 RX ADMIN — EPHEDRINE SULFATE 5 MG: 50 INJECTION, SOLUTION INTRAVENOUS at 15:20

## 2018-06-26 RX ADMIN — SUCCINYLCHOLINE CHLORIDE 100 MG: 20 INJECTION INTRAMUSCULAR; INTRAVENOUS at 14:53

## 2018-06-26 RX ADMIN — GLYCOPYRROLATE 0.5 MG: 0.2 INJECTION INTRAMUSCULAR; INTRAVENOUS at 16:21

## 2018-06-26 RX ADMIN — EPHEDRINE SULFATE 5 MG: 50 INJECTION, SOLUTION INTRAVENOUS at 15:07

## 2018-06-26 RX ADMIN — SODIUM CHLORIDE, SODIUM LACTATE, POTASSIUM CHLORIDE, AND CALCIUM CHLORIDE: 600; 310; 30; 20 INJECTION, SOLUTION INTRAVENOUS at 15:26

## 2018-06-26 RX ADMIN — FENTANYL CITRATE 50 MCG: 50 INJECTION, SOLUTION INTRAMUSCULAR; INTRAVENOUS at 17:08

## 2018-06-26 RX ADMIN — FENTANYL CITRATE 25 MCG: 50 INJECTION, SOLUTION INTRAMUSCULAR; INTRAVENOUS at 16:24

## 2018-06-26 RX ADMIN — ROCURONIUM BROMIDE 5 MG: 10 INJECTION, SOLUTION INTRAVENOUS at 14:50

## 2018-06-26 RX ADMIN — PROPOFOL 150 MG: 10 INJECTION, EMULSION INTRAVENOUS at 14:53

## 2018-06-26 RX ADMIN — LIDOCAINE HYDROCHLORIDE 60 MG: 20 INJECTION, SOLUTION EPIDURAL; INFILTRATION; INTRACAUDAL; PERINEURAL at 14:50

## 2018-06-26 RX ADMIN — NEOSTIGMINE METHYLSULFATE 3 MG: 1 INJECTION INTRAVENOUS at 16:21

## 2018-06-26 RX ADMIN — FENTANYL CITRATE 50 MCG: 50 INJECTION, SOLUTION INTRAMUSCULAR; INTRAVENOUS at 15:45

## 2018-06-26 RX ADMIN — FENTANYL CITRATE 100 MCG: 50 INJECTION, SOLUTION INTRAMUSCULAR; INTRAVENOUS at 14:50

## 2018-06-26 RX ADMIN — EPHEDRINE SULFATE 5 MG: 50 INJECTION, SOLUTION INTRAVENOUS at 15:34

## 2018-06-26 RX ADMIN — EPHEDRINE SULFATE 5 MG: 50 INJECTION, SOLUTION INTRAVENOUS at 15:13

## 2018-06-26 RX ADMIN — FENTANYL CITRATE 50 MCG: 50 INJECTION, SOLUTION INTRAMUSCULAR; INTRAVENOUS at 15:39

## 2018-06-26 RX ADMIN — ONDANSETRON 4 MG: 2 INJECTION INTRAMUSCULAR; INTRAVENOUS at 15:04

## 2018-06-26 RX ADMIN — EPHEDRINE SULFATE 5 MG: 50 INJECTION, SOLUTION INTRAVENOUS at 15:25

## 2018-06-26 RX ADMIN — ROCURONIUM BROMIDE 25 MG: 10 INJECTION, SOLUTION INTRAVENOUS at 14:53

## 2018-06-26 RX ADMIN — FENTANYL CITRATE 25 MCG: 50 INJECTION, SOLUTION INTRAMUSCULAR; INTRAVENOUS at 16:30

## 2018-06-26 RX ADMIN — MIDAZOLAM HYDROCHLORIDE 2 MG: 1 INJECTION, SOLUTION INTRAMUSCULAR; INTRAVENOUS at 14:47

## 2018-06-26 RX ADMIN — ROCURONIUM BROMIDE 10 MG: 10 INJECTION, SOLUTION INTRAVENOUS at 15:41

## 2018-06-26 RX ADMIN — CEFAZOLIN SODIUM 2 G: 1 INJECTION, POWDER, FOR SOLUTION INTRAMUSCULAR; INTRAVENOUS at 14:59

## 2018-06-26 NOTE — ANESTHESIA PREPROCEDURE EVALUATION
Anesthetic History     PONV (during knee surgery 4 years ago at [facility])          Review of Systems / Medical History  Patient summary reviewed, nursing notes reviewed and pertinent labs reviewed    Pulmonary  Within defined limits  COPD               Neuro/Psych     seizures (one episode, thought to be allergy to tequilla)         Cardiovascular    Hypertension          Hyperlipidemia    Exercise tolerance: >4 METS  Comments: Not on beta blocker   GI/Hepatic/Renal     GERD           Endo/Other  Within defined limits  Diabetes: type 2    Morbid obesity and arthritis     Other Findings   Comments: H/o slightly elevated LFT -thought to be familial           Physical Exam    Airway  Mallampati: III  TM Distance: 4 - 6 cm  Neck ROM: normal range of motion   Mouth opening: Diminished (comment)     Cardiovascular  Regular rate and rhythm,  S1 and S2 normal,  no murmur, click, rub, or gallop  Rhythm: regular  Rate: normal         Dental    Dentition: Caps/crowns, Bridges and Implants  Comments: Top front left bonded tooth   Pulmonary  Breath sounds clear to auscultation               Abdominal  GI exam deferred       Other Findings            Anesthetic Plan    ASA: 3  Anesthesia type: general          Induction: Intravenous  Anesthetic plan and risks discussed with: Patient

## 2018-06-26 NOTE — ANESTHESIA POSTPROCEDURE EVALUATION
Post-Anesthesia Evaluation and Assessment    Patient: Romi Acuña MRN: [id number]  SSN: [id number]    YOB: 1940  Age: 69 y.o. Sex: female       Cardiovascular Function/Vital Signs  Visit Vitals    /59    Pulse 74    Temp 36.4 °C (97.6 °F)    Resp 14    Ht 4' 11.5\" (1.511 m)    Wt 89.4 kg (197 lb 1.5 oz)    SpO2 99%    BMI 39.14 kg/m2       Patient is status post general anesthesia for Procedure(s):  LAPAROSCOPIC CHOLECYSTECTOMY WITH GRAMS . Nausea/Vomiting: None    Postoperative hydration reviewed and adequate. Pain:  Pain Scale 1: Numeric (0 - 10) (06/26/18 1731)  Pain Intensity 1: 2 (06/26/18 1731)   Managed    Neurological Status:   Neuro (WDL): Exceptions to WDL (06/26/18 1738)  Neuro  Neurologic State: Drowsy; Alert (06/26/18 1738)  LUE Motor Response: Purposeful;Spontaneous  (06/26/18 1738)  LLE Motor Response: Purposeful;Spontaneous  (06/26/18 1738)  RUE Motor Response: Purposeful;Spontaneous  (06/26/18 1738)  RLE Motor Response: Purposeful;Spontaneous  (06/26/18 1738)   At baseline    Mental Status and Level of Consciousness: Arousable    Pulmonary Status:   O2 Device: Room air (06/26/18 1738)   Adequate oxygenation and airway patent    Complications related to anesthesia: None    Post-anesthesia assessment completed.  No concerns    Signed By: Micky Hauser MD     June 26, 2018

## 2018-06-26 NOTE — DISCHARGE INSTRUCTIONS
PATIENT INSTRUCTIONS  GALL BLADDER SURGERY  (CHOLECYSTECTOMY)    FOLLOW-UP:  Please make an appointment with your physician in   Call your physician immediately if you have any fevers greater than 102.5, drainage from your wound that is not clear or looks infected, persistent bleeding, increasing abdominal pain, problems urinating, or persistent nausea/vomiting. You should be aware that you may have right shoulder pain after surgery and that this will progressively go away. This is called 'referred pain' and is from the area of the gallbladder. It can also be caused by gas that may be trapped under the diaphragm from the surgery, especially if it was performed laparoscopically through mini-incisions. This gas will progressively get reabsorbed by your body. WOUND CARE INSTRUCTIONS:  Keep a dry clean dressing on the wound if there is drainage. The initial bandage may be removed after 24 hours. Once the wound has quit draining you may leave it open to air. If clothing rubs against the wound or causes irritation and the wound is not draining you may cover it with a dry dressing during the daytime. Try to keep the wound dry and avoid ointments on the wound unless directed to do so. If the wound becomes bright red and painful or starts to drain infected material that is not clear, please contact your physician immediately. You should also call if you begin to drain fluid that is thin and greenish-brown from the wound and appears to look like bile. If the wound though is mildly pink and has a thick firm ridge underneath it, this is normal, and is referred to as a healing ridge. This will resolve over the next 4-6 weeks. DIET:  You may eat any foods that you can tolerate. It is a good idea to eat a high fiber diet and take in plenty of fluids to prevent constipation.   If you do become constipated you may want to take a mild laxative or take ducolax tablets on a daily basis until your bowel habits are regular. Constipation can be very uncomfortable, along with straining, after recent abdominal surgery. ACTIVITY:  You are encouraged to cough and deep breath or use your incentive spirometer if you were given one, every 15-30 minutes when awake. This will help prevent respiratory complications and low grade fevers post-operatively. You may want to hug a pillow when coughing and sneezing to add additional support to the surgical area(s) which will decrease pain during these times. You are encouraged to walk and engage in light activity for the next two weeks. You should not lift more than 20 pounds during this time frame as it could put you at increased risk for a post-operative hernia. Twenty pounds is roughly equivalent to a plastic bag of groceries. MEDICATIONS:  Try to take narcotic medications and anti-inflammatory medications, such as tylenol, ibuprofen, naprosyn, etc., with food. This will minimize stomach upset from the medication. Should you develop nausea and vomiting from the pain medication, or develop a rash, please discontinue the medication and contact your physician. You should not drive, make important decisions, or operate machinery when taking narcotic pain medication. QUESTIONS:  Please feel free to call your physician or the hospital  if you have any questions, and they will be glad to assist you. DISCHARGE SUMMARY from your Nurse    The following personal items collected during your admission are returned to you:   Dental Appliance: Dental Appliances: None  Vision: Visual Aid: Glasses, With patient (given to )  Hearing Aid:    Jewelry: Jewelry: None  Clothing: Clothing: Pants, Shirt, Footwear, Undergarments (placed in locker)  Other Valuables:  Other Valuables: Renaye Caulk (given to )  Valuables sent to safe:      PATIENT INSTRUCTIONS:    After general anesthesia or intravenous sedation, for 24 hours or while taking prescription Narcotics:  · Limit your activities  · Do not drive and operate hazardous machinery  · Do not make important personal or business decisions  · Do  not drink alcoholic beverages  · If you have not urinated within 8 hours after discharge, please contact your surgeon on call. Report the following to your surgeon:  · Excessive pain, swelling, redness or odor of or around the surgical area  · Temperature over 100.5  · Nausea and vomiting lasting longer than 4 hours or if unable to take medications  · Any signs of decreased circulation or nerve impairment to extremity: change in color, persistent  numbness, tingling, coldness or increase pain  · Any questions    COUGH AND DEEP BREATHE    Breathing deep and coughing are very important exercises to do after surgery. Deep breathing and coughing open the little air tubes and air sacks in your lungs. You take deep breaths every day. You may not even notice - it is just something you do when you sigh or yawn. It is a natural exercise you do to keep these air passages open. After surgery, take deep breaths and cough, on purpose. Coughing and deep breathing help prevent bronchitis and pneumonia after surgery. If you had chest or belly surgery, use a pillow as a \"hug buddy\" and hold it tightly to your chest or belly when you cough. DIRECTIONS:  6. Take 10 to 15 slow deep breaths every hour while awake. 7. Breathe in deeply, and hold it for 2 seconds. 8. Exhale slowly through puckered lips, like blowing up a balloon. 9. After every 4th or 5th deep breath, hug your pillow to your chest or belly and give a hard, deep cough. Yes, it will probably hurt. But doing this exercise is very important part of healing after surgery. Take your pain medicine to help you do this exercise without too much pain.     IF YOU HAVE BEEN DIAGNOSED WITH SLEEP APNEA, PLEASE USE YOUR SLEEP APNEA DEVICE OR CPAP MACHINE WHEN YOU INTEND TO NAP AFTER TAKING PAIN MEDICATION. Ankle Pumps    Ankle pumps increase the circulation of oxygenated blood to your lower extremities and decrease your risk for circulation problems such as blood clots. They also stretch the muscles, tendons and ligaments in your foot and ankle, and prevent joint contracture in the ankle and foot, especially after surgeries on the legs. It is important to do ankle pump exercises regularly after surgery because immobility increases your risk for developing a blood clot. Your doctor may also have you take an Aspirin for the next few days as well. If your doctor did not ask you to take an Aspirin, consult with him before starting Aspirin therapy on your own. Slowly point your foot forward, feeling the muscles on the top of your lower leg stretch, and hold this position for 5 seconds. Next, pull your foot back toward you as far as possible, stretching the calf muscles, and hold that position for 5 seconds. Repeat with the other foot. Perform 10 repetitions every hour while awake for both ankles if possible (down and then up with the foot once is one repetition). You should feel gentle stretching of the muscles in your lower leg when doing this exercise. If you feel pain, or your range of motion is limited, don't  Push too hard. Only go the limit your joint and muscles will let you go. If you have increasing pain, progressively worsening leg warmth or swelling, STOP the exercise and call your doctor. Below is information about the medications your doctor is prescribing after your visit:    Other information in your discharge envelope:  []     PRESCRIPTIONS  []     PHYSICAL THERAPY PRESCRIPTION  []     APPOINTMENT CARDS  []     Regional Anesthesia Pamphlet for block or block with On-Q Catheter from Anesthesia Service  []     Medical device information sheets/pamphlets from their    []     School/work excuse note.   []     /parent work excuse note. These are general instructions for a healthy lifestyle:    *  Please give a list of your current medications to your Primary Care Provider. *  Please update this list whenever your medications are discontinued, doses are      changed, or new medications (including over-the-counter products) are added. *  Please carry medication information at all times in case of emergency situations. About Smoking  No smoking / No tobacco products / Avoid exposure to second hand smoke    Surgeon General's Warning:  Quitting smoking now greatly reduces serious risk to your health. Obesity, smoking, and sedentary lifestyle greatly increases your risk for illness and disease. A healthy diet, regular physical exercise & weight monitoring are important for maintaining a healthy lifestyle. Congestive Heart Failure  You may be retaining fluid if you have a history of heart failure or if you experience any of the following symptoms:  Weight gain of 3 pounds or more overnight or 5 pounds in a week, increased swelling in our hands or feet or shortness of breath while lying flat in bed. Please call your doctor as soon as you notice any of these symptoms; do not wait until your next office visit. Recognize signs and symptoms of STROKE:  F - face looks uneven  A - arms unable to move or move even  S - speech slurred or non-existent  T - time-call 911 as soon as signs and symptoms begin-DO NOT go         Back to bed or wait to see if you get better-TIME IS BRAIN. Warning signs of HEART ATTACK  Call 911 if you have these symptoms    · Chest discomfort. Most heart attacks involve discomfort in the center of the chest that lasts more than a few minutes, or that goes away and comes back. It can feel like uncomfortable pressure, squeezing, fullness, or pain. · Discomfort in other areas of the upper body.        Symptoms can include pain or discomfort in one or both        Arms, the back, neck, jaw, or stomach. ·  Shortness of breath with or without chest discomfort. · Other signs may include breaking out in a cold sweat, nausea, or lightheadedness    Don't wait more than five minutes to call 911 - MINUTES MATTER! Fast action can save your life. Calling 911 is almost always the fastest way to get lifesaving treatment. Emergency Medical Services staff can begin treatment when they arrive - up to an hour sooner than if someone gets to the hospital by car. GAIL [facility] MEDICATION AND SIDE EFFECT GUIDE    The [facility] MEDICATION AND SIDE EFFECT GUIDE was provided to the PATIENT AND CARE PROVIDER. Information provided includes instruction about drug purpose and common side effects for the following medications:    HYDROcodone-acetaminophen 5-325 mg per tablet   Commonly known as: Sarah Yu       Your last dose was:        Your next dose is:             Take 1-2 Tabs by mouth every four (4) hours as needed for Pain (Acute post-operative pain). Max Daily Amount: 12 Tabs. Indications: Pain    1-2 Tab                          ondansetron 4 mg disintegrating tablet   Commonly known as: ZOFRAN ODT       Your last dose was:        Your next dose is:             Take 1 Tab by mouth every eight (8) hours as needed for Nausea. Indications: PREVENTION OF POST-OPERATIVE NAUSEA AND VOMITING    4 mg                         polyethylene glycol 17 gram packet   Commonly known as: MIRALAX       Your last dose was:        Your next dose is:             Take 1 Packet by mouth daily. Indications: constipation, If constipation last more than 24-48 hours, despite colace use.     17 g             ·

## 2018-06-26 NOTE — IP AVS SNAPSHOT
303 [facility] 
 
 
 [address] 
[phone] Patient: Vera Romo MRN: [id number] [facility]:[id number] About your hospitalization You were admitted on:  June 26, 2018 You last received care in the:  [facility] PACU You were discharged on:  June 26, 2018 Why you were hospitalized Your primary diagnosis was:  Not on File Follow-up Information Follow up With Details Comments Contact Info Eva Hester MD Schedule an appointment as soon as possible for a visit in 2 weeks  211 [facility] [address] 
[phone] Meli Yoo MD   [address] [address] 
[phone] Discharge Orders None A check david indicates which time of day the medication should be taken. My Medications START taking these medications Instructions Each Dose to Equal  
 Morning Noon Evening Bedtime HYDROcodone-acetaminophen 5-325 mg per tablet Commonly known as:  Washington Paul Your last dose was: Your next dose is: Take 1-2 Tabs by mouth every four (4) hours as needed for Pain (Acute post-operative pain). Max Daily Amount: 12 Tabs. Indications: Pain 1-2 Tab  
    
   
   
   
  
 ondansetron 4 mg disintegrating tablet Commonly known as:  ZOFRAN ODT Your last dose was: Your next dose is: Take 1 Tab by mouth every eight (8) hours as needed for Nausea. Indications: PREVENTION OF POST-OPERATIVE NAUSEA AND VOMITING  
 4 mg  
    
   
   
   
  
 polyethylene glycol 17 gram packet Commonly known as:  Mary Yates Your last dose was: Your next dose is: Take 1 Packet by mouth daily. Indications: constipation, If constipation last more than 24-48 hours, despite colace use. 17 g CHANGE how you take these medications Instructions Each Dose to Equal  
 Morning Noon Evening Bedtime  
 pantoprazole 40 mg tablet Commonly known as:  PROTONIX What changed:  when to take this Your last dose was: Your next dose is: Take 1 Tab by mouth daily. Indications: gastroesophageal reflux disease 40 mg CONTINUE taking these medications Instructions Each Dose to Equal  
 Morning Noon Evening Bedtime  
 aspirin 81 mg chewable tablet Your last dose was: Your next dose is: Take 81 mg by mouth daily. 81 mg  
    
   
   
   
  
 BYDUREON 2 mg/0.65 mL Pnij Generic drug:  exenatide microspheres Your last dose was: Your next dose is: INJECT THE CONTENTS OF 1 PEN UNDER THE SKIN EVERY 7 DAYS  
     
   
   
   
  
 cloNIDine HCl 0.1 mg tablet Commonly known as:  CATAPRES Your last dose was: Your next dose is: TAKE 1 TABLET NIGHTLY  
     
   
   
   
  
 dilTIAZem  mg ER capsule Commonly known as:  CARDIZEM CD Your last dose was: Your next dose is: TAKE 1 CAPSULE DAILY  
     
   
   
   
  
 ezetimibe 10 mg tablet Commonly known as:  Charlie Sellar Your last dose was: Your next dose is: TAKE 1 TABLET DAILY FISH OIL 1,000 mg Cap Generic drug:  omega-3 fatty acids-vitamin e Your last dose was: Your next dose is: Take 2 Caps by mouth two (2) times a day. 2 Cap  
    
   
   
   
  
 glucose blood VI test strips strip Commonly known as:  FREESTYLE LITE STRIPS Your last dose was: Your next dose is:    
   
   
 TEST ONCE A DAY DX CODE: E11.65  
     
   
   
   
  
 lancets 28 gauge Misc Commonly known as:  FREESTYLE LANCETS Your last dose was: Your next dose is:    
   
   
 TEST ONCE DAILY DX: E11.65  
     
   
   
   
  
 loratadine 10 mg tablet Commonly known as:  Amanda Thomas Your last dose was: Your next dose is: TAKE 1 TABLET DAILY losartan-hydroCHLOROthiazide 100-25 mg per tablet Commonly known as:  HYZAAR Your last dose was: Your next dose is: TAKE 1 TABLET DAILY. STOP TRIAMTERENCE/HCTZ  
     
   
   
   
  
 traMADol 50 mg tablet Commonly known as:  ULTRAM  
   
Your last dose was: Your next dose is: Take 1 Tab by mouth every six (6) hours as needed for Pain. Max Daily Amount: 200 mg.  
 50 mg  
    
   
   
   
  
 TRICOR 145 mg tablet Generic drug:  fenofibrate nanocrystallized Your last dose was: Your next dose is: TAKE 1 TABLET DAILY  
     
   
   
   
  
 VITAMIN D3 1,000 unit tablet Generic drug:  cholecalciferol Your last dose was: Your next dose is: Take 1,000 Units by mouth two (2) times a day. 1000 Units Where to Get Your Medications Information on where to get these meds will be given to you by the nurse or doctor. ! Ask your nurse or doctor about these medications HYDROcodone-acetaminophen 5-325 mg per tablet  
 ondansetron 4 mg disintegrating tablet  
 polyethylene glycol 17 gram packet Opioid Education Prescription Opioids: What You Need to Know: 
 
Prescription opioids can be used to help relieve moderate-to-severe pain and are often prescribed following a surgery or injury, or for certain health conditions. These medications can be an important part of treatment but also come with serious risks. Opioids are strong pain medicines. Examples include hydrocodone, oxycodone, fentanyl, and morphine. Heroin is an example of an illegal opioid. It is important to work with your health care provider to make sure you are getting the safest, most effective care. WHAT ARE THE RISKS AND SIDE EFFECTS OF OPIOID USE? Prescription opioids carry serious risks of addiction and overdose, especially with prolonged use. An opioid overdose, often marked by slow breathing, can cause sudden death. The use of prescription opioids can have a number of side effects as well, even when taken as directed. · Tolerance-meaning you might need to take more of a medication for the same pain relief · Physical dependence-meaning you have symptoms of withdrawal when the medication is stopped. Withdrawal symptoms can include nausea, sweating, chills, diarrhea, stomach cramps, and muscle aches. Withdrawal can last up to several weeks, depending on which drug you took and how long you took it. · Increased sensitivity to pain · Constipation · Nausea, vomiting, and dry mouth · Sleepiness and dizziness · Confusion · Depression · Low levels of testosterone that can result in lower sex drive, energy, and strength · Itching and sweating RISKS ARE GREATER WITH:      
· History of drug misuse, substance use disorder, or overdose · Mental health conditions (such as depression or anxiety) · Sleep apnea · Older age (72 years or older) · Pregnancy Avoid alcohol while taking prescription opioids. Also, unless specifically advised by your health care provider, medications to avoid include: · Benzodiazepines (such as Xanax or Valium) · Muscle relaxants (such as Soma or Flexeril) · Hypnotics (such as Ambien or Lunesta) · Other prescription opioids KNOW YOUR OPTIONS Talk to your health care provider about ways to manage your pain that don't involve prescription opioids. Some of these options may actually work better and have fewer risks and side effects. Options may include: 
· Pain relievers such as acetaminophen, ibuprofen, and naproxen · Some medications that are also used for depression or seizures · Physical therapy and exercise · Counseling to help patients learn how to cope better with triggers of pain and stress. · Application of heat or cold compress · Massage therapy · Relaxation techniques Be Informed Make sure you know the name of your medication, how much and how often to take it, and its potential risks & side effects. IF YOU ARE PRESCRIBED OPIOIDS FOR PAIN: 
· Never take opioids in greater amounts or more often than prescribed. Remember the goal is not to be pain-free but to manage your pain at a tolerable level. · Follow up with your primary care provider to: · Work together to create a plan on how to manage your pain. · Talk about ways to help manage your pain that don't involve prescription opioids. · Talk about any and all concerns and side effects. · Help prevent misuse and abuse. · Never sell or share prescription opioids · Help prevent misuse and abuse. · Store prescription opioids in a secure place and out of reach of others (this may include visitors, children, friends, and family). · Safely dispose of unused/unwanted prescription opioids: Find your community drug take-back program or your pharmacy mail-back program, or flush them down the toilet, following guidance from the Food and Drug Administration (www.fda.gov/Drugs/ResourcesForYou). · Visit www.cdc.gov/drugoverdose to learn about the risks of opioid abuse and overdose. · If you believe you may be struggling with addiction, tell your health care provider and ask for guidance or call [address] [place], [place] [address] at [phone]. Discharge Instructions PATIENT INSTRUCTIONS 
GALL BLADDER SURGERY 
(CHOLECYSTECTOMY) FOLLOW-UP:  Please make an appointment with your physician in {NUMBER:18126} {TIME PERIOD:9076}.   Call your physician immediately if you have any fevers greater than 102.5, drainage from your wound that is not clear or looks infected, persistent bleeding, increasing abdominal pain, problems urinating, or persistent nausea/vomiting. You should be aware that you may have right shoulder pain after surgery and that this will progressively go away. This is called 'referred pain' and is from the area of the gallbladder. It can also be caused by gas that may be trapped under the diaphragm from the surgery, especially if it was performed laparoscopically through mini-incisions. This gas will progressively get reabsorbed by your body. WOUND CARE INSTRUCTIONS:  Keep a dry clean dressing on the wound if there is drainage. The initial bandage may be removed after 24 hours. Once the wound has quit draining you may leave it open to air. If clothing rubs against the wound or causes irritation and the wound is not draining you may cover it with a dry dressing during the daytime. Try to keep the wound dry and avoid ointments on the wound unless directed to do so. If the wound becomes bright red and painful or starts to drain infected material that is not clear, please contact your physician immediately. You should also call if you begin to drain fluid that is thin and greenish-brown from the wound and appears to look like bile. If the wound though is mildly pink and has a thick firm ridge underneath it, this is normal, and is referred to as a healing ridge. This will resolve over the next 4-6 weeks. DIET:  You may eat any foods that you can tolerate. It is a good idea to eat a high fiber diet and take in plenty of fluids to prevent constipation. If you do become constipated you may want to take a mild laxative or take ducolax tablets on a daily basis until your bowel habits are regular. Constipation can be very uncomfortable, along with straining, after recent abdominal surgery. ACTIVITY:  You are encouraged to cough and deep breath or use your incentive spirometer if you were given one, every 15-30 minutes when awake.   This will help prevent respiratory complications and low grade fevers post-operatively. You may want to hug a pillow when coughing and sneezing to add additional support to the surgical area(s) which will decrease pain during these times. You are encouraged to walk and engage in light activity for the next two weeks. You should not lift more than 20 pounds during this time frame as it could put you at increased risk for a post-operative hernia. Twenty pounds is roughly equivalent to a plastic bag of groceries. MEDICATIONS:  Try to take narcotic medications and anti-inflammatory medications, such as tylenol, ibuprofen, naprosyn, etc., with food. This will minimize stomach upset from the medication. Should you develop nausea and vomiting from the pain medication, or develop a rash, please discontinue the medication and contact your physician. You should not drive, make important decisions, or operate machinery when taking narcotic pain medication. QUESTIONS:  Please feel free to call your physician or the hospital  if you have any questions, and they will be glad to assist you. DISCHARGE SUMMARY from your Nurse The following personal items collected during your admission are returned to you:  
Dental Appliance: Dental Appliances: None Vision: Visual Aid: Glasses, With patient (given to ) Hearing Aid:   
Jewelry: Jewelry: None Clothing: Clothing: Pants, Shirt, Footwear, Undergarments (placed in locker) Other Valuables: Other Valuables: Lorena Baugh (given to ) Valuables sent to safe:   
 
PATIENT INSTRUCTIONS: 
 
After general anesthesia or intravenous sedation, for 24 hours or while taking prescription Narcotics: · Limit your activities · Do not drive and operate hazardous machinery · Do not make important personal or business decisions · Do  not drink alcoholic beverages · If you have not urinated within 8 hours after discharge, please contact your surgeon on call. Report the following to your surgeon: · Excessive pain, swelling, redness or odor of or around the surgical area · Temperature over 100.5 · Nausea and vomiting lasting longer than 4 hours or if unable to take medications · Any signs of decreased circulation or nerve impairment to extremity: change in color, persistent  numbness, tingling, coldness or increase pain · Any questions [address] Breathing deep and coughing are very important exercises to do after surgery. Deep breathing and coughing open the little air tubes and air sacks in your lungs. You take deep breaths every day. You may not even notice - it is just something you do when you sigh or yawn. It is a natural exercise you do to keep these air passages open. After surgery, take deep breaths and cough, on purpose. Coughing and deep breathing help prevent bronchitis and pneumonia after surgery. If you had chest or belly surgery, use a pillow as a \"hug alisa\" and hold it tightly to your chest or belly when you cough. DIRECTIONS: 
6. Take 10 to 15 slow deep breaths every hour while awake. 7. Breathe in deeply, and hold it for 2 seconds. 8. Exhale slowly through puckered lips, like blowing up a balloon. 9. After every 4th or 5th deep breath, hug your pillow to your chest or belly and give a hard, deep cough. Yes, it will probably hurt. But doing this exercise is very important part of healing after surgery. Take your pain medicine to help you do this exercise without too much pain. IF YOU HAVE BEEN DIAGNOSED WITH SLEEP APNEA, PLEASE USE YOUR SLEEP APNEA DEVICE OR CPAP MACHINE WHEN YOU INTEND TO NAP AFTER TAKING PAIN MEDICATION. Ankle Pumps Ankle pumps increase the circulation of oxygenated blood to your lower extremities and decrease your risk for circulation problems such as blood clots.  They also stretch the muscles, tendons and ligaments in your foot and ankle, and prevent joint contracture in the ankle and foot, especially after surgeries on the legs. It is important to do ankle pump exercises regularly after surgery because immobility increases your risk for developing a blood clot. Your doctor may also have you take an Aspirin for the next few days as well. If your doctor did not ask you to take an Aspirin, consult with him before starting Aspirin therapy on your own. Slowly point your foot forward, feeling the muscles on the top of your lower leg stretch, and hold this position for 5 seconds. Next, pull your foot back toward you as far as possible, stretching the calf muscles, and hold that position for 5 seconds. Repeat with the other foot. Perform 10 repetitions every hour while awake for both ankles if possible (down and then up with the foot once is one repetition). You should feel gentle stretching of the muscles in your lower leg when doing this exercise. If you feel pain, or your range of motion is limited, don't  Push too hard. Only go the limit your joint and muscles will let you go. If you have increasing pain, progressively worsening leg warmth or swelling, STOP the exercise and call your doctor. Below is information about the medications your doctor is prescribing after your visit: 
 
Other information in your discharge envelope: 
[]     PRESCRIPTIONS []     PHYSICAL THERAPY PRESCRIPTION 
[]     APPOINTMENT CARDS []     [facility] An[facility] Pamphlet for block or block with On-Q Catheter from Anesthesia Service []     Medical device information sheets/pamphlets from their   
[]     School/work excuse note. []     /parent work excuse note. These are general instructions for a healthy lifestyle: *  Please give a list of your current medications to your Primary Care Provider. *  Please update this list whenever your medications are discontinued, doses are 
    changed, or new medications (including over-the-counter products) are added. *  Please carry medication information at all times in case of emergency situations. About Smoking No smoking / No tobacco products / Avoid exposure to second hand smoke Surgeon General's Warning:  Quitting smoking now greatly reduces serious risk to your health. Obesity, smoking, and sedentary lifestyle greatly increases your risk for illness and disease. A healthy diet, regular physical exercise & weight monitoring are important for maintaining a healthy lifestyle. Congestive Heart Failure You may be retaining fluid if you have a history of heart failure or if you experience any of the following symptoms:  Weight gain of 3 pounds or more overnight or 5 pounds in a week, increased swelling in our hands or feet or shortness of breath while lying flat in bed. Please call your doctor as soon as you notice any of these symptoms; do not wait until your next office visit. Recognize signs and symptoms of STROKE: 
F - face looks uneven A - arms unable to move or move even S - speech slurred or non-existent T - time-call 911 as soon as signs and symptoms begin-DO NOT go Back to bed or wait to see if you get better-TIME IS BRAIN. Warning signs of HEART ATTACK Call 911 if you have these symptoms · Chest discomfort. Most heart attacks involve discomfort in the center of the chest that lasts more than a few minutes, or that goes away and comes back. It can feel like uncomfortable pressure, squeezing, fullness, or pain. · Discomfort in other areas of the upper body. Symptoms can include pain or discomfort in one or both Arms, the back, neck, jaw, or stomach. ·  Shortness of breath with or without chest discomfort. · Other signs may include breaking out in a cold sweat, nausea, or lightheadedness Don't wait more than five minutes to call 911 - MINUTES MATTER! Fast action can save your life.   Calling 911 is almost always the fastest way to get lifesaving treatment. Emergency Medical Services staff can begin treatment when they arrive - up to an hour sooner than if someone gets to the hospital by car. GAIL GUERRERO MEDICATION AND SIDE EFFECT GUIDE The [address] EFFECT GUIDE was provided to the PATIENT AND CARE PROVIDER. Information provided includes instruction about drug purpose and common side effects for the following medications: 
 
HYDROcodone-acetaminophen 5-325 mg per tablet Commonly known as: Robles Sylwia  
   
Your last dose was:   
   
Your next dose is:   
   
   
 Take 1-2 Tabs by mouth every four (4) hours as needed for Pain (Acute post-operative pain). Max Daily Amount: 12 Tabs. Indications: Pain 1-2 Tab  
    
   
   
   
   
  ondansetron 4 mg disintegrating tablet Commonly known as: ZOFRAN ODT  
   
Your last dose was:   
   
Your next dose is:   
   
   
 Take 1 Tab by mouth every eight (8) hours as needed for Nausea. Indications: PREVENTION OF POST-OPERATIVE NAUSEA AND VOMITING  
 4 mg  
    
   
   
   
  
  polyethylene glycol 17 gram packet Commonly known as: MIRALAX  
   
Your last dose was:   
   
Your next dose is:   
   
   
 Take 1 Packet by mouth daily. Indications: constipation, If constipation last more than 24-48 hours, despite colace use. 17 g ·  
 
 
 
 
 
ACO Transitions of Care [facility] offers a voluntary care coordination program to provide high quality service and care to Lizeth Doan fee-for-service beneficiaries. Sudhakar Denton was designed to help you enhance your health and well-being through the following services: ? Transitions of Care  support for individuals who are transitioning from one care setting to another (example: Hospital to home). ?  Chronic and Complex Care Coordination  support for individuals and caregivers of those with serious or chronic illnesses or with more than one chronic (ongoing) condition and those who take a number of different medications. If you meet specific medical criteria, a [address] [place] [address] Rd may call you directly to coordinate your care with your primary care physician and your other care providers. For questions about the [facility] programs, please, contact your physicians office. For general questions or additional information about Accountable Care Organizations: 
Please visit www.medicare.gov/acos. html or call 1-800-MEDICARE (5-827.363.5420) TTY users should call 9-596.929.4901. Introducing [facility]! Dear Gumaro Cuenca: Thank you for requesting a [employer] account. Our records indicate that you already have an active [employer] account. You can access your account anytime at [email or web address] Did you know that you can access your hospital and ER discharge instructions at any time in [employer]? You can also review all of your test results from your hospital stay or ER visit. Additional Information If you have questions, please visit the Frequently Asked Questions section of the [employer] website at [email or web address] Remember, [employer] is NOT to be used for urgent needs. For medical emergencies, dial 911. Now available from your iPhone and Android! Introducing Erwin Seth As a Reynold Scootero patient, I wanted to make you aware of our electronic visit tool called Erwin Seth. Reynold Foster 24/7 allows you to connect within minutes with a medical provider 24 hours a day, seven days a week via a mobile device or tablet or logging into a secure website from your computer. You can access Erwin Seth from anywhere in the [place].  
 
A virtual visit might be right for you when you have a simple condition and feel like you just dont want to get out of bed, or cant get away from work for an appointment, when your regular [facility] provider is not available (evenings, weekends or holidays), or when youre out of town and need minor care. Electronic visits cost only $49 and if the [email or web address] 24/7 provider determines a prescription is needed to treat your condition, one can be electronically transmitted to a nearby pharmacy*. Please take a moment to enroll today if you have not already done so. The enrollment process is free and takes just a few minutes. To enroll, please download the [employer] benjamin to your tablet or phone, or visit [email or web address] to enroll on your computer. And, as an [address] [place] [address] patient with a [employer] account, the results of your visits will be scanned into your electronic medical record and your primary care provider will be able to view the scanned results. We urge you to continue to see your regular [facility] provider for your ongoing medical care. And while your primary care provider may not be the one available when you seek a YesVideo virtual visit, the peace of mind you get from getting a real diagnosis real time can be priceless. For more information on Erwin PEMREDdinafin, view our Frequently Asked Questions (FAQs) at [email or web address]. Sincerely, 
 
Hazel Sharma MD 
Chief Medical Officer [employer] *:  certain medications cannot be prescribed via [email or web address]Friend Unresulted tests-please follow up with your PCP on these results Procedure/Test Authorizing Provider XR Bette Delgado MD  
  
Providers Seen During Your Hospitalization Provider Specialty Primary office phone Sheela Thomas, [address] [facility] [phone] Your Primary Care Physician (PCP) Primary Care Physician Office Phone Office Fax Jose Gunderson [phone] You are allergic to the following Allergen Reactions Ciprofloxacin Nausea Only Erythromycin Nausea Only Metformin Other (comments) Nausea Only Felt bad Felt bad 
  
    
 Pcn (Penicillins) Swelling Caused lips to swell Tolerates Amoxicillin Recent Documentation Height Weight BMI OB Status Smoking Status 1.511 m 89.4 kg 39.14 kg/m2 Hysterectomy Former Smoker Emergency Contacts Name Discharge Info Relation Home Work Mobile Donell Angulo DISCHARGE CAREGIVER [3] Spouse [3] [phone] Patient Belongings The following personal items are in your possession at time of discharge: 
  Dental Appliances: None  Visual Aid: Glasses, With patient (given to )   Hearing Aids/Status: Does not own  Home Medications: None   Jewelry: None  Clothing: Pants, Shirt, Footwear, Undergarments (placed in locker)    Other Valuables: Estevan Childress (given to ) Please provide this summary of care documentation to your next provider. Signatures-by signing, you are acknowledging that this After Visit Summary has been reviewed with you and you have received a copy. Patient Signature:  ____________________________________________________________ Date:  ____________________________________________________________  
  
Brice Rodriguez Provider Signature:  ____________________________________________________________ Date:  ____________________________________________________________

## 2018-07-07 DIAGNOSIS — I10 ESSENTIAL HYPERTENSION: Chronic | ICD-10-CM

## 2018-07-07 RX ORDER — LOSARTAN POTASSIUM AND HYDROCHLOROTHIAZIDE 25; 100 MG/1; MG/1
TABLET ORAL
Qty: 90 TAB | Refills: 2 | Status: SHIPPED | OUTPATIENT
Start: 2018-07-07 | End: 2019-04-03 | Stop reason: SDUPTHER

## 2018-07-16 NOTE — BRIEF OP NOTE
BRIEF OPERATIVE NOTE    Date of Procedure: 6/26/2018   Preoperative Diagnosis: GALLSTONES  Postoperative Diagnosis: GALLSTONES    Procedure(s):  LAPAROSCOPIC CHOLECYSTECTOMY   INTRAOPERATIVE CHOLANGIOGRAM   Surgeon(s) and Role:     * Radha Osorio MD - Primary    Surgical Staff:  Circ-1: Atilio Morrison, RN  Scrub RN-1: Justin Longoria RN  Scrub RN-Relief: Karine Bowden RN  Surg Asst-1: Bina Trujillo RN  Surg Asst-Relief: Carol Diana  Event Time In   Incision Start 1540   Incision Close 1634     Anesthesia: General   Estimated Blood Loss: ***  Specimens:   ID Type Source Tests Collected by Time Destination   1 : gallbladder Preservative Gallbladder  Radha Osorio MD 6/26/2018 1603 Pathology      Findings: ***   Complications: ***  Implants: * No implants in log *

## 2018-07-16 NOTE — OP NOTES
OPERATIVE NOTE    Date of Procedure: 6/26/2018   Preoperative Diagnosis: GALLSTONES  Postoperative Diagnosis: GALLSTONES    Procedure(s):  LAPAROSCOPIC CHOLECYSTECTOMY   INTRAOPERATIVE CHOLANGIOGRAM  Surgeon(s) and Role:     * Ana Deluna MD - Primary    Surgical Staff:  Circ-1: Quincy Bhatt, RN  Scrub RN-1: Anival Tovar RN  Scrub RN-Relief: Ricardo Dupont RN  Surg Asst-1: Reeves Lennox, RN  Surg Asst-Relief: Edwige Linares  Event Time In   Incision Start 1540   Incision Close 1634     Anesthesia: General   Estimated Blood Loss: Min  Specimens:   ID Type Source Tests Collected by Time Destination   1 : gallbladder Preservative Gallbladder  Ana Deluna MD 6/26/2018 1603 Pathology      Findings: Thickened gallbladder wall with gallstones   Complications: none  Implants: * No implants in log *    INDICATION:  See paper H/P. PROCEDURE: After standard pre-anesthetic and pre-operative procedure nursing protocols, general anesthesia instituted. Wth the patient supine on the operating table, the abdomen was cleaned, prepped, and draped in usual sterile fashion. The laparoscopic camera and CO2 insufflation apparatus were affixed to the drapes in the usual fashion. Pre-incision antibiotics were given 30 minutes prior to skin incision. Pre-incision liposomal bupivicaine and 0.5% plain marcaine anesthetic was injected in the usual port sites of the skin. Through a 5mm horizontal right para-umbilical skin incision, the abdomen was entered under direct camera vision with a 5 mm blunt tissue dissecting port. Insufflation was established satisfactorily and maintained at 15mm. The laparoscopic camera was re-introduced and confirmed no gross evidence of intraabdominal visceral injury or bleeding in attaining access. Final midline horizontal port incisions were made, and under direct laparoscopic vision 5 mm and 12mm ventral ports were introduced.    The patient was positioned in reverse Trendelenburg with left tilt. The fundus was grasped and lifted up towards the diaphragm. The infundibulum was retracted laterally and inferiorly. There was thickened peritoneum here and required meticulous dissection in order to completely free the infundibulum and cystic duct. The junction of the cystic duct and gallbladder were clearly identified, and an adequate length of the cystic duct was dissected out. Similarly, the cystic artery was also dissected out and an adequate length was displayed. The critical view of Calot's triangle was obtained and the structures were clearly identified. The cystic duct was clipped high as possible across the infudibular/cystic duct junction. Just under the clip, ductotomy was made. Clear green bile came out. The cholangiogram catheter was introduced through the 12mm subcostal port. The catheter tip was introduced into the ductotomy and inflated. Saline flowed through the duct without intra-abdominal spilling in a clear intra-abdominal view. The sterile c-arm was positioned for cholangiogram.  Full contrast cholangiogram revealed no CBD defect with clear radiographic jet of contrast into the duodenum. Both main branches showed no filling defect. The cystic duct leading clearly into the common duct was visualized. At the time of surgery, I was not concerned for radiologic filling defects or obstruction. The c-arm was removed from the field. Cholangiogram catheter balloon was de-sufflated and entire system removed from the field. The proximal cystic duct was then clipped three times and divided cleanly across the prior ductotomy. The cystic artery was doubly clipped proximally and divided. With electrocautery, the gallbladder was then gently dissected completely from the liver bed and placed in a retrieval bag. Hemostasis was satisfactory.   The ports were removed under direct laparoscopic vision with no concern for preperitoneal or rectus arterial or venous bleeding. Remaining local anesthetic was injected into the pre-peritoneal plane, fascia and subcutaneous tissue under direct endoscopic vision. The gallbladder, instruments and ports were removed from the field and pneumoperitoneum released. The subxiphoid midline fascia was closed with an interrupted 0 vicryl suture. All skin incisions were closed with subcuticular 4-0 Monocryl, steristrips and tegaderm. The patient awoke in satisfactory condition. I went to discuss the findings with family in the waiting area.     Eva Hester MD

## 2018-08-20 RX ORDER — EZETIMIBE 10 MG/1
TABLET ORAL
Qty: 90 TAB | Refills: 2 | Status: SHIPPED | OUTPATIENT
Start: 2018-08-20 | End: 2019-05-24 | Stop reason: SDUPTHER

## 2018-08-24 ENCOUNTER — OFFICE VISIT (OUTPATIENT)
Dept: FAMILY MEDICINE CLINIC | Age: 78
End: 2018-08-24

## 2018-08-24 VITALS
HEIGHT: 60 IN | OXYGEN SATURATION: 98 % | DIASTOLIC BLOOD PRESSURE: 79 MMHG | RESPIRATION RATE: 20 BRPM | SYSTOLIC BLOOD PRESSURE: 128 MMHG | HEART RATE: 72 BPM | WEIGHT: 194.8 LBS | BODY MASS INDEX: 38.24 KG/M2 | TEMPERATURE: 97.9 F

## 2018-08-24 DIAGNOSIS — E11.21 TYPE 2 DIABETES MELLITUS WITH NEPHROPATHY (HCC): ICD-10-CM

## 2018-08-24 DIAGNOSIS — I10 ESSENTIAL HYPERTENSION: Chronic | ICD-10-CM

## 2018-08-24 DIAGNOSIS — E55.9 VITAMIN D DEFICIENCY: Chronic | ICD-10-CM

## 2018-08-24 DIAGNOSIS — E11.65 TYPE 2 DIABETES MELLITUS WITH HYPERGLYCEMIA, WITHOUT LONG-TERM CURRENT USE OF INSULIN (HCC): Primary | Chronic | ICD-10-CM

## 2018-08-24 DIAGNOSIS — K21.00 GASTROESOPHAGEAL REFLUX DISEASE WITH ESOPHAGITIS: Chronic | ICD-10-CM

## 2018-08-24 DIAGNOSIS — E78.00 HYPERCHOLESTEROLEMIA: Chronic | ICD-10-CM

## 2018-08-24 NOTE — PATIENT INSTRUCTIONS

## 2018-08-24 NOTE — PROGRESS NOTES
1. Have you been to the ER, urgent care clinic since your last visit? Hospitalized since your last visit? No    2. Have you seen or consulted any other health care providers outside of the [address] [place] [address] since your last visit? Include any pap smears or colon screening.  No  Reviewed record in preparation for visit and have necessary documentation  Pt did not bring medication to office visit for review  Goals that were addressed and/or need to be completed during or after this appointment include     Health Maintenance Due   Topic Date Due    Influenza Age 5 to Adult  08/01/2018    MICROALBUMIN Q1  09/08/2018

## 2018-08-24 NOTE — MR AVS SNAPSHOT
[address] [address] [address] [address] 
[phone] Patient: Daniela See MRN: [id number] Visit Information Date & Time Provider Department Dept. Phone Encounter #  
 8/24/2018 10:30 AM Josh French MD  Josey Lundberg [id number] Follow-up Instructions Return in about 3 months (around 11/24/2018). Your Appointments 10/19/2018  1:30 PM  
ROUTINE CARE with Tad Fitzgerald MD  
[facility]) Appt Note: 5mo fu  dm  
 [address] [facility] [address]  
[phone]  
  
   
 [address] [place] [address] Upcoming Health Maintenance Date Due Influenza Age 5 to Adult 8/1/2018 MICROALBUMIN Q1 9/8/2018 EYE EXAM RETINAL OR DILATED Q1 10/26/2018 HEMOGLOBIN A1C Q6M 11/23/2018 FOOT EXAM Q1 12/8/2018 MEDICARE YEARLY EXAM 2/6/2019 LIPID PANEL Q1 5/23/2019 COLONOSCOPY 6/27/2019 GLAUCOMA SCREENING Q2Y 10/26/2019 DTaP/Tdap/Td series (2 - Td) 12/14/2021 Allergies as of 8/24/2018  Review Complete On: 8/24/2018 By: Ambrose Robles LPN Severity Noted Reaction Type Reactions Ciprofloxacin  05/20/2010    Nausea Only Erythromycin  05/20/2010    Nausea Only Metformin  09/03/2012    Other (comments), Nausea Only Felt bad Felt bad  
 Pcn [Penicillins]  05/20/2010    Swelling Caused lips to swell Tolerates Amoxicillin Current Immunizations  Reviewed on 12/2/2016 Name Date H1N1 FLU VACCINE 3/4/2010 Influenza High Dose Vaccine PF 9/20/2017 Influenza Vaccine 10/8/2015, 10/17/2014  2:48 PM, 9/26/2013 Influenza Vaccine (Quad) PF 12/2/2016 Influenza Vaccine Split 11/16/2012, 11/18/2011, 10/28/2010 Pneumococcal Conjugate (PCV-13) 5/5/2016 Pneumococcal Polysaccharide (PPSV-23) 9/9/2015 TDAP Vaccine 12/14/2011 ZZZ-RETIRED (DO NOT USE) Pneumococcal Vaccine (Unspecified Type) 12/1/2010 Zoster Vaccine, Live 1/10/2013 Not reviewed this visit You Were Diagnosed With   
  
 Codes Comments Type 2 diabetes mellitus with hyperglycemia, without long-term current use of insulin (HCC)    -  Primary ICD-10-CM: E11.65 ICD-9-CM: 250.00, 790.29 Type 2 diabetes mellitus with nephropathy (HCC)     ICD-10-CM: E11.21 
ICD-9-CM: 250.40, 583.81 Essential hypertension     ICD-10-CM: I10 
ICD-9-CM: 401.9 Hypercholesterolemia     ICD-10-CM: E78.00 ICD-9-CM: 272.0 Gastroesophageal reflux disease with esophagitis     ICD-10-CM: K21.0 ICD-9-CM: 530.11 Vitamin D deficiency     ICD-10-CM: E55.9 ICD-9-CM: 268.9 Vitals BP Pulse Temp Resp Height(growth percentile) Weight(growth percentile) 128/79 (BP 1 Location: Right arm, BP Patient Position: Sitting) 72 97.9 °F (36.6 °C) (Oral) 20 4' 11.5\" (1.511 m) 194 lb 12.8 oz (88.4 kg) SpO2 BMI OB Status Smoking Status 98% 38.69 kg/m2 Hysterectomy Former Smoker Vitals History BMI and BSA Data Body Mass Index Body Surface Area  
 38.69 kg/m 2 1.93 m 2 Preferred Pharmacy Pharmacy Name Phone Robby Waldron, [facility] [phone] Your Updated Medication List  
  
   
This list is accurate as of 8/24/18 10:46 AM.  Always use your most recent med list.  
  
  
  
  
 aspirin 81 mg chewable tablet Take 81 mg by mouth daily. BYDUREON 2 mg/0.65 mL Pnij Generic drug:  exenatide microspheres INJECT THE CONTENTS OF 1 PEN UNDER THE SKIN EVERY 7 DAYS  
  
 cloNIDine HCl 0.1 mg tablet Commonly known as:  CATAPRES  
TAKE 1 TABLET NIGHTLY  
  
 dilTIAZem  mg ER capsule Commonly known as:  CARDIZEM CD  
TAKE 1 CAPSULE DAILY  
  
 ezetimibe 10 mg tablet Commonly known as:  Alanis Khat TAKE 1 TABLET DAILY FISH OIL 1,000 mg Cap Generic drug:  omega-3 fatty acids-vitamin e Take 2 Caps by mouth two (2) times a day. glucose blood VI test strips strip Commonly known as:  FREESTYLE LITE STRIPS  
TEST ONCE A DAY DX CODE: E11.65 HYDROcodone-acetaminophen 5-325 mg per tablet Commonly known as:  Lupe Huang Take 1-2 Tabs by mouth every four (4) hours as needed for Pain (Acute post-operative pain). Max Daily Amount: 12 Tabs. Indications: Pain  
  
 lancets 28 gauge Misc Commonly known as:  FREESTYLE LANCETS  
TEST ONCE DAILY DX: E11.65  
  
 loratadine 10 mg tablet Commonly known as:  CLARITIN  
TAKE 1 TABLET DAILY losartan-hydroCHLOROthiazide 100-25 mg per tablet Commonly known as:  HYZAAR  
TAKE 1 TABLET DAILY  
  
 ondansetron 4 mg disintegrating tablet Commonly known as:  ZOFRAN ODT Take 1 Tab by mouth every eight (8) hours as needed for Nausea. Indications: PREVENTION OF POST-OPERATIVE NAUSEA AND VOMITING  
  
 pantoprazole 40 mg tablet Commonly known as:  PROTONIX Take 1 Tab by mouth daily. Indications: gastroesophageal reflux disease  
  
 polyethylene glycol 17 gram packet Commonly known as:  Zapata Armor Take 1 Packet by mouth daily. Indications: constipation, If constipation last more than 24-48 hours, despite colace use. traMADol 50 mg tablet Commonly known as:  ULTRAM  
Take 1 Tab by mouth every six (6) hours as needed for Pain. Max Daily Amount: 200 mg. TRICOR 145 mg tablet Generic drug:  fenofibrate nanocrystallized TAKE 1 TABLET DAILY  
  
 VITAMIN D3 1,000 unit tablet Generic drug:  cholecalciferol Take 1,000 Units by mouth two (2) times a day. We Performed the Following HEMOGLOBIN A1C WITH EAG [54409 CPT(R)] HEMOGLOBIN [id number] CPT(R)] LIPID PANEL [04622 CPT(R)] METABOLIC PANEL, COMPREHENSIVE [55381 CPT(R)] MICROALBUMIN, UR, RAND W/ MICROALB/CREAT RATIO [id number] CPT(R)] Follow-up Instructions Return in about 3 months (around 11/24/2018). Patient Instructions Learning About Diabetes Food Guidelines Your Care Instructions Meal planning is important to manage diabetes. It helps keep your blood sugar at a target level (which you set with your doctor). You don't have to eat special foods. You can eat what your family eats, including sweets once in a while. But you do have to pay attention to how often you eat and how much you eat of certain foods. You may want to work with a dietitian or a certified diabetes educator (CDE) to help you plan meals and snacks. A dietitian or CDE can also help you lose weight if that is one of your goals. What should you know about eating carbs? Managing the amount of carbohydrate (carbs) you eat is an important part of healthy meals when you have diabetes. Carbohydrate is found in many foods. · Learn which foods have carbs. And learn the amounts of carbs in different foods. ¨ Bread, cereal, pasta, and rice have about 15 grams of carbs in a serving. A serving is 1 slice of bread (1 ounce), ½ cup of cooked cereal, or 1/3 cup of cooked pasta or rice. ¨ Fruits have 15 grams of carbs in a serving. A serving is 1 small fresh fruit, such as an apple or orange; ½ of a banana; ½ cup of cooked or canned fruit; ½ cup of fruit juice; 1 cup of melon or raspberries; or 2 tablespoons of dried fruit. ¨ Milk and no-sugar-added yogurt have 15 grams of carbs in a serving. A serving is 1 cup of milk or 2/3 cup of no-sugar-added yogurt. ¨ Starchy vegetables have 15 grams of carbs in a serving. A serving is ½ cup of mashed potatoes or sweet potato; 1 cup winter squash; ½ of a small baked potato; ½ cup of cooked beans; or ½ cup cooked corn or green peas. · Learn how much carbs to eat each day and at each meal. A dietitian or CDE can teach you how to keep track of the amount of carbs you eat. This is called carbohydrate counting.  
· If you are not sure how to count carbohydrate grams, use the Plate Method to plan meals. It is a good, quick way to make sure that you have a balanced meal. It also helps you spread carbs throughout the day. ¨ Divide your plate by types of foods. Put non-starchy vegetables on half the plate, meat or other protein food on one-quarter of the plate, and a grain or starchy vegetable in the final quarter of the plate. To this you can add a small piece of fruit and 1 cup of milk or yogurt, depending on how many carbs you are supposed to eat at a meal. 
· Try to eat about the same amount of carbs at each meal. Do not \"save up\" your daily allowance of carbs to eat at one meal. 
· Proteins have very little or no carbs per serving. Examples of proteins are beef, chicken, turkey, fish, eggs, tofu, cheese, cottage cheese, and peanut butter. A serving size of meat is 3 ounces, which is about the size of a deck of cards. Examples of meat substitute serving sizes (equal to 1 ounce of meat) are 1/4 cup of cottage cheese, 1 egg, 1 tablespoon of peanut butter, and ½ cup of tofu. How can you eat out and still eat healthy? · Learn to estimate the serving sizes of foods that have carbohydrate. If you measure food at home, it will be easier to estimate the amount in a serving of restaurant food. · If the meal you order has too much carbohydrate (such as potatoes, corn, or baked beans), ask to have a low-carbohydrate food instead. Ask for a salad or green vegetables. · If you use insulin, check your blood sugar before and after eating out to help you plan how much to eat in the future. · If you eat more carbohydrate at a meal than you had planned, take a walk or do other exercise. This will help lower your blood sugar. What else should you know? · Limit saturated fat, such as the fat from meat and dairy products. This is a healthy choice because people who have diabetes are at higher risk of heart disease.  So choose lean cuts of meat and nonfat or low-fat dairy products. Use olive or canola oil instead of butter or shortening when cooking. · Don't skip meals. Your blood sugar may drop too low if you skip meals and take insulin or certain medicines for diabetes. · Check with your doctor before you drink alcohol. Alcohol can cause your blood sugar to drop too low. Alcohol can also cause a bad reaction if you take certain diabetes medicines. Follow-up care is a key part of your treatment and safety. Be sure to make and go to all appointments, and call your doctor if you are having problems. It's also a good idea to know your test results and keep a list of the medicines you take. Where can you learn more? Go to http://reid-shanell.info/. Enter C088 in the search box to learn more about \"Learning About Diabetes Food Guidelines. \" Current as of: December 7, 2017 Content Version: 11.7 © 5391-5011 [employer]. Care instructions adapted under license by [employer] (which disclaims liability or warranty for this information). If you have questions about a medical condition or this instruction, always ask your healthcare professional. [address] any warranty or liability for your use of this information. Learning About Diabetes Food Guidelines Your Care Instructions Meal planning is important to manage diabetes. It helps keep your blood sugar at a target level (which you set with your doctor). You don't have to eat special foods. You can eat what your family eats, including sweets once in a while. But you do have to pay attention to how often you eat and how much you eat of certain foods. You may want to work with a dietitian or a certified diabetes educator (CDE) to help you plan meals and snacks. A dietitian or CDE can also help you lose weight if that is one of your goals. What should you know about eating carbs?  
Managing the amount of carbohydrate (carbs) you eat is an important part of healthy meals when you have diabetes. Carbohydrate is found in many foods. · Learn which foods have carbs. And learn the amounts of carbs in different foods. ¨ Bread, cereal, pasta, and rice have about 15 grams of carbs in a serving. A serving is 1 slice of bread (1 ounce), ½ cup of cooked cereal, or 1/3 cup of cooked pasta or rice. ¨ Fruits have 15 grams of carbs in a serving. A serving is 1 small fresh fruit, such as an apple or orange; ½ of a banana; ½ cup of cooked or canned fruit; ½ cup of fruit juice; 1 cup of melon or raspberries; or 2 tablespoons of dried fruit. ¨ Milk and no-sugar-added yogurt have 15 grams of carbs in a serving. A serving is 1 cup of milk or 2/3 cup of no-sugar-added yogurt. ¨ Starchy vegetables have 15 grams of carbs in a serving. A serving is ½ cup of mashed potatoes or sweet potato; 1 cup winter squash; ½ of a small baked potato; ½ cup of cooked beans; or ½ cup cooked corn or green peas. · Learn how much carbs to eat each day and at each meal. A dietitian or CDE can teach you how to keep track of the amount of carbs you eat. This is called carbohydrate counting. · If you are not sure how to count carbohydrate grams, use the Plate Method to plan meals. It is a good, quick way to make sure that you have a balanced meal. It also helps you spread carbs throughout the day. ¨ Divide your plate by types of foods. Put non-starchy vegetables on half the plate, meat or other protein food on one-quarter of the plate, and a grain or starchy vegetable in the final quarter of the plate. To this you can add a small piece of fruit and 1 cup of milk or yogurt, depending on how many carbs you are supposed to eat at a meal. 
· Try to eat about the same amount of carbs at each meal. Do not \"save up\" your daily allowance of carbs to eat at one meal. 
· Proteins have very little or no carbs per serving.  Examples of proteins are beef, chicken, turkey, fish, eggs, tofu, cheese, cottage cheese, and peanut butter. A serving size of meat is 3 ounces, which is about the size of a deck of cards. Examples of meat substitute serving sizes (equal to 1 ounce of meat) are 1/4 cup of cottage cheese, 1 egg, 1 tablespoon of peanut butter, and ½ cup of tofu. How can you eat out and still eat healthy? · Learn to estimate the serving sizes of foods that have carbohydrate. If you measure food at home, it will be easier to estimate the amount in a serving of restaurant food. · If the meal you order has too much carbohydrate (such as potatoes, corn, or baked beans), ask to have a low-carbohydrate food instead. Ask for a salad or green vegetables. · If you use insulin, check your blood sugar before and after eating out to help you plan how much to eat in the future. · If you eat more carbohydrate at a meal than you had planned, take a walk or do other exercise. This will help lower your blood sugar. What else should you know? · Limit saturated fat, such as the fat from meat and dairy products. This is a healthy choice because people who have diabetes are at higher risk of heart disease. So choose lean cuts of meat and nonfat or low-fat dairy products. Use olive or canola oil instead of butter or shortening when cooking. · Don't skip meals. Your blood sugar may drop too low if you skip meals and take insulin or certain medicines for diabetes. · Check with your doctor before you drink alcohol. Alcohol can cause your blood sugar to drop too low. Alcohol can also cause a bad reaction if you take certain diabetes medicines. Follow-up care is a key part of your treatment and safety. Be sure to make and go to all appointments, and call your doctor if you are having problems. It's also a good idea to know your test results and keep a list of the medicines you take. Where can you learn more? Go to http://reid-shanell.info/.  
Enter E821 in the search box to learn more about \"Learning About Diabetes Food Guidelines. \" Current as of: December 7, 2017 Content Version: 11.7 © 7478-3227 [employer], [employer]. Care instructions adapted under license by [employer] (which disclaims liability or warranty for this information). If you have questions about a medical condition or this instruction, always ask your healthcare professional. [address] any warranty or liability for your use of this information. Introducing [facility]! Dear Brice Meigs: Thank you for requesting a [employer] account. Our records indicate that you already have an active [employer] account. You can access your account anytime at [email or web address] Did you know that you can access your hospital and ER discharge instructions at any time in [employer]? You can also review all of your test results from your hospital stay or ER visit. Additional Information If you have questions, please visit the Frequently Asked Questions section of the [employer] website at [email or web address] Remember, [employer] is NOT to be used for urgent needs. For medical emergencies, dial 911. Now available from your iPhone and Android! Please provide this summary of care documentation to your next provider. Your primary care clinician is listed as [address]. If you have any questions after today's visit, please call [phone].

## 2018-08-24 NOTE — PROGRESS NOTES
Progress Note    Patient: Avis Sanchez MRN: [id number]  SSN: [id number]    YOB: 1940  Age: 69 y.o. Sex: female        No chief complaint on file. Subjective:     Encounter Diagnoses   Name Primary?  Type 2 diabetes mellitus with hyperglycemia, without long-term current use of insulin ([facility] [address]): This patient is managed under a comprehensive plan of care for Diabetes. Overall the patient feels well with good energy level. Key Antihyperglycemic Medications             BYDUREON 2 mg/0.65 mL pnij  (Taking) INJECT THE CONTENTS OF 1 PEN UNDER THE SKIN EVERY 7 DAYS           Pertinent Labs:   Lab Results   Component Value Date/Time    Hemoglobin A1c 7.1 (H) 05/23/2018 04:13 PM    Hemoglobin A1c 6.5 (H) 01/29/2018 02:47 PM    Hemoglobin A1c 6.5 (H) 09/08/2017 10:53 AM      Body mass index is 38.69 kg/(m^2). Lab Results   Component Value Date/Time    LDL, calculated 59 05/23/2018 04:13 PM         Lab Results   Component Value Date/Time    Sodium 140 06/20/2018 10:38 AM    Potassium 3.9 06/20/2018 10:38 AM    Chloride 102 06/20/2018 10:38 AM    CO2 29 06/20/2018 10:38 AM    Anion gap 9 06/20/2018 10:38 AM    Glucose 104 (H) 06/20/2018 10:38 AM    BUN 16 06/20/2018 10:38 AM    Creatinine 1.08 (H) 06/20/2018 10:38 AM    BUN/Creatinine ratio 15 06/20/2018 10:38 AM    GFR est AA 60 (L) 06/20/2018 10:38 AM    GFR est non-AA 49 (L) 06/20/2018 10:38 AM    Calcium 9.4 06/20/2018 10:38 AM    AST (SGOT) 71 (H) 05/23/2018 04:13 PM    Alk. phosphatase 63 05/23/2018 04:13 PM    Protein, total 6.7 05/23/2018 04:13 PM    Albumin 4.2 05/23/2018 04:13 PM    Globulin 3.2 10/28/2010 09:07 AM    A-G Ratio 1.7 05/23/2018 04:13 PM    ALT (SGPT) 58 (H) 05/23/2018 04:13 PM     Lab Results   Component Value Date/Time    Microalb/Creat ratio (ug/mg creat.) 153.3 (H) 09/08/2017 10:53 AM      Frequency of home glucose testing:daily   Blood Sugar range at home: No logs    Last eye exam: In past 12 months.    Last foot exam: This year. Polyuria, polyphagia or polydipsia: No   Retinopathy: No   Neuropathy SX: No    Low blood sugar symptoms: No   Dietary compliance: Good   Medication compliance:Good   On ASA: Yes   Depression: No   CKD: Stage III     Wt Readings from Last 3 Encounters:   08/24/18 194 lb 12.8 oz (88.4 kg)   06/26/18 197 lb 1.5 oz (89.4 kg)   06/20/18 197 lb 1.6 oz (89.4 kg)        History   Smoking Status    Former Smoker    Packs/day: 1.00    Years: 20.00    Quit date: 1988   Smokeless Tobacco    Never Used     Body mass index is 38.69 kg/(m^2). All the patient's questions regarding medications, diet and exercise were answered. Goal of A1C of less than 7.5% is our goal.   Our overall goal is to reduce or eliminate the long term consequences of poorly controlled diabetes. Yes    Type 2 diabetes mellitus with nephropathy ([facility] [address]): See above         Essential hypertension: Controlled  BP Readings from Last 3 Encounters:   08/24/18 128/79   06/26/18 118/56   06/20/18 124/61     The patient reports:  taking medications as instructed, no medication side effects noted, no TIA's, no chest pain on exertion, no dyspnea on exertion, no swelling of ankles. Key CAD CHF Meds             ezetimibe (ZETIA) 10 mg tablet  (Taking) TAKE 1 TABLET DAILY    losartan-hydroCHLOROthiazide (HYZAAR) 100-25 mg per tablet  (Taking) TAKE 1 TABLET DAILY    aspirin 81 mg chewable tablet  (Taking) Take 81 mg by mouth daily. dilTIAZem CD (CARDIZEM CD) 180 mg ER capsule  (Taking) TAKE 1 CAPSULE DAILY    cloNIDine HCl (CATAPRES) 0.1 mg tablet  (Taking) TAKE 1 TABLET NIGHTLY    TRICOR 145 mg tablet  (Taking) TAKE 1 TABLET DAILY    omega-3 fatty acids-vitamin e (FISH OIL) 1,000 mg cap  (Taking) Take 2 Caps by mouth two (2) times a day.            Lab Results   Component Value Date/Time    Sodium 140 06/20/2018 10:38 AM    Potassium 3.9 06/20/2018 10:38 AM    Chloride 102 06/20/2018 10:38 AM    CO2 29 06/20/2018 10:38 AM    Anion gap 9 06/20/2018 10:38 AM    Glucose 104 (H) 06/20/2018 10:38 AM    BUN 16 06/20/2018 10:38 AM    Creatinine 1.08 (H) 06/20/2018 10:38 AM    BUN/Creatinine ratio 15 06/20/2018 10:38 AM    GFR est AA 60 (L) 06/20/2018 10:38 AM    GFR est non-AA 49 (L) 06/20/2018 10:38 AM    Calcium 9.4 06/20/2018 10:38 AM    Bilirubin, total 0.4 05/23/2018 04:13 PM    AST (SGOT) 71 (H) 05/23/2018 04:13 PM    Alk. phosphatase 63 05/23/2018 04:13 PM    Protein, total 6.7 05/23/2018 04:13 PM    Albumin 4.2 05/23/2018 04:13 PM    Globulin 3.2 10/28/2010 09:07 AM    A-G Ratio 1.7 05/23/2018 04:13 PM    ALT (SGPT) 58 (H) 05/23/2018 04:13 PM     Low salt diet? yes  Aerobic exercise? No  Our goal is to normalize the blood pressure to decrease the risks of strokes and heart attacks. The patient is in agreement with the plan.  Hypercholesterolemia:  Cardiovascular risks for her are: LDL goal is under 80  diabetic  hypertension  hyperlipidemia. Key Antihyperlipidemia Meds             ezetimibe (ZETIA) 10 mg tablet  (Taking) TAKE 1 TABLET DAILY    TRICOR 145 mg tablet  (Taking) TAKE 1 TABLET DAILY    omega-3 fatty acids-vitamin e (FISH OIL) 1,000 mg cap  (Taking) Take 2 Caps by mouth two (2) times a day. Lab Results   Component Value Date/Time    Cholesterol, total 140 05/23/2018 04:13 PM    HDL Cholesterol 43 05/23/2018 04:13 PM    LDL, calculated 59 05/23/2018 04:13 PM    Triglyceride 190 (H) 05/23/2018 04:13 PM    CHOL/HDL Ratio 2.4 10/28/2010 09:07 AM     Lab Results   Component Value Date/Time    ALT (SGPT) 58 (H) 05/23/2018 04:13 PM    AST (SGOT) 71 (H) 05/23/2018 04:13 PM    Alk.  phosphatase 63 05/23/2018 04:13 PM    Bilirubin, total 0.4 05/23/2018 04:13 PM      Myalgias: No   Fatigue: No   Other side effects: no  Wt Readings from Last 3 Encounters:   08/24/18 194 lb 12.8 oz (88.4 kg)   06/26/18 197 lb 1.5 oz (89.4 kg)   06/20/18 197 lb 1.6 oz (89.4 kg)     The patient is aware of our goal to reduce or eliminate the long term problems (such as strokes and heart attacks) related to poorly controlled hyperlipidemia.  Gastroesophageal reflux disease with esophagitis: She was instructed on using antacid/anti-gas medication. Current control of Symptoms: Poor  Hiatal Hernia:no  Current Medications: Currently on Protonix. She will try Nexium again and see if that works better. The patient has no history melena or bright red blood in the stools. The patient avoids high dose aspirin and NSAID therapy. The patient is aware of diet changes needed, elevating the head of the bed and appropriate use of antacids.  Vitamin D deficiency:  No sx. Due for testing. Lab Results   Component Value Date/Time    Vitamin D 25-Hydroxy 41.1 11/18/2011 09:22 AM    VITAMIN D, 25-HYDROXY 47.6 01/29/2018 02:47 PM                     Current and past medical information:    Current Medications after this visit[de-identified]   Current Outpatient Prescriptions   Medication Sig    ezetimibe (ZETIA) 10 mg tablet TAKE 1 TABLET DAILY    losartan-hydroCHLOROthiazide (HYZAAR) 100-25 mg per tablet TAKE 1 TABLET DAILY    polyethylene glycol (MIRALAX) 17 gram packet Take 1 Packet by mouth daily. Indications: constipation, If constipation last more than 24-48 hours, despite colace use.  aspirin 81 mg chewable tablet Take 81 mg by mouth daily.  pantoprazole (PROTONIX) 40 mg tablet Take 1 Tab by mouth daily. Indications: gastroesophageal reflux disease (Patient taking differently: Take 40 mg by mouth every morning.  Indications: gastroesophageal reflux disease)    lancets (FREESTYLE LANCETS) 28 gauge misc TEST ONCE DAILY DX: E11.65    glucose blood VI test strips (FREESTYLE LITE STRIPS) strip TEST ONCE A DAY DX CODE: E11.65    dilTIAZem CD (CARDIZEM CD) 180 mg ER capsule TAKE 1 CAPSULE DAILY    cloNIDine HCl (CATAPRES) 0.1 mg tablet TAKE 1 TABLET NIGHTLY    BYDUREON 2 mg/0.65 mL pnij INJECT THE CONTENTS OF 1 PEN UNDER THE SKIN EVERY 7 DAYS    TRICOR 145 mg tablet TAKE 1 TABLET DAILY    loratadine (CLARITIN) 10 mg tablet TAKE 1 TABLET DAILY    traMADol (ULTRAM) 50 mg tablet Take 1 Tab by mouth every six (6) hours as needed for Pain. Max Daily Amount: 200 mg.    omega-3 fatty acids-vitamin e (FISH OIL) 1,000 mg cap Take 2 Caps by mouth two (2) times a day.  cholecalciferol, vitamin d3, (VITAMIN D) 1,000 unit tablet Take 1,000 Units by mouth two (2) times a day.  HYDROcodone-acetaminophen (NORCO) 5-325 mg per tablet Take 1-2 Tabs by mouth every four (4) hours as needed for Pain (Acute post-operative pain). Max Daily Amount: 12 Tabs. Indications: Pain    ondansetron (ZOFRAN ODT) 4 mg disintegrating tablet Take 1 Tab by mouth every eight (8) hours as needed for Nausea. Indications: PREVENTION OF POST-OPERATIVE NAUSEA AND VOMITING     No current facility-administered medications for this visit.         Patient Active Problem List    Diagnosis Date Noted    Hypertension      Priority: 1 - One    Hypercholesterolemia      Priority: 1 - One    GERD (gastroesophageal reflux disease)      Priority: 1 - One    Hot flashes, menopausal      Priority: 2 - Two    Type 2 diabetes mellitus with nephropathy ([address] 01/23/2018    Type 2 diabetes mellitus with hyperglycemia, without long-term current use of insulin ([address] 12/19/2016    Essential hypertension 10/18/2015    Mixed hyperlipidemia 10/18/2015    Morbid obesity ([address] 10/18/2015    S/P total knee replacement using cement 09/09/2015    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    Allergic rhinitis 11/18/2011    Arthritis 02/28/2011    Vitamin D deficiency 06/28/2010       Past Medical History:   Diagnosis Date    Arthritis 2/28/2011    OSTEO    Chronic obstructive pulmonary disease ([address]     VERY MILD - patient denies having COPD as of 6/20/2018    Chronic pain     LEFT KNEE    Diabetes ([address]     GERD (gastroesophageal reflux disease)     Hot flashes, menopausal     Hypercholesterolemia     Hypertension     Ill-defined condition     Elevated liver enzymes (has family history)    Nausea & vomiting     Seizures ([address] 1980'S    AFTER DRINKING 2 MARGARITAS    Vitamin D deficiency 6/28/2010       Allergies   Allergen Reactions    Ciprofloxacin Nausea Only    Erythromycin Nausea Only    Metformin Other (comments) and Nausea Only     Felt bad  Felt bad      Pcn [Penicillins] Swelling     Caused lips to swell  Tolerates Amoxicillin       Past Surgical History:   Procedure Laterality Date    HX APPENDECTOMY  2001    HX CATARACT REMOVAL Bilateral     HX HYSTERECTOMY  1975    HX KNEE REPLACEMENT Left 2014    HX OOPHORECTOMY  2001    bilateral    HX OTHER SURGICAL      LIPOMA LEFT SIDE    HX ROTATOR CUFF REPAIR Right 1998       Social History     Social History    Marital status:      Spouse name: N/A    Number of children: N/A    Years of education: N/A     Social History Main Topics    Smoking status: Former Smoker     Packs/day: 1.00     Years: 20.00     Quit date: 1988    Smokeless tobacco: Never Used    Alcohol use Yes      Comment: rarely - 1 glass of wine every 3 months    Drug use: No    Sexual activity: Yes     Other Topics Concern    None     Social History Narrative       Review of Systems   Constitutional: Negative. Negative for chills, fever, malaise/fatigue and weight loss. HENT: Negative. Negative for hearing loss. Eyes: Negative. Negative for blurred vision and double vision. Respiratory: Negative. Negative for cough, hemoptysis, sputum production and shortness of breath. Cardiovascular: Negative. Negative for chest pain, palpitations and orthopnea. Gastrointestinal: Negative. Negative for abdominal pain, blood in stool, heartburn, nausea and vomiting. Genitourinary: Negative. Negative for dysuria, frequency and urgency. Musculoskeletal: Negative. Negative for back pain, myalgias and neck pain. Skin: Negative. Negative for rash. Neurological: Negative. Negative for dizziness, tingling, tremors, weakness and headaches. Endo/Heme/Allergies: Negative. Psychiatric/Behavioral: Negative. Negative for depression. Objective:     Vitals:    08/24/18 1028   BP: 128/79   Pulse: 72   Resp: 20   Temp: 97.9 °F (36.6 °C)   TempSrc: Oral   SpO2: 98%   Weight: 194 lb 12.8 oz (88.4 kg)   Height: 4' 11.5\" (1.511 m)      Body mass index is 38.69 kg/(m^2). Physical Exam   Constitutional: She is oriented to person, place, and time and well-developed, well-nourished, and in no distress. No distress. HENT:   Head: Normocephalic and atraumatic. Mouth/Throat: Oropharynx is clear and moist.   Eyes: Conjunctivae are normal.   Neck: No tracheal deviation present. No thyromegaly present. Cardiovascular: Normal rate, regular rhythm and normal heart sounds. No murmur heard. Pulmonary/Chest: Effort normal and breath sounds normal. No respiratory distress. Abdominal: Soft. She exhibits no distension. Lymphadenopathy:     She has no cervical adenopathy. Neurological: She is alert and oriented to person, place, and time. Skin: Skin is warm. No rash noted. She is not diaphoretic. No erythema. Psychiatric: Mood and affect normal.   Nursing note and vitals reviewed. Health Maintenance Due   Topic Date Due    Influenza Age 5 to Adult  08/01/2018    MICROALBUMIN Q1  09/08/2018         Assessment and orders:     Encounter Diagnoses     ICD-10-CM ICD-9-CM   1. Type 2 diabetes mellitus with hyperglycemia, without long-term current use of insulin (HCC) E11.65 250.00     790.29   2. Type 2 diabetes mellitus with nephropathy (HCC) E11.21 250.40     583.81   3. Essential hypertension I10 401.9   4. Hypercholesterolemia E78.00 272.0   5. Gastroesophageal reflux disease with esophagitis K21.0 530.11   6. Vitamin D deficiency E55.9 268.9     Diagnoses and all orders for this visit:    1.  Type 2 diabetes mellitus with hyperglycemia, without long-term current use of insulin (HCC)-retest  -     HEMOGLOBIN A1C WITH EAG  -     LIPID PANEL  -     METABOLIC PANEL, COMPREHENSIVE  -     HEMOGLOBIN  -     MICROALBUMIN, UR, RAND W/ MICROALB/CREAT RATIO    2. Type 2 diabetes mellitus with nephropathy (HCC)  -     HEMOGLOBIN A1C WITH EAG  -     METABOLIC PANEL, COMPREHENSIVE  -     MICROALBUMIN, UR, RAND W/ MICROALB/CREAT RATIO    3. Essential hypertension-controlled  -     LIPID PANEL  -     METABOLIC PANEL, COMPREHENSIVE    4. Hypercholesterolemia- retest  -     LIPID PANEL    5. Gastroesophageal reflux disease with esophagitis-symptoms of excessive gas and difficulty eating tossed salads since her gallbladder surgery  -     HEMOGLOBIN    6. Vitamin D deficiency-controlled            Plan of care:  Discussed diagnoses in detail with patient. Medication risks/benefits/side effects discussed with patient. All of the patient's questions were addressed. The patient understands and agrees with our plan of care. The patient knows to call back if they are unsure of or forget any changes we discussed today or if the symptoms change. The patient received an After-Visit Summary which contains VS, orders, medication list and allergy list. This can be used as a \"mini-medical record\" should they have to seek medical care while out of town. Patient Care Team:  Karine Leung MD as PCP - Pedro Burton MD as Physician (Cardiology)  Lucho Watkins MD as Physician (Orthopedic Surgery)  Joellen Trejo MD (Endocrinology)  Anita Baig RN as Marianna Edwards MD (General Surgery)    Follow-up Disposition:  Return in about 3 months (around 11/24/2018).     Future Appointments  Date Time Provider [facility]   10/19/2018 1:30 PM Joellen Trejo MD CDE [place] [address] [address]   11/27/2018 10:50 AM Karine Leung MD [facility] SCHED       Signed By: Karine Leung MD     August 24, 2018

## 2018-08-27 ENCOUNTER — TELEPHONE (OUTPATIENT)
Dept: FAMILY MEDICINE CLINIC | Age: 78
End: 2018-08-27

## 2018-08-27 DIAGNOSIS — K21.9 GASTROESOPHAGEAL REFLUX DISEASE WITHOUT ESOPHAGITIS: Primary | ICD-10-CM

## 2018-08-27 RX ORDER — ESOMEPRAZOLE MAGNESIUM 40 MG/1
40 CAPSULE, DELAYED RELEASE ORAL DAILY
Qty: 90 CAP | Refills: 2 | Status: SHIPPED | OUTPATIENT
Start: 2018-08-27 | End: 2018-09-18 | Stop reason: SDUPTHER

## 2018-08-27 NOTE — TELEPHONE ENCOUNTER
Pt states that Dr. Riley Paul wanted her to start taking nexium for gas. She reports that it has been helping and would [facility]

## 2018-08-30 LAB
ALBUMIN SERPL-MCNC: 4.2 G/DL (ref 3.5–4.8)
ALBUMIN/CREAT UR: 42.4 MG/G CREAT (ref 0–30)
ALBUMIN/GLOB SERPL: 1.6 {RATIO} (ref 1.2–2.2)
ALP SERPL-CCNC: 55 IU/L (ref 39–117)
ALT SERPL-CCNC: 43 IU/L (ref 0–32)
AST SERPL-CCNC: 39 IU/L (ref 0–40)
BILIRUB SERPL-MCNC: 0.5 MG/DL (ref 0–1.2)
BUN SERPL-MCNC: 18 MG/DL (ref 8–27)
BUN/CREAT SERPL: 18 (ref 12–28)
CALCIUM SERPL-MCNC: 9.5 MG/DL (ref 8.7–10.3)
CHLORIDE SERPL-SCNC: 98 MMOL/L (ref 96–106)
CHOLEST SERPL-MCNC: 131 MG/DL (ref 100–199)
CO2 SERPL-SCNC: 23 MMOL/L (ref 20–29)
CREAT SERPL-MCNC: 0.98 MG/DL (ref 0.57–1)
CREAT UR-MCNC: 190.6 MG/DL
EST. AVERAGE GLUCOSE BLD GHB EST-MCNC: 151 MG/DL
GLOBULIN SER CALC-MCNC: 2.6 G/DL (ref 1.5–4.5)
GLUCOSE SERPL-MCNC: 148 MG/DL (ref 65–99)
HBA1C MFR BLD: 6.9 % (ref 4.8–5.6)
HDLC SERPL-MCNC: 44 MG/DL
HGB BLD-MCNC: 14.2 G/DL (ref 11.1–15.9)
LDLC SERPL CALC-MCNC: 52 MG/DL (ref 0–99)
Lab: NORMAL
MICROALBUMIN UR-MCNC: 80.8 UG/ML
POTASSIUM SERPL-SCNC: 4 MMOL/L (ref 3.5–5.2)
PROT SERPL-MCNC: 6.8 G/DL (ref 6–8.5)
SODIUM SERPL-SCNC: 140 MMOL/L (ref 134–144)
TRIGL SERPL-MCNC: 174 MG/DL (ref 0–149)
VLDLC SERPL CALC-MCNC: 35 MG/DL (ref 5–40)

## 2018-09-16 RX ORDER — LORATADINE 10 MG/1
TABLET ORAL
Qty: 90 TAB | Refills: 3 | Status: SHIPPED | OUTPATIENT
Start: 2018-09-16 | End: 2019-09-11 | Stop reason: SDUPTHER

## 2018-09-18 ENCOUNTER — CLINICAL SUPPORT (OUTPATIENT)
Dept: FAMILY MEDICINE CLINIC | Age: 78
End: 2018-09-18

## 2018-09-18 VITALS — TEMPERATURE: 98.2 F

## 2018-09-18 DIAGNOSIS — K21.9 GASTROESOPHAGEAL REFLUX DISEASE WITHOUT ESOPHAGITIS: ICD-10-CM

## 2018-09-18 DIAGNOSIS — Z23 ENCOUNTER FOR IMMUNIZATION: Primary | ICD-10-CM

## 2018-09-18 RX ORDER — ESOMEPRAZOLE MAGNESIUM 40 MG/1
40 CAPSULE, DELAYED RELEASE ORAL DAILY
Qty: 90 CAP | Refills: 2 | Status: SHIPPED | OUTPATIENT
Start: 2018-09-18 | End: 2019-05-10 | Stop reason: SDUPTHER

## 2018-09-18 NOTE — PROGRESS NOTES
Chief Complaint   Patient presents with    Immunization/Injection       Influenza injection ordered by Dr. Shakir Villalobos given 9/18/2018 by Brett Shay LPN as follows:    Dose amount:  0.5 ml  Injection site:  Left deltoid  Route:  IM      Patient tolerated injection well.

## 2018-10-03 DIAGNOSIS — E11.65 UNCONTROLLED TYPE 2 DIABETES MELLITUS WITH HYPERGLYCEMIA, WITHOUT LONG-TERM CURRENT USE OF INSULIN (HCC): ICD-10-CM

## 2018-10-03 RX ORDER — EXENATIDE 2 MG/.65ML
INJECTION, SUSPENSION, EXTENDED RELEASE SUBCUTANEOUS
Qty: 12 PEN | Refills: 3 | Status: SHIPPED | OUTPATIENT
Start: 2018-10-03 | End: 2019-05-13 | Stop reason: ALTCHOICE

## 2018-10-07 RX ORDER — FENOFIBRATE 145 MG/1
TABLET ORAL
Qty: 90 TAB | Refills: 3 | Status: SHIPPED | OUTPATIENT
Start: 2018-10-07 | End: 2019-09-15 | Stop reason: SDUPTHER

## 2018-10-19 ENCOUNTER — OFFICE VISIT (OUTPATIENT)
Dept: ENDOCRINOLOGY | Age: 78
End: 2018-10-19

## 2018-10-19 VITALS
OXYGEN SATURATION: 94 % | DIASTOLIC BLOOD PRESSURE: 78 MMHG | HEART RATE: 73 BPM | WEIGHT: 191.6 LBS | SYSTOLIC BLOOD PRESSURE: 125 MMHG | BODY MASS INDEX: 37.62 KG/M2 | HEIGHT: 60 IN | RESPIRATION RATE: 14 BRPM | TEMPERATURE: 97.3 F

## 2018-10-19 DIAGNOSIS — I10 ESSENTIAL HYPERTENSION: ICD-10-CM

## 2018-10-19 DIAGNOSIS — E11.65 TYPE 2 DIABETES MELLITUS WITH HYPERGLYCEMIA, WITHOUT LONG-TERM CURRENT USE OF INSULIN (HCC): Primary | ICD-10-CM

## 2018-10-19 DIAGNOSIS — E78.2 MIXED HYPERLIPIDEMIA: ICD-10-CM

## 2018-10-19 NOTE — PROGRESS NOTES
Paz Garcia MD        Patient Information  Date:10/20/2018  Name : Uli Thornton 69 y.o.     YOB: 1940         Referred by: Tim Matt MD         Chief Complaint   Patient presents with    Diabetes    Cholesterol Problem    Hypertension       History of Present Illness: Uli Thornton is a 69 y.o. female here for fu of  Type 2 Diabetes Mellitus. Type 2 Diabetes was diagnosed in 10/2014 . End organ effects of diabetes: none. Cardiovascular risk factors: family history, dyslipidemia, diabetes mellitus   Monitoring frequency:2/ day   Could not tolerate Metformin IR, XR formualtion and was on Actos,     Checking glucose at home  Has not noticed significant hypoglycemia     Compliant with medications    Has subcutaneous nodules but is getting better      Hyperlipidemia - was on tricor, zetia, colestipol, Niacin,    Wt Readings from Last 3 Encounters:   10/19/18 191 lb 9.6 oz (86.9 kg)   08/24/18 194 lb 12.8 oz (88.4 kg)   06/26/18 197 lb 1.5 oz (89.4 kg)       BP Readings from Last 3 Encounters:   10/19/18 125/78   08/24/18 128/79   06/26/18 118/56           Past Medical History:   Diagnosis Date    Arthritis 2/28/2011    OSTEO    Chronic obstructive pulmonary disease ([address]     VERY MILD - patient denies having COPD as of 6/20/2018    Chronic pain     LEFT KNEE    Diabetes ([facility] [address]     GERD (gastroesophageal reflux disease)     Hot flashes, menopausal     Hypercholesterolemia     Hypertension     Ill-defined condition     Elevated liver enzymes (has family history)    Nausea & vomiting     Seizures (HCC) 1980'S    AFTER DRINKING 2 MARGARITAS    Vitamin D deficiency 6/28/2010     Current Outpatient Medications   Medication Sig    TRICOR 145 mg tablet TAKE 1 TABLET DAILY    BYDUREON 2 mg/0.65 mL pnij INJECT THE CONTENTS OF 1 PEN UNDER THE SKIN EVERY 7 DAYS    esomeprazole (NEXIUM) 40 mg capsule Take 1 Cap by mouth daily. Indications: gastroesophageal reflux disease    loratadine (CLARITIN) 10 mg tablet TAKE 1 TABLET DAILY    ezetimibe (ZETIA) 10 mg tablet TAKE 1 TABLET DAILY    losartan-hydroCHLOROthiazide (HYZAAR) 100-25 mg per tablet TAKE 1 TABLET DAILY    HYDROcodone-acetaminophen (NORCO) 5-325 mg per tablet Take 1-2 Tabs by mouth every four (4) hours as needed for Pain (Acute post-operative pain). Max Daily Amount: 12 Tabs. Indications: Pain    ondansetron (ZOFRAN ODT) 4 mg disintegrating tablet Take 1 Tab by mouth every eight (8) hours as needed for Nausea. Indications: PREVENTION OF POST-OPERATIVE NAUSEA AND VOMITING    polyethylene glycol (MIRALAX) 17 gram packet Take 1 Packet by mouth daily. Indications: constipation, If constipation last more than 24-48 hours, despite colace use. (Patient taking differently: Take 17 g by mouth as needed.)    aspirin 81 mg chewable tablet Take 81 mg by mouth daily.  lancets (FREESTYLE LANCETS) 28 gauge misc TEST ONCE DAILY DX: E11.65    glucose blood VI test strips (FREESTYLE LITE STRIPS) strip TEST ONCE A DAY DX CODE: E11.65    dilTIAZem CD (CARDIZEM CD) 180 mg ER capsule TAKE 1 CAPSULE DAILY    cloNIDine HCl (CATAPRES) 0.1 mg tablet TAKE 1 TABLET NIGHTLY    traMADol (ULTRAM) 50 mg tablet Take 1 Tab by mouth every six (6) hours as needed for Pain. Max Daily Amount: 200 mg.    omega-3 fatty acids-vitamin e (FISH OIL) 1,000 mg cap Take 2 Caps by mouth two (2) times a day.  cholecalciferol, vitamin d3, (VITAMIN D) 1,000 unit tablet Take 1,000 Units by mouth two (2) times a day. No current facility-administered medications for this visit.       Allergies   Allergen Reactions    Ciprofloxacin Nausea Only    Erythromycin Nausea Only    Metformin Other (comments) and Nausea Only     Felt bad  Felt bad      Pcn [Penicillins] Swelling     Caused lips to swell  Tolerates Amoxicillin         Review of Systems:  - Constitutional Symptoms: no fevers, no chills,   - Eyes: no blurry vision no double vision  - Cardiovascular: no chest pain ,no palpitations  - Respiratory: no cough no shortness of breath  - Gastrointestinal: no dysphagia no  abdominal pain  - Musculoskeletal: + joint pains no  weakness  - Integumentary: no rashes  -     Physical Examination:   Blood pressure 125/78, pulse 73, temperature 97.3 °F (36.3 °C), temperature source Oral, resp. rate 14, height 4' 11.5\" (1.511 m), weight 191 lb 9.6 oz (86.9 kg), SpO2 94 %. Estimated body mass index is 38.05 kg/m² as calculated from the following:    Height as of this encounter: 4' 11.5\" (1.511 m). -   Weight as of this encounter: 191 lb 9.6 oz (86.9 kg). - General: pleasant, no distress, good eye contact  - HEENT: no pallor, no periorbital edema, EOMI  - Neck: supple, no thyromegaly  - Cardiovascular: regular, normal rate, normal S1 and S2  - Respiratory: clear to auscultation bilaterally  - Gastrointestinal: soft, nontender, nondistended,  BS +  - Musculoskeletal: no proximal muscle weakness in upper or lower extremities  - Integumentary: alert and oriented , foot 12/17   - Psychiatric: normal mood and affect  - Skin: color, texture, turgor normal.       Data Reviewed:     [] Glucose records reviewed. [] See glucose records for details (to be scanned).   [] A1C  [] Reviewed labs    Lab Results   Component Value Date/Time    Hemoglobin A1c 6.9 (H) 08/29/2018 12:07 PM    Hemoglobin A1c 7.1 (H) 05/23/2018 04:13 PM    Hemoglobin A1c 6.5 (H) 01/29/2018 02:47 PM    Glucose 148 (H) 08/29/2018 12:07 PM    Glucose (POC) 138 (H) 06/26/2018 04:58 PM    Microalb/Creat ratio (ug/mg creat.) 42.4 (H) 08/29/2018 12:07 PM    LDL, calculated 52 08/29/2018 12:07 PM    Creatinine 0.98 08/29/2018 12:07 PM      Lab Results   Component Value Date/Time    Cholesterol, total 131 08/29/2018 12:07 PM    HDL Cholesterol 44 08/29/2018 12:07 PM    LDL, calculated 52 08/29/2018 12:07 PM    Triglyceride 174 (H) 08/29/2018 12:07 PM    CHOL/HDL Ratio 2.4 10/28/2010 09:07 AM     Lab Results   Component Value Date/Time    ALT (SGPT) 43 (H) 08/29/2018 12:07 PM    AST (SGOT) 39 08/29/2018 12:07 PM    Alk. phosphatase 55 08/29/2018 12:07 PM    Bilirubin, total 0.5 08/29/2018 12:07 PM     Lab Results   Component Value Date/Time    TSH 1.220 01/29/2018 02:47 PM    T4, Free 1.55 09/09/2015 02:16 PM      Lab Results   Component Value Date/Time    Glucose 148 (H) 08/29/2018 12:07 PM    Glucose (POC) 138 (H) 06/26/2018 04:58 PM        Assessment/Plan:     No diagnosis found. 1. Type 2 Diabetes Mellitus   Lab Results   Component Value Date/Time    Hemoglobin A1c 6.9 (H) 08/29/2018 12:07 PM    Hemoglobin A1c (POC) 6.5 05/11/2018 11:37 AM   Controlled   Bydureon   FLU annually ,Pneumovax ,aspirin daily,annual eye exam,microalbumin    2. HTN : Continue current therapy     3. Mixed Hyperlipidemia : low carb diet. Statin intolerance hx,   Continue Zetia, Tricor    4. Obesity:Body mass index is 38.05 kg/m². Discussed about the importance of exercise and carbohydrate portion control. Follow-up Disposition:  Return in about 5 months (around 3/19/2019). Thank you for allowing me to participate in the care of this patient.     Flavia Caceres MD    Patient verbalized understanding

## 2018-10-19 NOTE — PROGRESS NOTES
Nancy Orozco is a 69 y.o. female here for   Chief Complaint   Patient presents with    Diabetes    Cholesterol Problem    Hypertension       Functional glucose monitor and record keeping system? - yes  Eye exam within last year? - on file  Foot exam within last year? - on file    1. Have you been to the ER, urgent care clinic since your last visit? Hospitalized since your last visit? -[facility] 2018 gallbladder removal    2. Have you seen or consulted any other health care providers outside of the [address] [place] [address] since your last visit?   Include any pap smears or colon screening.-no

## 2018-11-02 ENCOUNTER — OFFICE VISIT (OUTPATIENT)
Dept: FAMILY MEDICINE CLINIC | Age: 78
End: 2018-11-02

## 2018-11-02 VITALS
RESPIRATION RATE: 20 BRPM | WEIGHT: 190 LBS | HEIGHT: 60 IN | SYSTOLIC BLOOD PRESSURE: 133 MMHG | HEART RATE: 80 BPM | DIASTOLIC BLOOD PRESSURE: 86 MMHG | TEMPERATURE: 98.5 F | BODY MASS INDEX: 37.3 KG/M2 | OXYGEN SATURATION: 93 %

## 2018-11-02 DIAGNOSIS — E11.65 TYPE 2 DIABETES MELLITUS WITH HYPERGLYCEMIA, WITHOUT LONG-TERM CURRENT USE OF INSULIN (HCC): ICD-10-CM

## 2018-11-02 DIAGNOSIS — J30.1 SEASONAL ALLERGIC RHINITIS DUE TO POLLEN: ICD-10-CM

## 2018-11-02 DIAGNOSIS — J40 BRONCHITIS: Primary | ICD-10-CM

## 2018-11-02 RX ORDER — AZITHROMYCIN 250 MG/1
TABLET, FILM COATED ORAL
Qty: 6 TAB | Refills: 0 | Status: SHIPPED | OUTPATIENT
Start: 2018-11-02 | End: 2018-11-07

## 2018-11-02 RX ORDER — BENZONATATE 200 MG/1
200 CAPSULE ORAL
Qty: 30 CAP | Refills: 1 | Status: SHIPPED | OUTPATIENT
Start: 2018-11-02 | End: 2018-11-12

## 2018-11-02 NOTE — PATIENT INSTRUCTIONS
A Healthy Lifestyle: Care Instructions Your Care Instructions A healthy lifestyle can help you feel good, stay at a healthy weight, and have plenty of energy for both work and play. A healthy lifestyle is something you can share with your whole family. A healthy lifestyle also can lower your risk for serious health problems, such as high blood pressure, heart disease, and diabetes. You can follow a few steps listed below to improve your health and the health of your family. Follow-up care is a key part of your treatment and safety. Be sure to make and go to all appointments, and call your doctor if you are having problems. It's also a good idea to know your test results and keep a list of the medicines you take. How can you care for yourself at home? · Do not eat too much sugar, fat, or fast foods. You can still have dessert and treats now and then. The goal is moderation. · Start small to improve your eating habits. Pay attention to portion sizes, drink less juice and soda pop, and eat more fruits and vegetables. ? Eat a healthy amount of food. A 3-ounce serving of meat, for example, is about the size of a deck of cards. Fill the rest of your plate with vegetables and whole grains. ? Limit the amount of soda and sports drinks you have every day. Drink more water when you are thirsty. ? Eat at least 5 servings of fruits and vegetables every day. It may seem like a lot, but it is not hard to reach this goal. A serving or helping is 1 piece of fruit, 1 cup of vegetables, or 2 cups of leafy, raw vegetables. Have an apple or some carrot sticks as an afternoon snack instead of a candy bar. Try to have fruits and/or vegetables at every meal. 
· Make exercise part of your daily routine. You may want to start with simple activities, such as walking, bicycling, or slow swimming. Try to be active 30 to 60 minutes every day.  You do not need to do all 30 to 60 minutes all at once. For example, you can exercise 3 times a day for 10 or 20 minutes. Moderate exercise is safe for most people, but it is always a good idea to talk to your doctor before starting an exercise program. 
· Keep moving. Luanne Lopezers the lawn, work in the garden, or [place] the [address]. Take the stairs instead of the elevator at work. · If you smoke, quit. People who smoke have an increased risk for heart attack, stroke, cancer, and other lung illnesses. Quitting is hard, but there are ways to boost your chance of quitting tobacco for good. ? Use nicotine gum, patches, or lozenges. ? Ask your doctor about stop-smoking programs and medicines. ? Keep trying. In addition to reducing your risk of diseases in the future, you will notice some benefits soon after you stop using tobacco. If you have shortness of breath or asthma symptoms, they will likely get better within a few weeks after you quit. · Limit how much alcohol you drink. Moderate amounts of alcohol (up to 2 drinks a day for men, 1 drink a day for women) are okay. But drinking too much can lead to liver problems, high blood pressure, and other health problems. Family health If you have a family, there are many things you can do together to improve your health. · Eat meals together as a family as often as possible. · Eat healthy foods. This includes fruits, vegetables, lean meats and dairy, and whole grains. · Include your family in your fitness plan. Most people think of activities such as jogging or tennis as the way to fitness, but there are many ways you and your family can be more active. Anything that makes you breathe hard and gets your heart pumping is exercise. Here are some tips: 
? Walk to do errands or to take your child to school or the bus. 
? Go for a family bike ride after dinner instead of watching TV. Where can you learn more? Go to http://reid-shanell.info/. Enter F869 in the search box to learn more about \"A Healthy Lifestyle: Care Instructions. \" Current as of: December 7, 2017 Content Version: 11.8 © 5607-2625 Healthwise, [employer]. Care instructions adapted under license by [employer] (which disclaims liability or warranty for this information). If you have questions about a medical condition or this instruction, always ask your healthcare professional. [place] [address] any warranty or liability for your use of this information.

## 2018-11-02 NOTE — PROGRESS NOTES
Patient: Nelwyn Mortimer MRN: [id number]  SSN: [id number] YOB: 1940  Age: 69 y.o. Sex: female Chief Complaint Patient presents with  Cold Symptoms Nelwyn Mortimer is a 69 y.o. female new to me who presents with complaints of congestion and cough described as productive of yellow sputum for 3 days. There has been no nausea and no vomiting . she has not had  swollen glands, myalgias, headache and fever. Symptoms are moderate. Patient is drinking plenty of fluids. There is not a hx of asthma. There is a hx of allergic rhinitis. There is a hx of tobacco use. There have not been contacts with similar infections. Patient with hx of T2D, HTN, HLD and morbid obesity. Diabetes and HTN controlled. Patient with allergies to multiple antibiotics. Lab Results Component Value Date/Time Hemoglobin A1c 6.9 (H) 08/29/2018 12:07 PM  
  
BP Readings from Last 3 Encounters:  
11/02/18 133/86  
10/19/18 125/78  
08/24/18 128/79 Wt Readings from Last 3 Encounters:  
11/02/18 190 lb (86.2 kg) 10/19/18 191 lb 9.6 oz (86.9 kg) 08/24/18 194 lb 12.8 oz (88.4 kg) Body mass index is 37.73 kg/m². Medications:    
Current Outpatient Medications Medication Sig  
 benzonatate (TESSALON) 200 mg capsule Take 1 Cap by mouth three (3) times daily as needed for Cough for up to 10 days.  azithromycin (ZITHROMAX) 250 mg tablet Take 2 tablets today, then take 1 tablet daily  TRICOR 145 mg tablet TAKE 1 TABLET DAILY  BYDUREON 2 mg/0.65 mL pnij INJECT THE CONTENTS OF 1 PEN UNDER THE SKIN EVERY 7 DAYS  esomeprazole (NEXIUM) 40 mg capsule Take 1 Cap by mouth daily. Indications: gastroesophageal reflux disease  loratadine (CLARITIN) 10 mg tablet TAKE 1 TABLET DAILY  ezetimibe (ZETIA) 10 mg tablet TAKE 1 TABLET DAILY  losartan-hydroCHLOROthiazide (HYZAAR) 100-25 mg per tablet TAKE 1 TABLET DAILY  HYDROcodone-acetaminophen (NORCO) 5-325 mg per tablet Take 1-2 Tabs by mouth every four (4) hours as needed for Pain (Acute post-operative pain). Max Daily Amount: 12 Tabs. Indications: Pain  ondansetron (ZOFRAN ODT) 4 mg disintegrating tablet Take 1 Tab by mouth every eight (8) hours as needed for Nausea. Indications: PREVENTION OF POST-OPERATIVE NAUSEA AND VOMITING  
 polyethylene glycol (MIRALAX) 17 gram packet Take 1 Packet by mouth daily. Indications: constipation, If constipation last more than 24-48 hours, despite colace use. (Patient taking differently: Take 17 g by mouth as needed.)  aspirin 81 mg chewable tablet Take 81 mg by mouth daily.  lancets (FREESTYLE LANCETS) 28 gauge misc TEST ONCE DAILY DX: E11.65  
 glucose blood VI test strips (FREESTYLE LITE STRIPS) strip TEST ONCE A DAY DX CODE: E11.65  
 dilTIAZem CD (CARDIZEM CD) 180 mg ER capsule TAKE 1 CAPSULE DAILY  cloNIDine HCl (CATAPRES) 0.1 mg tablet TAKE 1 TABLET NIGHTLY  traMADol (ULTRAM) 50 mg tablet Take 1 Tab by mouth every six (6) hours as needed for Pain. Max Daily Amount: 200 mg.  
 omega-3 fatty acids-vitamin e (FISH OIL) 1,000 mg cap Take 2 Caps by mouth two (2) times a day.  cholecalciferol, vitamin d3, (VITAMIN D) 1,000 unit tablet Take 1,000 Units by mouth two (2) times a day. No current facility-administered medications for this visit. Problem List:    
Patient Active Problem List  
 Diagnosis Date Noted  [facility] 01/23/2018  Type 2 diabetes mellitus with hyperglycemia, without long-term current use of insulin ([facility] 12/19/2016  Essential hypertension 10/18/2015  Mixed hyperlipidemia 10/18/2015  Morbid obesity ([facility] 10/18/2015  S/P total knee replacement using cement 09/09/2015  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  Allergic rhinitis 11/18/2011  Arthritis 02/28/2011  Vitamin D deficiency 06/28/2010  Hypertension  Hypercholesterolemia  GERD (gastroesophageal reflux disease)  Hot flashes, menopausal   
 
 
 Medical History:    
Past Medical History:  
Diagnosis Date  Arthritis 2011 OSTEO  Chronic obstructive pulmonary disease ([facility] VERY MILD - patient denies having COPD as of 2018  Chronic pain LEFT KNEE  
 Diabetes ([facility]  GERD (gastroesophageal reflux disease)  Hot flashes, menopausal   
 Hypercholesterolemia  Hypertension  Ill-defined condition Elevated liver enzymes (has family history)  Nausea & vomiting  Seizures ([facility]  AFTER DRINKING 2 MARGARITAS  Vitamin D deficiency 2010 Allergies: Allergies Allergen Reactions  Ciprofloxacin Nausea Only  Erythromycin Nausea Only  Metformin Other (comments) and Nausea Only Felt bad Felt bad  Pcn [Penicillins] Swelling Caused lips to swell Tolerates Amoxicillin Surgical History:    
Past Surgical History:  
Procedure Laterality Date  HX APPENDECTOMY    HX CATARACT REMOVAL Bilateral   
 HX CHOLECYSTECTOMY  2018 [facility]  HX KNEE REPLACEMENT Left 2014  HX OOPHORECTOMY  2001  
 bilateral  
 HX OTHER SURGICAL    
 LIPOMA LEFT SIDE  
 HX ROTATOR CUFF REPAIR Right  Social History:    
Social History Socioeconomic History  Marital status:  Spouse name: Not on file  Number of children: Not on file  Years of education: Not on file  Highest education level: Not on file Social Needs  Financial resource strain: Not on file  Food insecurity - worry: Not on file  Food insecurity - inability: Not on file  Transportation needs - medical: Not on file  Transportation needs - non-medical: Not on file Occupational History  Not on file Tobacco Use  Smoking status: Former Smoker Packs/day: 1.00 Years: 20.00 Pack years: 20.00 Last attempt to quit:  Years since quittin.8  Smokeless tobacco: Never Used Substance and Sexual Activity  Alcohol use: Yes Comment: rarely - 1 glass of wine every 3 months  Drug use: No  
 Sexual activity: Yes Other Topics Concern  Not on file Social History Narrative  Not on file Review of Symptoms: 
Constitutional: c/o malaise, denies fever or chills Skin: Negative for rash or lesion Head: Negative for facial swelling or tenderness Eyes: Negative for redness or discharge Ears: Negative for otalgia or decreased hearing Nose: c/o nasal congestion, denies sinus pressure Neck: c/o sore throat, denies lymphadenopathy Cardiovascular: Negative for chest pain or palpitations Respiratory: c/o productive cough, denies wheezing or SOB Gastrointestinal: Negative for nausea or abdominal pain Neurologic: Negative for headache or dizziness Visit Vitals /86 (BP 1 Location: Right arm, BP Patient Position: Sitting) Pulse 80 Temp 98.5 °F (36.9 °C) (Oral) Resp 20 Ht 4' 11.5\" (1.511 m) Wt 190 lb (86.2 kg) SpO2 93% BMI 37.73 kg/m² Physical Examination: 
General: Well developed, well nourished, in no acute distress Skin: Warm and dry sans rash or lesion Head: Normocephalic, atraumatic Eyes: Sclera clear, EOMI, PERRL Ears: tympanic membranes normal in appearance Nose: mucosal edema with rhinorrhea Oropharynx: posterior erythema, no exudate Neck: Normal range of motion, no lymphadenopathy Cardiovascular: normal S1, S2, regular rate and rhythm Respiratory: Clear to auscultation bilaterally with symmetrical, unlabored effort Abdomen: Soft, Normal BS Extremities: Full range of motion Neurologic: Active, alert and oriented Diagnoses and all orders for this visit: 1. Bronchitis 
-     benzonatate (TESSALON) 200 mg capsule; Take 1 Cap by mouth three (3) times daily as needed for Cough for up to 10 days. -     azithromycin (ZITHROMAX) 250 mg tablet; Take 2 tablets today, then take 1 tablet daily 2. Seasonal allergic rhinitis due to pollen 3. Type 2 diabetes mellitus with hyperglycemia, without long-term current use of insulin ([facility] Symptomatic therapy suggested: rest, increase fluids and call prn if symptoms persist or worsen. Avoid any use of decongestants or sugar containing cough formulations for symptoms I have discussed the diagnosis with the patient and the intended plan as seen in the above orders. The patient expresses understanding and agreement with our plan of care. All of the patient's questions were answered to apparent satisfaction. The patient has received an after-visit summary. The patient knows to call our office if there are any questions or concerns regarding diagnosis and treatment plans. I have discussed medication side effects and warnings with the patient as well. Follow-up Disposition: 
Return if symptoms worsen or fail to improve.

## 2018-11-02 NOTE — PROGRESS NOTES
- check for functional glucose monitor and record keeping system Pt was given BS record log to document home readings and return to office for review Diabetic Bundle: LDL-52 A1C-6.9 BP-133/86 Smoking?no Anticoagulation medication? yes Eye exam dilated? Foot exam? 
1. Have you been to the ER, urgent care clinic since your last visit? Hospitalized since your last visit? no 
 
2. Have you seen or consulted any other health care providers outside of the [address] [place] [address] since your last visit? Include any pap smears or colon screening. yes Reviewed record in preparation for visit and have obtained necessary documentation. Patient did not bring medications to visit for review. Information provided on Advanced Directive, Living Will. Body mass index is 37.73 kg/m². Health Maintenance Due Topic Date Due  Shingrix Vaccine Age 50> (1 of 2) 12/15/1991  
 EYE EXAM RETINAL OR DILATED Q1  10/26/2018

## 2018-11-28 ENCOUNTER — OFFICE VISIT (OUTPATIENT)
Dept: FAMILY MEDICINE CLINIC | Age: 78
End: 2018-11-28

## 2018-11-28 VITALS
RESPIRATION RATE: 20 BRPM | DIASTOLIC BLOOD PRESSURE: 74 MMHG | BODY MASS INDEX: 38.21 KG/M2 | HEART RATE: 60 BPM | HEIGHT: 60 IN | WEIGHT: 194.6 LBS | TEMPERATURE: 97.5 F | SYSTOLIC BLOOD PRESSURE: 143 MMHG | OXYGEN SATURATION: 97 %

## 2018-11-28 DIAGNOSIS — E55.9 VITAMIN D DEFICIENCY: ICD-10-CM

## 2018-11-28 DIAGNOSIS — E11.21 TYPE 2 DIABETES MELLITUS WITH NEPHROPATHY (HCC): Primary | ICD-10-CM

## 2018-11-28 DIAGNOSIS — E66.01 MORBID OBESITY (HCC): ICD-10-CM

## 2018-11-28 DIAGNOSIS — J45.30 MILD PERSISTENT ASTHMATIC BRONCHITIS WITHOUT COMPLICATION: ICD-10-CM

## 2018-11-28 DIAGNOSIS — H65.91 RIGHT OTITIS MEDIA WITH EFFUSION: ICD-10-CM

## 2018-11-28 DIAGNOSIS — E78.00 HYPERCHOLESTEROLEMIA: ICD-10-CM

## 2018-11-28 DIAGNOSIS — I10 ESSENTIAL HYPERTENSION: ICD-10-CM

## 2018-11-28 RX ORDER — FLUTICASONE PROPIONATE 50 MCG
2 SPRAY, SUSPENSION (ML) NASAL DAILY
Qty: 1 BOTTLE | Refills: 5 | Status: SHIPPED | OUTPATIENT
Start: 2018-11-28 | End: 2019-05-31

## 2018-11-28 RX ORDER — ALBUTEROL SULFATE 90 UG/1
2 AEROSOL, METERED RESPIRATORY (INHALATION)
Qty: 1 INHALER | Refills: 4 | Status: SHIPPED | OUTPATIENT
Start: 2018-11-28 | End: 2019-11-23

## 2018-11-28 NOTE — PATIENT INSTRUCTIONS
Diabetes Foot Health: Care Instructions Your Care Instructions When you have diabetes, your feet need extra care and attention. Diabetes can damage the nerve endings and blood vessels in your feet, making you less likely to notice when your feet are injured. Diabetes also limits your body's ability to fight infection and get blood to areas that need it. If you get a minor foot injury, it could become an ulcer or a serious infection. With good foot care, you can prevent most of these problems. Caring for your feet can be quick and easy. Most of the care can be done when you are bathing or getting ready for bed. Follow-up care is a key part of your treatment and safety. Be sure to make and go to all appointments, and call your doctor if you are having problems. It's also a good idea to know your test results and keep a list of the medicines you take. How can you care for yourself at home? · Keep your blood sugar close to normal by watching what and how much you eat, monitoring blood sugar, taking medicines if prescribed, and getting regular exercise. · Do not smoke. Smoking affects blood flow and can make foot problems worse. If you need help quitting, talk to your doctor about stop-smoking programs and medicines. These can increase your chances of quitting for good. · Eat a diet that is low in fats. High fat intake can cause fat to build up in your blood vessels and decrease blood flow. · Inspect your feet daily for blisters, cuts, cracks, or sores. If you cannot see well, use a mirror or have someone help you. · Take care of your feet: 
? Wash your feet every day. Use warm (not hot) water. Check the water temperature with your wrists or other part of your body, not your feet. ? Dry your feet well. Pat them dry. Do not rub the skin on your feet too hard. Dry well between your toes. If the skin on your feet stays moist, bacteria or a fungus can grow, which can lead to infection. ? Keep your skin soft. Use moisturizing skin cream to keep the skin on your feet soft and prevent calluses and cracks. But do not put the cream between your toes, and stop using any cream that causes a rash. ? Clean underneath your toenails carefully. Do not use a sharp object to clean underneath your toenails. Use the blunt end of a nail file or other rounded tool. ? Trim and file your toenails straight across to prevent ingrown toenails. Use a nail clipper, not scissors. Use an emery board to smooth the edges. · Change socks daily. Socks without seams are best, because seams often rub the feet. You can find socks for people with diabetes from specialty catalogs. · Look inside your shoes every day for things like gravel or torn linings, which could cause blisters or sores. · Buy shoes that fit well: 
? Look for shoes that have plenty of space around the toes. This helps prevent bunions and blisters. ? Try on shoes while wearing the kind of socks you will usually wear with the shoes. ? Avoid plastic shoes. They may rub your feet and cause blisters. Good shoes should be made of materials that are flexible and breathable, such as leather or cloth. ? Break in new shoes slowly by wearing them for no more than an hour a day for several days. Take extra time to check your feet for red areas, blisters, or other problems after you wear new shoes. · Do not go barefoot. Do not wear sandals, and do not wear shoes with very thin soles. Thin soles are easy to puncture. They also do not protect your feet from hot pavement or cold weather. · Have your doctor check your feet during each visit. If you have a foot problem, see your doctor. Do not try to treat an early foot problem at home. Home remedies or treatments that you can buy without a prescription (such as corn removers) can be harmful. · Always get early treatment for foot problems. A minor irritation can lead to a major problem if not properly cared for early. When should you call for help? Call your doctor now or seek immediate medical care if: 
  · You have a foot sore, an ulcer or break in the skin that is not healing after 4 days, bleeding corns or calluses, or an ingrown toenail.  
  · You have blue or black areas, which can mean bruising or blood flow problems.  
  · You have peeling skin or tiny blisters between your toes or cracking or oozing of the skin.  
  · You have a fever for more than 24 hours and a foot sore.  
  · You have new numbness or tingling in your feet that does not go away after you move your feet or change positions.  
  · You have unexplained or unusual swelling of the foot or ankle.  
 Watch closely for changes in your health, and be sure to contact your doctor if: 
  · You cannot do proper foot care. Where can you learn more? Go to http://reid-shanell.info/. Enter A739 in the search box to learn more about \"Diabetes Foot Health: Care Instructions. \" Current as of: December 7, 2017 Content Version: 11.8 © 2009-2715 Healthwise[employer]. Care instructions adapted under license by [employer] (which disclaims liability or warranty for this information). If you have questions about a medical condition or this instruction, always ask your healthcare professional. [address] any warranty or liability for your use of this information.

## 2018-11-28 NOTE — PROGRESS NOTES
[address] [place] [address] 
[phone]  
 
 
Progress Note Patient: Valerie Mccauley MRN: [id number]  SSN: [id number] YOB: 1940  Age: 69 y.o. Sex: female Chief Complaint Patient presents with  Diabetes  Hypertension Subjective: The patient presents today to address multiple chronic medical problems. Encounter Diagnoses Name Primary?  Type 2 diabetes mellitus with nephropathy ([facility] [address]): This patient is managed under a comprehensive plan of care for Diabetes. Overall the patient feels well with good energy level. Key Antihyperglycemic Medications BYDUREON 2 mg/0.65 mL pnij  (Taking) INJECT THE CONTENTS OF 1 PEN UNDER THE SKIN EVERY 7 DAYS Pertinent Labs:  
Lab Results Component Value Date/Time Hemoglobin A1c 6.9 (H) 08/29/2018 12:07 PM  
 Hemoglobin A1c 7.1 (H) 05/23/2018 04:13 PM  
 Hemoglobin A1c 6.5 (H) 01/29/2018 02:47 PM  
 
 Body mass index is 38.65 kg/m². Lab Results Component Value Date/Time LDL, calculated 52 08/29/2018 12:07 PM  
     
Lab Results Component Value Date/Time Sodium 140 08/29/2018 12:07 PM  
 Potassium 4.0 08/29/2018 12:07 PM  
 Chloride 98 08/29/2018 12:07 PM  
 CO2 23 08/29/2018 12:07 PM  
 Anion gap 9 06/20/2018 10:38 AM  
 Glucose 148 (H) 08/29/2018 12:07 PM  
 BUN 18 08/29/2018 12:07 PM  
 Creatinine 0.98 08/29/2018 12:07 PM  
 BUN/Creatinine ratio 18 08/29/2018 12:07 PM  
 GFR est AA 65 08/29/2018 12:07 PM  
 GFR est non-AA 56 (L) 08/29/2018 12:07 PM  
 Calcium 9.5 08/29/2018 12:07 PM  
 AST (SGOT) 39 08/29/2018 12:07 PM  
 Alk. phosphatase 55 08/29/2018 12:07 PM  
 Protein, total 6.8 08/29/2018 12:07 PM  
 Albumin 4.2 08/29/2018 12:07 PM  
 Globulin 3.2 10/28/2010 09:07 AM  
 A-G Ratio 1.6 08/29/2018 12:07 PM  
 ALT (SGPT) 43 (H) 08/29/2018 12:07 PM  
 
Lab Results Component Value Date/Time Microalb/Creat ratio (ug/mg creat.) 42.4 (H) 2018 12:07 PM  
 
 Frequency of home glucose testing:daily Blood Sugar range at home:  
 Last eye exam: In past 12 months. Last foot exam: This year. Polyuria, polyphagia or polydipsia: No 
 Retinopathy: No 
 Neuropathy SX: No  
 Low blood sugar symptoms: No 
 Dietary compliance: Good Medication compliance:Good On ASA: Yes Depression: No 
 CKD:no Wt Readings from Last 3 Encounters:  
18 194 lb 9.6 oz (88.3 kg) 18 190 lb (86.2 kg) 10/19/18 191 lb 9.6 oz (86.9 kg) Social History Tobacco Use Smoking Status Former Smoker  Packs/day: 1.00  Years: 20.00  Pack years: 20.00  Last attempt to quit:   Years since quittin.9 Smokeless Tobacco Never Used Body mass index is 38.65 kg/m². Diabetic Consultants: All the patient's questions regarding medications, diet and exercise were answered. Goal of A1C of less than 7.5% is our goal.  
Our overall goal is to reduce or eliminate the long term consequences of poorly controlled diabetes. Yes  Essential hypertension: Repeat 143/74 BP Readings from Last 3 Encounters:  
18 160/88  
18 133/86  
10/19/18 125/78 The patient reports:  taking medications as instructed, no medication side effects noted, no TIA's, no chest pain on exertion, no dyspnea on exertion, no swelling of ankles. Key CAD CHF Meds TRICOR 145 mg tablet  (Taking) TAKE 1 TABLET DAILY  
 ezetimibe (ZETIA) 10 mg tablet  (Taking) TAKE 1 TABLET DAILY losartan-hydroCHLOROthiazide (HYZAAR) 100-25 mg per tablet  (Taking) TAKE 1 TABLET DAILY  
 aspirin 81 mg chewable tablet  (Taking) Take 81 mg by mouth daily.   
 dilTIAZem CD (CARDIZEM CD) 180 mg ER capsule  (Taking) TAKE 1 CAPSULE DAILY  
 cloNIDine HCl (CATAPRES) 0.1 mg tablet  (Taking) TAKE 1 TABLET NIGHTLY  
 omega-3 fatty acids-vitamin e (FISH OIL) 1,000 mg cap  (Taking) Take 2 Caps by mouth two (2) times a day. Lab Results Component Value Date/Time Sodium 140 08/29/2018 12:07 PM  
 Potassium 4.0 08/29/2018 12:07 PM  
 Chloride 98 08/29/2018 12:07 PM  
 CO2 23 08/29/2018 12:07 PM  
 Anion gap 9 06/20/2018 10:38 AM  
 Glucose 148 (H) 08/29/2018 12:07 PM  
 BUN 18 08/29/2018 12:07 PM  
 Creatinine 0.98 08/29/2018 12:07 PM  
 BUN/Creatinine ratio 18 08/29/2018 12:07 PM  
 GFR est AA 65 08/29/2018 12:07 PM  
 GFR est non-AA 56 (L) 08/29/2018 12:07 PM  
 Calcium 9.5 08/29/2018 12:07 PM  
 Bilirubin, total 0.5 08/29/2018 12:07 PM  
 AST (SGOT) 39 08/29/2018 12:07 PM  
 Alk. phosphatase 55 08/29/2018 12:07 PM  
 Protein, total 6.8 08/29/2018 12:07 PM  
 Albumin 4.2 08/29/2018 12:07 PM  
 Globulin 3.2 10/28/2010 09:07 AM  
 A-G Ratio 1.6 08/29/2018 12:07 PM  
 ALT (SGPT) 43 (H) 08/29/2018 12:07 PM  
 
Low salt diet? yes Aerobic exercise? yes Our goal is to normalize the blood pressure to decrease the risks of strokes and heart attacks. The patient is in agreement with the plan.  Hypercholesterolemia: 
Cardiovascular risks for her are: LDL goal is under 80 
diabetic 
hypertension 
hyperlipidemia. Key Antihyperlipidemia Meds TRICOR 145 mg tablet  (Taking) TAKE 1 TABLET DAILY  
 ezetimibe (ZETIA) 10 mg tablet  (Taking) TAKE 1 TABLET DAILY  
 omega-3 fatty acids-vitamin e (FISH OIL) 1,000 mg cap  (Taking) Take 2 Caps by mouth two (2) times a day. Lab Results Component Value Date/Time Cholesterol, total 131 08/29/2018 12:07 PM  
 HDL Cholesterol 44 08/29/2018 12:07 PM  
 LDL, calculated 52 08/29/2018 12:07 PM  
 Triglyceride 174 (H) 08/29/2018 12:07 PM  
 CHOL/HDL Ratio 2.4 10/28/2010 09:07 AM  
 
Lab Results Component Value Date/Time ALT (SGPT) 43 (H) 08/29/2018 12:07 PM  
 AST (SGOT) 39 08/29/2018 12:07 PM  
 Alk.  phosphatase 55 08/29/2018 12:07 PM  
 Bilirubin, total 0.5 08/29/2018 12:07 PM  
 
 Myalgias: No 
 Fatigue: No 
 Other side effects: no Wt Readings from Last 3 Encounters:  
11/28/18 194 lb 9.6 oz (88.3 kg) 11/02/18 190 lb (86.2 kg) 10/19/18 191 lb 9.6 oz (86.9 kg) The patient is aware of our goal to reduce or eliminate the long term problems (such as strokes and heart attacks) related to poorly controlled hyperlipidemia.  Morbid obesity ([address]): 
I have reviewed/discussed the above normal BMI with the patient. I have recommended the following interventions: dietary management education, guidance, and counseling . Low starch diet, sweet avoidance, eating slowly.  Vitamin D deficiency: No sx. Due for testing. Lab Results Component Value Date/Time Vitamin D 25-Hydroxy 41.1 11/18/2011 09:22 AM  
 VITAMIN D, 25-HYDROXY 47.6 01/29/2018 02:47 PM  
   
   
 Mild persistent asthmatic bronchitis without complication: She had a cold several weeks ago and is still coughing from it. On examination she has expiratory wheezing with cough. Wheezing is the earliest manifestation of cough. She will be treated with albuterol as needed 4 times daily. Particularly at bedtime.  Right otitis media with effusion: Since her upper respiratory infection she is also had congestion and decreased hearing in her right ear. On exam she has a clear effusion. Current and past medical information: 
 
Current Medications after this visit[de-identified]    
Current Outpatient Medications Medication Sig  
 albuterol (PROVENTIL HFA, VENTOLIN HFA, PROAIR HFA) 90 mcg/actuation inhaler Take 2 Puffs by inhalation every four (4) hours as needed for Wheezing for up to 360 days.  fluticasone (FLONASE) 50 mcg/actuation nasal spray 2 Sprays by Both Nostrils route daily.  TRICOR 145 mg tablet TAKE 1 TABLET DAILY  BYDUREON 2 mg/0.65 mL pnij INJECT THE CONTENTS OF 1 PEN UNDER THE SKIN EVERY 7 DAYS  esomeprazole (NEXIUM) 40 mg capsule Take 1 Cap by mouth daily. Indications: gastroesophageal reflux disease  loratadine (CLARITIN) 10 mg tablet TAKE 1 TABLET DAILY  ezetimibe (ZETIA) 10 mg tablet TAKE 1 TABLET DAILY  losartan-hydroCHLOROthiazide (HYZAAR) 100-25 mg per tablet TAKE 1 TABLET DAILY  ondansetron (ZOFRAN ODT) 4 mg disintegrating tablet Take 1 Tab by mouth every eight (8) hours as needed for Nausea. Indications: PREVENTION OF POST-OPERATIVE NAUSEA AND VOMITING  
 polyethylene glycol (MIRALAX) 17 gram packet Take 1 Packet by mouth daily. Indications: constipation, If constipation last more than 24-48 hours, despite colace use. (Patient taking differently: Take 17 g by mouth as needed.)  aspirin 81 mg chewable tablet Take 81 mg by mouth daily.  lancets (FREESTYLE LANCETS) 28 gauge misc TEST ONCE DAILY DX: E11.65  
 glucose blood VI test strips (FREESTYLE LITE STRIPS) strip TEST ONCE A DAY DX CODE: E11.65  
 dilTIAZem CD (CARDIZEM CD) 180 mg ER capsule TAKE 1 CAPSULE DAILY  cloNIDine HCl (CATAPRES) 0.1 mg tablet TAKE 1 TABLET NIGHTLY  traMADol (ULTRAM) 50 mg tablet Take 1 Tab by mouth every six (6) hours as needed for Pain. Max Daily Amount: 200 mg.  
 omega-3 fatty acids-vitamin e (FISH OIL) 1,000 mg cap Take 2 Caps by mouth two (2) times a day.  cholecalciferol, vitamin d3, (VITAMIN D) 1,000 unit tablet Take 1,000 Units by mouth two (2) times a day.  HYDROcodone-acetaminophen (NORCO) 5-325 mg per tablet Take 1-2 Tabs by mouth every four (4) hours as needed for Pain (Acute post-operative pain). Max Daily Amount: 12 Tabs. Indications: Pain No current facility-administered medications for this visit. Patient Active Problem List  
 Diagnosis Date Noted  Type 2 diabetes mellitus with hyperglycemia, without long-term current use of insulin [facility] 12/19/2016 Priority: 1 - One  Vitamin D deficiency 06/28/2010 Priority: 1 - One  
 Hypertension Priority: 1 - One  
 Hypercholesterolemia Priority: 1 - One  GERD (gastroesophageal reflux disease) Priority: 1 - One  
 Hot flashes, menopausal   
  Priority: 2 - Two  Type 2 diabetes mellitus with nephropathy ([facility] [address] 2018  Essential hypertension 10/18/2015  Mixed hyperlipidemia 10/18/2015  Morbid obesity ([address] 10/18/2015  S/P total knee replacement using cement 2015  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  Allergic rhinitis 2011  Arthritis 2011 Past Medical History:  
Diagnosis Date  Arthritis 2011 OSTEO  Chronic obstructive pulmonary disease ([address] VERY MILD - patient denies having COPD as of 2018  Chronic pain LEFT KNEE  
 Diabetes ([address]  GERD (gastroesophageal reflux disease)  Hot flashes, menopausal   
 Hypercholesterolemia  Hypertension  Ill-defined condition Elevated liver enzymes (has family history)  Nausea & vomiting  Seizures ([address]  AFTER DRINKING 2 MARGARITAS  Vitamin D deficiency 2010 Allergies Allergen Reactions  Ciprofloxacin Nausea Only  Erythromycin Nausea Only  Metformin Other (comments) and Nausea Only Felt bad Felt bad  Pcn [Penicillins] Swelling Caused lips to swell Tolerates Amoxicillin Past Surgical History:  
Procedure Laterality Date  HX APPENDECTOMY    HX CATARACT REMOVAL Bilateral   
 HX CHOLECYSTECTOMY  2018 [address]  HX KNEE REPLACEMENT Left   HX OOPHORECTOMY    
 bilateral  
 HX OTHER SURGICAL    
 LIPOMA LEFT SIDE  
 HX ROTATOR CUFF REPAIR Right  Social History Socioeconomic History  Marital status:  Spouse name: Not on file  Number of children: Not on file  Years of education: Not on file  Highest education level: Not on file Tobacco Use  Smoking status: Former Smoker Packs/day: 1.00 Years: 20.00 Pack years: 20.00 Last attempt to quit:  Years since quittin.9  Smokeless tobacco: Never Used Substance and Sexual Activity  Alcohol use: Yes Comment: rarely - 1 glass of wine every 3 months  Drug use: No  
 Sexual activity: Yes Review of Systems Constitutional: Negative. Negative for chills, fever, malaise/fatigue and weight loss. HENT: Positive for congestion. Negative for hearing loss. Right ear congestion Eyes: Negative. Negative for blurred vision and double vision. Respiratory: Positive for cough and wheezing. Negative for hemoptysis, sputum production and shortness of breath. Cardiovascular: Negative. Negative for chest pain, palpitations and orthopnea. Gastrointestinal: Negative. Negative for abdominal pain, blood in stool, heartburn, nausea and vomiting. Genitourinary: Negative. Negative for dysuria, frequency and urgency. Musculoskeletal: Negative. Negative for back pain, myalgias and neck pain. Skin: Negative. Negative for rash. Neurological: Negative. Negative for dizziness, tingling, tremors, weakness and headaches. Endo/Heme/Allergies: Negative. Psychiatric/Behavioral: Negative. Negative for depression. Objective:  
 
Vitals:  
 11/28/18 1058 BP: 160/88 Pulse: 60 Resp: 20 Temp: 97.5 °F (36.4 °C) TempSrc: Oral  
SpO2: 97% Weight: 194 lb 9.6 oz (88.3 kg) Height: 4' 11.5\" (1.511 m) Body mass index is 38.65 kg/m². Physical Exam  
Constitutional: She is oriented to person, place, and time and well-developed, well-nourished, and in no distress. No distress. HENT:  
Head: Normocephalic and atraumatic. Mouth/Throat: Oropharynx is clear and moist.  
Right serous otitis media-transparent. Eyes: Conjunctivae are normal.  
Neck: No thyromegaly present. Cardiovascular: Normal rate, regular rhythm and normal heart sounds. No murmur heard. Pulmonary/Chest: Effort normal and breath sounds normal. No respiratory distress. Abdominal: Soft. She exhibits no distension. Neurological: She is alert and oriented to person, place, and time. Diabetic foot exam:  
 
Left Foot: 
 Visual Exam: normal  
 Pulse DP: 2+ (normal) Filament test: normal sensation Vibratory sensation: normal 
   
Right Foot: 
 Visual Exam: normal  
 Pulse DP: 2+ (normal) Filament test: normal sensation Vibratory sensation: normal 
  
Skin: Skin is warm. No rash noted. She is not diaphoretic. No erythema. Psychiatric: Mood and affect normal.  
Nursing note and vitals reviewed. Health Maintenance Due Topic Date Due  Shingrix Vaccine Age 50> (1 of 2) 12/15/1991  
 EYE EXAM RETINAL OR DILATED Q1  10/26/2018  
 FOOT EXAM Q1  12/08/2018 Assessment and orders:  
 
Encounter Diagnoses ICD-10-CM ICD-9-CM 1. Type 2 diabetes mellitus with nephropathy (HCC) E11.21 250.40  
  583.81  
2. Essential hypertension I10 401.9 3. Hypercholesterolemia E78.00 272.0  
4. Morbid obesity ([facility] [address] E66.01 278.01  
5. Vitamin D deficiency E55.9 268.9 6. Mild persistent asthmatic bronchitis without complication K02.69 725.56  
7. Right otitis media with effusion H65.91 381.4 Diagnoses and all orders for this visit: 
 
1. Type 2 diabetes mellitus with nephropathy (HCC)-retest  
-     HEMOGLOBIN A1C WITH EAG 
-     MICROALBUMIN, UR, RAND W/ MICROALB/CREAT RATIO 
-     LIPID PANEL 
-     METABOLIC PANEL, COMPREHENSIVE 
-      DIABETES FOOT EXAM 
 
2. Essential hypertension-mild elevation today. Usually normal. 
-     MICROALBUMIN, UR, RAND W/ MICROALB/CREAT RATIO 
-     LIPID PANEL 
-     METABOLIC PANEL, COMPREHENSIVE 3. Hypercholesterolemia -     LIPID PANEL 
-     METABOLIC PANEL, COMPREHENSIVE 4. Morbid obesity (HCC)-low starch diet and eat slowly. 5. Vitamin D deficiency-corrected 6. Mild persistent asthmatic bronchitis without complication-following upper respiratory infection she has had 4 weeks of symptoms -     albuterol (PROVENTIL HFA, VENTOLIN HFA, PROAIR HFA) 90 mcg/actuation inhaler; Take 2 Puffs by inhalation every four (4) hours as needed for Wheezing for up to 360 days. 7. Right serous otitis media with effusion-Flonase 2 sprays daily for next month. She will call if she begins to get pain purulent nasal discharge or fever as serous otitis media predispose one to get acute infectious otitis media. -     fluticasone (FLONASE) 50 mcg/actuation nasal spray; 2 Sprays by Both Nostrils route daily. Plan of care: 
Discussed diagnoses in detail with patient. Medication risks/benefits/side effects discussed with patient. All of the patient's questions were addressed. The patient understands and agrees with our plan of care. The patient knows to call back if they are unsure of or forget any changes we discussed today or if the symptoms change. The patient received an After-Visit Summary which contains VS, orders, medication list and allergy list. This can be used as a \"mini-medical record\" should they have to seek medical care while out of town. Patient Care Team: 
Ramirez Lopez MD as PCP - Stephanie Pressley MD as Physician (Cardiology) Natan Moon MD as Physician (Orthopedic Surgery) Casey Vickers MD (Endocrinology) Parisa Lopez RN as Ambulatory Care Navigator Harjinder Nichols MD (General Surgery) Tita Kenny MD (Ophthalmology) Follow-up Disposition: Not on File Future Appointments Date Time Provider [facility] 2/8/2019  3:05 PM Ramirez Lopez MD [facility] SCHED  
3/20/2019  1:45 PM Casey Vickers MD [address] Signed By: Jeramie Dumont MD   
 November 28, 2018 This note was created using voice recognition software. Despite editing, there may be syntax errors.

## 2018-11-28 NOTE — PROGRESS NOTES
1. Have you been to the ER, urgent care clinic since your last visit? Hospitalized since your last visit? No 
 
2. Have you seen or consulted any other health care providers outside of the [address] [place] [address] since your last visit? Include any pap smears or colon screening. No 
Reviewed record in preparation for visit and have necessary documentation Pt did not bring medication to office visit for review Information was given to pt on Advanced Directives, Living Will Information was given on Shingles Vaccine 
opportunity was given for questions Goals that were addressed and/or need to be completed during or after this appointment include Health Maintenance Due Topic Date Due  Shingrix Vaccine Age 50> (1 of 2) 12/15/1991  
 EYE EXAM RETINAL OR DILATED Q1  10/26/2018  
 FOOT EXAM Q1  12/08/2018 Requesting eye exam results from Dr. Raciel Carbone.

## 2018-11-29 LAB
ALBUMIN SERPL-MCNC: 4.2 G/DL (ref 3.5–4.8)
ALBUMIN/CREAT UR: 84.4 MG/G CREAT (ref 0–30)
ALBUMIN/GLOB SERPL: 1.8 {RATIO} (ref 1.2–2.2)
ALP SERPL-CCNC: 60 IU/L (ref 39–117)
ALT SERPL-CCNC: 32 IU/L (ref 0–32)
AST SERPL-CCNC: 29 IU/L (ref 0–40)
BILIRUB SERPL-MCNC: 0.4 MG/DL (ref 0–1.2)
BUN SERPL-MCNC: 14 MG/DL (ref 8–27)
BUN/CREAT SERPL: 16 (ref 12–28)
CALCIUM SERPL-MCNC: 9.7 MG/DL (ref 8.7–10.3)
CHLORIDE SERPL-SCNC: 97 MMOL/L (ref 96–106)
CHOLEST SERPL-MCNC: 129 MG/DL (ref 100–199)
CO2 SERPL-SCNC: 28 MMOL/L (ref 20–29)
CREAT SERPL-MCNC: 0.89 MG/DL (ref 0.57–1)
CREAT UR-MCNC: 78.9 MG/DL
EST. AVERAGE GLUCOSE BLD GHB EST-MCNC: 148 MG/DL
GLOBULIN SER CALC-MCNC: 2.4 G/DL (ref 1.5–4.5)
GLUCOSE SERPL-MCNC: 116 MG/DL (ref 65–99)
HBA1C MFR BLD: 6.8 % (ref 4.8–5.6)
HDLC SERPL-MCNC: 44 MG/DL
LDLC SERPL CALC-MCNC: 52 MG/DL (ref 0–99)
Lab: NORMAL
MICROALBUMIN UR-MCNC: 66.6 UG/ML
POTASSIUM SERPL-SCNC: 4 MMOL/L (ref 3.5–5.2)
PROT SERPL-MCNC: 6.6 G/DL (ref 6–8.5)
SODIUM SERPL-SCNC: 139 MMOL/L (ref 134–144)
TRIGL SERPL-MCNC: 163 MG/DL (ref 0–149)
VLDLC SERPL CALC-MCNC: 33 MG/DL (ref 5–40)

## 2019-01-03 DIAGNOSIS — I10 ESSENTIAL HYPERTENSION: ICD-10-CM

## 2019-01-03 RX ORDER — CLONIDINE HYDROCHLORIDE 0.1 MG/1
TABLET ORAL
Qty: 90 TAB | Refills: 3 | Status: SHIPPED | OUTPATIENT
Start: 2019-01-03 | End: 2019-12-29

## 2019-02-08 DIAGNOSIS — Z23 ENCOUNTER FOR IMMUNIZATION: Primary | ICD-10-CM

## 2019-02-08 RX ORDER — DILTIAZEM HYDROCHLORIDE 180 MG/1
CAPSULE, EXTENDED RELEASE ORAL
Qty: 90 CAP | Refills: 3 | Status: SHIPPED | OUTPATIENT
Start: 2019-02-08 | End: 2019-05-31

## 2019-02-08 NOTE — TELEPHONE ENCOUNTER
Patient in the office today with spouse and requested a prescription for the Shingrix vaccine. Printed prescription per Dr. Thalia Saravia.

## 2019-04-01 ENCOUNTER — OFFICE VISIT (OUTPATIENT)
Dept: FAMILY MEDICINE CLINIC | Age: 79
End: 2019-04-01

## 2019-04-01 VITALS
OXYGEN SATURATION: 95 % | WEIGHT: 196.4 LBS | DIASTOLIC BLOOD PRESSURE: 81 MMHG | HEART RATE: 68 BPM | SYSTOLIC BLOOD PRESSURE: 151 MMHG | HEIGHT: 60 IN | RESPIRATION RATE: 20 BRPM | TEMPERATURE: 97.8 F | BODY MASS INDEX: 38.56 KG/M2

## 2019-04-01 DIAGNOSIS — I10 ESSENTIAL HYPERTENSION: Chronic | ICD-10-CM

## 2019-04-01 DIAGNOSIS — K21.00 GASTROESOPHAGEAL REFLUX DISEASE WITH ESOPHAGITIS: Chronic | ICD-10-CM

## 2019-04-01 DIAGNOSIS — E78.00 HYPERCHOLESTEROLEMIA: Chronic | ICD-10-CM

## 2019-04-01 DIAGNOSIS — Z12.11 COLON CANCER SCREENING: Primary | ICD-10-CM

## 2019-04-01 DIAGNOSIS — E11.65 TYPE 2 DIABETES MELLITUS WITH HYPERGLYCEMIA, WITHOUT LONG-TERM CURRENT USE OF INSULIN (HCC): Chronic | ICD-10-CM

## 2019-04-01 DIAGNOSIS — E55.9 VITAMIN D DEFICIENCY: Chronic | ICD-10-CM

## 2019-04-01 NOTE — PATIENT INSTRUCTIONS

## 2019-04-01 NOTE — PROGRESS NOTES
[address] 
[phone]  
 
 
Progress Note Patient: Blair Miller MRN: [id number]  SSN: [id number] YOB: 1940  Age: 69 y.o. Sex: female Chief Complaint Patient presents with  Follow Up Chronic Condition Subjective:  
 
Encounter Diagnoses Name Primary?  Colon cancer screening: She is due for colonoscopy. Will place the order for her to see  
 
. Yes  Type 2 diabetes mellitus with hyperglycemia, without long-term current use of insulin ([facility] [address]): This patient is managed under a comprehensive plan of care for Diabetes. Overall the patient feels well with good energy level. Key Antihyperglycemic Medications BYDUREON 2 mg/0.65 mL pnij (Taking) INJECT THE CONTENTS OF 1 PEN UNDER THE SKIN EVERY 7 DAYS Pertinent Labs:  
Lab Results Component Value Date/Time Hemoglobin A1c 6.8 (H) 11/28/2018 04:10 PM  
 Hemoglobin A1c 6.9 (H) 08/29/2018 12:07 PM  
 Hemoglobin A1c 7.1 (H) 05/23/2018 04:13 PM  
 
 Body mass index is 39 kg/m². Lab Results Component Value Date/Time LDL, calculated 52 11/28/2018 04:10 PM  
     
Lab Results Component Value Date/Time Sodium 139 11/28/2018 04:10 PM  
 Potassium 4.0 11/28/2018 04:10 PM  
 Chloride 97 11/28/2018 04:10 PM  
 CO2 28 11/28/2018 04:10 PM  
 Anion gap 9 06/20/2018 10:38 AM  
 Glucose 116 (H) 11/28/2018 04:10 PM  
 BUN 14 11/28/2018 04:10 PM  
 Creatinine 0.89 11/28/2018 04:10 PM  
 BUN/Creatinine ratio 16 11/28/2018 04:10 PM  
 GFR est AA 73 11/28/2018 04:10 PM  
 GFR est non-AA 63 11/28/2018 04:10 PM  
 Calcium 9.7 11/28/2018 04:10 PM  
 AST (SGOT) 29 11/28/2018 04:10 PM  
 Alk.  phosphatase 60 11/28/2018 04:10 PM  
 Protein, total 6.6 11/28/2018 04:10 PM  
 Albumin 4.2 11/28/2018 04:10 PM  
 Globulin 3.2 10/28/2010 09:07 AM  
 A-G Ratio 1.8 11/28/2018 04:10 PM  
 ALT (SGPT) 32 11/28/2018 04:10 PM  
 
 Lab Results Component Value Date/Time Microalb/Creat ratio (ug/mg creat.) 84.4 (H) 2018 04:10 PM  
 
 Frequency of home glucose testing: No logs Blood Sugar range at home:  
 Last eye exam: In past 12 months. Last foot exam: This year. Polyuria, polyphagia or polydipsia: No 
 Retinopathy: No 
 Neuropathy SX: No  
 Low blood sugar symptoms: No 
 Dietary compliance: Fair. Is gained weight over the winter. Medication compliance:Good On ASA: Yes Depression: No 
 CKD:no Wt Readings from Last 3 Encounters:  
19 196 lb 6.4 oz (89.1 kg) 18 194 lb 9.6 oz (88.3 kg) 18 190 lb (86.2 kg) Social History Tobacco Use Smoking Status Former Smoker  Packs/day: 1.00  Years: 20.00  Pack years: 20.00  Last attempt to quit:   Years since quittin.2 Smokeless Tobacco Never Used Body mass index is 39 kg/m². Diabetic Consultants: Dr. Robbie Lora. All the patient's questions regarding medications, diet and exercise were answered. Goal of A1C of less than 7.5% is our goal.  
Our overall goal is to reduce or eliminate the long term consequences of poorly controlled diabetes.  Hypercholesterolemia: 
Cardiovascular risks for her are: LDL goal is under 80 
diabetic 
hypertension 
hyperlipidemia. Key Antihyperlipidemia Meds TRICOR 145 mg tablet (Taking) TAKE 1 TABLET DAILY  
 ezetimibe (ZETIA) 10 mg tablet (Taking) TAKE 1 TABLET DAILY  
 omega-3 fatty acids-vitamin e (FISH OIL) 1,000 mg cap (Taking) Take 2 Caps by mouth two (2) times a day. Lab Results Component Value Date/Time Cholesterol, total 129 2018 04:10 PM  
 HDL Cholesterol 44 2018 04:10 PM  
 LDL, calculated 52 2018 04:10 PM  
 Triglyceride 163 (H) 2018 04:10 PM  
 CHOL/HDL Ratio 2.4 10/28/2010 09:07 AM  
 
Lab Results Component Value Date/Time  ALT (SGPT) 32 2018 04:10 PM  
 AST (SGOT) 29 2018 04:10 PM  
 Alk. phosphatase 60 11/28/2018 04:10 PM  
 Bilirubin, total 0.4 11/28/2018 04:10 PM  
 
 Myalgias: No 
 Fatigue: No 
 Other side effects: no Wt Readings from Last 3 Encounters:  
04/01/19 196 lb 6.4 oz (89.1 kg) 11/28/18 194 lb 9.6 oz (88.3 kg) 11/02/18 190 lb (86.2 kg) The patient is aware of our goal to reduce or eliminate the long term problems (such as strokes and heart attacks) related to poorly controlled hyperlipidemia.  Gastroesophageal reflux disease with esophagitis: 
Current control of Symptoms:good Hiatal Hernia:no Current Medications:esomeprazole The patient has no history melena or bright red blood in the stools. The patient avoids high dose aspirin and NSAID therapy. The patient is aware of diet changes needed, elevating the head of the bed and appropriate use of antacids.  Essential hypertension: 
BP Readings from Last 3 Encounters:  
04/01/19 151/81  
11/28/18 143/74  
11/02/18 133/86 The patient reports:  taking medications as instructed, no medication side effects noted, no TIA's, no chest pain on exertion, no dyspnea on exertion, no swelling of ankles. Key CAD CHF Meds CARTIA  mg ER capsule (Taking) TAKE 1 CAPSULE DAILY  
 cloNIDine HCl (CATAPRES) 0.1 mg tablet (Taking) TAKE 1 TABLET NIGHTLY TRICOR 145 mg tablet (Taking) TAKE 1 TABLET DAILY  
 ezetimibe (ZETIA) 10 mg tablet (Taking) TAKE 1 TABLET DAILY losartan-hydroCHLOROthiazide (HYZAAR) 100-25 mg per tablet (Taking) TAKE 1 TABLET DAILY  
 aspirin 81 mg chewable tablet (Taking) Take 81 mg by mouth daily. omega-3 fatty acids-vitamin e (FISH OIL) 1,000 mg cap (Taking) Take 2 Caps by mouth two (2) times a day. Lab Results Component Value Date/Time  Sodium 139 11/28/2018 04:10 PM  
 Potassium 4.0 11/28/2018 04:10 PM  
 Chloride 97 11/28/2018 04:10 PM  
 CO2 28 11/28/2018 04:10 PM  
 Anion gap 9 06/20/2018 10:38 AM  
 Glucose 116 (H) 11/28/2018 04:10 PM  
 BUN 14 11/28/2018 04:10 PM  
 Creatinine 0.89 11/28/2018 04:10 PM  
 BUN/Creatinine ratio 16 11/28/2018 04:10 PM  
 GFR est AA 73 11/28/2018 04:10 PM  
 GFR est non-AA 63 11/28/2018 04:10 PM  
 Calcium 9.7 11/28/2018 04:10 PM  
 Bilirubin, total 0.4 11/28/2018 04:10 PM  
 AST (SGOT) 29 11/28/2018 04:10 PM  
 Alk. phosphatase 60 11/28/2018 04:10 PM  
 Protein, total 6.6 11/28/2018 04:10 PM  
 Albumin 4.2 11/28/2018 04:10 PM  
 Globulin 3.2 10/28/2010 09:07 AM  
 A-G Ratio 1.8 11/28/2018 04:10 PM  
 ALT (SGPT) 32 11/28/2018 04:10 PM  
 
Low salt diet? yes Aerobic exercise? no 
Our goal is to normalize the blood pressure to decrease the risks of strokes and heart attacks. The patient is in agreement with the plan.  Vitamin D deficiency: No sx. Lab Results Component Value Date/Time Vitamin D 25-Hydroxy 41.1 11/18/2011 09:22 AM  
 VITAMIN D, 25-HYDROXY 47.6 01/29/2018 02:47 PM  
   
   
 
 
 
 
 
Current and past medical information: 
 
Current Medications after this visit[de-identified]    
Current Outpatient Medications Medication Sig  CARTIA  mg ER capsule TAKE 1 CAPSULE DAILY  cloNIDine HCl (CATAPRES) 0.1 mg tablet TAKE 1 TABLET NIGHTLY  albuterol (PROVENTIL HFA, VENTOLIN HFA, PROAIR HFA) 90 mcg/actuation inhaler Take 2 Puffs by inhalation every four (4) hours as needed for Wheezing for up to 360 days.  fluticasone (FLONASE) 50 mcg/actuation nasal spray 2 Sprays by Both Nostrils route daily.  TRICOR 145 mg tablet TAKE 1 TABLET DAILY  BYDUREON 2 mg/0.65 mL pnij INJECT THE CONTENTS OF 1 PEN UNDER THE SKIN EVERY 7 DAYS  esomeprazole (NEXIUM) 40 mg capsule Take 1 Cap by mouth daily. Indications: gastroesophageal reflux disease  loratadine (CLARITIN) 10 mg tablet TAKE 1 TABLET DAILY  ezetimibe (ZETIA) 10 mg tablet TAKE 1 TABLET DAILY  losartan-hydroCHLOROthiazide (HYZAAR) 100-25 mg per tablet TAKE 1 TABLET DAILY  HYDROcodone-acetaminophen (NORCO) 5-325 mg per tablet Take 1-2 Tabs by mouth every four (4) hours as needed for Pain (Acute post-operative pain). Max Daily Amount: 12 Tabs. Indications: Pain  ondansetron (ZOFRAN ODT) 4 mg disintegrating tablet Take 1 Tab by mouth every eight (8) hours as needed for Nausea. Indications: PREVENTION OF POST-OPERATIVE NAUSEA AND VOMITING  
 polyethylene glycol (MIRALAX) 17 gram packet Take 1 Packet by mouth daily. Indications: constipation, If constipation last more than 24-48 hours, despite colace use. (Patient taking differently: Take 17 g by mouth as needed.)  aspirin 81 mg chewable tablet Take 81 mg by mouth daily.  lancets (FREESTYLE LANCETS) 28 gauge misc TEST ONCE DAILY DX: E11.65  
 glucose blood VI test strips (FREESTYLE LITE STRIPS) strip TEST ONCE A DAY DX CODE: E11.65  
 traMADol (ULTRAM) 50 mg tablet Take 1 Tab by mouth every six (6) hours as needed for Pain. Max Daily Amount: 200 mg.  
 omega-3 fatty acids-vitamin e (FISH OIL) 1,000 mg cap Take 2 Caps by mouth two (2) times a day.  cholecalciferol, vitamin d3, (VITAMIN D) 1,000 unit tablet Take 1,000 Units by mouth two (2) times a day. No current facility-administered medications for this visit. Patient Active Problem List  
 Diagnosis Date Noted  Type 2 diabetes mellitus with hyperglycemia, without long-term current use of insulin ([email or web address] 12/19/2016 Priority: 1 - One  Vitamin D deficiency 06/28/2010 Priority: 1 - One  
 Hypertension Priority: 1 - One  
 Hypercholesterolemia Priority: 1 - One  GERD (gastroesophageal reflux disease) Priority: 1 - One  
 Hot flashes, menopausal   
  Priority: 2 - Two  Type 2 diabetes mellitus with nephropathy ([facility] [address] 01/23/2018  Essential hypertension 10/18/2015  Mixed hyperlipidemia 10/18/2015  Morbid obesity ([email or web address] 10/18/2015  S/P total knee replacement using cement 09/09/2015  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  Allergic rhinitis 2011  Arthritis 2011 Past Medical History:  
Diagnosis Date  Arthritis 2011 OSTEO  Chronic obstructive pulmonary disease ([facility] [address] VERY MILD - patient denies having COPD as of 2018  Chronic pain LEFT KNEE  
 Diabetes ([facility] [address]  GERD (gastroesophageal reflux disease)  Hot flashes, menopausal   
 Hypercholesterolemia  Hypertension  Ill-defined condition Elevated liver enzymes (has family history)  Nausea & vomiting  Seizures ([facility] [address]  AFTER DRINKING 2 MARGARITAS  Vitamin D deficiency 2010 Allergies Allergen Reactions  Ciprofloxacin Nausea Only  Erythromycin Nausea Only  Metformin Other (comments) and Nausea Only Felt bad Felt bad  Pcn [Penicillins] Swelling Caused lips to swell Tolerates Amoxicillin Past Surgical History:  
Procedure Laterality Date  HX APPENDECTOMY    HX CATARACT REMOVAL Bilateral   
 HX CHOLECYSTECTOMY  2018 [address]  HX KNEE REPLACEMENT Left 2014  HX OOPHORECTOMY    
 bilateral  
 HX OTHER SURGICAL    
 LIPOMA LEFT SIDE  
 HX ROTATOR CUFF REPAIR Right  Social History Socioeconomic History  Marital status:  Spouse name: Not on file  Number of children: Not on file  Years of education: Not on file  Highest education level: Not on file Tobacco Use  Smoking status: Former Smoker Packs/day: 1.00 Years: 20.00 Pack years: 20.00 Last attempt to quit:  Years since quittin.2  Smokeless tobacco: Never Used Substance and Sexual Activity  Alcohol use: Yes Comment: rarely - 1 glass of wine every 3 months  Drug use: No  
 Sexual activity: Yes Review of Systems Constitutional: Negative. Negative for chills, fever, malaise/fatigue and weight loss. HENT: Negative. Negative for hearing loss. Eyes: Negative. Negative for blurred vision and double vision. Respiratory: Negative. Negative for cough, hemoptysis, sputum production and shortness of breath. Cardiovascular: Negative. Negative for chest pain, palpitations and orthopnea. Gastrointestinal: Negative. Negative for abdominal pain, blood in stool, heartburn, nausea and vomiting. Genitourinary: Negative. Negative for dysuria, frequency and urgency. Musculoskeletal: Positive for joint pain. Negative for back pain, falls, myalgias and neck pain. Skin: Negative. Negative for rash. Neurological: Negative. Negative for dizziness, tingling, tremors, sensory change, weakness and headaches. Endo/Heme/Allergies: Negative. Psychiatric/Behavioral: Negative. Negative for depression. Objective:  
 
Vitals:  
 04/01/19 1130 04/01/19 1153 BP: (!) 157/95 151/81 Pulse: 68 Resp: 20 Temp: 97.8 °F (36.6 °C) TempSrc: Oral   
SpO2: 95% Weight: 196 lb 6.4 oz (89.1 kg) Height: 4' 11.5\" (1.511 m) Body mass index is 39 kg/m². Physical Exam  
Constitutional: She is oriented to person, place, and time and well-developed, well-nourished, and in no distress. No distress. HENT:  
Head: Normocephalic and atraumatic. Mouth/Throat: Oropharynx is clear and moist.  
Eyes: Conjunctivae are normal.  
Neck: No thyromegaly present. Cardiovascular: Normal rate, regular rhythm and normal heart sounds. No murmur heard. Pulmonary/Chest: Effort normal and breath sounds normal. No respiratory distress. Abdominal: Soft. She exhibits no distension. There is no tenderness. There is no rebound and no guarding. Musculoskeletal: She exhibits no edema. Neurological: She is alert and oriented to person, place, and time. Skin: Skin is warm. No rash noted. She is not diaphoretic. No erythema. Psychiatric: Mood and affect normal.  
Nursing note and vitals reviewed. Health Maintenance Due Topic Date Due  
  MEDICARE YEARLY EXAM  02/06/2019  COLONOSCOPY  06/27/2019 Assessment and orders:  
 
Encounter Diagnoses ICD-10-CM ICD-9-CM 1. Colon cancer screening Z12.11 V76.51  
2. Type 2 diabetes mellitus with hyperglycemia, without long-term current use of insulin (HCC) E11.65 250.00  
  790.29  
3. Hypercholesterolemia E78.00 272.0  
4. Gastroesophageal reflux disease with esophagitis K21.0 530.11  
5. Essential hypertension I10 401.9 6. Vitamin D deficiency E55.9 268.9 Diagnoses and all orders for this visit: 1. Colon cancer screening 
-     REFERRAL TO GASTROENTEROLOGY 2. Type 2 diabetes mellitus with hyperglycemia, without long-term current use of insulin (HCC)-recheck labs -     REFERRAL TO GASTROENTEROLOGY 
-     HEMOGLOBIN A1C WITH EAG 
-     MICROALBUMIN, UR, RAND W/ MICROALB/CREAT RATIO 
-     LIPID PANEL 
-     METABOLIC PANEL, COMPREHENSIVE 3. Hypercholesterolemia-recheck labs -     LIPID PANEL 
-     METABOLIC PANEL, COMPREHENSIVE 4. Gastroesophageal reflux disease with esophagitis-symptoms controlled 5. Essential hypertension-blood pressure is high today. I have given her home monitoring sheet. -     MICROALBUMIN, UR, RAND W/ MICROALB/CREAT RATIO 
-     LIPID PANEL 
-     METABOLIC PANEL, COMPREHENSIVE 6. Vitamin D deficiency-vitamin D was normal when recently checked. Plan of care: 
Discussed diagnoses in detail with patient. Medication risks/benefits/side effects discussed with patient. All of the patient's questions were addressed. The patient understands and agrees with our plan of care. The patient knows to call back if they are unsure of or forget any changes we discussed today or if the symptoms change. The patient received an After-Visit Summary which contains VS, orders, medication list and allergy list. This can be used as a \"mini-medical record\" should they have to seek medical care while out of town. Patient Care Team: Silas Cerrato MD as PCP - General 
Haritha Roman MD as Physician (Cardiology) Emiliano Cordero MD as Physician (Orthopedic Surgery) Fred Garg MD (Endocrinology) Katie Sanchez MD (General Surgery) Jose Angel Aguirre MD (Ophthalmology) Follow-up and Dispositions · Return in about 3 months (around 7/1/2019) for  mwe 1 month. Desire Rosenthal Future Appointments Date Time Provider [facility] 5/7/2019 11:30 AM Fred Garg MD [address] Signed By: Peace Parish MD   
 April 1, 2019 This note was created using voice recognition software. Despite editing, there may be syntax errors.

## 2019-04-01 NOTE — PROGRESS NOTES
1. Have you been to the ER, urgent care clinic since your last visit? Hospitalized since your last visit? No 
 
2. Have you seen or consulted any other health care providers outside of the [address] [place] [address] since your last visit? Include any pap smears or colon screening. No 
Reviewed record in preparation for visit and have necessary documentation Pt did not bring medication to office visit for review Information was given to pt on Advanced Directives, Living Will Information was given on Shingles Vaccine 
opportunity was given for questions Goals that were addressed and/or need to be completed during or after this appointment include Health Maintenance Due Topic Date Due  Shingrix Vaccine Age 50> (1 of 2) 12/15/1991  MEDICARE YEARLY EXAM  02/06/2019  COLONOSCOPY  06/27/2019

## 2019-04-02 LAB
ALBUMIN SERPL-MCNC: 4.3 G/DL (ref 3.5–4.8)
ALBUMIN/CREAT UR: 57.2 MG/G CREAT (ref 0–30)
ALBUMIN/GLOB SERPL: 1.7 {RATIO} (ref 1.2–2.2)
ALP SERPL-CCNC: 62 IU/L (ref 39–117)
ALT SERPL-CCNC: 46 IU/L (ref 0–32)
AST SERPL-CCNC: 37 IU/L (ref 0–40)
BILIRUB SERPL-MCNC: 0.5 MG/DL (ref 0–1.2)
BUN SERPL-MCNC: 18 MG/DL (ref 8–27)
BUN/CREAT SERPL: 20 (ref 12–28)
CALCIUM SERPL-MCNC: 10 MG/DL (ref 8.7–10.3)
CHLORIDE SERPL-SCNC: 99 MMOL/L (ref 96–106)
CHOLEST SERPL-MCNC: 143 MG/DL (ref 100–199)
CO2 SERPL-SCNC: 25 MMOL/L (ref 20–29)
CREAT SERPL-MCNC: 0.89 MG/DL (ref 0.57–1)
CREAT UR-MCNC: 129.3 MG/DL
EST. AVERAGE GLUCOSE BLD GHB EST-MCNC: 160 MG/DL
GLOBULIN SER CALC-MCNC: 2.6 G/DL (ref 1.5–4.5)
GLUCOSE SERPL-MCNC: 109 MG/DL (ref 65–99)
HBA1C MFR BLD: 7.2 % (ref 4.8–5.6)
HDLC SERPL-MCNC: 49 MG/DL
LDLC SERPL CALC-MCNC: 56 MG/DL (ref 0–99)
Lab: NORMAL
MICROALBUMIN UR-MCNC: 74 UG/ML
POTASSIUM SERPL-SCNC: 3.9 MMOL/L (ref 3.5–5.2)
PROT SERPL-MCNC: 6.9 G/DL (ref 6–8.5)
SODIUM SERPL-SCNC: 140 MMOL/L (ref 134–144)
TRIGL SERPL-MCNC: 188 MG/DL (ref 0–149)
VLDLC SERPL CALC-MCNC: 38 MG/DL (ref 5–40)

## 2019-04-03 DIAGNOSIS — I10 ESSENTIAL HYPERTENSION: Chronic | ICD-10-CM

## 2019-04-03 RX ORDER — LOSARTAN POTASSIUM AND HYDROCHLOROTHIAZIDE 25; 100 MG/1; MG/1
TABLET ORAL
Qty: 90 TAB | Refills: 2 | Status: SHIPPED | OUTPATIENT
Start: 2019-04-03 | End: 2019-12-29

## 2019-04-05 ENCOUNTER — TELEPHONE (OUTPATIENT)
Dept: FAMILY MEDICINE CLINIC | Age: 79
End: 2019-04-05

## 2019-04-05 NOTE — TELEPHONE ENCOUNTER
Attempted to call, unsuccessful, message left. Need to inform patient she is due for medicare wellness appointment. Please assist patient in scheduling this appointment.

## 2019-05-07 ENCOUNTER — OFFICE VISIT (OUTPATIENT)
Dept: ENDOCRINOLOGY | Age: 79
End: 2019-05-07

## 2019-05-07 VITALS
BODY MASS INDEX: 38.09 KG/M2 | TEMPERATURE: 96.9 F | RESPIRATION RATE: 16 BRPM | DIASTOLIC BLOOD PRESSURE: 57 MMHG | HEART RATE: 70 BPM | HEIGHT: 60 IN | WEIGHT: 194 LBS | SYSTOLIC BLOOD PRESSURE: 94 MMHG | OXYGEN SATURATION: 97 %

## 2019-05-07 DIAGNOSIS — E11.65 TYPE 2 DIABETES MELLITUS WITH HYPERGLYCEMIA, WITHOUT LONG-TERM CURRENT USE OF INSULIN (HCC): Primary | Chronic | ICD-10-CM

## 2019-05-07 DIAGNOSIS — I10 ESSENTIAL HYPERTENSION: Chronic | ICD-10-CM

## 2019-05-07 DIAGNOSIS — E78.2 MIXED HYPERLIPIDEMIA: ICD-10-CM

## 2019-05-07 NOTE — LETTER
5/8/19 Patient: Eagle Hernandez YOB: 1940 Date of Visit: 5/7/2019 Yennifer Campbell MD 
[address] [place] [address] VIA In Basket Dear Yennifer Campbell MD, Thank you for referring Ms. Jillian Ryder to [address] for evaluation. My notes for this consultation are attached. If you have questions, please do not hesitate to call me. I look forward to following your patient along with you. Sincerely, Maki Villarreal MD

## 2019-05-07 NOTE — PROGRESS NOTES
Javi Rocha MD        Patient Information  Date:5/7/2019  Name : Blair Miller 69 y.o.     YOB: 1940         Referred by: Ivana Fox MD         Chief Complaint   Patient presents with    Diabetes       History of Present Illness: Blair Miller is a 69 y.o. female here for fu of  Type 2 Diabetes Mellitus. Type 2 Diabetes was diagnosed in 10/2014 . End organ effects of diabetes: none.     Cardiovascular risk factors: family history, dyslipidemia, diabetes mellitus   Monitoring frequency:2/ day   Could not tolerate Metformin IR, XR formualtion and was on Actos,     Checking glucose at home  Has not noticed significant hypoglycemia     Compliant with medications    Has subcutaneous nodules but is getting better      Hyperlipidemia - was on tricor, zetia, colestipol, Niacin,    Wt Readings from Last 3 Encounters:   05/07/19 194 lb (88 kg)   04/01/19 196 lb 6.4 oz (89.1 kg)   11/28/18 194 lb 9.6 oz (88.3 kg)       BP Readings from Last 3 Encounters:   05/07/19 94/57   04/01/19 151/81   11/28/18 143/74           Past Medical History:   Diagnosis Date    Arthritis 2/28/2011    OSTEO    Chronic obstructive pulmonary disease ([facility]     VERY MILD - patient denies having COPD as of 6/20/2018    Chronic pain     LEFT KNEE    Diabetes ([facility]     GERD (gastroesophageal reflux disease)     Hot flashes, menopausal     Hypercholesterolemia     Hypertension     Ill-defined condition     Elevated liver enzymes (has family history)    Nausea & vomiting     Seizures (HCC) 1980'S    AFTER DRINKING 2 MARGARITAS    Vitamin D deficiency 6/28/2010     Current Outpatient Medications   Medication Sig    losartan-hydroCHLOROthiazide (HYZAAR) 100-25 mg per tablet TAKE 1 TABLET DAILY    CARTIA  mg ER capsule TAKE 1 CAPSULE DAILY    cloNIDine HCl (CATAPRES) 0.1 mg tablet TAKE 1 TABLET NIGHTLY    albuterol (PROVENTIL HFA, VENTOLIN HFA, PROAIR HFA) 90 mcg/actuation inhaler Take 2 Puffs by inhalation every four (4) hours as needed for Wheezing for up to 360 days.  TRICOR 145 mg tablet TAKE 1 TABLET DAILY    BYDUREON 2 mg/0.65 mL pnij INJECT THE CONTENTS OF 1 PEN UNDER THE SKIN EVERY 7 DAYS    esomeprazole (NEXIUM) 40 mg capsule Take 1 Cap by mouth daily. Indications: gastroesophageal reflux disease    loratadine (CLARITIN) 10 mg tablet TAKE 1 TABLET DAILY    ezetimibe (ZETIA) 10 mg tablet TAKE 1 TABLET DAILY    HYDROcodone-acetaminophen (NORCO) 5-325 mg per tablet Take 1-2 Tabs by mouth every four (4) hours as needed for Pain (Acute post-operative pain). Max Daily Amount: 12 Tabs. Indications: Pain    polyethylene glycol (MIRALAX) 17 gram packet Take 1 Packet by mouth daily. Indications: constipation, If constipation last more than 24-48 hours, despite colace use. (Patient taking differently: Take 17 g by mouth as needed.)    aspirin 81 mg chewable tablet Take 81 mg by mouth daily.  lancets (FREESTYLE LANCETS) 28 gauge misc TEST ONCE DAILY DX: E11.65    glucose blood VI test strips (FREESTYLE LITE STRIPS) strip TEST ONCE A DAY DX CODE: E11.65    omega-3 fatty acids-vitamin e (FISH OIL) 1,000 mg cap Take 2 Caps by mouth two (2) times a day.  cholecalciferol, vitamin d3, (VITAMIN D) 1,000 unit tablet Take 1,000 Units by mouth two (2) times a day.  fluticasone (FLONASE) 50 mcg/actuation nasal spray 2 Sprays by Both Nostrils route daily.  ondansetron (ZOFRAN ODT) 4 mg disintegrating tablet Take 1 Tab by mouth every eight (8) hours as needed for Nausea. Indications: PREVENTION OF POST-OPERATIVE NAUSEA AND VOMITING    traMADol (ULTRAM) 50 mg tablet Take 1 Tab by mouth every six (6) hours as needed for Pain. Max Daily Amount: 200 mg. No current facility-administered medications for this visit.       Allergies   Allergen Reactions    Ciprofloxacin Nausea Only    Erythromycin Nausea Only    Metformin Other (comments) and Nausea Only Felt bad  Felt bad      Pcn [Penicillins] Swelling     Caused lips to swell  Tolerates Amoxicillin         Review of Systems:  - Constitutional Symptoms: no fevers, no chills,   - Eyes: no blurry vision no double vision  - Cardiovascular: no chest pain ,no palpitations  - Respiratory: no cough no shortness of breath  - Gastrointestinal: no dysphagia no  abdominal pain  - Musculoskeletal: + joint pains no  weakness  - Integumentary: no rashes  -     Physical Examination:   Blood pressure 94/57, pulse 70, temperature 96.9 °F (36.1 °C), temperature source Oral, resp. rate 16, height 4' 11.5\" (1.511 m), weight 194 lb (88 kg), SpO2 97 %. Estimated body mass index is 38.53 kg/m² as calculated from the following:    Height as of this encounter: 4' 11.5\" (1.511 m). -   Weight as of this encounter: 194 lb (88 kg). - General: pleasant, no distress, good eye contact  - HEENT: no pallor, no periorbital edema, EOMI  - Neck: supple, no thyromegaly  - Cardiovascular: regular, normal rate, normal S1 and S2  - Respiratory: clear to auscultation bilaterally  - Gastrointestinal: soft, nontender, nondistended,  BS +  - Musculoskeletal: no proximal muscle weakness in upper or lower extremities  - Integumentary: alert and oriented , foot 12/17   - Psychiatric: normal mood and affect  - Skin: color, texture, turgor normal.       Data Reviewed:     [] Glucose records reviewed. [] See glucose records for details (to be scanned).   [] A1C  [] Reviewed labs    Lab Results   Component Value Date/Time    Hemoglobin A1c 7.2 (H) 04/01/2019 02:11 PM    Hemoglobin A1c 6.8 (H) 11/28/2018 04:10 PM    Hemoglobin A1c 6.9 (H) 08/29/2018 12:07 PM    Glucose 109 (H) 04/01/2019 02:11 PM    Glucose (POC) 138 (H) 06/26/2018 04:58 PM    Microalb/Creat ratio (ug/mg creat.) 57.2 (H) 04/01/2019 02:11 PM    LDL, calculated 56 04/01/2019 02:11 PM    Creatinine 0.89 04/01/2019 02:11 PM      Lab Results   Component Value Date/Time    Cholesterol, total 143 04/01/2019 02:11 PM    HDL Cholesterol 49 04/01/2019 02:11 PM    LDL, calculated 56 04/01/2019 02:11 PM    Triglyceride 188 (H) 04/01/2019 02:11 PM    CHOL/HDL Ratio 2.4 10/28/2010 09:07 AM     Lab Results   Component Value Date/Time    ALT (SGPT) 46 (H) 04/01/2019 02:11 PM    AST (SGOT) 37 04/01/2019 02:11 PM    Alk. phosphatase 62 04/01/2019 02:11 PM    Bilirubin, total 0.5 04/01/2019 02:11 PM     Lab Results   Component Value Date/Time    TSH 1.220 01/29/2018 02:47 PM    T4, Free 1.55 09/09/2015 02:16 PM      Lab Results   Component Value Date/Time    Glucose 109 (H) 04/01/2019 02:11 PM    Glucose (POC) 138 (H) 06/26/2018 04:58 PM        Assessment/Plan:     1. Type 2 diabetes mellitus with hyperglycemia, without long-term current use of insulin ([facility]    2. Essential hypertension    3. Mixed hyperlipidemia        1. Type 2 Diabetes Mellitus   Lab Results   Component Value Date/Time    Hemoglobin A1c 7.2 (H) 04/01/2019 02:11 PM    Hemoglobin A1c (POC) 6.5 05/11/2018 11:37 AM   Controlled   Trulicity  FLU annually ,Pneumovax ,aspirin daily,annual eye exam,microalbumin    2. HTN : Continue current therapy     3. Mixed Hyperlipidemia : low carb diet. Statin intolerance hx,   Continue Zetia, Tricor    4. Obesity:Body mass index is 38.53 kg/m². Discussed about the importance of exercise and carbohydrate portion control. Thank you for allowing me to participate in the care of this patient.     Shannon Craven MD    Patient verbalized understanding

## 2019-05-07 NOTE — PROGRESS NOTES
Sonal Lopez is a 69 y.o. female here for   Chief Complaint   Patient presents with    Diabetes       Functional glucose monitor and record keeping system? -yes  Eye exam within last year? - on file  Foot exam within last year? - on file    1. Have you been to the ER, urgent care clinic since your last visit? Hospitalized since your last visit? -no    2. Have you seen or consulted any other health care providers outside of the [address] [place] [address] since your last visit?   Include any pap smears or colon screening.-no

## 2019-05-10 ENCOUNTER — OFFICE VISIT (OUTPATIENT)
Dept: FAMILY MEDICINE CLINIC | Age: 79
End: 2019-05-10

## 2019-05-10 VITALS
WEIGHT: 197.2 LBS | TEMPERATURE: 98.8 F | RESPIRATION RATE: 18 BRPM | SYSTOLIC BLOOD PRESSURE: 144 MMHG | HEART RATE: 79 BPM | OXYGEN SATURATION: 95 % | BODY MASS INDEX: 38.72 KG/M2 | DIASTOLIC BLOOD PRESSURE: 76 MMHG | HEIGHT: 60 IN

## 2019-05-10 DIAGNOSIS — K21.9 GASTROESOPHAGEAL REFLUX DISEASE WITHOUT ESOPHAGITIS: ICD-10-CM

## 2019-05-10 DIAGNOSIS — Z00.00 MEDICARE ANNUAL WELLNESS VISIT, SUBSEQUENT: Primary | ICD-10-CM

## 2019-05-10 RX ORDER — ESOMEPRAZOLE MAGNESIUM 40 MG/1
40 CAPSULE, DELAYED RELEASE ORAL DAILY
Qty: 90 CAP | Refills: 2 | Status: SHIPPED | OUTPATIENT
Start: 2019-05-10 | End: 2020-04-06

## 2019-05-10 NOTE — PROGRESS NOTES
1. Have you been to the ER, urgent care clinic since your last visit? Hospitalized since your last visit? No    2. Have you seen or consulted any other health care providers outside of the [address] [place] [address] since your last visit? Include any pap smears or colon screening.  No  Reviewed record in preparation for visit and have necessary documentation  Pt did not bring medication to office visit for review  Information was given to pt on Advanced Directives, Living Will  Information was given on Shingles Vaccine  opportunity was given for questions  Goals that were addressed and/or need to be completed during or after this appointment include     Health Maintenance Due   Topic Date Due    MEDICARE YEARLY EXAM  02/06/2019    COLONOSCOPY  06/27/2019

## 2019-05-10 NOTE — PROGRESS NOTES
[facility]  [facility], [address] [place] [address]             Date of visit: 5/10/2019       This is a Subsequent Medicare Annual Wellness Visit (AWV), (Performed more than 12 months after effective date of Medicare Part B enrollment and 12 months after last wellness visit)    I have reviewed the patient's medical history in detail and updated the computerized patient record. Lissy Mederos is a 69 y.o. female   History obtained from: the patient.     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    Essential hypertension I10    Mixed hyperlipidemia E78.2    Morbid obesity ([address] E66.01    Type 2 diabetes mellitus with hyperglycemia, without long-term current use of insulin ([address] E11.65    Type 2 diabetes mellitus with nephropathy ([address] E11.21     Past Medical History:   Diagnosis Date    Arthritis 2/28/2011    OSTEO    Chronic obstructive pulmonary disease ([address]     VERY MILD - patient denies having COPD as of 6/20/2018    Chronic pain     LEFT KNEE    Diabetes ([address]     GERD (gastroesophageal reflux disease)     Hot flashes, menopausal     Hypercholesterolemia     Hypertension     Ill-defined condition     Elevated liver enzymes (has family history)    Nausea & vomiting     Seizures ([address] 1980'S    AFTER DRINKING 2 MARGARITAS    Vitamin D deficiency 6/28/2010      Past Surgical History:   Procedure Laterality Date    HX APPENDECTOMY  2001    HX CATARACT REMOVAL Bilateral     HX CHOLECYSTECTOMY 06/28/2018    HX HYSTERECTOMY  1975    HX KNEE REPLACEMENT Left 2014    HX OOPHORECTOMY  2001    bilateral    HX OTHER SURGICAL      LIPOMA LEFT SIDE    HX ROTATOR CUFF REPAIR Right 1998     Allergies   Allergen Reactions    Ciprofloxacin Nausea Only    Erythromycin Nausea Only    Metformin Other (comments) and Nausea Only     Felt bad  Felt bad      Pcn [Penicillins] Swelling     Caused lips to swell  Tolerates Amoxicillin     Current Outpatient Medications   Medication Sig Dispense Refill    losartan-hydroCHLOROthiazide (HYZAAR) 100-25 mg per tablet TAKE 1 TABLET DAILY 90 Tab 2    CARTIA  mg ER capsule TAKE 1 CAPSULE DAILY 90 Cap 3    cloNIDine HCl (CATAPRES) 0.1 mg tablet TAKE 1 TABLET NIGHTLY 90 Tab 3    albuterol (PROVENTIL HFA, VENTOLIN HFA, PROAIR HFA) 90 mcg/actuation inhaler Take 2 Puffs by inhalation every four (4) hours as needed for Wheezing for up to 360 days. 1 Inhaler 4    TRICOR 145 mg tablet TAKE 1 TABLET DAILY 90 Tab 3    BYDUREON 2 mg/0.65 mL pnij INJECT THE CONTENTS OF 1 PEN UNDER THE SKIN EVERY 7 DAYS 12 Pen 3    esomeprazole (NEXIUM) 40 mg capsule Take 1 Cap by mouth daily. Indications: gastroesophageal reflux disease 90 Cap 2    loratadine (CLARITIN) 10 mg tablet TAKE 1 TABLET DAILY 90 Tab 3    ezetimibe (ZETIA) 10 mg tablet TAKE 1 TABLET DAILY 90 Tab 2    HYDROcodone-acetaminophen (NORCO) 5-325 mg per tablet Take 1-2 Tabs by mouth every four (4) hours as needed for Pain (Acute post-operative pain). Max Daily Amount: 12 Tabs. Indications: Pain 30 Tab 0    polyethylene glycol (MIRALAX) 17 gram packet Take 1 Packet by mouth daily. Indications: constipation, If constipation last more than 24-48 hours, despite colace use. (Patient taking differently: Take 17 g by mouth as needed.) 10 Packet 0    aspirin 81 mg chewable tablet Take 81 mg by mouth daily.       lancets (FREESTYLE LANCETS) 28 gauge misc TEST ONCE DAILY DX: E11.65 100 Lancet 3    glucose blood VI test strips (FREESTYLE LITE STRIPS) strip TEST ONCE A DAY DX CODE: E11.65 100 Strip 3    omega-3 fatty acids-vitamin e (FISH OIL) 1,000 mg cap Take 2 Caps by mouth two (2) times a day.  cholecalciferol, vitamin d3, (VITAMIN D) 1,000 unit tablet Take 1,000 Units by mouth two (2) times a day.  fluticasone (FLONASE) 50 mcg/actuation nasal spray 2 Sprays by Both Nostrils route daily. 1 Bottle 5    ondansetron (ZOFRAN ODT) 4 mg disintegrating tablet Take 1 Tab by mouth every eight (8) hours as needed for Nausea. Indications: PREVENTION OF POST-OPERATIVE NAUSEA AND VOMITING 20 Tab 1    traMADol (ULTRAM) 50 mg tablet Take 1 Tab by mouth every six (6) hours as needed for Pain. Max Daily Amount: 200 mg. 80 Tab 4     Family History   Problem Relation Age of Onset    Pulmonary Embolism Father     Arthritis-rheumatoid Paternal [de-identified]     Alzheimer Mother     Arthritis-rheumatoid Sister     Emphysema Brother     Arthritis-rheumatoid Sister     Anesth Problems Neg Hx      Social History     Tobacco Use    Smoking status: Former Smoker     Packs/day: 1.00     Years: 20.00     Pack years: 20.00     Last attempt to quit:      Years since quittin.3    Smokeless tobacco: Never Used   Substance Use Topics    Alcohol use: Yes     Comment: rarely - 1 glass of wine every 3 months       Specialists/Care Team   Su Aviles has established care with the following healthcare providers:  Patient Care Team:  Jun Martino MD as PCP - Cindy Decker MD as Physician (Cardiology)  Zoran Interiano MD as Physician (Orthopedic Surgery)  Arnel Cornejo MD (Endocrinology)  Alfredo Reyna MD (General Surgery)  Georgena Rubinstein, MD (Ophthalmology)      Health Risk Assessment     Demographics   female  69 y.o. General Health Questions   -During the past 4 weeks:   -how would you rate your health in general? Good   -how often have you been bothered by feeling dizzy when standing up? occasionally   -how much have you been bothered by bodily pain? not   -Have you noticed any hearing difficulties? no   -has your physical and emotional health limited your social activities with family or friends? no    Emotional Health Questions   -Do you have a history of depression, anxiety, or emotional problems? no  -Over the past 2 weeks, have you felt down, depressed or hopeless? no  -Over the past 2 weeks, have you felt little interest or pleasure in doing things? no    Health Habits   Please describe your diet habits: Self grade B  Do you get 5 servings of fruits or vegetables daily? no  Do you exercise regularly? Yes, chair aerobics. Alcohol Risk Factor Screening: On any occasion during the past 3 months, have you had more than 4 drinks containing alcohol? No   Do you average more than 14 drinks per week? No     Activities of Daily Living and Functional Status   -Do you need help with eating, walking, dressing, bathing, toileting, the phone, transportation, shopping, preparing meals, housework, laundry, medications or managing money? no  -In the past four weeks, was someone available to help you if you needed and wanted help with anything? yes  -Are you confident are you that you can control and manage most of your health problems? yes  -Have you been given information to help you keep track of your medications? yes  -How often do you have trouble taking your medications as prescribed? occasionally    Fall Risk and Home Safety   Have you fallen 2 or more times in the past year? no  Does your home have rugs in the hallway, lack grab bars in the bathroom, lack handrails on the stairs or have poor lighting? no  Do you have smoke detectors and check them regularly? yes  Do you have difficulties driving a car? no  Do you always fasten your seat belt when you are in a car? yes    Review of Systems (if indicated for problems addressed today)   Review of Systems   Constitutional: Negative.   Negative for chills, fever, malaise/fatigue and weight loss. HENT: Negative. Negative for hearing loss. Eyes: Negative. Negative for blurred vision and double vision. Respiratory: Negative. Negative for cough, hemoptysis, sputum production and shortness of breath. Cardiovascular: Negative. Negative for chest pain, palpitations and orthopnea. Gastrointestinal: Positive for heartburn. Negative for abdominal pain, blood in stool, nausea and vomiting. GERD symptoms are not controlled with pantoprazole. She will have to go back on Nexium 40 mg.   Genitourinary: Negative. Negative for dysuria, frequency and urgency. Musculoskeletal: Negative. Negative for back pain, myalgias and neck pain. Skin: Negative. Negative for rash. Neurological: Negative. Negative for dizziness, tingling, tremors, weakness and headaches. Endo/Heme/Allergies: Negative. Psychiatric/Behavioral: Negative. Negative for depression. Physical Examination     Wt Readings from Last 3 Encounters:   05/10/19 197 lb 3.2 oz (89.4 kg)   05/07/19 194 lb (88 kg)   04/01/19 196 lb 6.4 oz (89.1 kg)     Temp Readings from Last 3 Encounters:   05/10/19 98.8 °F (37.1 °C) (Oral)   05/07/19 96.9 °F (36.1 °C) (Oral)   04/01/19 97.8 °F (36.6 °C) (Oral)     BP Readings from Last 3 Encounters:   05/10/19 144/76   05/07/19 94/57   04/01/19 151/81     Pulse Readings from Last 3 Encounters:   05/10/19 79   05/07/19 70   04/01/19 68       Body mass index is 39.16 kg/m². No exam data present  Was the patient's timed Up & Go test unsteady or longer than 10 seconds? no    Evaluation of Cognitive Function   Mood/affect:  happy  Orientation: Person, Place, Time and Situation  Appearance: age appropriate and casually dressed  Family member/caregiver input: no    Additional exam if indicated for problems addressed today:  Physical Exam   Constitutional: She is oriented to person, place, and time. She appears well-developed and well-nourished.  No distress. HENT:   Head: Normocephalic and atraumatic. Mouth/Throat: Oropharynx is clear and moist.   Eyes: Conjunctivae are normal. No scleral icterus. Neck: No thyromegaly present. No carotid bruit. Cardiovascular: Normal rate, regular rhythm and normal heart sounds. Exam reveals no gallop and no friction rub. No murmur heard. Pulmonary/Chest: Effort normal and breath sounds normal. She has no wheezes. She has no rales. Abdominal: Soft. Bowel sounds are normal. She exhibits no distension. There is no tenderness. Musculoskeletal: She exhibits no edema. Lymphadenopathy:     She has no cervical adenopathy. Neurological: She is alert and oriented to person, place, and time. Skin: Skin is warm and dry. No rash noted. She is not diaphoretic. Psychiatric: She has a normal mood and affect. Her behavior is normal. Thought content normal.   Nursing note and vitals reviewed. Advice/Referrals/Counseling (as indicated)     Advance Care Planning (ACP) Provider Note - Comprehensive   On FILE. Preventive Services     (Preventive care checklist to be included in patient instructions)  Discussed today Done Previously Not Needed     x  Pneumococcal vaccines    x  Flu vaccine     x Hepatitis B vaccine (if at risk)   x   Shingles vaccine    x  TDAP vaccine    x  Mammogram   x   Colorectal cancer screening    x  Low-dose CT for lung cancer screening    x  Bone density test    x  Glaucoma screening    x  Cholesterol test    x  Diabetes screening test     x  Diabetes self-management class     x Nutritionist referral for diabetes or renal disease     Discussion of Advance Directive   Discussed with Yovani Sandhusing her ability to prepare and advance directive in the case that an injury or illness causes her to be unable to make health care decisions. Assessment/Plan   Diagnoses and all orders for this visit:    1.  Medicare annual wellness visit, subsequent        No orders of the defined types were placed in this encounter.       Patient Care Team:  Franklin Mckenzie MD as PCP - Peggy Ziegler MD as Physician (Cardiology)  Jose Moreau MD as Physician (Orthopedic Surgery)  Edvin Jeffers MD (Endocrinology)  Luis Terrazas MD (General Surgery)  Kathryn Lauren MD (Ophthalmology)        Future Appointments   Date Time Provider Department Center   7/1/2019 10:50 AM Franklin Mckenzie MD [facility]   10/8/2019 11:15 AM Oxana Grady MD CDE Rama Barton MD

## 2019-05-24 RX ORDER — EZETIMIBE 10 MG/1
TABLET ORAL
Qty: 90 TAB | Refills: 2 | Status: SHIPPED | OUTPATIENT
Start: 2019-05-24 | End: 2019-11-07 | Stop reason: SDUPTHER

## 2019-05-31 RX ORDER — RANITIDINE HCL 75 MG
75 TABLET ORAL 2 TIMES DAILY
COMMUNITY
End: 2020-01-13

## 2019-05-31 RX ORDER — DILTIAZEM HYDROCHLORIDE EXTENDED-RELEASE TABLETS 180 MG/1
180 TABLET, EXTENDED RELEASE ORAL DAILY
COMMUNITY
End: 2020-02-03

## 2019-05-31 NOTE — PERIOP NOTES
[address]  Endoscopy Preprocedure Instructions      1. On the day of your surgery, please report to registration located on the 2nd floor of the  [facility]. yes    2. You must have a responsible adult to drive you to the hospital, stay at the hospital during your procedure and drive you home. If they leave your procedure will not be started (no exceptions). yes    3. Do not have anything to eat or drink (including water, gum, mints, coffee, and juice) after midnight. This does not apply to the medications you were instructed to take by your physician. yesIf you are currently taking Plavix, Coumadin, Aspirin, or other blood-thinning agents, contact your physician for special instructions. Yes, aspirin    4. If you are having a procedure that requires bowel prep: We recommend that you have only clear liquids the day before your procedure and begin your bowel prep by 5:00 pm.  You may continue to drink clear liquids until midnight. If for any reason you are not able to complete your prep please notify your physician immediately. yes    5. Have a list of all current medications, including vitamins, herbal supplements and any other over the counter medications. yes    6. If you wear glasses, contacts, dentures and/or hearing aids, they may be removed prior to procedure, please bring a case to store them in. yes    7. You should understand that if you do not follow these instructions your procedure may be cancelled. If your physical condition changes (I.e. fever, cold or flu) please contact your doctor as soon as possible. 8. It is important that you be on time.   If for any reason you are unable to keep your appointment please call [phone] the day of or your physicians office prior to your scheduled procedure

## 2019-06-03 ENCOUNTER — ANESTHESIA EVENT (OUTPATIENT)
Dept: ENDOSCOPY | Age: 79
End: 2019-06-03
Payer: MEDICARE

## 2019-06-03 ENCOUNTER — ANESTHESIA (OUTPATIENT)
Dept: ENDOSCOPY | Age: 79
End: 2019-06-03
Payer: MEDICARE

## 2019-06-03 ENCOUNTER — HOSPITAL ENCOUNTER (OUTPATIENT)
Age: 79
Setting detail: OUTPATIENT SURGERY
Discharge: HOME | End: 2019-06-03
Attending: INTERNAL MEDICINE | Admitting: INTERNAL MEDICINE
Payer: MEDICARE

## 2019-06-03 VITALS
WEIGHT: 194 LBS | BODY MASS INDEX: 38.09 KG/M2 | HEIGHT: 60 IN | SYSTOLIC BLOOD PRESSURE: 147 MMHG | OXYGEN SATURATION: 100 % | TEMPERATURE: 97.6 F | HEART RATE: 70 BPM | DIASTOLIC BLOOD PRESSURE: 92 MMHG | RESPIRATION RATE: 12 BRPM

## 2019-06-03 LAB
GLUCOSE BLD STRIP.AUTO-MCNC: 117 MG/DL (ref 65–100)
SERVICE CMNT-IMP: ABNORMAL

## 2019-06-03 PROCEDURE — 74011250636 HC RX REV CODE- 250/636

## 2019-06-03 PROCEDURE — 76060000032 HC ANESTHESIA 0.5 TO 1 HR: Performed by: INTERNAL MEDICINE

## 2019-06-03 PROCEDURE — 88305 TISSUE EXAM BY PATHOLOGIST: CPT

## 2019-06-03 PROCEDURE — 76040000007: Performed by: INTERNAL MEDICINE

## 2019-06-03 PROCEDURE — 82962 GLUCOSE BLOOD TEST: CPT

## 2019-06-03 PROCEDURE — 77030013992 HC SNR POLYP ENDOSC BSC -B: Performed by: INTERNAL MEDICINE

## 2019-06-03 RX ORDER — FLUMAZENIL 0.1 MG/ML
0.2 INJECTION INTRAVENOUS
Status: DISCONTINUED | OUTPATIENT
Start: 2019-06-03 | End: 2019-06-03 | Stop reason: HOSPADM

## 2019-06-03 RX ORDER — PROPOFOL 10 MG/ML
INJECTION, EMULSION INTRAVENOUS AS NEEDED
Status: DISCONTINUED | OUTPATIENT
Start: 2019-06-03 | End: 2019-06-03 | Stop reason: HOSPADM

## 2019-06-03 RX ORDER — PROPOFOL 10 MG/ML
INJECTION, EMULSION INTRAVENOUS
Status: DISCONTINUED | OUTPATIENT
Start: 2019-06-03 | End: 2019-06-03 | Stop reason: HOSPADM

## 2019-06-03 RX ORDER — DEXTROMETHORPHAN/PSEUDOEPHED 2.5-7.5/.8
1.2 DROPS ORAL
Status: DISCONTINUED | OUTPATIENT
Start: 2019-06-03 | End: 2019-06-03 | Stop reason: HOSPADM

## 2019-06-03 RX ORDER — SODIUM CHLORIDE 9 MG/ML
50 INJECTION, SOLUTION INTRAVENOUS CONTINUOUS
Status: DISCONTINUED | OUTPATIENT
Start: 2019-06-03 | End: 2019-06-03 | Stop reason: HOSPADM

## 2019-06-03 RX ORDER — NALOXONE HYDROCHLORIDE 0.4 MG/ML
0.4 INJECTION, SOLUTION INTRAMUSCULAR; INTRAVENOUS; SUBCUTANEOUS
Status: DISCONTINUED | OUTPATIENT
Start: 2019-06-03 | End: 2019-06-03 | Stop reason: HOSPADM

## 2019-06-03 RX ORDER — SODIUM CHLORIDE 9 MG/ML
INJECTION, SOLUTION INTRAVENOUS
Status: DISCONTINUED | OUTPATIENT
Start: 2019-06-03 | End: 2019-06-03 | Stop reason: HOSPADM

## 2019-06-03 RX ORDER — ATROPINE SULFATE 0.1 MG/ML
0.4 INJECTION INTRAVENOUS
Status: DISCONTINUED | OUTPATIENT
Start: 2019-06-03 | End: 2019-06-03 | Stop reason: HOSPADM

## 2019-06-03 RX ORDER — EPINEPHRINE 0.1 MG/ML
1 INJECTION INTRACARDIAC; INTRAVENOUS
Status: DISCONTINUED | OUTPATIENT
Start: 2019-06-03 | End: 2019-06-03 | Stop reason: HOSPADM

## 2019-06-03 RX ORDER — MIDAZOLAM HYDROCHLORIDE 1 MG/ML
.25-5 INJECTION, SOLUTION INTRAMUSCULAR; INTRAVENOUS
Status: DISCONTINUED | OUTPATIENT
Start: 2019-06-03 | End: 2019-06-03 | Stop reason: HOSPADM

## 2019-06-03 RX ADMIN — PROPOFOL 120 MCG/KG/MIN: 10 INJECTION, EMULSION INTRAVENOUS at 15:08

## 2019-06-03 RX ADMIN — SODIUM CHLORIDE: 9 INJECTION, SOLUTION INTRAVENOUS at 14:56

## 2019-06-03 RX ADMIN — PROPOFOL 100 MG: 10 INJECTION, EMULSION INTRAVENOUS at 15:08

## 2019-06-03 NOTE — DISCHARGE INSTRUCTIONS
[facility]. [facility] SYD Randhawa  [phone]            COLON DISCHARGE INSTRUCTIONS       6/3/2019    Sonal Lopez  :  1940  [facility] Medical Record Number:  [id number]      COLONOSCOPY FINDINGS:  Your colonoscopy showed a total of 15 polyps that were removed and sent to pathology, diverticulosis and internal hemorrhoids. DISCOMFORT:  Redness at IV site- apply warm compress to area; if redness or soreness persist- contact your physician  There may be a slight amount of blood passed from the rectum  Gaseous discomfort- walking, belching will help relieve any discomfort  You may not operate a vehicle for 12 hours  You may not engage in an occupation involving machinery or appliances for rest of today  You may not drink alcoholic beverages for at least 12 hours  Avoid making any critical decisions for at least 24 hour  DIET:   High fiber diet. - however -  remember your colon is empty and a heavy meal will produce gas. Avoid these foods:  vegetables, fried / greasy foods, carbonated drinks for today     ACTIVITY:  You may resume your normal daily activities it is recommended that you spend the remainder of the day resting -  avoid any strenuous activity. CALL M.D. ANY SIGN OF:   Increasing pain, nausea, vomiting  Abdominal distension (swelling)  New increased bleeding (oral or rectal)  Fever (chills)  Pain in chest area  Bloody discharge from nose or mouth   Shortness of breath    Follow-up Instructions:   Call Dr. Shan Mendez if any questions or problems. Telephone # [phone]  Biopsy results will be available in  5 to 7 days  Should have a repeat colonoscopy in 1 year based on current guidelines.

## 2019-06-03 NOTE — H&P
Alejandra Kim M.D.  [phone]            History and Physical       NAME:  Judith Cruz   :   1940   MRN:   [id number]       Referring Physician:  Dr. All Bell Date: 6/3/2019 3:00 PM    Chief Complaint:  Colon cancer screening    History of Present Illness:  Patient is a 69 y.o. who is seen for colon cancer screening. Denies any ongoing GI complaints. PMH:  Past Medical History:   Diagnosis Date    Arthritis 2011    OSTEO    Chronic obstructive pulmonary disease ([address]     VERY MILD - patient denies having COPD as of 2018    Chronic pain     RIGHT KNEE    Diabetes ([address]     GERD (gastroesophageal reflux disease)     Hot flashes, menopausal     Hypercholesterolemia     Hypertension     Ill-defined condition     Elevated liver enzymes (has family history)    Nausea & vomiting     Seizures ([address] 'S    AFTER DRINKING 2 MARGARITAS    Vitamin D deficiency 2010       PSH:  Past Surgical History:   Procedure Laterality Date    HX APPENDECTOMY      HX CATARACT REMOVAL Bilateral     HX CHOLECYSTECTOMY  2018    HX HYSTERECTOMY  1975    HX KNEE REPLACEMENT Left 2014    HX OOPHORECTOMY  2001    bilateral    HX OTHER SURGICAL      LIPOMA LEFT SIDE    HX ROTATOR CUFF REPAIR Right 1998       Allergies: Allergies   Allergen Reactions    Ciprofloxacin Nausea Only    Erythromycin Nausea Only    Metformin Other (comments) and Nausea Only     Felt bad  Felt bad      Pcn [Penicillins] Swelling     Caused lips to swell  Tolerates Amoxicillin       Home Medications:  Prior to Admission Medications   Prescriptions Last Dose Informant Patient Reported? Taking?    TRICOR 145 mg tablet 2019 at pm  No Yes   Sig: TAKE 1 TABLET DAILY   albuterol (PROVENTIL HFA, VENTOLIN HFA, PROAIR HFA) 90 mcg/actuation inhaler Not Taking at Unknown time  No No   Sig: Take 2 Puffs by inhalation every four (4) hours as needed for Wheezing for up to 360 days. aspirin 81 mg chewable tablet 2019  Yes Yes   Sig: Take 81 mg by mouth daily. cholecalciferol, vitamin d3, (VITAMIN D) 1,000 unit tablet 2019 at Unknown time  Yes Yes   Sig: Take 1,000 Units by mouth two (2) times a day. cloNIDine HCl (CATAPRES) 0.1 mg tablet 2019 at pm  No Yes   Sig: TAKE 1 TABLET NIGHTLY   dilTIAZem ER (CARDIZEM LA) 180 mg Tb24 tablet 2019 at am  Yes Yes   Sig: Take 180 mg by mouth daily. dulaglutide (TRULICITY) 1.5 QN/2.1 mL sub-q pen Not Taking at Unknown time  No No   Si.5 mL by SubCUTAneous route every seven (7) days. Stop Byduereon   esomeprazole (NEXIUM) 40 mg capsule 2019 at am  No Yes   Sig: Take 1 Cap by mouth daily. Indications: gastroesophageal reflux disease   ezetimibe (ZETIA) 10 mg tablet 2019 at pm  No Yes   Sig: TAKE 1 TABLET DAILY   glucose blood VI test strips (FREESTYLE LITE STRIPS) strip 2019 at Unknown time  No Yes   Sig: TEST ONCE A DAY DX CODE: E11.65   lancets (FREESTYLE LANCETS) 28 gauge misc 2019 at Unknown time  No Yes   Sig: TEST ONCE DAILY DX: E11.65   loratadine (CLARITIN) 10 mg tablet 2019 at Unknown time  No Yes   Sig: TAKE 1 TABLET DAILY   losartan-hydroCHLOROthiazide (HYZAAR) 100-25 mg per tablet 2019 at Unknown time  No Yes   Sig: TAKE 1 TABLET DAILY   omega-3 fatty acids-vitamin e (FISH OIL) 1,000 mg cap 2019 at Unknown time  Yes Yes   Sig: Take 2 Caps by mouth two (2) times a day. ondansetron (ZOFRAN ODT) 4 mg disintegrating tablet Not Taking at Unknown time  No No   Sig: Take 1 Tab by mouth every eight (8) hours as needed for Nausea. Indications: PREVENTION OF POST-OPERATIVE NAUSEA AND VOMITING   polyethylene glycol (MIRALAX) 17 gram packet Not Taking at Unknown time  No No   Sig: Take 1 Packet by mouth daily. Indications: constipation, If constipation last more than 24-48 hours, despite colace use. Patient taking differently: Take 17 g by mouth as needed.    raNITIdine (ZANTAC) 75 mg tab 2019 at Unknown time  Yes Yes   Sig: Take 75 mg by mouth two (2) times a day.       Facility-Administered Medications: None       Hospital Medications:  Current Facility-Administered Medications   Medication Dose Route Frequency    0.9% sodium chloride infusion  50 mL/hr IntraVENous CONTINUOUS    midazolam (VERSED) injection 0.25-5 mg  0.25-5 mg IntraVENous Multiple    naloxone (NARCAN) injection 0.4 mg  0.4 mg IntraVENous Multiple    flumazenil (ROMAZICON) 0.1 mg/mL injection 0.2 mg  0.2 mg IntraVENous Multiple    simethicone (MYLICON) 48LP/3.3AW oral drops 80 mg  1.2 mL Oral Multiple    atropine injection 0.4 mg  0.4 mg IntraVENous ONCE PRN    EPINEPHrine (ADRENALIN) 0.1 mg/mL syringe 1 mg  1 mg Endoscopically ONCE PRN       Social History:  Social History     Tobacco Use    Smoking status: Former Smoker     Packs/day: 1.00     Years: 20.00     Pack years: 20.00     Last attempt to quit:      Years since quittin.4    Smokeless tobacco: Never Used   Substance Use Topics    Alcohol use: Yes     Comment: rarely - 1 glass of wine every 3 months       Family History:  Family History   Problem Relation Age of Onset    Pulmonary Embolism Father     Arthritis-rheumatoid Paternal Grandmother     Alzheimer Mother     Arthritis-rheumatoid Sister     Emphysema Brother     Arthritis-rheumatoid Sister     Anesth Problems Neg Hx              Review of Systems:      Constitutional: negative fever, negative chills, negative weight loss  Eyes:   negative visual changes  ENT:   negative sore throat, tongue or lip swelling  Respiratory:  negative cough, negative dyspnea  Cards:  negative for chest pain, palpitations, lower extremity edema  GI:   See HPI  :  negative for frequency, dysuria  Integument:  negative for rash and pruritus  Heme:  negative for easy bruising and gum/nose bleeding  Musculoskel: negative for myalgias,  back pain and muscle weakness  Neuro: negative for headaches, dizziness, vertigo  Psych:  negative for feelings of anxiety, depression       Objective:     Patient Vitals for the past 8 hrs:   BP Temp Pulse Resp SpO2 Height Weight   06/03/19 1606 (!) 147/92  70 12 100 %     06/03/19 1601 (!) 142/96  73 18 96 %     06/03/19 1555 132/78  73 16 92 %     06/03/19 1550 119/85 97.6 °F (36.4 °C) 77 15 95 %     06/03/19 1319 139/72 97.9 °F (36.6 °C) 87 14 95 % 4' 11.5\" (1.511 m) 88 kg (194 lb 0.1 oz)     06/03 0701 - 06/03 1900  In: 740 [P.O.:240; I.V.:500]  Out: -   No intake/output data recorded. EXAM:     NEURO-a&o   HEENT-wnl   LUNGS-clear    COR-regular rate and rhythym     ABD-soft , no tenderness, no rebound, good bs     EXT-no edema     Data Review     No results for input(s): WBC, HGB, HCT, PLT, HGBEXT, HCTEXT, PLTEXT in the last 72 hours. No results for input(s): NA, K, CL, CO2, BUN, CREA, GLU, PHOS, CA in the last 72 hours. No results for input(s): SGOT, GPT, AP, TBIL, TP, ALB, GLOB, GGT, AML, LPSE in the last 72 hours. No lab exists for component: AMYP, HLPSE  No results for input(s): INR, PTP, APTT in the last 72 hours. No lab exists for component: INREXT    Patient Active Problem List   Diagnosis Code    Hypertension I10    Hypercholesterolemia E78.00    GERD (gastroesophageal reflux disease) K21.9    Hot flashes, menopausal N95.1    Vitamin D deficiency E55.9    Arthritis M19.90    Allergic rhinitis J30.9    Left knee DJD M17.12    S/P total knee replacement using cement Z96.659    Essential hypertension I10    Mixed hyperlipidemia E78.2    Morbid obesity (HCC) E66.01    Type 2 diabetes mellitus with hyperglycemia, without long-term current use of insulin (HCC) E11.65    Type 2 diabetes mellitus with nephropathy (HCC) E11.21      Assessment:   · Colon cancer screening   Plan:   · Colonoscopy today.      Signed By: Audrey Black MD     6/3/2019  3:07 PM

## 2019-06-03 NOTE — PROCEDURES
Erica Dao M.D.  [phone]            6/3/2019          Colonoscopy Operative Report  Jerilyn Aburto  :  1940  [facility] Medical Record Number:  [id number]      Indications:    Screening colonoscopy     :  Corrine Saldana MD    Referring Provider: Tad Severe, MD    Sedation:  MAC anesthesia    Pre-Procedural Exam:      Airway: clear,  No airway problems anticipated  Heart: RRR, without gallops or rubs  Lungs: clear bilaterally without wheezes, crackles, or rhonchi  Abdomen: soft, nontender, nondistended, bowel sounds present  Mental Status: awake, alert and oriented to person, place and time     Procedure Details:  After informed consent was obtained with all risks and benefits of procedure explained and preoperative exam completed, the patient was taken to the endoscopy suite and placed in the left lateral decubitus position. Upon sequential sedation as per above, a digital rectal exam was performed. The Olympus videocolonoscope  was inserted in the rectum and carefully advanced to the cecum, which was identified by the ileocecal valve and appendiceal orifice. The quality of preparation was good. The colonoscope was slowly withdrawn with careful inspection and evaluation between folds. Retroflexion in the rectum was performed. Findings:   Terminal Ileum: not intubated  Cecum: 1  Sessile polyp(s), the largest 3 mm in size;  Ascending Colon: 8  Sessile polyp(s), the largest 6 mm in size;  Transverse Colon: 6  Sessile polyp(s), the largest 5 mm in size; Descending Colon: normal  Sigmoid: no mucosal lesion appreciated  moderate diverticulosis; Rectum: no mucosal lesion appreciated  Grade 1 internal hemorrhoid(s);     Interventions:  15 complete polypectomy were performed using hot snare  and the polyps were  retrieved    Specimen Removed:  specimen #1, 3 to 10 mm in size, located in the transverse colon removed by snare cautery and retrieved for pathology  #2, 3 mm in size, located in the cecum removed by cold snare and retrieved for pathology  #3, 3 to 6 mm in size, located in the ascending colon removed by snare cautery and retrieved for pathology    Complications: None. EBL:  None. Impression:  A total of 15 polyps were removed and sent to pathology                         Sigmoid diverticulosis and internal hemorrhoids    Recommendations:  -Repeat colonoscopy in 1 year   -High fiber diet.    -Resume normal medication(s). Discharge Disposition:  Home in the company of a  when able to ambulate.     Dwayne Duncan MD  6/3/2019  3:44 PM

## 2019-06-03 NOTE — ANESTHESIA POSTPROCEDURE EVALUATION
Procedure(s):  COLONOSCOPY  ENDOSCOPIC POLYPECTOMY. MAC    Anesthesia Post Evaluation      Multimodal analgesia: multimodal analgesia not used between 6 hours prior to anesthesia start to PACU discharge  Patient location during evaluation: bedside  Patient participation: complete - patient participated  Level of consciousness: responsive to light touch  Pain management: adequate  Airway patency: patent  Anesthetic complications: no  Cardiovascular status: acceptable  Respiratory status: acceptable  Hydration status: acceptable  Post anesthesia nausea and vomiting:  controlled      No vitals data found for the desired time range.

## 2019-06-03 NOTE — PERIOP NOTES
Received Report From Scott Cruz CRNA @ 9826, see anesthesia notes. Care of the patient transferred to procedure nurse Ashley Ziegler RN @ 2112    Out of Procedure and sent to post-recovery @ [id number] report given to Cecille Lopez RN  @ 6958    Patient ABD remains soft and non-tender post procedure. Pt has no complaints at this time and tolerated the procedure well. Endoscope was pre-cleaned at bedside immediately following procedure by Ivan Romeo

## 2019-06-03 NOTE — ANESTHESIA PREPROCEDURE EVALUATION
Relevant Problems   No relevant active problems       Anesthetic History     PONV          Review of Systems / Medical History  Patient summary reviewed, nursing notes reviewed and pertinent labs reviewed    Pulmonary                   Neuro/Psych              Cardiovascular    Hypertension          Hyperlipidemia         GI/Hepatic/Renal     GERD           Endo/Other    Diabetes: type 2    Morbid obesity     Other Findings            Physical Exam    Airway  Mallampati: III  TM Distance: 4 - 6 cm  Neck ROM: normal range of motion   Mouth opening: Normal     Cardiovascular    Rhythm: regular  Rate: normal         Dental  No notable dental hx       Pulmonary  Breath sounds clear to auscultation               Abdominal         Other Findings            Anesthetic Plan    ASA: 3  Anesthesia type: MAC          Induction: Intravenous  Anesthetic plan and risks discussed with: Patient

## 2019-06-03 NOTE — ROUTINE PROCESS
[facility]  1940  [id number]    Situation:  Verbal report received from: Marlin Valles RN  Procedure: Procedure(s):  COLONOSCOPY  ENDOSCOPIC POLYPECTOMY    Background:    Preoperative diagnosis: COLON CANCER SCREENING  Postoperative diagnosis: transverse colon polyps  cecum polyp  Ascending colon polyps  diverticulosis    :  Dr. Erasmo Tai  Assistant(s): Endoscopy Technician-1: Latoya Cason  Endoscopy RN-1: Eva Cifuentes RN    Specimens:   ID Type Source Tests Collected by Time Destination   1 : Transverse colon polyps Preservative Colon, Transverse  Carmita Kendall MD 6/3/2019 1515 Pathology   2 : Cecum polyp Preservative Cecum  Carmita Kendall MD 6/3/2019 1519 Pathology   3 : Ascending colon polyps Preservative Colon, Ascending  Carmita Kendall MD 6/3/2019 1521 Pathology     H. Pylori  no    Assessment:  Intra-procedure medications     Anesthesia gave intra-procedure sedation and medications, see anesthesia flow sheet yes    Intravenous fluids: NS@ KVO     Vital signs stable     Abdominal assessment: round and soft     Recommendation:  Discharge patient per MD order.   Family or Friend   Permission to share finding with family or friend yes

## 2019-07-01 ENCOUNTER — OFFICE VISIT (OUTPATIENT)
Dept: FAMILY MEDICINE CLINIC | Age: 79
End: 2019-07-01

## 2019-07-01 VITALS
DIASTOLIC BLOOD PRESSURE: 79 MMHG | SYSTOLIC BLOOD PRESSURE: 116 MMHG | WEIGHT: 196.6 LBS | HEART RATE: 70 BPM | RESPIRATION RATE: 20 BRPM | BODY MASS INDEX: 38.6 KG/M2 | OXYGEN SATURATION: 95 % | HEIGHT: 60 IN | TEMPERATURE: 98 F

## 2019-07-01 DIAGNOSIS — K21.00 GASTROESOPHAGEAL REFLUX DISEASE WITH ESOPHAGITIS: ICD-10-CM

## 2019-07-01 DIAGNOSIS — I10 ESSENTIAL HYPERTENSION: Chronic | ICD-10-CM

## 2019-07-01 DIAGNOSIS — E78.00 HYPERCHOLESTEROLEMIA: Chronic | ICD-10-CM

## 2019-07-01 DIAGNOSIS — E66.01 MORBID OBESITY (HCC): ICD-10-CM

## 2019-07-01 DIAGNOSIS — E11.21 TYPE 2 DIABETES MELLITUS WITH NEPHROPATHY (HCC): ICD-10-CM

## 2019-07-01 DIAGNOSIS — E11.65 TYPE 2 DIABETES MELLITUS WITH HYPERGLYCEMIA, WITHOUT LONG-TERM CURRENT USE OF INSULIN (HCC): Primary | Chronic | ICD-10-CM

## 2019-07-01 PROBLEM — K21.9 BILE ACID ESOPHAGEAL REFLUX: Chronic | Status: ACTIVE | Noted: 2019-07-01

## 2019-07-01 NOTE — PROGRESS NOTES
[address]  [phone]           Progress Note    Patient: Luis Greenberg MRN: [id number]  SSN: [id number]    YOB: 1940  Age: 69 y.o. Sex: female        Chief Complaint   Patient presents with    Follow Up Chronic Condition    Labs         Subjective:     Encounter Diagnoses   Name Primary?  Type 2 diabetes mellitus with hyperglycemia, without long-term current use of insulin ([facility]): This patient is managed under a comprehensive plan of care for Diabetes. Overall the patient feels well with good energy level. Key Antihyperglycemic Medications             dulaglutide (TRULICITY) 1.5 UT/5.6 mL sub-q pen (Taking) 0.5 mL by SubCUTAneous route every seven (7) days. Stop Byduereon           Pertinent Labs:   Lab Results   Component Value Date/Time    Hemoglobin A1c 7.2 (H) 04/01/2019 02:11 PM    Hemoglobin A1c 6.8 (H) 11/28/2018 04:10 PM    Hemoglobin A1c 6.9 (H) 08/29/2018 12:07 PM      Body mass index is 39.04 kg/m². Lab Results   Component Value Date/Time    LDL, calculated 56 04/01/2019 02:11 PM         Lab Results   Component Value Date/Time    Sodium 140 04/01/2019 02:11 PM    Potassium 3.9 04/01/2019 02:11 PM    Chloride 99 04/01/2019 02:11 PM    CO2 25 04/01/2019 02:11 PM    Anion gap 9 06/20/2018 10:38 AM    Glucose 109 (H) 04/01/2019 02:11 PM    BUN 18 04/01/2019 02:11 PM    Creatinine 0.89 04/01/2019 02:11 PM    BUN/Creatinine ratio 20 04/01/2019 02:11 PM    GFR est AA 72 04/01/2019 02:11 PM    GFR est non-AA 63 04/01/2019 02:11 PM    Calcium 10.0 04/01/2019 02:11 PM    AST (SGOT) 37 04/01/2019 02:11 PM    Alk.  phosphatase 62 04/01/2019 02:11 PM    Protein, total 6.9 04/01/2019 02:11 PM    Albumin 4.3 04/01/2019 02:11 PM    Globulin 3.2 10/28/2010 09:07 AM    A-G Ratio 1.7 04/01/2019 02:11 PM    ALT (SGPT) 46 (H) 04/01/2019 02:11 PM     Lab Results   Component Value Date/Time    Microalb/Creat ratio (ug/mg creat.) 57.2 (H) 2019 02:11 PM      Frequency of home glucose testing:daily   Blood Sugar range at home:    Last eye exam: In past 12 months. Last foot exam: This year. Polyuria, polyphagia or polydipsia: No   Retinopathy: No   Neuropathy SX: No    Low blood sugar symptoms: No   Dietary compliance: Good   Medication compliance:Good   On ASA: Yes   Depression: No   CKD:no     Wt Readings from Last 3 Encounters:   19 196 lb 9.6 oz (89.2 kg)   19 194 lb 0.1 oz (88 kg)   05/10/19 197 lb 3.2 oz (89.4 kg)        Social History     Tobacco Use   Smoking Status Former Smoker    Packs/day: 1.00    Years: 20.00    Pack years: 20.00    Last attempt to quit:     Years since quittin.5   Smokeless Tobacco Never Used     Body mass index is 39.04 kg/m². All the patient's questions regarding medications, diet and exercise were answered. Goal of A1C of less than 7.5% is our goal.   Our overall goal is to reduce or eliminate the long term consequences of poorly controlled diabetes. Yes    Type 2 diabetes mellitus with nephropathy [facility]): See above  This patient is managed under a comprehensive plan of care for Diabetes. Overall the patient feels well with good energy level. Key Antihyperglycemic Medications             dulaglutide (TRULICITY) 1.5 PU/0.6 mL sub-q pen (Taking) 0.5 mL by SubCUTAneous route every seven (7) days. Stop Byduereon           Pertinent Labs:   Lab Results   Component Value Date/Time    Hemoglobin A1c 7.2 (H) 2019 02:11 PM    Hemoglobin A1c 6.8 (H) 2018 04:10 PM    Hemoglobin A1c 6.9 (H) 2018 12:07 PM      Body mass index is 39.04 kg/m².      Lab Results   Component Value Date/Time    LDL, calculated 56 2019 02:11 PM         Lab Results   Component Value Date/Time    Sodium 140 2019 02:11 PM    Potassium 3.9 2019 02:11 PM    Chloride 99 2019 02:11 PM    CO2 25 2019 02:11 PM    Anion gap 9 2018 10:38 AM Glucose 109 (H) 2019 02:11 PM    BUN 18 2019 02:11 PM    Creatinine 0.89 2019 02:11 PM    BUN/Creatinine ratio 20 2019 02:11 PM    GFR est AA 72 2019 02:11 PM    GFR est non-AA 63 2019 02:11 PM    Calcium 10.0 2019 02:11 PM    AST (SGOT) 37 2019 02:11 PM    Alk. phosphatase 62 2019 02:11 PM    Protein, total 6.9 2019 02:11 PM    Albumin 4.3 2019 02:11 PM    Globulin 3.2 10/28/2010 09:07 AM    A-G Ratio 1.7 2019 02:11 PM    ALT (SGPT) 46 (H) 2019 02:11 PM     Lab Results   Component Value Date/Time    Microalb/Creat ratio (ug/mg creat.) 57.2 (H) 2019 02:11 PM        Wt Readings from Last 3 Encounters:   19 196 lb 9.6 oz (89.2 kg)   19 194 lb 0.1 oz (88 kg)   05/10/19 197 lb 3.2 oz (89.4 kg)        Social History     Tobacco Use   Smoking Status Former Smoker    Packs/day: 1.00    Years: 20.00    Pack years: 20.00    Last attempt to quit: 1988    Years since quittin.5   Smokeless Tobacco Never Used     Body mass index is 39.04 kg/m². Diabetic Consultants: All the patient's questions regarding medications, diet and exercise were answered. Goal of A1C of less than 7.5% is our goal.   Our overall goal is to reduce or eliminate the long term consequences of poorly controlled diabetes.  Essential hypertension:  BP Readings from Last 3 Encounters:   19 116/79   19 (!) 147/92   05/10/19 144/76     The patient reports:  taking medications as instructed, no medication side effects noted, no TIA's, no chest pain on exertion, no dyspnea on exertion, no swelling of ankles. Key CAD CHF Meds             Cholestyramine-Aspartame (PREVALITE) 4 gram powder (Taking) Take 2 g by mouth two (2) times a day. 1/2 scoop in 6 oz water. dilTIAZem ER (CARDIZEM LA) 180 mg Tb24 tablet (Taking) Take 180 mg by mouth daily.     ezetimibe (ZETIA) 10 mg tablet (Taking) TAKE 1 TABLET DAILY losartan-hydroCHLOROthiazide (HYZAAR) 100-25 mg per tablet (Taking) TAKE 1 TABLET DAILY    cloNIDine HCl (CATAPRES) 0.1 mg tablet (Taking) TAKE 1 TABLET NIGHTLY    TRICOR 145 mg tablet (Taking) TAKE 1 TABLET DAILY    aspirin 81 mg chewable tablet (Taking) Take 81 mg by mouth daily. omega-3 fatty acids-vitamin e (FISH OIL) 1,000 mg cap (Taking) Take 2 Caps by mouth two (2) times a day. Lab Results   Component Value Date/Time    Sodium 140 04/01/2019 02:11 PM    Potassium 3.9 04/01/2019 02:11 PM    Chloride 99 04/01/2019 02:11 PM    CO2 25 04/01/2019 02:11 PM    Anion gap 9 06/20/2018 10:38 AM    Glucose 109 (H) 04/01/2019 02:11 PM    BUN 18 04/01/2019 02:11 PM    Creatinine 0.89 04/01/2019 02:11 PM    BUN/Creatinine ratio 20 04/01/2019 02:11 PM    GFR est AA 72 04/01/2019 02:11 PM    GFR est non-AA 63 04/01/2019 02:11 PM    Calcium 10.0 04/01/2019 02:11 PM    Bilirubin, total 0.5 04/01/2019 02:11 PM    AST (SGOT) 37 04/01/2019 02:11 PM    Alk. phosphatase 62 04/01/2019 02:11 PM    Protein, total 6.9 04/01/2019 02:11 PM    Albumin 4.3 04/01/2019 02:11 PM    Globulin 3.2 10/28/2010 09:07 AM    A-G Ratio 1.7 04/01/2019 02:11 PM    ALT (SGPT) 46 (H) 04/01/2019 02:11 PM     Low salt diet? yes  Aerobic exercise? no  Our goal is to normalize the blood pressure to decrease the risks of strokes and heart attacks. The patient is in agreement with the plan.  Hypercholesterolemia     Gastroesophageal reflux disease with esophagitis     Morbid obesity ([address]              Current and past medical information:    Current Medications after this visit[de-identified]     Current Outpatient Medications   Medication Sig    Cholestyramine-Aspartame (PREVALITE) 4 gram powder Take 2 g by mouth two (2) times a day. 1/2 scoop in 6 oz water.  dilTIAZem ER (CARDIZEM LA) 180 mg Tb24 tablet Take 180 mg by mouth daily.  raNITIdine (ZANTAC) 75 mg tab Take 75 mg by mouth two (2) times a day.     ezetimibe (ZETIA) 10 mg tablet TAKE 1 TABLET DAILY    dulaglutide (TRULICITY) 1.5 LH/0.4 mL sub-q pen 0.5 mL by SubCUTAneous route every seven (7) days. Stop Byduereon    esomeprazole (NEXIUM) 40 mg capsule Take 1 Cap by mouth daily. Indications: gastroesophageal reflux disease    losartan-hydroCHLOROthiazide (HYZAAR) 100-25 mg per tablet TAKE 1 TABLET DAILY    cloNIDine HCl (CATAPRES) 0.1 mg tablet TAKE 1 TABLET NIGHTLY    albuterol (PROVENTIL HFA, VENTOLIN HFA, PROAIR HFA) 90 mcg/actuation inhaler Take 2 Puffs by inhalation every four (4) hours as needed for Wheezing for up to 360 days.  TRICOR 145 mg tablet TAKE 1 TABLET DAILY    loratadine (CLARITIN) 10 mg tablet TAKE 1 TABLET DAILY    polyethylene glycol (MIRALAX) 17 gram packet Take 1 Packet by mouth daily. Indications: constipation, If constipation last more than 24-48 hours, despite colace use. (Patient taking differently: Take 17 g by mouth as needed.)    aspirin 81 mg chewable tablet Take 81 mg by mouth daily.  lancets (FREESTYLE LANCETS) 28 gauge misc TEST ONCE DAILY DX: E11.65    glucose blood VI test strips (FREESTYLE LITE STRIPS) strip TEST ONCE A DAY DX CODE: E11.65    omega-3 fatty acids-vitamin e (FISH OIL) 1,000 mg cap Take 2 Caps by mouth two (2) times a day.  cholecalciferol, vitamin d3, (VITAMIN D) 1,000 unit tablet Take 1,000 Units by mouth two (2) times a day.  ondansetron (ZOFRAN ODT) 4 mg disintegrating tablet Take 1 Tab by mouth every eight (8) hours as needed for Nausea. Indications: PREVENTION OF POST-OPERATIVE NAUSEA AND VOMITING     No current facility-administered medications for this visit.         Patient Active Problem List    Diagnosis Date Noted    Type 2 diabetes mellitus with nephropathy ([facility] 01/23/2018     Priority: 1 - One    Type 2 diabetes mellitus with hyperglycemia, without long-term current use of insulin ([facility] 12/19/2016     Priority: 1 - One    Morbid obesity ([facility] 10/18/2015     Priority: 1 - One    Vitamin D deficiency 06/28/2010 Priority: 1 - One    Hypertension      Priority: 1 - One    Hypercholesterolemia      Priority: 1 - One    GERD (gastroesophageal reflux disease)      Priority: 1 - One    Hot flashes, menopausal      Priority: 3 - Three    Allergic rhinitis 2011     Priority: 6 - Six    S/P total knee replacement using cement 2015    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    Arthritis 2011       Past Medical History:   Diagnosis Date    Arthritis 2011    OSTEO    Chronic pain     RIGHT KNEE    [facility]     GERD (gastroesophageal reflux disease)     Hot flashes, menopausal     Hypercholesterolemia     Hypertension     Ill-defined condition     Elevated liver enzymes (has family history)    Nausea & vomiting     Seizures ([address]     AFTER DRINKING 2 MARGARITAS    Vitamin D deficiency 2010       Allergies   Allergen Reactions    Ciprofloxacin Nausea Only    Erythromycin Nausea Only    Metformin Other (comments) and Nausea Only     Felt bad  Felt bad      Pcn [Penicillins] Swelling     Caused lips to swell  Tolerates Amoxicillin       Past Surgical History:   Procedure Laterality Date    COLONOSCOPY N/A 6/3/2019    COLONOSCOPY performed by Patience Teran MD at [facility] HX APPENDECTOMY  2001    HX CATARACT REMOVAL Bilateral     HX CHOLECYSTECTOMY  2018    HX HYSTERECTOMY  1975    HX KNEE REPLACEMENT Left 2014    HX OOPHORECTOMY  2001    bilateral    HX OTHER SURGICAL      LIPOMA LEFT SIDE    HX ROTATOR CUFF REPAIR Right        Social History     Socioeconomic History    Marital status:      Spouse name: Not on file    Number of children: Not on file    Years of education: Not on file    Highest education level: Not on file   Tobacco Use    Smoking status: Former Smoker     Packs/day: 1.00     Years: 20.00     Pack years: 20.00     Last attempt to quit:      Years since quittin.5    Smokeless tobacco: Never Used   Substance and Sexual Activity    Alcohol use: Yes     Comment: rarely - 1 glass of wine every 3 months    Drug use: No    Sexual activity: Yes       Review of Systems   Constitutional: Negative. Negative for chills, fever, malaise/fatigue and weight loss. HENT: Negative. Negative for hearing loss. Eyes: Negative. Negative for blurred vision and double vision. Respiratory: Negative. Negative for cough, sputum production and shortness of breath. Cardiovascular: Negative. Negative for chest pain and palpitations. Gastrointestinal: Negative. Negative for abdominal pain, blood in stool, heartburn, nausea and vomiting. She has severe symptoms of GERD with bloating and burping. This is not been responsive to PPIs or Zantac. She had a cholecystectomy one year ago when the symptoms began about 6 months after that. This may very well be bile acid reflux. She just had a colonoscopy so I would like to wait to do an upper GI endoscopy if we could. I will give her a trial of Prevalite. Genitourinary: Negative. Negative for dysuria, frequency and urgency. Musculoskeletal: Negative. Negative for back pain, falls, myalgias and neck pain. Skin: Negative. Negative for rash. Neurological: Negative. Negative for dizziness, tingling, tremors, weakness and headaches. Endo/Heme/Allergies: Negative. Psychiatric/Behavioral: Negative. Negative for depression. Objective:     Vitals:    07/01/19 1054 07/01/19 1119   BP: 145/84 116/79   Pulse: 70    Resp: 20    Temp: 98 °F (36.7 °C)    TempSrc: Oral    SpO2: 95%    Weight: 196 lb 9.6 oz (89.2 kg)    Height: 4' 11.5\" (1.511 m)       Body mass index is 39.04 kg/m². Physical Exam   Constitutional: She is oriented to person, place, and time and well-developed, well-nourished, and in no distress. No distress. HENT:   Head: Normocephalic and atraumatic.    Mouth/Throat: Oropharynx is clear and moist.   Eyes: Conjunctivae are normal.   Neck: No thyromegaly present. No carotid bruit. Cardiovascular: Normal rate, regular rhythm and normal heart sounds. No murmur heard. Pulmonary/Chest: Effort normal and breath sounds normal. No respiratory distress. She has no wheezes. She has no rales. Abdominal: Soft. She exhibits no distension. There is no tenderness. There is no rebound and no guarding. Musculoskeletal: She exhibits no edema. Neurological: She is alert and oriented to person, place, and time. Skin: Skin is warm. No rash noted. She is not diaphoretic. No erythema. Psychiatric: Mood and affect normal.   Nursing note and vitals reviewed. There are no preventive care reminders to display for this patient. Assessment and orders:     Encounter Diagnoses     ICD-10-CM ICD-9-CM   1. Type 2 diabetes mellitus with hyperglycemia, without long-term current use of insulin ([facility]) E11.65 250.00     790.29   2. Type 2 diabetes mellitus with nephropathy ([facility]) E11.21 250.40     583.81   3. Essential hypertension I10 401.9   4. Hypercholesterolemia E78.00 272.0   5. Gastroesophageal reflux disease with esophagitis K21.0 530.11   6. Morbid obesity ([facility] E66.01 278.01     Diagnoses and all orders for this visit:    1. Type 2 diabetes mellitus with hyperglycemia, without long-term current use of insulin ([facility])  -     HEMOGLOBIN A1C WITH EAG  -     MICROALBUMIN, UR, RAND W/ MICROALB/CREAT RATIO  -     LIPID PANEL  -     METABOLIC PANEL, COMPREHENSIVE    2. Type 2 diabetes mellitus with nephropathy ([facility])  -     HEMOGLOBIN A1C WITH EAG  -     MICROALBUMIN, UR, RAND W/ MICROALB/CREAT RATIO  -     LIPID PANEL  -     METABOLIC PANEL, COMPREHENSIVE    3. Essential hypertension  -     HEMOGLOBIN    4. Hypercholesterolemia  -     LIPID PANEL  -     METABOLIC PANEL, COMPREHENSIVE    5. Gastroesophageal reflux disease with esophagitis-suspect component of bile acid reflux  -     HEMOGLOBIN    6.  Mor[facility] ([facility]  -     LIPID PANEL  -     METABOLIC PANEL, COMPREHENSIVE    7. Bile acid reflux suspected. She is to call me in 2 weeks with a progress report. If her symptoms are not dramatically different we will proceed with an upper GI endoscopy request to Dr. Lavonne Koroma.  -     Cholestyramine-Aspartame (PREVALITE) 4 gram powder; Take 2 g by mouth two (2) times a day. 1/2 scoop in 6 oz water. Plan of care:  Discussed diagnoses in detail with patient. Medication risks/benefits/side effects discussed with patient. All of the patient's questions were addressed. The patient understands and agrees with our plan of care. The patient knows to call back if they are unsure of or forget any changes we discussed today or if the symptoms change. The patient received an After-Visit Summary which contains VS, orders, medication list and allergy list. This can be used as a \"mini-medical record\" should they have to seek medical care while out of town. Patient Care Team:  Suly Whitney MD as PCP - Zina Dodge MD as Physician (Cardiology)  Isael Samuels MD as Physician (Orthopedic Surgery)  Helen Green MD (Endocrinology)  Nydia Tejada MD (General Surgery)  Carola Simmons MD (Ophthalmology)    Follow-up and Dispositions    · Return in about 3 months (around 10/1/2019). Future Appointments   Date Time Provider [place] Sloan   10/1/2019  9:50 AM Connie Ambriz MD [facility]   10/8/2019 11:15 AM Gayle Grady MD [facility]       Signed By: Beryle Bayard, MD     July 1, 2019      ATTENTION:   This medical record was transcribed using an electronic medical records/speech recognition system. Although proofread, it may and can contain electronic, spelling and other errors. Corrections may be executed at a later time. Please feel free to contact me for any clarifications as needed.

## 2019-07-01 NOTE — PROGRESS NOTES
1. Have you been to the ER, urgent care clinic since your last visit? Hospitalized since your last visit? Yes When: CJW - nausea and vomiting     2. Have you seen or consulted any other health care providers outside of the [address] [place] [address] since your last visit? Include any pap smears or colon screening. No  Reviewed record in preparation for visit and have necessary documentation  Pt did not bring medication to office visit for review  Information was given to pt on Advanced Directives, Living Will  Information was given on Shingles Vaccine  opportunity was given for questions  Goals that were addressed and/or need to be completed during or after this appointment include     There are no preventive care reminders to display for this patient.

## 2019-07-02 LAB
ALBUMIN SERPL-MCNC: 4.4 G/DL (ref 3.5–4.8)
ALBUMIN/CREAT UR: 109.1 MG/G CREAT (ref 0–30)
ALBUMIN/GLOB SERPL: 1.7 {RATIO} (ref 1.2–2.2)
ALP SERPL-CCNC: 57 IU/L (ref 39–117)
ALT SERPL-CCNC: 49 IU/L (ref 0–32)
AST SERPL-CCNC: 50 IU/L (ref 0–40)
BILIRUB SERPL-MCNC: 0.5 MG/DL (ref 0–1.2)
BUN SERPL-MCNC: 19 MG/DL (ref 8–27)
BUN/CREAT SERPL: 19 (ref 12–28)
CALCIUM SERPL-MCNC: 10.2 MG/DL (ref 8.7–10.3)
CHLORIDE SERPL-SCNC: 98 MMOL/L (ref 96–106)
CHOLEST SERPL-MCNC: 148 MG/DL (ref 100–199)
CO2 SERPL-SCNC: 25 MMOL/L (ref 20–29)
CREAT SERPL-MCNC: 1.01 MG/DL (ref 0.57–1)
CREAT UR-MCNC: 70.5 MG/DL
EST. AVERAGE GLUCOSE BLD GHB EST-MCNC: 163 MG/DL
GLOBULIN SER CALC-MCNC: 2.6 G/DL (ref 1.5–4.5)
GLUCOSE SERPL-MCNC: 129 MG/DL (ref 65–99)
HBA1C MFR BLD: 7.3 % (ref 4.8–5.6)
HDLC SERPL-MCNC: 50 MG/DL
HGB BLD-MCNC: 14.6 G/DL (ref 11.1–15.9)
LDLC SERPL CALC-MCNC: 61 MG/DL (ref 0–99)
Lab: NORMAL
MICROALBUMIN UR-MCNC: 76.9 UG/ML
POTASSIUM SERPL-SCNC: 4.3 MMOL/L (ref 3.5–5.2)
PROT SERPL-MCNC: 7 G/DL (ref 6–8.5)
SODIUM SERPL-SCNC: 140 MMOL/L (ref 134–144)
TRIGL SERPL-MCNC: 184 MG/DL (ref 0–149)
VLDLC SERPL CALC-MCNC: 37 MG/DL (ref 5–40)

## 2019-07-22 ENCOUNTER — TELEPHONE (OUTPATIENT)
Dept: FAMILY MEDICINE CLINIC | Age: 79
End: 2019-07-22

## 2019-07-22 DIAGNOSIS — K21.00 GASTROESOPHAGEAL REFLUX DISEASE WITH ESOPHAGITIS: Primary | Chronic | ICD-10-CM

## 2019-07-22 NOTE — TELEPHONE ENCOUNTER
Advised patient that Dr. Charbel Dias has placed a referral for her to see Dr. Miguel Greene. Patient verbalized understanding.

## 2019-07-22 NOTE — TELEPHONE ENCOUNTER
Returned phone call to patient. She reports that the Prevalite is not working. She stopped taking it last night. She says that she is still experiencing a lot of burping 2-3 times a week. She is willing to see Dr. Sherrill Hillman as discussed in her last office visit.

## 2019-07-22 NOTE — TELEPHONE ENCOUNTER
Pt states that Dr. Espinoza wanted an update on the medication she is taking. It is not working. Asking for Nurse Mia to call.  thanks

## 2019-09-11 RX ORDER — LORATADINE 10 MG/1
TABLET ORAL
Qty: 90 TAB | Refills: 0 | Status: SHIPPED | OUTPATIENT
Start: 2019-09-11 | End: 2019-12-10 | Stop reason: SDUPTHER

## 2019-09-15 RX ORDER — FENOFIBRATE 145 MG/1
TABLET ORAL
Qty: 90 TAB | Refills: 4 | Status: SHIPPED | OUTPATIENT
Start: 2019-09-15 | End: 2020-12-15

## 2019-09-17 ENCOUNTER — CLINICAL SUPPORT (OUTPATIENT)
Dept: FAMILY MEDICINE CLINIC | Age: 79
End: 2019-09-17

## 2019-09-17 VITALS
HEIGHT: 60 IN | WEIGHT: 196 LBS | BODY MASS INDEX: 38.48 KG/M2 | OXYGEN SATURATION: 96 % | SYSTOLIC BLOOD PRESSURE: 127 MMHG | RESPIRATION RATE: 18 BRPM | DIASTOLIC BLOOD PRESSURE: 70 MMHG | TEMPERATURE: 98.2 F | HEART RATE: 64 BPM

## 2019-09-17 DIAGNOSIS — Z23 ENCOUNTER FOR IMMUNIZATION: Primary | ICD-10-CM

## 2019-09-17 NOTE — PROGRESS NOTES
1. Have you been to the ER, urgent care clinic since your last visit? Hospitalized since your last visit? No    2. Have you seen or consulted any other health care providers outside of the [address] [place] [address] since your last visit? Include any pap smears or colon screening. No    Health Maintenance Due   Topic Date Due    Influenza Age 5 to Adult  08/01/2019       Patient is accompanied by  I have received verbal consent from Rozina Pérez to discuss any/all medical information while they are present in the room. Influenza vaccine administered 9/17/2019 by Nolan Holbrook LPN with consent. Patient tolerated well. No reactions noted.

## 2019-10-01 ENCOUNTER — OFFICE VISIT (OUTPATIENT)
Dept: FAMILY MEDICINE CLINIC | Age: 79
End: 2019-10-01

## 2019-10-01 VITALS
WEIGHT: 196 LBS | BODY MASS INDEX: 39.51 KG/M2 | RESPIRATION RATE: 20 BRPM | TEMPERATURE: 98.5 F | HEIGHT: 59 IN | SYSTOLIC BLOOD PRESSURE: 112 MMHG | OXYGEN SATURATION: 97 % | HEART RATE: 69 BPM | DIASTOLIC BLOOD PRESSURE: 72 MMHG

## 2019-10-01 DIAGNOSIS — E11.65 TYPE 2 DIABETES MELLITUS WITH HYPERGLYCEMIA, WITHOUT LONG-TERM CURRENT USE OF INSULIN (HCC): Primary | Chronic | ICD-10-CM

## 2019-10-01 DIAGNOSIS — K21.00 GASTROESOPHAGEAL REFLUX DISEASE WITH ESOPHAGITIS: Chronic | ICD-10-CM

## 2019-10-01 DIAGNOSIS — E78.00 HYPERCHOLESTEROLEMIA: Chronic | ICD-10-CM

## 2019-10-01 DIAGNOSIS — E66.01 MORBID OBESITY (HCC): ICD-10-CM

## 2019-10-01 DIAGNOSIS — I10 ESSENTIAL HYPERTENSION: Chronic | ICD-10-CM

## 2019-10-01 DIAGNOSIS — E11.21 TYPE 2 DIABETES MELLITUS WITH NEPHROPATHY (HCC): ICD-10-CM

## 2019-10-01 NOTE — PATIENT INSTRUCTIONS
Noninsulin Medicines for Type 2 Diabetes: Care Instructions Your Care Instructions There are different types of noninsulin medicines for diabetes. Each works in a different way. But they all help you control your blood sugar. Some types help your body make insulin to lower your blood sugar. Others lower how much insulin your body needs. Some can slow how fast your body digests sugars. And some can remove extra glucose through your urine. · Alpha-glucosidase inhibitors. These keep starches from breaking down. This means that they lower the amount of glucose absorbed when you eat. They don't help your body make more insulin. So they will not cause low blood sugar unless you use them with other medicines for diabetes. They include acarbose and miglitol. · DPP-4 inhibitors. These help your body raise the level of insulin after you eat. They also help your body make less of a hormone that raises blood sugar. They include linagliptin, saxagliptin, and sitagliptin. · Incretin hormones (GLP-1 receptor agonists). Your body makes a protein that can raise your insulin level. It also can lower your blood sugar and make you less hungry. You can get shots of hormones that work the same way. They include exenatide and liraglutide. · Meglitinides. These help your body release insulin. They also help slow how your body digests sugars. So they can keep your blood sugar from rising too fast after you eat. They include nateglinide and repaglinide. · Metformin. This lowers how much glucose your liver makes. And it helps you respond better to insulin. It also lowers the amount of stored sugar that your liver releases when you are not eating. · SGLT2 inhibitors. These help to remove extra glucose through your urine. They may also help some people lose weight. They include canagliflozin, dapagliflozin, and empagliflozin. · Sulfonylureas. These help your body release more insulin.  Some work for many hours. They can cause low blood sugar if you don't eat as you planned. They include glipizide and glyburide. · Thiazolidinediones. These reduce the amount of blood glucose. They also help you respond better to insulin. They include pioglitazone and rosiglitazone. You may need to take more than one medicine for diabetes. Two or more medicines may work better to lower your blood sugar level than just one does. Follow-up care is a key part of your treatment and safety. Be sure to make and go to all appointments, and call your doctor if you are having problems. It's also a good idea to know your test results and keep a list of the medicines you take. How can you care for yourself at home? · Eat a healthy diet. Get some exercise each day. This may help you to reduce how much medicine you need. · Do not take other prescription or over-the-counter medicines, vitamins, herbal products, or supplements without talking to your doctor first. Some medicines for type 2 diabetes can cause problems with other medicines or supplements. · Tell your doctor if you plan to get pregnant. Some of these drugs are not safe for pregnant women. · Be safe with medicines. Take your medicines exactly as prescribed. Meglitinides and sulfonylureas can cause your blood sugar to drop very low. Call your doctor if you think you are having a problem with your medicine. · Check your blood sugar often. You can use a glucose monitor. Keeping track can help you know how certain foods, activities, and medicines affect your blood sugar. And it can help you keep your blood sugar from getting so low that it's not safe. When should you call for help? Call 911 anytime you think you may need emergency care.  For example, call if: 
  · You passed out (lost consciousness).  
  · You are confused or cannot think clearly.  
  · Your blood sugar is very high or very low.  
 Watch closely for changes in your health, and be sure to contact your doctor if: 
  · Your blood sugar stays outside the level your doctor set for you.  
  · You have any problems. Where can you learn more? Go to http://reid-shanell.info/. Enter H153 in the search box to learn more about \"Noninsulin Medicines for Type 2 Diabetes: Care Instructions. \" Current as of: April 16, 2019 Content Version: 12.2 © 4543-2026 [email or web address]. Care instructions adapted under license by [employer] (which disclaims liability or warranty for this information). If you have questions about a medical condition or this instruction, always ask your healthcare professional. [address] any warranty or liability for your use of this information.

## 2019-10-01 NOTE — PROGRESS NOTES
1. Have you been to the ER, urgent care clinic since your last visit? Hospitalized since your last visit? No    2. Have you seen or consulted any other health care providers outside of the [address] [place] [address] since your last visit? Include any pap smears or colon screening. No    Reviewed record in preparation for visit and have necessary documentation  Goals that were addressed and/or need to be completed during or after this appointment include     There are no preventive care reminders to display for this patient. Patient is accompanied by spouse I have received verbal consent from Raheel Simons to discuss any/all medical information while they are present in the room.

## 2019-10-01 NOTE — PROGRESS NOTES
[address]  [phone]           Progress Note    Patient: Kvng Monroe MRN: [id number]  SSN: [id number]    YOB: 1940  Age: 69 y.o. Sex: female        Chief Complaint   Patient presents with    Hypertension    Diabetes    Labs         Subjective:     Encounter Diagnoses   Name Primary?  Type 2 diabetes mellitus with hyperglycemia, without long-term current use of insulin ([facility] [address]): This patient is managed under a comprehensive plan of care for Diabetes. Overall the patient feels well with good energy level. Key Antihyperglycemic Medications             dulaglutide (TRULICITY) 1.5 SD/8.3 mL sub-q pen (Taking) 0.5 mL by SubCUTAneous route every seven (7) days. Stop Byduereon           Pertinent Labs:   Lab Results   Component Value Date/Time    Hemoglobin A1c 7.3 (H) 07/01/2019 11:20 AM    Hemoglobin A1c 7.2 (H) 04/01/2019 02:11 PM    Hemoglobin A1c 6.8 (H) 11/28/2018 04:10 PM      Body mass index is 39.59 kg/m². Lab Results   Component Value Date/Time    LDL, calculated 61 07/01/2019 11:20 AM         Lab Results   Component Value Date/Time    Sodium 140 07/01/2019 11:20 AM    Potassium 4.3 07/01/2019 11:20 AM    Chloride 98 07/01/2019 11:20 AM    CO2 25 07/01/2019 11:20 AM    Anion gap 9 06/20/2018 10:38 AM    Glucose 129 (H) 07/01/2019 11:20 AM    BUN 19 07/01/2019 11:20 AM    Creatinine 1.01 (H) 07/01/2019 11:20 AM    BUN/Creatinine ratio 19 07/01/2019 11:20 AM    GFR est AA 62 07/01/2019 11:20 AM    GFR est non-AA 54 (L) 07/01/2019 11:20 AM    Calcium 10.2 07/01/2019 11:20 AM    AST (SGOT) 50 (H) 07/01/2019 11:20 AM    Alk.  phosphatase 57 07/01/2019 11:20 AM    Protein, total 7.0 07/01/2019 11:20 AM    Albumin 4.4 07/01/2019 11:20 AM    Globulin 3.2 10/28/2010 09:07 AM    A-G Ratio 1.7 07/01/2019 11:20 AM    ALT (SGPT) 49 (H) 07/01/2019 11:20 AM     Lab Results   Component Value Date/Time Microalb/Creat ratio (ug/mg creat.) 109.1 (H) 2019 11:20 AM      Frequency of home glucose testing:daily   Blood Sugar range at home:    Last eye exam: In past 12 months. Last foot exam: This year. Polyuria, polyphagia or polydipsia: No   Retinopathy: No   Neuropathy SX: No    Low blood sugar symptoms: No   Dietary compliance: Good   Medication compliance:Good   On ASA: Yes   Depression: No   CKD:no     Wt Readings from Last 3 Encounters:   10/01/19 196 lb (88.9 kg)   19 196 lb (88.9 kg)   19 196 lb 9.6 oz (89.2 kg)        Social History     Tobacco Use   Smoking Status Former Smoker    Packs/day: 1.00    Years: 20.00    Pack years: 20.00    Last attempt to quit:     Years since quittin.7   Smokeless Tobacco Never Used     Body mass index is 39.59 kg/m². Diabetic Consultants: All the patient's questions regarding medications, diet and exercise were answered. Goal of A1C of less than 7.5% is our goal.   Our overall goal is to reduce or eliminate the long term consequences of poorly controlled diabetes. Yes    Type 2 diabetes mellitus with nephropathy ([address]):  See above.  Hypercholesterolemia:  Cardiovascular risks for her are: LDL goal is under 80  diabetic  hypertension  hyperlipidemia. Key Antihyperlipidemia Meds             TRICOR 145 mg tablet (Taking) TAKE 1 TABLET DAILY    Cholestyramine-Aspartame (PREVALITE) 4 gram powder (Taking) Take 2 g by mouth two (2) times a day. 1/2 scoop in 6 oz water. ezetimibe (ZETIA) 10 mg tablet (Taking) TAKE 1 TABLET DAILY    omega-3 fatty acids-vitamin e (FISH OIL) 1,000 mg cap (Taking) Take 2 Caps by mouth two (2) times a day.         Lab Results   Component Value Date/Time    Cholesterol, total 148 2019 11:20 AM    HDL Cholesterol 50 2019 11:20 AM    LDL, calculated 61 2019 11:20 AM    Triglyceride 184 (H) 2019 11:20 AM    CHOL/HDL Ratio 2.4 10/28/2010 09:07 AM     Lab Results   Component Value Date/Time    ALT (SGPT) 49 (H) 07/01/2019 11:20 AM    AST (SGOT) 50 (H) 07/01/2019 11:20 AM    Alk. phosphatase 57 07/01/2019 11:20 AM    Bilirubin, total 0.5 07/01/2019 11:20 AM      Myalgias: No   Fatigue: No   Other side effects: no  Wt Readings from Last 3 Encounters:   10/01/19 196 lb (88.9 kg)   09/17/19 196 lb (88.9 kg)   07/01/19 196 lb 9.6 oz (89.2 kg)     The patient is aware of our goal to reduce or eliminate the long term problems (such as strokes and heart attacks) related to poorly controlled hyperlipidemia.  Essential hypertension:  BP Readings from Last 3 Encounters:   10/01/19 112/72   09/17/19 127/70   07/01/19 116/79     The patient reports:  taking medications as instructed, no medication side effects noted, no TIA's, no chest pain on exertion, no dyspnea on exertion, no swelling of ankles. Key CAD CHF Meds             TRICOR 145 mg tablet (Taking) TAKE 1 TABLET DAILY    Cholestyramine-Aspartame (PREVALITE) 4 gram powder (Taking) Take 2 g by mouth two (2) times a day. 1/2 scoop in 6 oz water. dilTIAZem ER (CARDIZEM LA) 180 mg Tb24 tablet (Taking) Take 180 mg by mouth daily. ezetimibe (ZETIA) 10 mg tablet (Taking) TAKE 1 TABLET DAILY    losartan-hydroCHLOROthiazide (HYZAAR) 100-25 mg per tablet (Taking) TAKE 1 TABLET DAILY    cloNIDine HCl (CATAPRES) 0.1 mg tablet (Taking) TAKE 1 TABLET NIGHTLY    aspirin 81 mg chewable tablet (Taking) Take 81 mg by mouth daily. omega-3 fatty acids-vitamin e (FISH OIL) 1,000 mg cap (Taking) Take 2 Caps by mouth two (2) times a day.            Lab Results   Component Value Date/Time    Sodium 140 07/01/2019 11:20 AM    Potassium 4.3 07/01/2019 11:20 AM    Chloride 98 07/01/2019 11:20 AM    CO2 25 07/01/2019 11:20 AM    Anion gap 9 06/20/2018 10:38 AM    Glucose 129 (H) 07/01/2019 11:20 AM    BUN 19 07/01/2019 11:20 AM    Creatinine 1.01 (H) 07/01/2019 11:20 AM    BUN/Creatinine ratio 19 07/01/2019 11:20 AM    GFR est AA 62 07/01/2019 11:20 AM GFR est non-AA 54 (L) 07/01/2019 11:20 AM    Calcium 10.2 07/01/2019 11:20 AM    Bilirubin, total 0.5 07/01/2019 11:20 AM    AST (SGOT) 50 (H) 07/01/2019 11:20 AM    Alk. phosphatase 57 07/01/2019 11:20 AM    Protein, total 7.0 07/01/2019 11:20 AM    Albumin 4.4 07/01/2019 11:20 AM    Globulin 3.2 10/28/2010 09:07 AM    A-G Ratio 1.7 07/01/2019 11:20 AM    ALT (SGPT) 49 (H) 07/01/2019 11:20 AM     Low salt diet? yes  Aerobic exercise? no  Our goal is to normalize the blood pressure to decrease the risks of strokes and heart attacks. The patient is in agreement with the plan.  Gastroesophageal reflux disease with esophagitis:  Current control of Symptoms:good  Hiatal Hernia:no  Current Medications:esomeprazole  The patient has no history melena or bright red blood in the stools. The patient avoids high dose aspirin and NSAID therapy. The patient is aware of diet changes needed, elevating the head of the bed and appropriate use of antacids.  Morbid obesity ([address]): Stable. Wt Readings from Last 3 Encounters:   10/01/19 196 lb (88.9 kg)   09/17/19 196 lb (88.9 kg)   07/01/19 196 lb 9.6 oz (89.2 kg)                   Current and past medical information:    Current Medications after this visit[de-identified]     Current Outpatient Medications   Medication Sig    TRICOR 145 mg tablet TAKE 1 TABLET DAILY    loratadine (CLARITIN) 10 mg tablet TAKE 1 TABLET DAILY    Cholestyramine-Aspartame (PREVALITE) 4 gram powder Take 2 g by mouth two (2) times a day. 1/2 scoop in 6 oz water.  dilTIAZem ER (CARDIZEM LA) 180 mg Tb24 tablet Take 180 mg by mouth daily.  raNITIdine (ZANTAC) 75 mg tab Take 75 mg by mouth two (2) times a day.  ezetimibe (ZETIA) 10 mg tablet TAKE 1 TABLET DAILY    dulaglutide (TRULICITY) 1.5 DX/9.8 mL sub-q pen 0.5 mL by SubCUTAneous route every seven (7) days. Stop Byduereon    esomeprazole (NEXIUM) 40 mg capsule Take 1 Cap by mouth daily.  Indications: gastroesophageal reflux disease  losartan-hydroCHLOROthiazide (HYZAAR) 100-25 mg per tablet TAKE 1 TABLET DAILY    cloNIDine HCl (CATAPRES) 0.1 mg tablet TAKE 1 TABLET NIGHTLY    albuterol (PROVENTIL HFA, VENTOLIN HFA, PROAIR HFA) 90 mcg/actuation inhaler Take 2 Puffs by inhalation every four (4) hours as needed for Wheezing for up to 360 days.  polyethylene glycol (MIRALAX) 17 gram packet Take 1 Packet by mouth daily. Indications: constipation, If constipation last more than 24-48 hours, despite colace use. (Patient taking differently: Take 17 g by mouth as needed.)    aspirin 81 mg chewable tablet Take 81 mg by mouth daily.  lancets (FREESTYLE LANCETS) 28 gauge misc TEST ONCE DAILY DX: E11.65    glucose blood VI test strips (FREESTYLE LITE STRIPS) strip TEST ONCE A DAY DX CODE: E11.65    omega-3 fatty acids-vitamin e (FISH OIL) 1,000 mg cap Take 2 Caps by mouth two (2) times a day.  cholecalciferol, vitamin d3, (VITAMIN D) 1,000 unit tablet Take 1,000 Units by mouth two (2) times a day.  ondansetron (ZOFRAN ODT) 4 mg disintegrating tablet Take 1 Tab by mouth every eight (8) hours as needed for Nausea. Indications: PREVENTION OF POST-OPERATIVE NAUSEA AND VOMITING     No current facility-administered medications for this visit.         Patient Active Problem List    Diagnosis Date Noted    Type 2 diabetes mellitus with nephropathy ([facility] 01/23/2018     Priority: 1 - One    Type 2 diabetes mellitus with hyperglycemia, without long-term current use of insulin ([facility] 12/19/2016     Priority: 1 - One    Morbid obesity ([facility] 10/18/2015     Priority: 1 - One    Vitamin D deficiency 06/28/2010     Priority: 1 - One    Hypertension      Priority: 1 - One    Hypercholesterolemia      Priority: 1 - One    GERD (gastroesophageal reflux disease)      Priority: 1 - One    Hot flashes, menopausal      Priority: 3 - Three    Allergic rhinitis 11/18/2011     Priority: 6 - Six    Bile acid esophageal reflux 07/01/2019    S/P total knee replacement using cement 2015    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    Arthritis 2011       Past Medical History:   Diagnosis Date    Arthritis 2011    OSTEO    Chronic pain     RIGHT KNEE    Diabetes ([address]     GERD (gastroesophageal reflux disease)     Hot flashes, menopausal     Hypercholesterolemia     Hypertension     Ill-defined condition     Elevated liver enzymes (has family history)    Nausea & vomiting     Seizures ([address] 'S    AFTER DRINKING 2 MARGARITAS    Vitamin D deficiency 2010       Allergies   Allergen Reactions    Ciprofloxacin Nausea Only    Erythromycin Nausea Only    Metformin Other (comments) and Nausea Only     Felt bad  Felt bad      Pcn [Penicillins] Swelling     Caused lips to swell  Tolerates Amoxicillin       Past Surgical History:   Procedure Laterality Date    COLONOSCOPY N/A 6/3/2019    COL[facility] HX APPENDECTOMY  2001    HX CATARACT REMOVAL Bilateral     HX CHOLECYSTECTOMY  2018    HX HYSTERECTOMY  1975    HX KNEE REPLACEMENT Left 2014    HX OOPHORECTOMY  2001    bilateral    HX OTHER SURGICAL      LIPOMA LEFT SIDE    HX ROTATOR CUFF REPAIR Right        Social History     Socioeconomic History    Marital status:      Spouse name: Not on file    Number of children: Not on file    Years of education: Not on file    Highest education level: Not on file   Tobacco Use    Smoking status: Former Smoker     Packs/day: 1.00     Years: 20.00     Pack years: 20.00     Last attempt to quit:      Years since quittin.7    Smokeless tobacco: Never Used   Substance and Sexual Activity    Alcohol use: Yes     Comment: rarely - 1 glass of wine every 3 months    Drug use: No    Sexual activity: Yes       Review of Systems   Constitutional: Negative. Negative for chills, fever, malaise/fatigue and weight loss. HENT: Negative. Negative for hearing loss. Eyes: Negative. Negative for blurred vision and double vision. Respiratory: Negative. Negative for cough, sputum production and shortness of breath. She has had an upper respiratory infection this past week. Cardiovascular: Negative. Negative for chest pain and palpitations. Gastrointestinal: Negative. Negative for abdominal pain, blood in stool, heartburn, nausea and vomiting. Genitourinary: Negative. Negative for dysuria, frequency and urgency. Musculoskeletal: Negative. Negative for back pain, falls, myalgias and neck pain. Skin: Negative. Negative for rash. Neurological: Negative. Negative for dizziness, tingling, tremors, weakness and headaches. Endo/Heme/Allergies: Negative. Psychiatric/Behavioral: Negative. Negative for depression. Objective:     Vitals:    10/01/19 1423 10/01/19 1444   BP: 165/84 112/72   Pulse: 69    Resp: 20    Temp: 98.5 °F (36.9 °C)    TempSrc: Oral    SpO2: 97%    Weight: 196 lb (88.9 kg)    Height: 4' 11\" (1.499 m)       Body mass index is 39.59 kg/m². Physical Exam   Constitutional: She is oriented to person, place, and time and well-developed, well-nourished, and in no distress. No distress. HENT:   Head: Normocephalic and atraumatic. Mouth/Throat: Oropharynx is clear and moist.   Eyes: Conjunctivae are normal.   Neck: No thyromegaly present. No carotid bruit. Cardiovascular: Normal rate, regular rhythm and normal heart sounds. No murmur heard. Pulmonary/Chest: Effort normal and breath sounds normal. No respiratory distress. She has a few wet rhonchi on coughing. Abdominal: Soft. She exhibits no distension. There is no tenderness. Neurological: She is alert and oriented to person, place, and time. Skin: Skin is warm. No rash noted. She is not diaphoretic. No erythema. Psychiatric: Mood and affect normal.   Nursing note and vitals reviewed. There are no preventive care reminders to display for this patient.       Assessment and orders:     Encounter Diagnoses     ICD-10-CM ICD-9-CM   1. Type 2 diabetes mellitus with hyperglycemia, without long-term current use of insulin (HCC) E11.65 250.00     790.29   2. Type 2 diabetes mellitus with nephropathy (HCC) E11.21 250.40     583.81   3. Hypercholesterolemia E78.00 272.0   4. Essential hypertension I10 401.9   5. Gastroesophageal reflux disease with esophagitis K21.0 530.11   6. Morbid obesity ([facility] E66.01 278.01     Diagnoses and all orders for this visit:    1. Type 2 diabetes mellitus with hyperglycemia, without long-term current use of insulin (HCC)  -     HEMOGLOBIN A1C WITH EAG  -     LIPID PANEL  -     METABOLIC PANEL, COMPREHENSIVE    2. Type 2 diabetes mellitus with nephropathy (HCC)  -     HEMOGLOBIN A1C WITH EAG  -     LIPID PANEL  -     METABOLIC PANEL, COMPREHENSIVE    3. Hypercholesterolemia  -     LIPID PANEL  -     METABOLIC PANEL, COMPREHENSIVE    4. Essential hypertension  -     LIPID PANEL  -     METABOLIC PANEL, COMPREHENSIVE    5. Gastroesophageal reflux disease with esophagitis    6. Morbid obesity ([facility]            Plan of care:  Discussed diagnoses in detail with patient. Medication risks/benefits/side effects discussed with patient. All of the patient's questions were addressed. The patient understands and agrees with our plan of care. The patient knows to call back if they are unsure of or forget any changes we discussed today or if the symptoms change. The patient received an After-Visit Summary which contains VS, orders, medication list and allergy list. This can be used as a \"mini-medical record\" should they have to seek medical care while out of town.     Patient Care Team:  Claudette Raddle, MD as PCP - Maria Fernanda Russ MD as Physician (Cardiology)  Sharmila Richter MD as Physician (Orthopedic Surgery)  Kendal Oneill MD (Endocrinology)  Ebony Tapia MD (General Surgery)  Jenny Pineda MD (Ophthalmology)    Follow-up and Dispositions    · Return in about 3 months (around 1/1/2020). Future Appointments   Date Time Provider Hay Sloan   10/8/2019 11:15 AM Sd Grady MD CDE [facility]   1/6/2020  9:30 AM Pastora Santillan MD [facility] SCHED       Signed By: Diana Cunningham MD     October 1, 2019      ATTENTION:   This medical record was transcribed using an electronic medical records/speech recognition system. Although proofread, it may and can contain electronic, spelling and other errors. Corrections may be executed at a later time. Please feel free to contact me for any clarifications as needed.

## 2019-10-05 LAB
ALBUMIN SERPL-MCNC: 4.2 G/DL (ref 3.5–4.8)
ALBUMIN/GLOB SERPL: 1.6 {RATIO} (ref 1.2–2.2)
ALP SERPL-CCNC: 62 IU/L (ref 39–117)
ALT SERPL-CCNC: 41 IU/L (ref 0–32)
AST SERPL-CCNC: 47 IU/L (ref 0–40)
BILIRUB SERPL-MCNC: 0.5 MG/DL (ref 0–1.2)
BUN SERPL-MCNC: 16 MG/DL (ref 8–27)
BUN/CREAT SERPL: 16 (ref 12–28)
CALCIUM SERPL-MCNC: 9.8 MG/DL (ref 8.7–10.3)
CHLORIDE SERPL-SCNC: 98 MMOL/L (ref 96–106)
CHOLEST SERPL-MCNC: 137 MG/DL (ref 100–199)
CO2 SERPL-SCNC: 24 MMOL/L (ref 20–29)
CREAT SERPL-MCNC: 0.97 MG/DL (ref 0.57–1)
EST. AVERAGE GLUCOSE BLD GHB EST-MCNC: 183 MG/DL
GLOBULIN SER CALC-MCNC: 2.6 G/DL (ref 1.5–4.5)
GLUCOSE SERPL-MCNC: 149 MG/DL (ref 65–99)
HBA1C MFR BLD: 8 % (ref 4.8–5.6)
HDLC SERPL-MCNC: 43 MG/DL
LDLC SERPL CALC-MCNC: 58 MG/DL (ref 0–99)
POTASSIUM SERPL-SCNC: 4.2 MMOL/L (ref 3.5–5.2)
PROT SERPL-MCNC: 6.8 G/DL (ref 6–8.5)
SODIUM SERPL-SCNC: 141 MMOL/L (ref 134–144)
TRIGL SERPL-MCNC: 180 MG/DL (ref 0–149)
VLDLC SERPL CALC-MCNC: 36 MG/DL (ref 5–40)

## 2019-10-07 NOTE — PROGRESS NOTES
Edison Falcon is a 69 y.o. female here for   Chief Complaint   Patient presents with    Diabetes       Functional glucose monitor and record keeping system? - yes  Eye exam within last year? - on file  Foot exam within last year? - on file    1. Have you been to the ER, urgent care clinic since your last visit? Hospitalized since your last visit? -no    2. Have you seen or consulted any other health care providers outside of the [address] [place] [address] since your last visit?   Include any pap smears or colon screening.-Dr. Wu

## 2019-10-08 ENCOUNTER — OFFICE VISIT (OUTPATIENT)
Dept: ENDOCRINOLOGY | Age: 79
End: 2019-10-08

## 2019-10-08 VITALS
SYSTOLIC BLOOD PRESSURE: 136 MMHG | OXYGEN SATURATION: 95 % | BODY MASS INDEX: 39.31 KG/M2 | TEMPERATURE: 96.1 F | HEART RATE: 67 BPM | HEIGHT: 59 IN | WEIGHT: 195 LBS | DIASTOLIC BLOOD PRESSURE: 71 MMHG | RESPIRATION RATE: 14 BRPM

## 2019-10-08 DIAGNOSIS — E11.65 TYPE 2 DIABETES MELLITUS WITH HYPERGLYCEMIA, WITHOUT LONG-TERM CURRENT USE OF INSULIN (HCC): Primary | Chronic | ICD-10-CM

## 2019-10-08 DIAGNOSIS — E78.00 HYPERCHOLESTEROLEMIA: Chronic | ICD-10-CM

## 2019-10-08 DIAGNOSIS — I10 ESSENTIAL HYPERTENSION: Chronic | ICD-10-CM

## 2019-10-08 DIAGNOSIS — E11.65 TYPE 2 DIABETES MELLITUS WITH HYPERGLYCEMIA, WITHOUT LONG-TERM CURRENT USE OF INSULIN (HCC): Primary | ICD-10-CM

## 2019-10-08 RX ORDER — PIOGLITAZONEHYDROCHLORIDE 30 MG/1
30 TABLET ORAL
Qty: 90 TAB | Refills: 3 | Status: SHIPPED | OUTPATIENT
Start: 2019-10-08 | End: 2020-02-24 | Stop reason: SDUPTHER

## 2019-10-08 NOTE — LETTER
10/8/19 Patient: Guanako Hudson YOB: 1940 Date of Visit: 10/8/2019 Shelby Wilson MD 
[address] [address] VIA In Basket Dear Shelby Wilson MD, Thank you for referring Ms. Kendal Berry to [address] for evaluation. My notes for this consultation are attached. If you have questions, please do not hesitate to call me. I look forward to following your patient along with you. Sincerely, Keyla Pedroza MD

## 2019-10-08 NOTE — PROGRESS NOTES
Bulmaro Conte MD        Patient Information  Date:10/8/2019  Name : Dave Quinones 69 y.o.     YOB: 1940         Referred by: Niki Hackett MD         Chief Complaint   Patient presents with    Diabetes       History of Present Illness: Dave Quinones is a 69 y.o. female here for fu of  Type 2 Diabetes Mellitus. Type 2 Diabetes was diagnosed in 10/2014 . End organ effects of diabetes: none. Cardiovascular risk factors: family history, dyslipidemia, diabetes mellitus   Monitoring frequency: 3 times a week. The blood glucose is ranging from 150-200  There was a creamery built next to her house, she was eating more but has stopped the last 2 weeks with no change in the blood glucose    Could not tolerate Metformin IR, XR formualtion. Checking glucose at home  Has not noticed significant hypoglycemia     Compliant with medications    She has more GI symptoms like burping, GERD.   Had it even before the day  Scheduled for upper endoscopy      Hyperlipidemia - was on tricor, zetia, colestipol, Niacin,    Wt Readings from Last 3 Encounters:   10/08/19 195 lb (88.5 kg)   10/01/19 196 lb (88.9 kg)   09/17/19 196 lb (88.9 kg)       BP Readings from Last 3 Encounters:   10/08/19 136/71   10/01/19 112/72   09/17/19 127/70           Past Medical History:   Diagnosis Date    Arthritis 2/28/2011    OSTEO    Chronic pain     RIGHT KNEE    Diabetes ([facility] [address]     GERD (gastroesophageal reflux disease)     Hot flashes, menopausal     Hypercholesterolemia     Hypertension     Ill-defined condition     Elevated liver enzymes (has family history)    Nausea & vomiting     Seizures (HCC) 1980'S    AFTER DRINKING 2 MARGARITAS    Vitamin D deficiency 6/28/2010     Current Outpatient Medications   Medication Sig    TRICOR 145 mg tablet TAKE 1 TABLET DAILY    loratadine (CLARITIN) 10 mg tablet TAKE 1 TABLET DAILY    dilTIAZem ER (CARDIZEM LA) 180 mg Tb24 tablet Take 180 mg by mouth daily.  raNITIdine (ZANTAC) 75 mg tab Take 75 mg by mouth two (2) times a day.  ezetimibe (ZETIA) 10 mg tablet TAKE 1 TABLET DAILY    dulaglutide (TRULICITY) 1.5 CS/9.7 mL sub-q pen 0.5 mL by SubCUTAneous route every seven (7) days. Stop Byduereon    esomeprazole (NEXIUM) 40 mg capsule Take 1 Cap by mouth daily. Indications: gastroesophageal reflux disease    losartan-hydroCHLOROthiazide (HYZAAR) 100-25 mg per tablet TAKE 1 TABLET DAILY    cloNIDine HCl (CATAPRES) 0.1 mg tablet TAKE 1 TABLET NIGHTLY    albuterol (PROVENTIL HFA, VENTOLIN HFA, PROAIR HFA) 90 mcg/actuation inhaler Take 2 Puffs by inhalation every four (4) hours as needed for Wheezing for up to 360 days.  aspirin 81 mg chewable tablet Take 81 mg by mouth daily.  lancets (FREESTYLE LANCETS) 28 gauge misc TEST ONCE DAILY DX: E11.65    glucose blood VI test strips (FREESTYLE LITE STRIPS) strip TEST ONCE A DAY DX CODE: E11.65    omega-3 fatty acids-vitamin e (FISH OIL) 1,000 mg cap Take 2 Caps by mouth two (2) times a day.  cholecalciferol, vitamin d3, (VITAMIN D) 1,000 unit tablet Take 1,000 Units by mouth two (2) times a day.  Cholestyramine-Aspartame (PREVALITE) 4 gram powder Take 2 g by mouth two (2) times a day. 1/2 scoop in 6 oz water.  ondansetron (ZOFRAN ODT) 4 mg disintegrating tablet Take 1 Tab by mouth every eight (8) hours as needed for Nausea. Indications: PREVENTION OF POST-OPERATIVE NAUSEA AND VOMITING    polyethylene glycol (MIRALAX) 17 gram packet Take 1 Packet by mouth daily. Indications: constipation, If constipation last more than 24-48 hours, despite colace use. (Patient taking differently: Take 17 g by mouth as needed.)     No current facility-administered medications for this visit.       Allergies   Allergen Reactions    Ciprofloxacin Nausea Only    Erythromycin Nausea Only    Metformin Other (comments) and Nausea Only     Felt bad  Felt bad      Pcn [Penicillins] Swelling     Caused lips to swell  Tolerates Amoxicillin         Review of Systems:  - Constitutional Symptoms: no fevers, no chills,   - Eyes: no blurry vision no double vision  - Cardiovascular: no chest pain ,no palpitations  - Respiratory: no cough no shortness of breath  - Gastrointestinal: no dysphagia no  abdominal pain  - Musculoskeletal: + joint pains no  weakness  - Integumentary: no rashes  -     Physical Examination:   Blood pressure 136/71, pulse 67, temperature 96.1 °F (35.6 °C), temperature source Oral, resp. rate 14, height 4' 11\" (1.499 m), weight 195 lb (88.5 kg), SpO2 95 %. Estimated body mass index is 39.39 kg/m² as calculated from the following:    Height as of this encounter: 4' 11\" (1.499 m). -   Weight as of this encounter: 195 lb (88.5 kg).   - General: pleasant, no distress, good eye contact  - HEENT: no pallor, no periorbital edema, EOMI  - Neck: supple, no thyromegaly  - Cardiovascular: regular, normal rate, normal S1 and S2  - Respiratory: clear to auscultation bilaterally  - Gastrointestinal: soft, nontender, nondistended,  BS +  - Musculoskeletal: no proximal muscle weakness in upper or lower extremities  - Integumentary: alert and oriented , foot 12/17   - Psychiatric: normal mood and affect  - Skin: color, texture, turgor normal.       Data Reviewed:         Lab Results   Component Value Date/Time    Hemoglobin A1c 8.0 (H) 10/04/2019 11:06 AM    Hemoglobin A1c 7.3 (H) 07/01/2019 11:20 AM    Hemoglobin A1c 7.2 (H) 04/01/2019 02:11 PM    Glucose 149 (H) 10/04/2019 11:06 AM    Glucose (POC) 117 (H) 06/03/2019 01:23 PM    Microalb/Creat ratio (ug/mg creat.) 109.1 (H) 07/01/2019 11:20 AM    LDL, calculated 58 10/04/2019 11:06 AM    Creatinine 0.97 10/04/2019 11:06 AM      Lab Results   Component Value Date/Time    Cholesterol, total 137 10/04/2019 11:06 AM    HDL Cholesterol 43 10/04/2019 11:06 AM    LDL, calculated 58 10/04/2019 11:06 AM    Triglyceride 180 (H) 10/04/2019 11:06 AM CHOL/HDL Ratio 2.4 10/28/2010 09:07 AM     Lab Results   Component Value Date/Time    ALT (SGPT) 41 (H) 10/04/2019 11:06 AM    AST (SGOT) 47 (H) 10/04/2019 11:06 AM    Alk. phosphatase 62 10/04/2019 11:06 AM    Bilirubin, total 0.5 10/04/2019 11:06 AM     Lab Results   Component Value Date/Time    TSH 1.220 01/29/2018 02:47 PM    T4, Free 1.55 09/09/2015 02:16 PM      Lab Results   Component Value Date/Time    Glucose 149 (H) 10/04/2019 11:06 AM    Glucose (POC) 117 (H) 06/03/2019 01:23 PM        Assessment/Plan:     1. Type 2 diabetes mellitus with hyperglycemia, without long-term current use of insulin ([facility] [address]    2. Essential hypertension    3. Hypercholesterolemia        1. Type 2 Diabetes Mellitus   Lab Results   Component Value Date/Time    Hemoglobin A1c 8.0 (H) 10/04/2019 11:06 AM    Hemoglobin A1c (POC) 6.5 05/11/2018 11:37 AM   Uncontrolled  Actos  Trulicity  FLU annually ,Pneumovax ,aspirin daily,annual eye exam,microalbumin    2. HTN : Continue current therapy     3. Mixed Hyperlipidemia : low carb diet. Statin intolerance hx,   Continue Zetia, Tricor    4. Obesity:Body mass index is 39.39 kg/m². Discussed about the importance of exercise and carbohydrate portion control. 5.  GI symptoms: Scheduled for upper endoscopy  Avoid liquid calories, small meals  Discussed that while she is on Trulicity delayed stomach emptying cannot be diagnosed, if there is any concern by her GI doctors we have to stop the medication for 3 weeks and check the scan. Thank you for allowing me to participate in the care of this patient.     Huy Rodríguez MD    Patient verbalized understanding

## 2019-10-10 NOTE — PERIOP NOTES
[address] [place] [address] Dr Olivarez Preprocedure Instructions      1. On the day of your surgery, please report to registration located on the 2nd floor of the  [facility]. yes    2. You must have a responsible adult to drive you to the hospital, stay at the hospital during your procedure and drive you home. If they leave your procedure will not be started (no exceptions). yes    3. Do not have anything to eat or drink (including water, gum, mints, coffee, and juice) after midnight. This does not apply to the medications you were instructed to take by your physician. yesIf you are currently taking Plavix, Coumadin, Aspirin, or other blood-thinning agents, contact your physician for special instructions. no    4. If you are having a procedure that requires bowel prep: We recommend that you have only clear liquids the day before your procedure and begin your bowel prep by 5:00 pm.  You may continue to drink clear liquids until midnight. If for any reason you are not able to complete your prep please notify your physician immediately. not applicable    5. Have a list of all current medications, including vitamins, herbal supplements and any other over the counter medications. yes    6. If you wear glasses, contacts, dentures and/or hearing aids, they may be removed prior to procedure, please bring a case to store them in. yes    7. You should understand that if you do not follow these instructions your procedure may be cancelled. If your physical condition changes (I.e. fever, cold or flu) please contact your doctor as soon as possible. 8. It is important that you be on time.   If for any reason you are unable to keep your appointment please call )- the day of or your physicians office prior to your scheduled procedure

## 2019-10-18 ENCOUNTER — HOSPITAL ENCOUNTER (OUTPATIENT)
Age: 79
Setting detail: OUTPATIENT SURGERY
Discharge: HOME | End: 2019-10-18
Attending: INTERNAL MEDICINE | Admitting: INTERNAL MEDICINE
Payer: MEDICARE

## 2019-10-18 ENCOUNTER — ANESTHESIA (OUTPATIENT)
Dept: ENDOSCOPY | Age: 79
End: 2019-10-18
Payer: MEDICARE

## 2019-10-18 ENCOUNTER — ANESTHESIA EVENT (OUTPATIENT)
Dept: ENDOSCOPY | Age: 79
End: 2019-10-18
Payer: MEDICARE

## 2019-10-18 VITALS
BODY MASS INDEX: 38.8 KG/M2 | RESPIRATION RATE: 19 BRPM | TEMPERATURE: 97.6 F | DIASTOLIC BLOOD PRESSURE: 68 MMHG | WEIGHT: 192.46 LBS | HEIGHT: 59 IN | OXYGEN SATURATION: 95 % | HEART RATE: 71 BPM | SYSTOLIC BLOOD PRESSURE: 141 MMHG

## 2019-10-18 LAB
GLUCOSE BLD STRIP.AUTO-MCNC: 152 MG/DL (ref 65–100)
SERVICE CMNT-IMP: ABNORMAL

## 2019-10-18 PROCEDURE — 82962 GLUCOSE BLOOD TEST: CPT

## 2019-10-18 PROCEDURE — 74011000250 HC RX REV CODE- 250: Performed by: NURSE ANESTHETIST, CERTIFIED REGISTERED

## 2019-10-18 PROCEDURE — 74011250636 HC RX REV CODE- 250/636: Performed by: INTERNAL MEDICINE

## 2019-10-18 PROCEDURE — 74011250636 HC RX REV CODE- 250/636: Performed by: NURSE ANESTHETIST, CERTIFIED REGISTERED

## 2019-10-18 PROCEDURE — 76040000019: Performed by: INTERNAL MEDICINE

## 2019-10-18 PROCEDURE — 76060000031 HC ANESTHESIA FIRST 0.5 HR: Performed by: INTERNAL MEDICINE

## 2019-10-18 RX ORDER — ATROPINE SULFATE 0.1 MG/ML
0.4 INJECTION INTRAVENOUS
Status: DISCONTINUED | OUTPATIENT
Start: 2019-10-18 | End: 2019-10-18 | Stop reason: HOSPADM

## 2019-10-18 RX ORDER — NALOXONE HYDROCHLORIDE 0.4 MG/ML
0.4 INJECTION, SOLUTION INTRAMUSCULAR; INTRAVENOUS; SUBCUTANEOUS
Status: DISCONTINUED | OUTPATIENT
Start: 2019-10-18 | End: 2019-10-18 | Stop reason: HOSPADM

## 2019-10-18 RX ORDER — DEXTROMETHORPHAN/PSEUDOEPHED 2.5-7.5/.8
1.2 DROPS ORAL
Status: DISCONTINUED | OUTPATIENT
Start: 2019-10-18 | End: 2019-10-18 | Stop reason: HOSPADM

## 2019-10-18 RX ORDER — SODIUM CHLORIDE 9 MG/ML
50 INJECTION, SOLUTION INTRAVENOUS CONTINUOUS
Status: DISCONTINUED | OUTPATIENT
Start: 2019-10-18 | End: 2019-10-18 | Stop reason: HOSPADM

## 2019-10-18 RX ORDER — SODIUM CHLORIDE 9 MG/ML
INJECTION, SOLUTION INTRAVENOUS
Status: DISCONTINUED | OUTPATIENT
Start: 2019-10-18 | End: 2019-10-18 | Stop reason: HOSPADM

## 2019-10-18 RX ORDER — EPINEPHRINE 0.1 MG/ML
1 INJECTION INTRACARDIAC; INTRAVENOUS
Status: DISCONTINUED | OUTPATIENT
Start: 2019-10-18 | End: 2019-10-18 | Stop reason: HOSPADM

## 2019-10-18 RX ORDER — MIDAZOLAM HYDROCHLORIDE 1 MG/ML
.25-5 INJECTION, SOLUTION INTRAMUSCULAR; INTRAVENOUS
Status: DISCONTINUED | OUTPATIENT
Start: 2019-10-18 | End: 2019-10-18 | Stop reason: HOSPADM

## 2019-10-18 RX ORDER — LIDOCAINE HYDROCHLORIDE 20 MG/ML
INJECTION, SOLUTION EPIDURAL; INFILTRATION; INTRACAUDAL; PERINEURAL AS NEEDED
Status: DISCONTINUED | OUTPATIENT
Start: 2019-10-18 | End: 2019-10-18 | Stop reason: HOSPADM

## 2019-10-18 RX ORDER — FLUMAZENIL 0.1 MG/ML
0.2 INJECTION INTRAVENOUS
Status: DISCONTINUED | OUTPATIENT
Start: 2019-10-18 | End: 2019-10-18 | Stop reason: HOSPADM

## 2019-10-18 RX ADMIN — LIDOCAINE HYDROCHLORIDE 120 MG: 20 INJECTION, SOLUTION EPIDURAL; INFILTRATION; INTRACAUDAL; PERINEURAL at 09:12

## 2019-10-18 RX ADMIN — SODIUM CHLORIDE: 9 INJECTION, SOLUTION INTRAVENOUS at 09:04

## 2019-10-18 RX ADMIN — LIDOCAINE HYDROCHLORIDE 30 MG: 20 INJECTION, SOLUTION EPIDURAL; INFILTRATION; INTRACAUDAL; PERINEURAL at 09:15

## 2019-10-18 RX ADMIN — SODIUM CHLORIDE 50 ML/HR: 900 INJECTION, SOLUTION INTRAVENOUS at 08:48

## 2019-10-18 NOTE — ANESTHESIA POSTPROCEDURE EVALUATION
Procedure(s):  ESOPHAGOGASTRODUODENOSCOPY (EGD). MAC    Anesthesia Post Evaluation      Multimodal analgesia: multimodal analgesia used between 6 hours prior to anesthesia start to PACU discharge  Patient location during evaluation: PACU  Patient participation: complete - patient participated  Level of consciousness: awake and alert  Pain management: adequate  Airway patency: patent  Anesthetic complications: no  Cardiovascular status: acceptable  Respiratory status: acceptable  Hydration status: acceptable  Post anesthesia nausea and vomiting:  none      Vitals Value Taken Time   /68 10/18/2019  9:42 AM   Temp 36.4 °C (97.6 °F) 10/18/2019  9:25 AM   Pulse 72 10/18/2019  9:45 AM   Resp 16 10/18/2019  9:45 AM   SpO2 96 % 10/18/2019  9:45 AM   Vitals shown include unvalidated device data.

## 2019-10-18 NOTE — H&P
The patient is a 69year old female who presents with a complaint of Acid Reflux. The patient presents due to new symptoms (Pt presents with complaints of burping which has been intermittend and recurrent.  She \"burps to the point where she throws up\"  She has history of acid reflux but is is controlled while on PPI. ). The symptoms have been associated with belching, nausea (has this after burping for several hours.) and NSAIDs (about once per week for knee pain.),  while the symptoms have not been associated with dysphagia or melena. Problem List/Past Medical Shireen Candace; 8/28/2019 9:34 AM)  Diabetes Mellitus    Hypertension    Hypercholesterolemia    Osteoarthritis      Past Surgical History [facility] Candace; 8/28/2019 9:34 AM)  Cholecystectomy    Hysterectomy; Total    Oophorectomy; Bilateral    Knee Replacement, Total   Left. Shoulder Surgery   Right. 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 Erythromycins   Nausea. Cipro *FLUOROQUINOLONES*   Nausea. metFORMIN HCl *ANTIDIABETICS*   Vomiting. Medication History Shireen Candace; 8/28/2019 9:34 AM)  Aspirin  (81MG Tablet, 1 Oral daily) Active. cloNIDine HCl  (0.1MG Tablet, 1 Oral daily) Active. dilTIAZem HCl  (180MG/24HR Capsule ER, 1 Oral daily) Active. Trulicity  (9.9MI/8.9LJ Soln Pen-inj, 1 Subcutaneous weekly) Active. Esomeprazole Magnesium  (40MG Capsule DR, 1 Oral daily) Active. Ezetimibe  (10MG Tablet, 1 Oral daily) Active. Fish Oil  (1000MG Capsule, 2 Oral two times daily) Active. Loratadine  (10MG Capsule, 1 Oral daily) Active. Losartan Potassium-HCTZ  (100-25MG Tablet, 1 Oral daily) Active. Tricor  (145MG Tablet, 1 Oral daily) Active. Vitamin D3  (1000UNIT Tablet, 1 Oral two times daily) Active. Medications Reconciled     Family History Shireen Almazana; 8/28/2019 9:34 AM)  Rheumatoid Arthritis   Sibling.     Social History Shireen Maria; 8/28/2019 9:34 AM)  Blood Transfusion   No.  Alcohol Use   Occasional alcohol use. Employment status   Retired. Marital status   . Tobacco Use   Former smoker. Diagnostic Studies History [facility]; 8/28/2019 9:34 AM)  Colonoscopy  [2019]:    Health Maintenance History [facility]; 8/28/2019 9:34 AM)  Flu Vaccine  [2018]:  Pneumovax   pt not sure when    Other Problems [facility]; 8/28/2019 9:34 AM)  Colon cancer screening (V76.51  Z12.11)          Review of Systems [facility]; 8/28/2019 9:34 AM)  General Not Present- Chronic Fatigue, Poor Appetite, Weight Gain and Weight Loss. Skin Not Present- Itching, Rash and Skin Color Changes. HEENT Present- Hearing Loss. Not Present- Vertigo. Respiratory Not Present- Difficulty Breathing and TB exposure. Cardiovascular Present- Hypertension. Not Present- Chest Pain, Use of Antibiotics before Dental Procedures and Use of Blood Thinners. Gastrointestinal Present- See HPI. Musculoskeletal Present- Arthritis. Not Present- Hip Replacement Surgery and Knee Replacement Surgery. Neurological Not Present- Weakness. Psychiatric Not Present- Depression. Endocrine Present- Diabetes. Not Present- Thyroid Problems. Hematology Not Present- Anemia. Vitals [facility]; 8/28/2019 9:42 AM)  8/28/2019 9:38 AM  Weight: 196 lb   Height: 60 in   Body Surface Area: 1.85 m²   Body Mass Index: 38.28 kg/m²    BP: 140/78 (Sitting, Left Arm, Standard)              Physical Exam (Jose Antonio Martinez; 8/29/2019 2:43 PM)  General  Mental Status - Alert. General Appearance - Cooperative, Pleasant, Not in acute distress. Orientation - Oriented X3. Integumentary  General Characteristics  Color - normal coloration of skin. Head and Neck  Neck  Global Assessment - supple. Eye  Sclera/Conjunctiva - Bilateral - No Jaundice.     Chest and Lung Exam  Chest and lung exam reveals  - quiet, even and easy respiratory effort with no use of accessory muscles. Auscultation  Breath sounds - Normal. Adventitious sounds - No Adventitious sounds. Cardiovascular  Auscultation  Rhythm - Regular, No Tachycardia, No Bradycardia . Heart Sounds - Normal heart sounds , S1 WNL and S2 WNL, No S3, No Summation Gallop. Murmurs & Other Heart Sounds - Auscultation of the heart reveals - No Murmurs. Abdomen  Palpation/Percussion  Tenderness - Non-Tender. Rebound tenderness - No rebound. Rigidity (guarding) - No Rigidity. Dullness to percussion - No abnormal dullness to percussion. Liver - No hepatosplenomegaly. Abdominal Mass Palpable - No masses. Other Characteristics - No Ascites. Auscultation  Auscultation of the abdomen reveals - Bowel sounds normal, No Abdominal bruits and No Succussion splash. Rectal - Did not examine. Peripheral Vascular  Lower Extremity  Palpation - Edema - Left - No edema. Right - No edema. Neurologic  Motor  Involuntary Movements - Asterixis - not present. Assessment & Plan (Jose Antonio CALZADA; 8/29/2019 2:56 PM)  Vomiting (787.03  R11.10)  Impression: Concerning for Gastritis, Esophagitis, Hiatal hernia. Diet and lifestyle modifications to manage GERD were explained in detail. Recommended she continue PPI and avoid NSAIDs. EGD as next step and consider GES or US if EGD is negative and symptoms persist. Follow up in office after procedure. She will call her endocrinologist for mgmt of her insulin prior to procedure. She is due for screening colonoscopy in 2020. Current Plans  Endoscopy (49996) (Discussed risks and benefits with the patient to include: perforation, post polypectomy, or post biopsy bleeding, missed lesions, and sedation reactions.)  Pt Education - How to access health information online: discussed with patient and provided information. Medical Decision Making (Jose Antonio HOROWITZ; 8/29/2019 2:57 PM)  Amount/complexity of data to be reviewed:  - Review and summarization of old records      Signed electronically by LOR Da Silva (8/29/2019 2:57 PM)    Co-signed electronically by Carole Maguire MD (8/29/2019 8:17 PM)

## 2019-10-18 NOTE — PERIOP NOTES
Procedure being performed under MAC; Sanjeev Esteban CRNA at bedside monitoring patient at 2964. See anesthesia notes. Endoscope was pre-cleaned at bedside immediately following procedure by Rosea Kanner. at 5040. Care of patient assumed from the anesthesia provider at 1666. Patient tolerated procedure well. Abdomen remains soft and non tender post procedure, no complaints or indication of discomfort noted at this time. Patient transferred to Endoscopy Recovery and report given to recovery nurse Aaliyah Blandon. recovery room [facility].

## 2019-10-18 NOTE — ANESTHESIA PREPROCEDURE EVALUATION
Relevant Problems   No relevant active problems       Anesthetic History     PONV          Review of Systems / Medical History      Pulmonary              Pertinent negatives: No COPD, asthma, shortness of breath and recent URI     Neuro/Psych              Cardiovascular    Hypertension                   GI/Hepatic/Renal     GERD: well controlled           Endo/Other    Diabetes: well controlled, type 2    Morbid obesity     Other Findings              Physical Exam    Airway  Mallampati: III  TM Distance: 4 - 6 cm  Neck ROM: normal range of motion        Cardiovascular    Rhythm: regular  Rate: normal         Dental    Dentition: Lower dentition intact and Upper dentition intact     Pulmonary  Breath sounds clear to auscultation               Abdominal         Other Findings            Anesthetic Plan    ASA: 3  Anesthesia type: MAC            Anesthetic plan and risks discussed with: Patient

## 2019-10-18 NOTE — DISCHARGE INSTRUCTIONS
Mikel Pa M.D.  [phone]           10/18/2019  Emmanuel Girard  :  1940  Dez Sweeney Medical Record Number:  [id number]        ENDOSCOPY FINDINGS:   Your endoscopy showed normal mucosa throughout the esophagus, stomach and duodenum. EGD DISCHARGE INSTRUCTIONS    DISCOMFORT:  Sore throat- throat lozenges or warm salt water gargle  redness at IV site- apply warm compress to area; if redness or soreness persist- contact your physician  Gaseous discomfort- walking, belching will help relieve any discomfort  You may not operate a vehicle for 12 hours  You may not engage in an occupation involving machinery or appliances for rest of today  You may not drink alcoholic beverages for at least 12 hours  Avoid making any critical decisions for at least 24 hour    DIET:   You may resume your regular diet. Would remain on frequent small meal portions for possible mild delay in the stomach emptying. ACTIVITY  Spend the remainder of the day resting -  avoid any strenuous activity. Avoid driving or operating machinery. CALL M.D. ANY SIGN OF   Increasing pain, nausea, vomiting  Abdominal distension (swelling)  New increased bleeding (oral or rectal)  Fever (chills)  Pain in chest area  Bloody discharge from nose or mouth  Shortness of breath    Follow-up Instructions:   Call Dr. Jd Hastings for any questions or problems. Telephone # [phone]     Continue same medications. Follow up in the office.

## 2019-10-18 NOTE — PROGRESS NOTES
[facility]  1940  [id number]    Situation:  Verbal report received from: Dariana Lieberman RN   Procedure: Procedure(s):  ESOPHAGOGASTRODUODENOSCOPY (EGD)    Background:    Preoperative diagnosis: REFLUX  Postoperative diagnosis: Normal EGD    :  Dr. Laurin Goodpasture  Assistant(s): Endoscopy Technician-1: Chicho Cali  Endoscopy RN-1: Miguelito Cedillo RN    Specimens: * No specimens in log *  H. Pylori  no    Assessment:  Intra-procedure medications   BY 25 TO 50:[id number]::\"100\"} mcg    Anesthesia gave intra-procedure sedation and medications, see anesthesia flow sheet yes    Intravenous fluids: NS@ KVO     Vital signs stable   yes    Abdominal assessment: round and soft   yes    Recommendation:  Discharge patient per MD order  yes.   Return to floor  outpatient  Family or Friend   spouse  Permission to share finding with family or friend yes

## 2019-10-18 NOTE — PROCEDURES
Bora Pinto M.D.  [phone]           10/18/2019                EGD Operative Report  Evangelina Deng  :  1940  Bayron Uribe Medical Record Number:  [id number]      Indication:  GERD     : Ofe Sung MD    Referring Provider:  Qing Rivera MD      Anesthesia/Sedation:  MAC anesthesia    Airway assessment: No airway problems anticipated    Pre-Procedural Exam:      Airway: clear, no airway problems anticipated  Heart: RRR, without gallops or rubs  Lungs: clear bilaterally without wheezes, crackles, or rhonchi  Abdomen: soft, nontender, nondistended, bowel sounds present  Mental Status: awake, alert and oriented to person, place and time       Procedure Details     After infomed consent was obtained for the procedure, with all risks and benefits of procedure explained the patient was taken to the endoscopy suite and placed in the left lateral decubitus position. Following sequential administration of sedation as per above, the endoscope was inserted into the mouth and advanced under direct vision to second portion of the duodenum. A careful inspection was made as the gastroscope was withdrawn, including a retroflexed view of the proximal stomach; findings and interventions are described below. Findings:   Esophagus:Mucosa within normal throughout the esophagus, no strictures or lesions seen. Stomach: Mucosa within normal throughout the stomach, small amount of bile seen in the body, otherwise no gastric outlet obstruction seen. Duodenum/jejunum: normal    Therapies:  none    Specimens: none           Complications:   None; patient tolerated the procedure well. EBL:  None. Impression:    Upper endoscopy within normal    Recommendations:    -Acid suppression with a proton pump inhibitor. -GERD diet: avoid fried and fatty foods.  peppermint, chocolate, alcohol, coffee, citrus fruits and juices, tomoato products; avoid lying down for 2 to 3 hours after eating.  -Follow-up in the office, if symptoms worsen, consider gastric emptying scan.     mEanuel Knutson MD

## 2019-11-07 RX ORDER — EZETIMIBE 10 MG/1
TABLET ORAL
Qty: 90 TAB | Refills: 2 | Status: SHIPPED | OUTPATIENT
Start: 2019-11-07 | End: 2020-10-28

## 2019-12-10 RX ORDER — LORATADINE 10 MG/1
TABLET ORAL
Qty: 90 TAB | Refills: 4 | Status: SHIPPED | OUTPATIENT
Start: 2019-12-10 | End: 2021-03-04

## 2019-12-29 DIAGNOSIS — I10 ESSENTIAL HYPERTENSION: Chronic | ICD-10-CM

## 2019-12-29 RX ORDER — LOSARTAN POTASSIUM AND HYDROCHLOROTHIAZIDE 25; 100 MG/1; MG/1
TABLET ORAL
Qty: 90 TAB | Refills: 4 | Status: SHIPPED | OUTPATIENT
Start: 2019-12-29 | End: 2020-11-25 | Stop reason: SDUPTHER

## 2020-01-13 ENCOUNTER — HOSPITAL ENCOUNTER (OUTPATIENT)
Dept: LAB | Age: 80
Discharge: HOME | End: 2020-01-13

## 2020-01-13 ENCOUNTER — OFFICE VISIT (OUTPATIENT)
Dept: FAMILY MEDICINE CLINIC | Age: 80
End: 2020-01-13

## 2020-01-13 VITALS
OXYGEN SATURATION: 96 % | WEIGHT: 194 LBS | RESPIRATION RATE: 20 BRPM | SYSTOLIC BLOOD PRESSURE: 133 MMHG | BODY MASS INDEX: 39.11 KG/M2 | DIASTOLIC BLOOD PRESSURE: 77 MMHG | TEMPERATURE: 97.8 F | HEART RATE: 62 BPM | HEIGHT: 59 IN

## 2020-01-13 DIAGNOSIS — R79.89 ELEVATED LFTS: ICD-10-CM

## 2020-01-13 DIAGNOSIS — E66.01 MORBID OBESITY (HCC): ICD-10-CM

## 2020-01-13 DIAGNOSIS — E11.21 TYPE 2 DIABETES MELLITUS WITH NEPHROPATHY (HCC): ICD-10-CM

## 2020-01-13 DIAGNOSIS — E11.65 TYPE 2 DIABETES MELLITUS WITH HYPERGLYCEMIA, WITHOUT LONG-TERM CURRENT USE OF INSULIN (HCC): Chronic | ICD-10-CM

## 2020-01-13 DIAGNOSIS — I10 ESSENTIAL HYPERTENSION: Chronic | ICD-10-CM

## 2020-01-13 DIAGNOSIS — K21.00 GASTROESOPHAGEAL REFLUX DISEASE WITH ESOPHAGITIS: Chronic | ICD-10-CM

## 2020-01-13 DIAGNOSIS — E55.9 VITAMIN D DEFICIENCY: Chronic | ICD-10-CM

## 2020-01-13 DIAGNOSIS — E78.00 HYPERCHOLESTEROLEMIA: Chronic | ICD-10-CM

## 2020-01-13 DIAGNOSIS — E11.65 TYPE 2 DIABETES MELLITUS WITH HYPERGLYCEMIA, WITHOUT LONG-TERM CURRENT USE OF INSULIN (HCC): Primary | Chronic | ICD-10-CM

## 2020-01-13 LAB
ALBUMIN SERPL-MCNC: 4 G/DL (ref 3.5–5)
ALBUMIN/GLOB SERPL: 1.2 {RATIO} (ref 1.1–2.2)
ALP SERPL-CCNC: 51 U/L (ref 45–117)
ALT SERPL-CCNC: 34 U/L (ref 12–78)
ANION GAP SERPL CALC-SCNC: 8 MMOL/L (ref 5–15)
AST SERPL-CCNC: 23 U/L (ref 15–37)
BILIRUB SERPL-MCNC: 0.6 MG/DL (ref 0.2–1)
BUN SERPL-MCNC: 27 MG/DL (ref 6–20)
BUN/CREAT SERPL: 23 (ref 12–20)
CALCIUM SERPL-MCNC: 9.8 MG/DL (ref 8.5–10.1)
CHLORIDE SERPL-SCNC: 104 MMOL/L (ref 97–108)
CHOLEST SERPL-MCNC: 145 MG/DL
CO2 SERPL-SCNC: 29 MMOL/L (ref 21–32)
CREAT SERPL-MCNC: 1.15 MG/DL (ref 0.55–1.02)
CREAT UR-MCNC: 94.1 MG/DL
EST. AVERAGE GLUCOSE BLD GHB EST-MCNC: 151 MG/DL
GLOBULIN SER CALC-MCNC: 3.4 G/DL (ref 2–4)
GLUCOSE SERPL-MCNC: 115 MG/DL (ref 65–100)
HBA1C MFR BLD: 6.9 % (ref 4–5.6)
HDLC SERPL-MCNC: 61 MG/DL
HDLC SERPL: 2.4 {RATIO} (ref 0–5)
LDLC SERPL CALC-MCNC: 53.4 MG/DL (ref 0–100)
LIPID PROFILE,FLP: ABNORMAL
MICROALBUMIN UR-MCNC: 15.3 MG/DL
MICROALBUMIN/CREAT UR-RTO: 163 MG/G (ref 0–30)
POTASSIUM SERPL-SCNC: 3.9 MMOL/L (ref 3.5–5.1)
PROT SERPL-MCNC: 7.4 G/DL (ref 6.4–8.2)
SODIUM SERPL-SCNC: 141 MMOL/L (ref 136–145)
TRIGL SERPL-MCNC: 153 MG/DL (ref ?–150)
VLDLC SERPL CALC-MCNC: 30.6 MG/DL

## 2020-01-13 NOTE — PROGRESS NOTES
[address] [place] [address], [facility]  [phone]           Progress Note    Patient: Richard Barajas MRN: [id number]  SSN: [id number]    YOB: 1940  Age: 67 y.o. Sex: female        Chief Complaint   Patient presents with    Labs    Cholesterol Problem         Subjective:     Encounter Diagnoses   Name Primary?  Type 2 diabetes mellitus with hyperglycemia, without long-term current use of insulin ([facility]): This patient is managed under a comprehensive plan of care for Diabetes. Overall the patient feels well with good energy level. Key Antihyperglycemic Medications             pioglitazone (ACTOS) 30 mg tablet (Taking) Take 1 Tab by mouth every morning. dulaglutide (TRULICITY) 1.5 QE/5.0 mL sub-q pen (Taking) 0.5 mL by SubCUTAneous route every seven (7) days. Stop Byduereon           Pertinent Labs:   Lab Results   Component Value Date/Time    Hemoglobin A1c 8.0 (H) 10/04/2019 11:06 AM    Hemoglobin A1c 7.3 (H) 07/01/2019 11:20 AM    Hemoglobin A1c 7.2 (H) 04/01/2019 02:11 PM      Body mass index is 39.18 kg/m². Lab Results   Component Value Date/Time    LDL, calculated 58 10/04/2019 11:06 AM         Lab Results   Component Value Date/Time    Sodium 141 10/04/2019 11:06 AM    Potassium 4.2 10/04/2019 11:06 AM    Chloride 98 10/04/2019 11:06 AM    CO2 24 10/04/2019 11:06 AM    Anion gap 9 06/20/2018 10:38 AM    Glucose 149 (H) 10/04/2019 11:06 AM    BUN 16 10/04/2019 11:06 AM    Creatinine 0.97 10/04/2019 11:06 AM    BUN/Creatinine ratio 16 10/04/2019 11:06 AM    GFR est AA 65 10/04/2019 11:06 AM    GFR est non-AA 57 (L) 10/04/2019 11:06 AM    Calcium 9.8 10/04/2019 11:06 AM    AST (SGOT) 47 (H) 10/04/2019 11:06 AM    Alk.  phosphatase 62 10/04/2019 11:06 AM    Protein, total 6.8 10/04/2019 11:06 AM    Albumin 4.2 10/04/2019 11:06 AM    Globulin 3.2 10/28/2010 09:07 AM    A-G Ratio 1.6 10/04/2019 11:06 AM    ALT (SGPT) 41 (H) 10/04/2019 11:06 AM     Lab Results   Component Value Date/Time    Microalb/Creat ratio (ug/mg creat.) 109.1 (H) 2019 11:20 AM      Frequency of home glucose testing:daily   Blood Sugar range at home:    Last eye exam: In past 12 months. Last foot exam: This year. Polyuria, polyphagia or polydipsia: No   Retinopathy: No   Neuropathy SX: No    Low blood sugar symptoms: No   Dietary compliance: Good   Medication compliance:Good   On ASA: Yes   Depression: No   CKD:no     Wt Readings from Last 3 Encounters:   20 194 lb (88 kg)   10/18/19 192 lb 7.4 oz (87.3 kg)   10/08/19 195 lb (88.5 kg)        Social History     Tobacco Use   Smoking Status Former Smoker    Packs/day: 1.00    Years: 20.00    Pack years: 20.00    Last attempt to quit:     Years since quittin.0   Smokeless Tobacco Never Used     Body mass index is 39.18 kg/m². Diabetic Consultants: Dr. Cecile Bueno  All the patient's questions regarding medications, diet and exercise were answered. Goal of A1C of less than 7.5% is our goal.   Our overall goal is to reduce or eliminate the long term consequences of poorly controlled diabetes. Yes    Type 2 diabetes mellitus with nephropathy ([facility]):  Above.  Elevated LFTs;  Lab Results   Component Value Date/Time    ALT (SGPT) 41 (H) 10/04/2019 11:06 AM    AST (SGOT) 47 (H) 10/04/2019 11:06 AM    Alk. phosphatase 62 10/04/2019 11:06 AM    Bilirubin, total 0.5 10/04/2019 11:06 AM            Morbid obesity ([facility]):         Essential hypertension:  BP Readings from Last 3 Encounters:   20 133/77   10/18/19 141/68   10/08/19 136/71     The patient reports:  taking medications as instructed, no medication side effects noted, no TIA's, no chest pain on exertion, no dyspnea on exertion, no swelling of ankles.     Key CAD CHF Meds             losartan-hydroCHLOROthiazide (HYZAAR) 100-25 mg per tablet (Taking) TAKE 1 TABLET DAILY    cloNIDine HCl (CATAPRES) 0.1 mg tablet (Taking) TAKE 1 TABLET NIGHTLY    ezetimibe (ZETIA) 10 mg tablet (Taking) TAKE 1 TABLET DAILY    TRICOR 145 mg tablet (Taking) TAKE 1 TABLET DAILY    dilTIAZem ER (CARDIZEM LA) 180 mg Tb24 tablet (Taking) Take 180 mg by mouth daily. aspirin 81 mg chewable tablet (Taking) Take 81 mg by mouth daily. omega-3 fatty acids-vitamin e (FISH OIL) 1,000 mg cap (Taking) Take 2 Caps by mouth two (2) times a day. Lab Results   Component Value Date/Time    Sodium 141 10/04/2019 11:06 AM    Potassium 4.2 10/04/2019 11:06 AM    Chloride 98 10/04/2019 11:06 AM    CO2 24 10/04/2019 11:06 AM    Anion gap 9 06/20/2018 10:38 AM    Glucose 149 (H) 10/04/2019 11:06 AM    BUN 16 10/04/2019 11:06 AM    Creatinine 0.97 10/04/2019 11:06 AM    BUN/Creatinine ratio 16 10/04/2019 11:06 AM    GFR est AA 65 10/04/2019 11:06 AM    GFR est non-AA 57 (L) 10/04/2019 11:06 AM    Calcium 9.8 10/04/2019 11:06 AM    Bilirubin, total 0.5 10/04/2019 11:06 AM    AST (SGOT) 47 (H) 10/04/2019 11:06 AM    Alk. phosphatase 62 10/04/2019 11:06 AM    Protein, total 6.8 10/04/2019 11:06 AM    Albumin 4.2 10/04/2019 11:06 AM    Globulin 3.2 10/28/2010 09:07 AM    A-G Ratio 1.6 10/04/2019 11:06 AM    ALT (SGPT) 41 (H) 10/04/2019 11:06 AM     Low salt diet? yes  Aerobic exercise? no  Our goal is to normalize the blood pressure to decrease the risks of strokes and heart attacks. The patient is in agreement with the plan.  Hypercholesterolemia:  Cardiovascular risks for her are: LDL goal is under 80  diabetic  hypertension  hyperlipidemia. Key Antihyperlipidemia Meds             ezetimibe (ZETIA) 10 mg tablet (Taking) TAKE 1 TABLET DAILY    TRICOR 145 mg tablet (Taking) TAKE 1 TABLET DAILY    omega-3 fatty acids-vitamin e (FISH OIL) 1,000 mg cap (Taking) Take 2 Caps by mouth two (2) times a day.         Lab Results   Component Value Date/Time    Cholesterol, total 137 10/04/2019 11:06 AM    HDL Cholesterol 43 10/04/2019 11:06 AM    LDL, calculated 58 10/04/2019 11:06 AM    Triglyceride 180 (H) 10/04/2019 11:06 AM    CHOL/HDL Ratio 2.4 10/28/2010 09:07 AM     Lab Results   Component Value Date/Time    ALT (SGPT) 41 (H) 10/04/2019 11:06 AM    AST (SGOT) 47 (H) 10/04/2019 11:06 AM    Alk. phosphatase 62 10/04/2019 11:06 AM    Bilirubin, total 0.5 10/04/2019 11:06 AM      Myalgias: No   Fatigue: No   Other side effects: no  Wt Readings from Last 3 Encounters:   01/13/20 194 lb (88 kg)   10/18/19 192 lb 7.4 oz (87.3 kg)   10/08/19 195 lb (88.5 kg)     The patient is aware of our goal to reduce or eliminate the long term problems (such as strokes and heart attacks) related to poorly controlled hyperlipidemia.  Gastroesophageal reflux disease with esophagitis: Her reflux and belching have gone away after she stopped artificial sweeteners. Assume it is aspartame but she will call back and let me know for sure. She discovered this accidentally. She said her EGD did not show anything. Current control of Symptoms:good  Hiatal Hernia:no  Current Medications: As needed  The patient has no history melena or bright red blood in the stools. The patient avoids high dose aspirin and NSAID therapy. The patient is aware of diet changes needed, elevating the head of the bed and appropriate use of antacids.  Vitamin D deficiency:  No sx. Due for testing.   Lab Results   Component Value Date/Time    Vitamin D 25-Hydroxy 41.1 11/18/2011 09:22 AM    VITAMIN D, 25-HYDROXY 47.6 01/29/2018 02:47 PM                     Current and past medical information:    Current Medications after this visit[de-identified]     Current Outpatient Medications   Medication Sig    losartan-hydroCHLOROthiazide (HYZAAR) 100-25 mg per tablet TAKE 1 TABLET DAILY    cloNIDine HCl (CATAPRES) 0.1 mg tablet TAKE 1 TABLET NIGHTLY    loratadine (CLARITIN) 10 mg tablet TAKE 1 TABLET DAILY    ezetimibe (ZETIA) 10 mg tablet TAKE 1 TABLET DAILY    pioglitazone (ACTOS) 30 mg tablet Take 1 Tab by mouth every morning.  TRICOR 145 mg tablet TAKE 1 TABLET DAILY    dilTIAZem ER (CARDIZEM LA) 180 mg Tb24 tablet Take 180 mg by mouth daily.  dulaglutide (TRULICITY) 1.5 VS/7.0 mL sub-q pen 0.5 mL by SubCUTAneous route every seven (7) days. Stop Byduereon    esomeprazole (NEXIUM) 40 mg capsule Take 1 Cap by mouth daily. Indications: gastroesophageal reflux disease    aspirin 81 mg chewable tablet Take 81 mg by mouth daily.  lancets (FREESTYLE LANCETS) 28 gauge misc TEST ONCE DAILY DX: E11.65    glucose blood VI test strips (FREESTYLE LITE STRIPS) strip TEST ONCE A DAY DX CODE: E11.65    omega-3 fatty acids-vitamin e (FISH OIL) 1,000 mg cap Take 2 Caps by mouth two (2) times a day.  cholecalciferol, vitamin d3, (VITAMIN D) 1,000 unit tablet Take 1,000 Units by mouth two (2) times a day.  raNITIdine (ZANTAC) 75 mg tab Take 75 mg by mouth two (2) times a day.  ondansetron (ZOFRAN ODT) 4 mg disintegrating tablet Take 1 Tab by mouth every eight (8) hours as needed for Nausea. Indications: PREVENTION OF POST-OPERATIVE NAUSEA AND VOMITING    polyethylene glycol (MIRALAX) 17 gram packet Take 1 Packet by mouth daily. Indications: constipation, If constipation last more than 24-48 hours, despite colace use. (Patient taking differently: Take 17 g by mouth as needed.)     No current facility-administered medications for this visit.         Patient Active Problem List    Diagnosis Date Noted    Type 2 diabetes mellitus with nephropathy ([facility] 01/23/2018     Priority: 1 - One    Type 2 diabetes mellitus with hyperglycemia, without long-term current use of insulin ([facility] 12/19/2016     Priority: 1 - One    Morbid obesity ([facility] 10/18/2015     Priority: 1 - One    Vitamin D deficiency 06/28/2010     Priority: 1 - One    Hypertension      Priority: 1 - One    Hypercholesterolemia      Priority: 1 - One    GERD (gastroesophageal reflux disease)      Priority: 1 - One    Hot flashes, menopausal      Priority: 3 - Three  Allergic rhinitis 2011     Priority: 6 - Six    Bile acid esophageal reflux 2019    S/P total knee replacement using cement 2015    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    Arthritis 2011       Past Medical History:   Diagnosis Date    Arthritis 2011    OSTEO    Chronic pain     RIGHT KNEE    Diabetes ([facility]     GERD (gastroesophageal reflux disease)     Hot flashes, menopausal     Hypercholesterolemia     Hypertension     Ill-defined condition     Elevated liver enzymes (has family history)    Nausea & vomiting     Seizures ([address]     AFTER DRINKING 2 MARGARITAS    Vitamin D deficiency 2010       Allergies   Allergen Reactions    Ciprofloxacin Nausea Only    Erythromycin Nausea Only    Metformin Other (comments) and Nausea Only     Felt bad  Felt bad      Pcn [Penicillins] Swelling     Caused lips to swell  Tolerates Amoxicillin       Past Surgical History:   Procedure Laterality Date    COLONOSCOPY N/A 6/3/2019    COLONOSCOPY performed by Kellen Smalls MD at [facility] HX APPENDECTOMY      HX CATARACT REMOVAL Bilateral     HX CHOLECYSTECTOMY  2018    HX HYSTERECTOMY  1975    HX KNEE REPLACEMENT Left 2014    HX OOPHORECTOMY  2001    bilateral    HX OTHER SURGICAL      LIPOMA LEFT SIDE    HX ROTATOR CUFF REPAIR Right 1998       Social History     Socioeconomic History    Marital status:      Spouse name: Not on file    Number of children: Not on file    Years of education: Not on file    Highest education level: Not on file   Tobacco Use    Smoking status: Former Smoker     Packs/day: 1.00     Years: 20.00     Pack years: 20.00     Last attempt to quit:      Years since quittin.0    Smokeless tobacco: Never Used   Substance and Sexual Activity    Alcohol use: Yes     Comment: rarely - 1 glass of wine every 3 months    Drug use: No    Sexual activity: Yes       Review of Systems   Constitutional: Negative.   Negative for chills, fever, malaise/fatigue and weight loss. HENT: Negative. Negative for hearing loss. Eyes: Negative. Negative for blurred vision and double vision. Respiratory: Negative. Negative for cough, sputum production and shortness of breath. Cardiovascular: Negative. Negative for chest pain and palpitations. Gastrointestinal: Negative. Negative for abdominal pain, blood in stool, heartburn, nausea and vomiting. Genitourinary: Negative. Negative for dysuria, frequency and urgency. Musculoskeletal: Negative. Negative for back pain, falls, myalgias and neck pain. Skin: Negative. Negative for rash. Neurological: Negative. Negative for dizziness, tingling, tremors, weakness and headaches. Endo/Heme/Allergies: Negative. Psychiatric/Behavioral: Negative. Negative for depression. Objective:     Vitals:    01/13/20 1004   BP: 133/77   Pulse: 62   Resp: 20   Temp: 97.8 °F (36.6 °C)   TempSrc: Oral   SpO2: 96%   Weight: 194 lb (88 kg)   Height: 4' 11\" (1.499 m)      Body mass index is 39.18 kg/m². Physical Exam  Vitals signs and nursing note reviewed. Constitutional:       General: She is not in acute distress. Appearance: She is not diaphoretic. HENT:      Head: Normocephalic and atraumatic. Eyes:      Conjunctiva/sclera: Conjunctivae normal.   Neck:      Thyroid: No thyromegaly. Comments: No carotid bruit. Cardiovascular:      Rate and Rhythm: Normal rate and regular rhythm. Heart sounds: No murmur. No friction rub. No gallop. Pulmonary:      Effort: Pulmonary effort is normal. No respiratory distress. Breath sounds: Normal breath sounds. No wheezing or rales. Abdominal:      General: There is no distension. Palpations: Abdomen is soft. Skin:     General: Skin is warm. Findings: No erythema or rash. Neurological:      Mental Status: She is alert and oriented to person, place, and time.    Psychiatric:         Mood and Affect: Mood and affect normal.           Health Maintenance Due   Topic Date Due    FOOT EXAM Q1  11/28/2019         Assessment and orders:     Encounter Diagnoses     ICD-10-CM ICD-9-CM   1. Type 2 diabetes mellitus with hyperglycemia, without long-term current use of insulin (HCC) E11.65 250.00     790.29   2. Type 2 diabetes mellitus with nephropathy (HCC) E11.21 250.40     583.81   3. Elevated LFTs R94.5 790.6   4. Morbid obesity ([facility] E66.01 278.01   5. Essential hypertension I10 401.9   6. Hypercholesterolemia E78.00 272.0   7. Gastroesophageal reflux disease with esophagitis K21.0 530.11   8. Vitamin D deficiency E55.9 268.9     Diagnoses and all orders for this visit:    1. Type 2 diabetes mellitus with hyperglycemia, without long-term current use of insulin (HCC)  -     HEMOGLOBIN A1C WITH EAG; Future  -     MICROALBUMIN, UR, RAND W/ MICROALB/CREAT RATIO; Future  -     LIPID PANEL; Future  -     METABOLIC PANEL, COMPREHENSIVE; Future    2. Type 2 diabetes mellitus with nephropathy ([facility])  -     HEMOGLOBIN A1C WITH EAG; Future  -     MICROALBUMIN, UR, RAND W/ MICROALB/CREAT RATIO; Future  -     LIPID PANEL; Future  -     METABOLIC PANEL, COMPREHENSIVE; Future    3. Elevated LFTs-2018 CT scan of her abdomen showed steatosis  -     METABOLIC PANEL, COMPREHENSIVE; Future    4. Morbid obesity ([facility]): She has not lost any weight recently. Wt Readings from Last 3 Encounters:   01/13/20 194 lb (88 kg)   10/18/19 192 lb 7.4 oz (87.3 kg)   10/08/19 195 lb (88.5 kg)         5. Essential hypertension-controlled  -     MICROALBUMIN, UR, RAND W/ MICROALB/CREAT RATIO; Future  -     LIPID PANEL; Future  -     METABOLIC PANEL, COMPREHENSIVE; Future    6. Hypercholesterolemia-retest  -     LIPID PANEL; Future    7. Gastroesophageal reflux disease with esophagitis-controlled off artificial sweetener. Call me and let me know if it was aspartame or something else.     8. Vitamin D deficiency-retest  -     VITAMIN D, 25 HYDROXY; Future            Plan of care:  Discussed diagnoses in detail with patient. Medication risks/benefits/side effects discussed with patient. All of the patient's questions were addressed. The patient understands and agrees with our plan of care. The patient knows to call back if they are unsure of or forget any changes we discussed today or if the symptoms change. The patient received an After-Visit Summary which contains VS, orders, medication list and allergy list. This can be used as a \"mini-medical record\" should they have to seek medical care while out of town. Patient Care Team:  Emmett hDaliwal MD as PCP - Anita Doran MD as PCP - [facility] Empaneled Provider  Sonny Peck MD as Physician (Cardiology)  Anastasia Cabrera MD as Physician (Orthopedic Surgery)  Philly Saavedra MD (Endocrinology)  Jerson Pichardo MD (General Surgery)  Robi Waldron MD (Ophthalmology)    Follow-up and Dispositions    · Return in about 3 months (around 4/13/2020). Signed By: Zechariah Laughlin MD     January 13, 2020      ATTENTION:   This medical record was transcribed using an electronic medical records/speech recognition system. Although proofread, it may and can contain electronic, spelling and other errors. Corrections may be executed at a later time. Please feel free to contact me for any clarifications as needed.

## 2020-01-13 NOTE — PATIENT INSTRUCTIONS

## 2020-01-13 NOTE — PROGRESS NOTES
1. Have you been to the ER, urgent care clinic since your last visit? Hospitalized since your last visit? No    2. Have you seen or consulted any other health care providers outside of the [address] [place] [address] since your last visit? Include any pap smears or colon screening. No    Reviewed record in preparation for visit and have necessary documentation  Goals that were addressed and/or need to be completed during or after this appointment include     Health Maintenance Due   Topic Date Due    FOOT EXAM Q1  11/28/2019       Patient is accompanied by self and patient I have received verbal consent from Eagle Hernandez to discuss any/all medical information while they are present in the room.

## 2020-01-14 ENCOUNTER — TELEPHONE (OUTPATIENT)
Dept: FAMILY MEDICINE CLINIC | Age: 80
End: 2020-01-14

## 2020-01-14 LAB — 25(OH)D3 SERPL-MCNC: 52.6 NG/ML (ref 30–100)

## 2020-01-14 NOTE — TELEPHONE ENCOUNTER
Attempted to reach patient by telephone. Patient's spouse answered and reports that she is out of the house but should be back in this afternoon. Need to inform her of the following per Dr. Reza Miss: \"Dear Lexy Salinas:     Please find your most recent results below. 1-your blood sugar control is better. 2-your kidney function has slightly decreased. Stay well-hydrated and we need to recheck this in 2 months so make an appointment to see me then. \"

## 2020-01-14 NOTE — TELEPHONE ENCOUNTER
Spoke with patient and informed her per Dr. Riley Paul: \"Dear Nicholas Katie:     Please find your most recent results below. 1-your blood sugar control is better. 2-your kidney function has slightly decreased.  Stay well-hydrated and we need to recheck this in 2 months so make an appointment to see me then. \"    Patient verbalized understanding.

## 2020-01-14 NOTE — TELEPHONE ENCOUNTER
----- Message from Angel Chen sent at 1/14/2020 11:42 AM EST -----  Regarding: Dr. Magallanes Brad: [phone]  Caller's first and last name and relationship (if not the patient): n/a  Best contact number(s): [phone]  Whose call is being returned: Unknown   Details to clarify the request: Pt returning missed call

## 2020-01-20 DIAGNOSIS — E11.65 TYPE 2 DIABETES MELLITUS WITH HYPERGLYCEMIA, WITHOUT LONG-TERM CURRENT USE OF INSULIN (HCC): Primary | Chronic | ICD-10-CM

## 2020-01-20 NOTE — TELEPHONE ENCOUNTER
Patient accidentally put Trulicity in freezer. She does have about a month supply. She has contacted mail order and let them know what happened, asking [facility] with [facility]ills.

## 2020-02-24 ENCOUNTER — OFFICE VISIT (OUTPATIENT)
Dept: ENDOCRINOLOGY | Age: 80
End: 2020-02-24

## 2020-02-24 VITALS
TEMPERATURE: 96.4 F | DIASTOLIC BLOOD PRESSURE: 73 MMHG | SYSTOLIC BLOOD PRESSURE: 136 MMHG | HEART RATE: 66 BPM | WEIGHT: 198 LBS | OXYGEN SATURATION: 96 % | RESPIRATION RATE: 16 BRPM | BODY MASS INDEX: 39.92 KG/M2 | HEIGHT: 59 IN

## 2020-02-24 DIAGNOSIS — E78.00 HYPERCHOLESTEROLEMIA: Chronic | ICD-10-CM

## 2020-02-24 DIAGNOSIS — E11.65 TYPE 2 DIABETES MELLITUS WITH HYPERGLYCEMIA, WITHOUT LONG-TERM CURRENT USE OF INSULIN (HCC): ICD-10-CM

## 2020-02-24 DIAGNOSIS — I10 ESSENTIAL HYPERTENSION: Chronic | ICD-10-CM

## 2020-02-24 DIAGNOSIS — E11.65 TYPE 2 DIABETES MELLITUS WITH HYPERGLYCEMIA, WITHOUT LONG-TERM CURRENT USE OF INSULIN (HCC): Primary | Chronic | ICD-10-CM

## 2020-02-24 RX ORDER — PIOGLITAZONEHYDROCHLORIDE 30 MG/1
30 TABLET ORAL
Qty: 90 TAB | Refills: 3 | Status: SHIPPED | OUTPATIENT
Start: 2020-02-24 | End: 2020-06-09 | Stop reason: SDUPTHER

## 2020-02-24 NOTE — PROGRESS NOTES
Ashley Crowder MD        Patient Information  Date:2/24/2020  Name : Maye Armijo 67 y.o.     YOB: 1940         Referred by: Lady Farhad MD         Chief Complaint   Patient presents with    Diabetes       History of Present Illness: Maye Armijo is a 67 y.o. female here for fu of  Type 2 Diabetes Mellitus. Type 2 Diabetes was diagnosed in 10/2014 . End organ effects of diabetes: none. Cardiovascular risk factors: family history, dyslipidemia, diabetes mellitus   Monitoring frequency: 3 times a week. The blood glucose is ranging from 130 - 160  There is a creamery built next to her house, she has cut down eating ice cream since her last visit  Added Actos October 2019    Could not tolerate Metformin IR, XR formualtion. Hyperlipidemia - was on tricor, zetia, colestipol, Niacin,    Wt Readings from Last 3 Encounters:   02/24/20 198 lb (89.8 kg)   01/13/20 194 lb (88 kg)   10/18/19 192 lb 7.4 oz (87.3 kg)       BP Readings from Last 3 Encounters:   02/24/20 136/73   01/13/20 133/77   10/18/19 141/68           Past Medical History:   Diagnosis Date    Arthritis 2/28/2011    OSTEO    Chronic pain     RIGHT KNEE    Diabetes ([address]     GERD (gastroesophageal reflux disease)     Hot flashes, menopausal     Hypercholesterolemia     Hypertension     Ill-defined condition     Elevated liver enzymes (has family history)    Nausea & vomiting     Seizures (HCC) 1980'S    AFTER DRINKING 2 MARGARITAS    Vitamin D deficiency 6/28/2010     Current Outpatient Medications   Medication Sig    dilTIAZem CD (CARDIZEM CD) 180 mg ER capsule TAKE 1 CAPSULE DAILY    dulaglutide (TRULICITY) 1.5 FS/9.8 mL sub-q pen 0.5 mL by SubCUTAneous route every seven (7) days.  Stop Byduereon    losartan-hydroCHLOROthiazide (HYZAAR) 100-25 mg per tablet TAKE 1 TABLET DAILY    cloNIDine HCl (CATAPRES) 0.1 mg tablet TAKE 1 TABLET NIGHTLY    loratadine (CLARITIN) 10 mg tablet TAKE 1 TABLET DAILY    ezetimibe (ZETIA) 10 mg tablet TAKE 1 TABLET DAILY    pioglitazone (ACTOS) 30 mg tablet Take 1 Tab by mouth every morning.  TRICOR 145 mg tablet TAKE 1 TABLET DAILY    esomeprazole (NEXIUM) 40 mg capsule Take 1 Cap by mouth daily. Indications: gastroesophageal reflux disease    aspirin 81 mg chewable tablet Take 81 mg by mouth daily.  lancets (FREESTYLE LANCETS) 28 gauge misc TEST ONCE DAILY DX: E11.65    glucose blood VI test strips (FREESTYLE LITE STRIPS) strip TEST ONCE A DAY DX CODE: E11.65    omega-3 fatty acids-vitamin e (FISH OIL) 1,000 mg cap Take 2 Caps by mouth two (2) times a day.  cholecalciferol, vitamin d3, (VITAMIN D) 1,000 unit tablet Take 1,000 Units by mouth two (2) times a day. No current facility-administered medications for this visit. Allergies   Allergen Reactions    Other Food Nausea and Vomiting     Artificial sweeteners - gas and beldging    Ciprofloxacin Nausea Only    Erythromycin Nausea Only    Metformin Other (comments) and Nausea Only     Felt bad  Felt bad      Pcn [Penicillins] Swelling     Caused lips to swell  Tolerates Amoxicillin         Review of Systems:  - Constitutional Symptoms: no fevers, no chills,   - Eyes: no blurry vision no double vision  - Cardiovascular: no chest pain ,no palpitations  - Respiratory: no cough no shortness of breath  - Gastrointestinal: no dysphagia no  abdominal pain  - Musculoskeletal: + joint pains no  weakness  - Integumentary: no rashes  -     Physical Examination:   Blood pressure 136/73, pulse 66, temperature 96.4 °F (35.8 °C), temperature source Oral, resp. rate 16, height 4' 11\" (1.499 m), weight 198 lb (89.8 kg), SpO2 96 %. Estimated body mass index is 39.99 kg/m² as calculated from the following:    Height as of this encounter: 4' 11\" (1.499 m). -   Weight as of this encounter: 198 lb (89.8 kg).   - General: pleasant, no distress, good eye contact  - HEENT: no pallor, no periorbital edema, EOMI  - Neck: supple,  - Cardiovascular: regular, normal rate, normal S1 and S2  - Respiratory: clear to auscultation bilaterally  - Gastrointestinal: soft, nontender,  - Musculoskeletal: no proximal muscle weakness in upper or lower extremities  - Integumentary: alert and oriented , foot 12/17   - Psychiatric: normal mood and affect  - Skin: color, texture, turgor normal.       Data Reviewed:         Lab Results   Component Value Date/Time    Hemoglobin A1c 6.9 (H) 01/13/2020 11:05 AM    Hemoglobin A1c 8.0 (H) 10/04/2019 11:06 AM    Hemoglobin A1c 7.3 (H) 07/01/2019 11:20 AM    Glucose 115 (H) 01/13/2020 11:05 AM    Glucose (POC) 152 (H) 10/18/2019 08:37 AM    Microalbumin/Creat ratio (mg/g creat) 163 (H) 01/13/2020 11:05 AM    Microalbumin,urine random 15.30 01/13/2020 11:05 AM    LDL, calculated 53.4 01/13/2020 11:05 AM    Creatinine 1.15 (H) 01/13/2020 11:05 AM      Lab Results   Component Value Date/Time    Cholesterol, total 145 01/13/2020 11:05 AM    HDL Cholesterol 61 01/13/2020 11:05 AM    LDL, calculated 53.4 01/13/2020 11:05 AM    Triglyceride 153 (H) 01/13/2020 11:05 AM    CHOL/HDL Ratio 2.4 01/13/2020 11:05 AM     Lab Results   Component Value Date/Time    ALT (SGPT) 34 01/13/2020 11:05 AM    AST (SGOT) 23 01/13/2020 11:05 AM    Alk. phosphatase 51 01/13/2020 11:05 AM    Bilirubin, total 0.6 01/13/2020 11:05 AM     Lab Results   Component Value Date/Time    TSH 1.220 01/29/2018 02:47 PM    T4, Free 1.55 09/09/2015 02:16 PM      Lab Results   Component Value Date/Time    Glucose 115 (H) 01/13/2020 11:05 AM    Glucose (POC) 152 (H) 10/18/2019 08:37 AM        Assessment/Plan:     1. Type 2 diabetes mellitus with hyperglycemia, without long-term current use of insulin ([address]    2. Essential hypertension    3. Hypercholesterolemia        1.  Type 2 Diabetes Mellitus   Lab Results   Component Value Date/Time    Hemoglobin A1c 6.9 (H) 01/13/2020 11:05 AM    Hemoglobin A1c (POC) 6.5 05/11/2018 11:37 AM   controlled  Actos  Trulicity  FLU annually ,Pneumovax ,aspirin daily,annual eye exam,microalbumin    2. HTN : Continue current therapy     3. Mixed Hyperlipidemia : low carb diet. Statin intolerance hx,   Continue Zetia, Tricor    4. Obesity:Body mass index is 39.99 kg/m². Discussed about the importance of exercise and carbohydrate portion control. 5.  GI symptoms: work up was negative  It was due to artificial sweetners according to the patient      Thank you for allowing me to participate in the care of this patient.     Maykel Larson MD    Patient verbalized understanding

## 2020-02-24 NOTE — PROGRESS NOTES
Avis Sanchez is a 67 y.o. female here for   Chief Complaint   Patient presents with    Diabetes       1. Have you been to the ER, urgent care clinic since your last visit? Hospitalized since your last visit? -no    2. Have you seen or consulted any other health care providers outside of the [address] [place] [address] since your last visit?   Include any pap smears or colon screening.-no

## 2020-02-24 NOTE — LETTER
2/24/20 Patient: Severiano Evener YOB: 1940 Date of Visit: 2/24/2020 Peace Parish MD 
[address] [place] [address] VIA In Basket Dear Peace Parish MD, Thank you for referring Ms. Vonnie Bell to [id number] [address] for evaluation. My notes for this consultation are attached. If you have questions, please do not hesitate to call me. I look forward to following your patient along with you. Sincerely, Amy Emmanuel MD

## 2020-03-09 ENCOUNTER — OFFICE VISIT (OUTPATIENT)
Dept: FAMILY MEDICINE CLINIC | Age: 80
End: 2020-03-09

## 2020-03-09 ENCOUNTER — HOSPITAL ENCOUNTER (OUTPATIENT)
Dept: LAB | Age: 80
Discharge: HOME | End: 2020-03-09

## 2020-03-09 VITALS
DIASTOLIC BLOOD PRESSURE: 69 MMHG | BODY MASS INDEX: 40.48 KG/M2 | TEMPERATURE: 98 F | HEIGHT: 59 IN | HEART RATE: 67 BPM | SYSTOLIC BLOOD PRESSURE: 124 MMHG | RESPIRATION RATE: 20 BRPM | WEIGHT: 200.8 LBS | OXYGEN SATURATION: 96 %

## 2020-03-09 DIAGNOSIS — E11.21 TYPE 2 DIABETES MELLITUS WITH NEPHROPATHY (HCC): ICD-10-CM

## 2020-03-09 DIAGNOSIS — E78.00 HYPERCHOLESTEROLEMIA: Chronic | ICD-10-CM

## 2020-03-09 DIAGNOSIS — E55.9 VITAMIN D DEFICIENCY: Chronic | ICD-10-CM

## 2020-03-09 DIAGNOSIS — E66.01 MORBID OBESITY (HCC): ICD-10-CM

## 2020-03-09 DIAGNOSIS — E11.65 TYPE 2 DIABETES MELLITUS WITH HYPERGLYCEMIA, WITHOUT LONG-TERM CURRENT USE OF INSULIN (HCC): Chronic | ICD-10-CM

## 2020-03-09 DIAGNOSIS — I10 ESSENTIAL HYPERTENSION: Chronic | ICD-10-CM

## 2020-03-09 DIAGNOSIS — E11.65 TYPE 2 DIABETES MELLITUS WITH HYPERGLYCEMIA, WITHOUT LONG-TERM CURRENT USE OF INSULIN (HCC): Primary | Chronic | ICD-10-CM

## 2020-03-09 NOTE — PATIENT INSTRUCTIONS
Diabetes Foot Health: Care Instructions Your Care Instructions When you have diabetes, your feet need extra care and attention. Diabetes can damage the nerve endings and blood vessels in your feet, making you less likely to notice when your feet are injured. Diabetes also limits your body's ability to fight infection and get blood to areas that need it. If you get a minor foot injury, it could become an ulcer or a serious infection. With good foot care, you can prevent most of these problems. Caring for your feet can be quick and easy. Most of the care can be done when you are bathing or getting ready for bed. Follow-up care is a key part of your treatment and safety. Be sure to make and go to all appointments, and call your doctor if you are having problems. It's also a good idea to know your test results and keep a list of the medicines you take. How can you care for yourself at home? · Keep your blood sugar close to normal by watching what and how much you eat, monitoring blood sugar, taking medicines if prescribed, and getting regular exercise. · Do not smoke. Smoking affects blood flow and can make foot problems worse. If you need help quitting, talk to your doctor about stop-smoking programs and medicines. These can increase your chances of quitting for good. · Eat a diet that is low in fats. High fat intake can cause fat to build up in your blood vessels and decrease blood flow. · Inspect your feet daily for blisters, cuts, cracks, or sores. If you cannot see well, use a mirror or have someone help you. · Take care of your feet: 
? Wash your feet every day. Use warm (not hot) water. Check the water temperature with your wrists or other part of your body, not your feet. ? Dry your feet well. Pat them dry. Do not rub the skin on your feet too hard. Dry well between your toes. If the skin on your feet stays moist, bacteria or a fungus can grow, which can lead to infection. ? Keep your skin soft. Use moisturizing skin cream to keep the skin on your feet soft and prevent calluses and cracks. But do not put the cream between your toes, and stop using any cream that causes a rash. ? Clean underneath your toenails carefully. Do not use a sharp object to clean underneath your toenails. Use the blunt end of a nail file or other rounded tool. ? Trim and file your toenails straight across to prevent ingrown toenails. Use a nail clipper, not scissors. Use an emery board to smooth the edges. · Change socks daily. Socks without seams are best, because seams often rub the feet. You can find socks for people with diabetes from specialty catalogs. · Look inside your shoes every day for things like gravel or torn linings, which could cause blisters or sores. · Buy shoes that fit well: 
? Look for shoes that have plenty of space around the toes. This helps prevent bunions and blisters. ? Try on shoes while wearing the kind of socks you will usually wear with the shoes. ? Avoid plastic shoes. They may rub your feet and cause blisters. Good shoes should be made of materials that are flexible and breathable, such as leather or cloth. ? Break in new shoes slowly by wearing them for no more than an hour a day for several days. Take extra time to check your feet for red areas, blisters, or other problems after you wear new shoes. · Do not go barefoot. Do not wear sandals, and do not wear shoes with very thin soles. Thin soles are easy to puncture. They also do not protect your feet from hot pavement or cold weather. · Have your doctor check your feet during each visit. If you have a foot problem, see your doctor. Do not try to treat an early foot problem at home. Home remedies or treatments that you can buy without a prescription (such as corn removers) can be harmful. · Always get early treatment for foot problems. A minor irritation can lead to a major problem if not properly cared for early. When should you call for help? Call your doctor now or seek immediate medical care if: 
  · You have a foot sore, an ulcer or break in the skin that is not healing after 4 days, bleeding corns or calluses, or an ingrown toenail.  
  · You have blue or black areas, which can mean bruising or blood flow problems.  
  · You have peeling skin or tiny blisters between your toes or cracking or oozing of the skin.  
  · You have a fever for more than 24 hours and a foot sore.  
  · You have new numbness or tingling in your feet that does not go away after you move your feet or change positions.  
  · You have unexplained or unusual swelling of the foot or ankle.  
 Watch closely for changes in your health, and be sure to contact your doctor if: 
  · You cannot do proper foot care. Where can you learn more? Go to http://reid-shanell.info/. Enter A739 in the search box to learn more about \"Diabetes Foot Health: Care Instructions. \" Current as of: April 16, 2019 Content Version: 12.2 © 5659-6898 [employer]. Care instructions adapted under license by [employer] (which disclaims liability or warranty for this information). If you have questions about a medical condition or this instruction, always ask your healthcare professional. [address] any warranty or liability for your use of this information.

## 2020-03-09 NOTE — PROGRESS NOTES
Chief Complaint   Patient presents with   [facility] Shoulder Pain     Body mass index is 40.56 kg/m². 1. Have you been to the ER, urgent care clinic since your last visit? Hospitalized since your last visit? No    2. Have you seen or consulted any other health care providers outside of the [address] [place] [address] since your last visit? Include any pap smears or colon screening.  No    Reviewed record in preparation for visit and have necessary documentation  Pt did not bring medication to office visit for review  Information was given to pt on Advanced Directives, Living Will  Information was given on Shingles Vaccine  Opportunity was given for questions  Goals that were addressed and/or need to be completed after this appointment include:     Health Maintenance Due   Topic Date Due    Shingrix Vaccine Age 50> (1 of 2) 12/15/1991    Foot Exam Q1  11/28/2019

## 2020-03-09 NOTE — PROGRESS NOTES
[address]  [phone]           Progress Note    Patient: Lissy Mederos MRN: [id number]  SSN: [id number]    YOB: 1940  Age: 67 y.o. Sex: female        Chief Complaint   Patient presents with   [facility]    Shoulder Pain         Subjective:     Encounter Diagnoses   Name Primary?  Type 2 diabetes mellitus with hyperglycemia, without long-term current use of insulin ([facility] [address]): This patient is managed under a comprehensive plan of care for Diabetes. Overall the patient feels well with good energy level. Key Antihyperglycemic Medications             pioglitazone (ACTOS) 30 mg tablet Take 1 Tab by mouth every morning. dulaglutide (TRULICITY) 1.5 CA/0.4 mL sub-q pen 0.5 mL by SubCUTAneous route every seven (7) days. Stop Byduereon           Pertinent Labs:   Lab Results   Component Value Date/Time    Hemoglobin A1c 6.9 (H) 01/13/2020 11:05 AM    Hemoglobin A1c 8.0 (H) 10/04/2019 11:06 AM    Hemoglobin A1c 7.3 (H) 07/01/2019 11:20 AM      Body mass index is 40.56 kg/m². Lab Results   Component Value Date/Time    LDL, calculated 53.4 01/13/2020 11:05 AM         Lab Results   Component Value Date/Time    Sodium 141 01/13/2020 11:05 AM    Potassium 3.9 01/13/2020 11:05 AM    Chloride 104 01/13/2020 11:05 AM    CO2 29 01/13/2020 11:05 AM    Anion gap 8 01/13/2020 11:05 AM    Glucose 115 (H) 01/13/2020 11:05 AM    BUN 27 (H) 01/13/2020 11:05 AM    Creatinine 1.15 (H) 01/13/2020 11:05 AM    BUN/Creatinine ratio 23 (H) 01/13/2020 11:05 AM    GFR est AA 55 (L) 01/13/2020 11:05 AM    GFR est non-AA 46 (L) 01/13/2020 11:05 AM    Calcium 9.8 01/13/2020 11:05 AM    AST (SGOT) 23 01/13/2020 11:05 AM    Alk.  phosphatase 51 01/13/2020 11:05 AM    Protein, total 7.4 01/13/2020 11:05 AM    Albumin 4.0 01/13/2020 11:05 AM    Globulin 3.4 01/13/2020 11:05 AM    A-G Ratio 1.2 01/13/2020 11:05 AM    ALT (SGPT) 34 01/13/2020 11:05 AM Lab Results   Component Value Date/Time    Microalbumin/Creat ratio (mg/g creat) 163 (H) 2020 11:05 AM    Microalbumin,urine random 15.30 2020 11:05 AM      Frequency of home glucose testing: No logs   Blood Sugar range at home:    Last eye exam: In past 12 months. Last foot exam: Today. Polyuria, polyphagia or polydipsia: No   Retinopathy: No   Neuropathy SX: No    Low blood sugar symptoms: No   Dietary compliance: Good   Medication compliance:Good   On ASA: Yes   Depression: No   CKD: Stage III. Wt Readings from Last 3 Encounters:   20 200 lb 12.8 oz (91.1 kg)   20 198 lb (89.8 kg)   20 194 lb (88 kg)        Social History     Tobacco Use   Smoking Status Former Smoker    Packs/day: 1.00    Years: 20.00    Pack years: 20.00    Last attempt to quit: Muna Emanuel Years since quittin.2   Smokeless Tobacco Never Used     Body mass index is 40.56 kg/m². Diabetic Consultants: Dr. Jennifer Pemberton  All the patient's questions regarding medications, diet and exercise were answered. Goal of A1C of less than 7.5% is our goal.   Our overall goal is to reduce or eliminate the long term consequences of poorly controlled diabetes. Yes    Type 2 diabetes mellitus with nephropathy ([address]): See above.  Morbid obesity ([address]): Currently very poor diabetic diet on a  Body mass index is 40.56 kg/m². Wt Readings from Last 3 Encounters:   20 200 lb 12.8 oz (91.1 kg)   20 198 lb (89.8 kg)   20 194 lb (88 kg)     Exercise capacity: Poor  I have reviewed/discussed the above normal BMI with the patient. I have recommended the following interventions: dietary management education, guidance, and counseling . Recommended diet: Low starch, low sweet diabetic         Hypercholesterolemia:  Cardiovascular risks for her are: LDL goal is under 80  diabetic  hypertension  hyperlipidemia.    Key Antihyperlipidemia Meds             ezetimibe (ZETIA) 10 mg tablet TAKE 1 TABLET DAILY    TRICOR 145 mg tablet TAKE 1 TABLET DAILY    omega-3 fatty acids-vitamin e (FISH OIL) 1,000 mg cap Take 2 Caps by mouth two (2) times a day. Lab Results   Component Value Date/Time    Cholesterol, total 145 01/13/2020 11:05 AM    HDL Cholesterol 61 01/13/2020 11:05 AM    LDL, calculated 53.4 01/13/2020 11:05 AM    Triglyceride 153 (H) 01/13/2020 11:05 AM    CHOL/HDL Ratio 2.4 01/13/2020 11:05 AM     Lab Results   Component Value Date/Time    ALT (SGPT) 34 01/13/2020 11:05 AM    AST (SGOT) 23 01/13/2020 11:05 AM    Alk. phosphatase 51 01/13/2020 11:05 AM    Bilirubin, total 0.6 01/13/2020 11:05 AM      Myalgias: No   Fatigue: No   Other side effects: no  Wt Readings from Last 3 Encounters:   03/09/20 200 lb 12.8 oz (91.1 kg)   02/24/20 198 lb (89.8 kg)   01/13/20 194 lb (88 kg)     The patient is aware of our goal to reduce or eliminate the long term problems (such as strokes and heart attacks) related to poorly controlled hyperlipidemia.  Essential hypertension:  BP Readings from Last 3 Encounters:   03/09/20 124/69   02/24/20 136/73   01/13/20 133/77     The patient reports:  taking medications as instructed, no medication side effects noted, no TIA's, no chest pain on exertion, no dyspnea on exertion, no swelling of ankles. Key CAD CHF Meds             dilTIAZem CD (CARDIZEM CD) 180 mg ER capsule TAKE 1 CAPSULE DAILY    losartan-hydroCHLOROthiazide (HYZAAR) 100-25 mg per tablet TAKE 1 TABLET DAILY    cloNIDine HCl (CATAPRES) 0.1 mg tablet TAKE 1 TABLET NIGHTLY    ezetimibe (ZETIA) 10 mg tablet TAKE 1 TABLET DAILY    TRICOR 145 mg tablet TAKE 1 TABLET DAILY    aspirin 81 mg chewable tablet Take 81 mg by mouth daily. omega-3 fatty acids-vitamin e (FISH OIL) 1,000 mg cap Take 2 Caps by mouth two (2) times a day.            Lab Results   Component Value Date/Time    Sodium 141 01/13/2020 11:05 AM    Potassium 3.9 01/13/2020 11:05 AM    Chloride 104 01/13/2020 11:05 AM    CO2 29 01/13/2020 11:05 AM    Anion gap 8 01/13/2020 11:05 AM    Glucose 115 (H) 01/13/2020 11:05 AM    BUN 27 (H) 01/13/2020 11:05 AM    Creatinine 1.15 (H) 01/13/2020 11:05 AM    BUN/Creatinine ratio 23 (H) 01/13/2020 11:05 AM    GFR est AA 55 (L) 01/13/2020 11:05 AM    GFR est non-AA 46 (L) 01/13/2020 11:05 AM    Calcium 9.8 01/13/2020 11:05 AM    Bilirubin, total 0.6 01/13/2020 11:05 AM    AST (SGOT) 23 01/13/2020 11:05 AM    Alk. phosphatase 51 01/13/2020 11:05 AM    Protein, total 7.4 01/13/2020 11:05 AM    Albumin 4.0 01/13/2020 11:05 AM    Globulin 3.4 01/13/2020 11:05 AM    A-G Ratio 1.2 01/13/2020 11:05 AM    ALT (SGPT) 34 01/13/2020 11:05 AM     Low salt diet? yes  Aerobic exercise? no  Our goal is to normalize the blood pressure to decrease the risks of strokes and heart attacks. The patient is in agreement with the plan.  Vitamin D deficiency; No sx. Due for testing. Lab Results   Component Value Date/Time    Vitamin D 25-Hydroxy 52.6 01/13/2020 11:05 AM                     Current and past medical information:    Current Medications after this visit[de-identified]     Current Outpatient Medications   Medication Sig    pioglitazone (ACTOS) 30 mg tablet Take 1 Tab by mouth every morning.  dilTIAZem CD (CARDIZEM CD) 180 mg ER capsule TAKE 1 CAPSULE DAILY    dulaglutide (TRULICITY) 1.5 WW/5.5 mL sub-q pen 0.5 mL by SubCUTAneous route every seven (7) days. Stop Byduereon    losartan-hydroCHLOROthiazide (HYZAAR) 100-25 mg per tablet TAKE 1 TABLET DAILY    cloNIDine HCl (CATAPRES) 0.1 mg tablet TAKE 1 TABLET NIGHTLY    loratadine (CLARITIN) 10 mg tablet TAKE 1 TABLET DAILY    ezetimibe (ZETIA) 10 mg tablet TAKE 1 TABLET DAILY    TRICOR 145 mg tablet TAKE 1 TABLET DAILY    esomeprazole (NEXIUM) 40 mg capsule Take 1 Cap by mouth daily. Indications: gastroesophageal reflux disease    aspirin 81 mg chewable tablet Take 81 mg by mouth daily.     lancets (FREESTYLE LANCETS) 28 gauge misc TEST ONCE DAILY DX: E11.65    glucose blood VI test strips (FREESTYLE LITE STRIPS) strip TEST ONCE A DAY DX CODE: E11.65    omega-3 fatty acids-vitamin e (FISH OIL) 1,000 mg cap Take 2 Caps by mouth two (2) times a day.  cholecalciferol, vitamin d3, (VITAMIN D) 1,000 unit tablet Take 1,000 Units by mouth two (2) times a day. No current facility-administered medications for this visit.         Patient Active Problem List    Diagnosis Date Noted    Type 2 diabetes mellitus with nephropathy ([facility] 01/23/2018     Priority: 1 - One    Type 2 diabetes mellitus with hyperglycemia, without long-term current use of insulin ([facility] 12/19/2016     Priority: 1 - One    Morbid obesity ([facility] 10/18/2015     Priority: 1 - One    Vitamin D deficiency 06/28/2010     Priority: 1 - One    Hypertension      Priority: 1 - One    Hypercholesterolemia      Priority: 1 - One    GERD (gastroesophageal reflux disease)      Priority: 1 - One    Hot flashes, menopausal      Priority: 3 - Three    Allergic rhinitis 11/18/2011     Priority: 6 - Six    Bile acid esophageal reflux 07/01/2019    S/P total knee replacement using cement 09/09/2015    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    Arthritis 02/28/2011       Past Medical History:   Diagnosis Date    Arthritis 2/28/2011    OSTEO    Chronic pain     RIGHT KNEE    Diabetes ([facility]     GERD (gastroesophageal reflux disease)     Hot flashes, menopausal     Hypercholesterolemia     Hypertension     Ill-defined condition     Elevated liver enzymes (has family history)    Nausea & vomiting     Seizures (HCC) 1980'S    AFTER DRINKING 2 MARGARITAS    Vitamin D deficiency 6/28/2010       Allergies   Allergen Reactions    Other Food Nausea and Vomiting     Artificial sweeteners - gas and beldging    Ciprofloxacin Nausea Only    Erythromycin Nausea Only    Metformin Other (comments) and Nausea Only     Felt bad  Felt bad      Pcn [Penicillins] Swelling     Caused lips to swell  Tolerates Amoxicillin       Past Surgical History:   Procedure Laterality Date    COLONOSCOPY N/A 6/3/2019    COLONOSCOPY performed by Madison Samayoa MD at [facility] HX APPENDECTOMY  2001    HX CATARACT REMOVAL Bilateral     HX CHOLECYSTECTOMY  2018    HX HYSTERECTOMY  1975    HX KNEE REPLACEMENT Left 2014    HX OOPHORECTOMY  2001    bilateral    HX OTHER SURGICAL      LIPOMA LEFT SIDE    HX ROTATOR CUFF REPAIR Right 1998       Social History     Socioeconomic History    Marital status:      Spouse name: Not on file    Number of children: Not on file    Years of education: Not on file    Highest education level: Not on file   Tobacco Use    Smoking status: Former Smoker     Packs/day: 1.00     Years: 20.00     Pack years: 20.00     Last attempt to quit:      Years since quittin.2    Smokeless tobacco: Never Used   Substance and Sexual Activity    Alcohol use: Yes     Comment: rarely - 1 glass of wine every 3 months    Drug use: No    Sexual activity: Yes       Review of Systems   Constitutional: Negative. Negative for chills, fever, malaise/fatigue and weight loss. HENT: Negative. Negative for hearing loss. Eyes: Negative. Negative for blurred vision and double vision. Respiratory: Negative. Negative for cough, sputum production and shortness of breath. Cardiovascular: Negative. Negative for chest pain and palpitations. Gastrointestinal: Negative. Negative for abdominal pain, blood in stool, heartburn, nausea and vomiting. Genitourinary: Negative. Negative for dysuria, frequency and urgency. Musculoskeletal: Positive for joint pain. Negative for back pain, falls, myalgias and neck pain. Left shoulder and bilateral knee pain. No injury. Skin: Negative. Negative for rash. Neurological: Negative. Negative for dizziness, tingling, tremors, weakness and headaches. Endo/Heme/Allergies: Negative. Psychiatric/Behavioral: Negative. Negative for depression. Objective:     Vitals:    03/09/20 0924   BP: 124/69   Pulse: 67   Resp: 20   Temp: 98 °F (36.7 °C)   TempSrc: Oral   SpO2: 96%   Weight: 200 lb 12.8 oz (91.1 kg)   Height: 4' 11\" (1.499 m)      Body mass index is 40.56 kg/m². Physical Exam  Vitals signs and nursing note reviewed. Constitutional:       General: She is not in acute distress. Appearance: She is not diaphoretic. HENT:      Head: Normocephalic and atraumatic. Eyes:      General: No scleral icterus. Conjunctiva/sclera: Conjunctivae normal.   Cardiovascular:      Rate and Rhythm: Normal rate and regular rhythm. Heart sounds: No murmur. No friction rub. No gallop. Pulmonary:      Effort: Pulmonary effort is normal. No respiratory distress. Breath sounds: Normal breath sounds. No wheezing or rales. Abdominal:      General: There is no distension. Palpations: Abdomen is soft. Skin:     General: Skin is warm. Findings: No erythema or rash. Neurological:      Mental Status: She is alert. Psychiatric:         Mood and Affect: Mood and affect normal.       Health Maintenance Due   Topic Date Due    Shingrix Vaccine Age 49> (1 of 2) 12/15/1991    Foot Exam Q1  11/28/2019         Assessment and orders:     Encounter Diagnoses     ICD-10-CM ICD-9-CM   1. Type 2 diabetes mellitus with hyperglycemia, without long-term current use of insulin (HCC) E11.65 250.00     790.29   2. Type 2 diabetes mellitus with nephropathy (HCC) E11.21 250.40     583.81   3. Morbid obesity ([facility] [address] E66.01 278.01   4. Hypercholesterolemia E78.00 272.0   5. Essential hypertension I10 401.9   6. Vitamin D deficiency E55.9 268.9     Diagnoses and all orders for this visit:    1. Type 2 diabetes mellitus with hyperglycemia, without long-term current use of insulin (HCC)-retest  -     LIPID PANEL; Future  -     METABOLIC PANEL, COMPREHENSIVE; Future  -      DIABETES FOOT EXAM    2.  Type 2 diabetes mellitus with nephropathy (HCC)  -     LIPID PANEL; Future  -     METABOLIC PANEL, COMPREHENSIVE; Future  -      DIABETES FOOT EXAM    3. Morbid obesity (HCC)-see problem she will start working on diet. Diminished weight. 4. Hypercholesterolemia-recheck  -     LIPID PANEL; Future  -     METABOLIC PANEL, COMPREHENSIVE; Future    5. Essential hypertension-controlled  -     LIPID PANEL; Future  -     METABOLIC PANEL, COMPREHENSIVE; Future    6. Vitamin D deficiency-treated            Plan of care:  Discussed diagnoses in detail with patient. Medication risks/benefits/side effects discussed with patient. All of the patient's questions were addressed. The patient understands and agrees with our plan of care. The patient knows to call back if they are unsure of or forget any changes we discussed today or if the symptoms change. The patient received an After-Visit Summary which contains VS, orders, medication list and allergy list. This can be used as a \"mini-medical record\" should they have to seek medical care while out of town. Patient Care Team:  Kiki Saldaña MD as PCP - Fletcher Doran MD as PCP - [facility] Empaneled Provider  Sidney Jennings MD as Physician (Cardiology)  Lorenzo Lozano MD as Physician (Orthopedic Surgery)  Jory La MD (Endocrinology)  Noreen Kendall MD (General Surgery)  Madeline Pate MD (Ophthalmology)          Signed By: Dany Coley MD     March 9, 2020      ATTENTION:   This medical record was transcribed using an electronic medical records/speech recognition system. Although proofread, it may and can contain electronic, spelling and other errors. Corrections may be executed at a later time. Please feel free to contact me for any clarifications as needed.

## 2020-03-10 LAB
ALBUMIN SERPL-MCNC: 4.1 G/DL (ref 3.5–5)
ALBUMIN/GLOB SERPL: 1.2 {RATIO} (ref 1.1–2.2)
ALP SERPL-CCNC: 54 U/L (ref 45–117)
ALT SERPL-CCNC: 32 U/L (ref 12–78)
ANION GAP SERPL CALC-SCNC: 5 MMOL/L (ref 5–15)
AST SERPL-CCNC: 24 U/L (ref 15–37)
BILIRUB SERPL-MCNC: 0.4 MG/DL (ref 0.2–1)
BUN SERPL-MCNC: 26 MG/DL (ref 6–20)
BUN/CREAT SERPL: 25 (ref 12–20)
CALCIUM SERPL-MCNC: 9.5 MG/DL (ref 8.5–10.1)
CHLORIDE SERPL-SCNC: 103 MMOL/L (ref 97–108)
CHOLEST SERPL-MCNC: 144 MG/DL
CO2 SERPL-SCNC: 31 MMOL/L (ref 21–32)
CREAT SERPL-MCNC: 1.03 MG/DL (ref 0.55–1.02)
GLOBULIN SER CALC-MCNC: 3.3 G/DL (ref 2–4)
GLUCOSE SERPL-MCNC: 116 MG/DL (ref 65–100)
HDLC SERPL-MCNC: 60 MG/DL
HDLC SERPL: 2.4 {RATIO} (ref 0–5)
LDLC SERPL CALC-MCNC: 58 MG/DL (ref 0–100)
LIPID PROFILE,FLP: NORMAL
POTASSIUM SERPL-SCNC: 4 MMOL/L (ref 3.5–5.1)
PROT SERPL-MCNC: 7.4 G/DL (ref 6.4–8.2)
SODIUM SERPL-SCNC: 139 MMOL/L (ref 136–145)
TRIGL SERPL-MCNC: 130 MG/DL (ref ?–150)
VLDLC SERPL CALC-MCNC: 26 MG/DL

## 2020-04-06 DIAGNOSIS — K21.9 GASTROESOPHAGEAL REFLUX DISEASE WITHOUT ESOPHAGITIS: ICD-10-CM

## 2020-04-06 RX ORDER — ESOMEPRAZOLE MAGNESIUM 40 MG/1
CAPSULE, DELAYED RELEASE ORAL
Qty: 90 CAP | Refills: 3 | Status: SHIPPED | OUTPATIENT
Start: 2020-04-06 | End: 2020-09-14 | Stop reason: SDUPTHER

## 2020-06-09 ENCOUNTER — HOSPITAL ENCOUNTER (OUTPATIENT)
Dept: LAB | Age: 80
Discharge: HOME | End: 2020-06-09

## 2020-06-09 ENCOUNTER — OFFICE VISIT (OUTPATIENT)
Dept: FAMILY MEDICINE CLINIC | Age: 80
End: 2020-06-09

## 2020-06-09 VITALS
DIASTOLIC BLOOD PRESSURE: 77 MMHG | SYSTOLIC BLOOD PRESSURE: 130 MMHG | HEIGHT: 59 IN | HEART RATE: 65 BPM | OXYGEN SATURATION: 96 % | TEMPERATURE: 98.1 F | BODY MASS INDEX: 40.12 KG/M2 | WEIGHT: 199 LBS | RESPIRATION RATE: 20 BRPM

## 2020-06-09 DIAGNOSIS — E11.65 TYPE 2 DIABETES MELLITUS WITH HYPERGLYCEMIA, WITHOUT LONG-TERM CURRENT USE OF INSULIN (HCC): ICD-10-CM

## 2020-06-09 DIAGNOSIS — I10 ESSENTIAL HYPERTENSION: Chronic | ICD-10-CM

## 2020-06-09 DIAGNOSIS — E78.00 HYPERCHOLESTEROLEMIA: Chronic | ICD-10-CM

## 2020-06-09 DIAGNOSIS — K21.00 GASTROESOPHAGEAL REFLUX DISEASE WITH ESOPHAGITIS: Chronic | ICD-10-CM

## 2020-06-09 DIAGNOSIS — E11.21 TYPE 2 DIABETES MELLITUS WITH NEPHROPATHY (HCC): ICD-10-CM

## 2020-06-09 DIAGNOSIS — E55.9 VITAMIN D DEFICIENCY: Chronic | ICD-10-CM

## 2020-06-09 DIAGNOSIS — E66.01 MORBID OBESITY (HCC): Primary | ICD-10-CM

## 2020-06-09 LAB
ALBUMIN SERPL-MCNC: 4 G/DL (ref 3.5–5)
ALBUMIN/GLOB SERPL: 1.3 {RATIO} (ref 1.1–2.2)
ALP SERPL-CCNC: 47 U/L (ref 45–117)
ALT SERPL-CCNC: 24 U/L (ref 12–78)
ANION GAP SERPL CALC-SCNC: 8 MMOL/L (ref 5–15)
AST SERPL-CCNC: 17 U/L (ref 15–37)
BILIRUB SERPL-MCNC: 0.4 MG/DL (ref 0.2–1)
BUN SERPL-MCNC: 22 MG/DL (ref 6–20)
BUN/CREAT SERPL: 21 (ref 12–20)
CALCIUM SERPL-MCNC: 9.6 MG/DL (ref 8.5–10.1)
CHLORIDE SERPL-SCNC: 104 MMOL/L (ref 97–108)
CHOLEST SERPL-MCNC: 158 MG/DL
CO2 SERPL-SCNC: 28 MMOL/L (ref 21–32)
CREAT SERPL-MCNC: 1.05 MG/DL (ref 0.55–1.02)
EST. AVERAGE GLUCOSE BLD GHB EST-MCNC: 134 MG/DL
GLOBULIN SER CALC-MCNC: 3.1 G/DL (ref 2–4)
GLUCOSE SERPL-MCNC: 113 MG/DL (ref 65–100)
HBA1C MFR BLD: 6.3 % (ref 4–5.6)
HDLC SERPL-MCNC: 54 MG/DL
HDLC SERPL: 2.9 {RATIO} (ref 0–5)
LDLC SERPL CALC-MCNC: 75.6 MG/DL (ref 0–100)
LIPID PROFILE,FLP: NORMAL
POTASSIUM SERPL-SCNC: 3.8 MMOL/L (ref 3.5–5.1)
PROT SERPL-MCNC: 7.1 G/DL (ref 6.4–8.2)
SODIUM SERPL-SCNC: 140 MMOL/L (ref 136–145)
TRIGL SERPL-MCNC: 142 MG/DL (ref ?–150)
VLDLC SERPL CALC-MCNC: 28.4 MG/DL

## 2020-06-09 RX ORDER — PIOGLITAZONEHYDROCHLORIDE 30 MG/1
30 TABLET ORAL
Qty: 90 TAB | Refills: 3 | Status: SHIPPED | OUTPATIENT
Start: 2020-06-09 | End: 2021-03-10

## 2020-06-09 NOTE — PROGRESS NOTES
Chief Complaint   Patient presents with    Diabetes    Labs     There is no height or weight on file to calculate BMI. 1. Have you been to the ER, urgent care clinic since your last visit? Hospitalized since your last visit? No    2. Have you seen or consulted any other health care providers outside of the [address] [place] [address] since your last visit? Include any pap smears or colon screening.  No    Reviewed record in preparation for visit and have necessary documentation  Pt did not bring medication to office visit for review  Information was given to pt on Advanced Directives, Living Will  Information was given on Shingles Vaccine  Opportunity was given for questions  Goals that were addressed and/or need to be completed after this appointment include:   Health Maintenance Due   Topic Date Due    Medicare Yearly Exam  05/10/2020

## 2020-06-09 NOTE — PATIENT INSTRUCTIONS

## 2020-06-09 NOTE — PROGRESS NOTES
[address]  [phone]           Progress Note    Patient: Tim Bergeron MRN: [id number]  SSN: [id number]    YOB: 1940  Age: 67 y.o. Sex: female        Chief Complaint   Patient presents with    Diabetes    Labs         Subjective:     Encounter Diagnoses   Name Primary?  Morbid obesity ([facility]): Has not gained weight despite social isolation and death of her . Wt Readings from Last 3 Encounters:   06/09/20 199 lb (90.3 kg)   03/09/20 200 lb 12.8 oz (91.1 kg)   02/24/20 198 lb (89.8 kg)      Yes    Type 2 diabetes mellitus with hyperglycemia, without long-term current use of insulin ([facility]): This patient is managed under a comprehensive plan of care for Diabetes. Overall the patient feels well with good energy level. Key Antihyperglycemic Medications             pioglitazone (ACTOS) 30 mg tablet (Taking) Take 1 Tab by mouth every morning. dulaglutide (TRULICITY) 1.5 WL/2.8 mL sub-q pen 0.5 mL by SubCUTAneous route every seven (7) days. Stop Byduereon           Pertinent Labs:   Lab Results   Component Value Date/Time    Hemoglobin A1c 6.9 (H) 01/13/2020 11:05 AM    Hemoglobin A1c 8.0 (H) 10/04/2019 11:06 AM    Hemoglobin A1c 7.3 (H) 07/01/2019 11:20 AM      Body mass index is 40.19 kg/m². Lab Results   Component Value Date/Time    LDL, calculated 58 03/09/2020 10:37 AM         Lab Results   Component Value Date/Time    Sodium 139 03/09/2020 10:37 AM    Potassium 4.0 03/09/2020 10:37 AM    Chloride 103 03/09/2020 10:37 AM    CO2 31 03/09/2020 10:37 AM    Anion gap 5 03/09/2020 10:37 AM    Glucose 116 (H) 03/09/2020 10:37 AM    BUN 26 (H) 03/09/2020 10:37 AM    Creatinine 1.03 (H) 03/09/2020 10:37 AM    BUN/Creatinine ratio 25 (H) 03/09/2020 10:37 AM    GFR est AA >60 03/09/2020 10:37 AM    GFR est non-AA 52 (L) 03/09/2020 10:37 AM    Calcium 9.5 03/09/2020 10:37 AM    Alk.  phosphatase 54 2020 10:37 AM    Protein, total 7.4 2020 10:37 AM    Albumin 4.1 2020 10:37 AM    Globulin 3.3 2020 10:37 AM    A-G Ratio 1.2 2020 10:37 AM    ALT (SGPT) 32 2020 10:37 AM     Lab Results   Component Value Date/Time    Microalbumin/Creat ratio (mg/g creat) 163 (H) 2020 11:05 AM    Microalbumin,urine random 15.30 2020 11:05 AM      Frequency of home glucose testing:daily   Blood Sugar range at home:    Last eye exam: In past 12 months. Last foot exam: This year. Polyuria, polyphagia or polydipsia: No   Retinopathy: No   Neuropathy SX: No    Low blood sugar symptoms: No   Dietary compliance: Good   Medication compliance:Good   On ASA: Yes   Depression: No   CKD:no     Wt Readings from Last 3 Encounters:   20 199 lb (90.3 kg)   20 200 lb 12.8 oz (91.1 kg)   20 198 lb (89.8 kg)        Social History     Tobacco Use   Smoking Status Former Smoker    Packs/day: 1.00    Years: 20.00    Pack years: 20.00    Last attempt to quit: Kareem Kemp Years since quittin.4   Smokeless Tobacco Never Used     Body mass index is 40.19 kg/m². Diabetic Consultants: All the patient's questions regarding medications, diet and exercise were answered. Goal of A1C of less than 7.5% is our goal.   Our overall goal is to reduce or eliminate the long term consequences of poorly controlled diabetes.  Type 2 diabetes mellitus with nephropathy ([facility]): This patient is managed under a comprehensive plan of care for Diabetes. Overall the patient feels well with good energy level. Key Antihyperglycemic Medications             pioglitazone (ACTOS) 30 mg tablet (Taking) Take 1 Tab by mouth every morning. dulaglutide (TRULICITY) 1.5 EY/5.1 mL sub-q pen 0.5 mL by SubCUTAneous route every seven (7) days.  Stop Byduereon           Pertinent Labs:   Lab Results   Component Value Date/Time    Hemoglobin A1c 6.9 (H) 2020 11:05 AM    Hemoglobin A1c 8.0 (H) 10/04/2019 11:06 AM    Hemoglobin A1c 7.3 (H) 2019 11:20 AM      Body mass index is 40.19 kg/m². Lab Results   Component Value Date/Time    LDL, calculated 58 2020 10:37 AM         Lab Results   Component Value Date/Time    Sodium 139 2020 10:37 AM    Potassium 4.0 2020 10:37 AM    Chloride 103 2020 10:37 AM    CO2 31 2020 10:37 AM    Anion gap 5 2020 10:37 AM    Glucose 116 (H) 2020 10:37 AM    BUN 26 (H) 2020 10:37 AM    Creatinine 1.03 (H) 2020 10:37 AM    BUN/Creatinine ratio 25 (H) 2020 10:37 AM    GFR est AA >60 2020 10:37 AM    GFR est non-AA 52 (L) 2020 10:37 AM    Calcium 9.5 2020 10:37 AM    Alk. phosphatase 54 2020 10:37 AM    Protein, total 7.4 2020 10:37 AM    Albumin 4.1 2020 10:37 AM    Globulin 3.3 2020 10:37 AM    A-G Ratio 1.2 2020 10:37 AM    ALT (SGPT) 32 2020 10:37 AM     Lab Results   Component Value Date/Time    Microalbumin/Creat ratio (mg/g creat) 163 (H) 2020 11:05 AM    Microalbumin,urine random 15.30 2020 11:05 AM      Frequency of home glucose testing:daily   Blood Sugar range at home:    Last eye exam: In past 12 months. Last foot exam: This year. Polyuria, polyphagia or polydipsia: No   Retinopathy: No   Neuropathy SX: No    Low blood sugar symptoms: No   Dietary compliance: Good   Medication compliance:Good   On ASA: Yes   Depression: No   CKD:no     Wt Readings from Last 3 Encounters:   20 199 lb (90.3 kg)   20 200 lb 12.8 oz (91.1 kg)   20 198 lb (89.8 kg)        Social History     Tobacco Use   Smoking Status Former Smoker    Packs/day: 1.00    Years: 20.00    Pack years: 20.00    Last attempt to quit: Kareem Kemp Years since quittin.4   Smokeless Tobacco Never Used     Body mass index is 40.19 kg/m². Diabetic Consultants: All the patient's questions regarding medications, diet and exercise were answered.  Goal of A1C of less than 7.5% is our goal.   Our overall goal is to reduce or eliminate the long term consequences of poorly controlled diabetes.  Gastroesophageal reflux disease with esophagitis:  Current control of Symptoms:good  Hiatal Hernia:no  Current Medications:omeprazole  The patient has no history melena or bright red blood in the stools. The patient avoids high dose aspirin and NSAID therapy. The patient is aware of diet changes needed, elevating the head of the bed and appropriate use of antacids.  Hypercholesterolemia:  Cardiovascular risks for her are: LDL goal is under 80  diabetic  hypertension  hyperlipidemia. Key Antihyperlipidemia Meds             ezetimibe (ZETIA) 10 mg tablet TAKE 1 TABLET DAILY    TRICOR 145 mg tablet TAKE 1 TABLET DAILY    omega-3 fatty acids-vitamin e (FISH OIL) 1,000 mg cap Take 2 Caps by mouth two (2) times a day. Lab Results   Component Value Date/Time    Cholesterol, total 144 03/09/2020 10:37 AM    HDL Cholesterol 60 03/09/2020 10:37 AM    LDL, calculated 58 03/09/2020 10:37 AM    Triglyceride 130 03/09/2020 10:37 AM    CHOL/HDL Ratio 2.4 03/09/2020 10:37 AM     Lab Results   Component Value Date/Time    ALT (SGPT) 32 03/09/2020 10:37 AM    Alk. phosphatase 54 03/09/2020 10:37 AM    Bilirubin, total 0.4 03/09/2020 10:37 AM      Myalgias: No   Fatigue: No   Other side effects: no  Wt Readings from Last 3 Encounters:   06/09/20 199 lb (90.3 kg)   03/09/20 200 lb 12.8 oz (91.1 kg)   02/24/20 198 lb (89.8 kg)     The patient is aware of our goal to reduce or eliminate the long term problems (such as strokes and heart attacks) related to poorly controlled hyperlipidemia.          Essential hypertension: Controlled  BP Readings from Last 3 Encounters:   06/09/20 130/77   03/09/20 124/69   02/24/20 136/73     The patient reports:  taking medications as instructed, no medication side effects noted, no TIA's, no chest pain on exertion, no dyspnea on exertion, no swelling of ankles. Key CAD CHF Meds             dilTIAZem CD (CARDIZEM CD) 180 mg ER capsule TAKE 1 CAPSULE DAILY    losartan-hydroCHLOROthiazide (HYZAAR) 100-25 mg per tablet TAKE 1 TABLET DAILY    cloNIDine HCl (CATAPRES) 0.1 mg tablet TAKE 1 TABLET NIGHTLY    ezetimibe (ZETIA) 10 mg tablet TAKE 1 TABLET DAILY    TRICOR 145 mg tablet TAKE 1 TABLET DAILY    aspirin 81 mg chewable tablet Take 81 mg by mouth daily. omega-3 fatty acids-vitamin e (FISH OIL) 1,000 mg cap Take 2 Caps by mouth two (2) times a day. Lab Results   Component Value Date/Time    Sodium 139 03/09/2020 10:37 AM    Potassium 4.0 03/09/2020 10:37 AM    Chloride 103 03/09/2020 10:37 AM    CO2 31 03/09/2020 10:37 AM    Anion gap 5 03/09/2020 10:37 AM    Glucose 116 (H) 03/09/2020 10:37 AM    BUN 26 (H) 03/09/2020 10:37 AM    Creatinine 1.03 (H) 03/09/2020 10:37 AM    BUN/Creatinine ratio 25 (H) 03/09/2020 10:37 AM    GFR est AA >60 03/09/2020 10:37 AM    GFR est non-AA 52 (L) 03/09/2020 10:37 AM    Calcium 9.5 03/09/2020 10:37 AM    Bilirubin, total 0.4 03/09/2020 10:37 AM    Alk. phosphatase 54 03/09/2020 10:37 AM    Protein, total 7.4 03/09/2020 10:37 AM    Albumin 4.1 03/09/2020 10:37 AM    Globulin 3.3 03/09/2020 10:37 AM    A-G Ratio 1.2 03/09/2020 10:37 AM    ALT (SGPT) 32 03/09/2020 10:37 AM     Low salt diet? yes  Aerobic exercise? no  Our goal is to normalize the blood pressure to decrease the risks of strokes and heart attacks. The patient is in agreement with the plan.  Vitamin D deficiency:  No sx. Lab Results   Component Value Date/Time    Vitamin D 25-Hydroxy 52.6 01/13/2020 11:05 AM                     Current and past medical information:    Current Medications after this visit[de-identified]     Current Outpatient Medications   Medication Sig    pioglitazone (ACTOS) 30 mg tablet Take 1 Tab by mouth every morning.     esomeprazole (NEXIUM) 40 mg capsule TAKE 1 CAPSULE DAILY FOR GASTROESOPHAGEAL REFLUX DISEASE    dilTIAZem CD (CARDIZEM CD) 180 mg ER capsule TAKE 1 CAPSULE DAILY    dulaglutide (TRULICITY) 1.5 EG/8.8 mL sub-q pen 0.5 mL by SubCUTAneous route every seven (7) days. Stop Byduereon    losartan-hydroCHLOROthiazide (HYZAAR) 100-25 mg per tablet TAKE 1 TABLET DAILY    cloNIDine HCl (CATAPRES) 0.1 mg tablet TAKE 1 TABLET NIGHTLY    loratadine (CLARITIN) 10 mg tablet TAKE 1 TABLET DAILY    ezetimibe (ZETIA) 10 mg tablet TAKE 1 TABLET DAILY    TRICOR 145 mg tablet TAKE 1 TABLET DAILY    aspirin 81 mg chewable tablet Take 81 mg by mouth daily.  lancets (FREESTYLE LANCETS) 28 gauge misc TEST ONCE DAILY DX: E11.65    glucose blood VI test strips (FREESTYLE LITE STRIPS) strip TEST ONCE A DAY DX CODE: E11.65    omega-3 fatty acids-vitamin e (FISH OIL) 1,000 mg cap Take 2 Caps by mouth two (2) times a day.  cholecalciferol, vitamin d3, (VITAMIN D) 1,000 unit tablet Take 1,000 Units by mouth two (2) times a day. No current facility-administered medications for this visit.         Patient Active Problem List    Diagnosis Date Noted    Type 2 diabetes mellitus with nephropathy ([facility] 01/23/2018     Priority: 1 - One    Type 2 diabetes mellitus with hyperglycemia, without long-term current use of insulin ([facility] 12/19/2016     Priority: 1 - One    Morbid obesity ([facility] 10/18/2015     Priority: 1 - One    Vitamin D deficiency 06/28/2010     Priority: 1 - One    Hypertension      Priority: 1 - One    Hypercholesterolemia      Priority: 1 - One    GERD (gastroesophageal reflux disease)      Priority: 1 - One    Allergic rhinitis 11/18/2011     Priority: 6 - Six    Bile acid esophageal reflux 07/01/2019    S/P total knee replacement using cement 09/09/2015    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    Arthritis 02/28/2011       Past Medical History:   Diagnosis Date    Arthritis 2/28/2011    OSTEO    Chronic pain     RIGHT KNEE    Diabetes (HCC)     GERD (gastroesophageal reflux disease)     Hot flashes, menopausal     Hypercholesterolemia     Hypertension     Ill-defined condition     Elevated liver enzymes (has family history)    Nausea & vomiting     Seizures (HCC) 'S    AFTER DRINKING 2 MARGARITAS    Vitamin D deficiency 2010       Allergies   Allergen Reactions    Other Food Nausea and Vomiting     Artificial sweeteners - gas and beldging    Ciprofloxacin Nausea Only    Erythromycin Nausea Only    Metformin Other (comments) and Nausea Only     Felt bad  Felt bad      Pcn [Penicillins] Swelling     Caused lips to swell  Tolerates Amoxicillin       Past Surgical History:   Procedure Laterality Date    COLONOSCOPY N/A 6/3/2019    COLONOSCOPY performed by Rick Real MD at [address] [facility] HX APPENDECTOMY  2001    HX CATARACT REMOVAL Bilateral     HX CHOLECYSTECTOMY  2018    HX HYSTERECTOMY  1975    HX KNEE REPLACEMENT Left 2014    HX OOPHORECTOMY  2001    bilateral    HX OTHER SURGICAL      LIPOMA LEFT SIDE    HX ROTATOR CUFF REPAIR Right        Social History     Socioeconomic History    Marital status:      Spouse name: Not on file    Number of children: Not on file    Years of education: Not on file    Highest education level: Not on file   Tobacco Use    Smoking status: Former Smoker     Packs/day: 1.00     Years: 20.00     Pack years: 20.00     Last attempt to quit:      Years since quittin.4    Smokeless tobacco: Never Used   Substance and Sexual Activity    Alcohol use: Yes     Comment: rarely - 1 glass of wine every 3 months    Drug use: No    Sexual activity: Yes       Review of Systems   Constitutional: Negative. Negative for chills, fever, malaise/fatigue and weight loss. HENT: Negative. Negative for hearing loss. Eyes: Negative. Negative for blurred vision and double vision. Respiratory: Negative. Negative for cough, sputum production and shortness of breath. Cardiovascular: Negative. Negative for chest pain and palpitations. Gastrointestinal: Negative. Negative for abdominal pain, blood in stool, heartburn, nausea and vomiting. Genitourinary: Negative. Negative for dysuria, frequency and urgency. Musculoskeletal: Negative. Negative for back pain, falls, myalgias and neck pain. Skin: Negative. Negative for rash. Neurological: Negative. Negative for dizziness, tingling, tremors, weakness and headaches. Endo/Heme/Allergies: Negative. Psychiatric/Behavioral: Negative. Negative for depression. Grieving appropriately. No signs of depression. Objective:     Vitals:    06/09/20 1010   BP: 130/77   Pulse: 65   Resp: 20   Temp: 98.1 °F (36.7 °C)   TempSrc: Oral   SpO2: 96%   Weight: 199 lb (90.3 kg)   Height: 4' 11\" (1.499 m)      Body mass index is 40.19 kg/m². Physical Exam  Vitals signs and nursing note reviewed. Constitutional:       General: She is not in acute distress. Appearance: She is not diaphoretic. HENT:      Head: Normocephalic and atraumatic. Eyes:      Conjunctiva/sclera: Conjunctivae normal.   Cardiovascular:      Rate and Rhythm: Normal rate and regular rhythm. Heart sounds: No murmur. No friction rub. No gallop. Pulmonary:      Effort: Pulmonary effort is normal. No respiratory distress. Breath sounds: Normal breath sounds. No wheezing or rales. Abdominal:      General: There is no distension. Palpations: Abdomen is soft. Skin:     General: Skin is warm. Findings: No erythema or rash. Neurological:      Mental Status: She is alert. Psychiatric:         Mood and Affect: Mood normal.         Behavior: Behavior normal.      Comments: Had  crying spells while in the office. I talked to her about prolonged grief that leads into depression. Gave her the warning signs and symptoms. She will call me if this happens.         Health Maintenance Due   Topic Date Due    Medicare Yearly Exam  05/10/2020         Assessment and orders:     Encounter Diagnoses ICD-10-CM ICD-9-CM   1. Morbid obesity ([facility] E66.01 278.01   2. Type 2 diabetes mellitus with hyperglycemia, without long-term current use of insulin ([facility]) E11.65 250.00     790.29   3. Type 2 diabetes mellitus with nephropathy ([facility]) E11.21 250.40     583.81   4. Gastroesophageal reflux disease with esophagitis K21.0 530.11   5. Hypercholesterolemia E78.00 272.0   6. Essential hypertension I10 401.9   7. Vitamin D deficiency E55.9 268.9     Diagnoses and all orders for this visit:    1. Morbid obesity ([facility])-encouraged her to be more active outside. 2. Type 2 diabetes mellitus with hyperglycemia, without long-term current use of insulin ([facility])-retest  -     pioglitazone (ACTOS) 30 mg tablet; Take 1 Tab by mouth every morning.  -     HEMOGLOBIN A1C WITH EAG; Future  -     LIPID PANEL; Future  -     METABOLIC PANEL, COMPREHENSIVE; Future    3. Type 2 diabetes mellitus with nephropathy (HCC)  -     HEMOGLOBIN A1C WITH EAG; Future  -     LIPID PANEL; Future  -     METABOLIC PANEL, COMPREHENSIVE; Future    4. Gastroesophageal reflux disease with esophagitis-stable and controlled    5. Hypercholesterolemia-retest  -     LIPID PANEL; Future  -     METABOLIC PANEL, COMPREHENSIVE; Future    6. Essential hypertension-blood pressure controlled  -     LIPID PANEL; Future  -     METABOLIC PANEL, COMPREHENSIVE; Future    7. Vitamin D deficiency-treated and corrected. Plan of care:  Discussed diagnoses in detail with patient. Medication risks/benefits/side effects discussed with patient. All of the patient's questions were addressed. The patient understands and agrees with our plan of care. The patient knows to call back if they are unsure of or forget any changes we discussed today or if the symptoms change.      The patient received an After-Visit Summary which contains VS, orders, medication list and allergy list. This can be used as a \"mini-medical record\" should they have to seek medical care while out of [facility].    Patient Care Team:  Tete Gan MD as PCP - Vladimir Mejia MD as PCP - [facility] Empaneled Provider  Janice Lopez MD as Physician (Cardiology)  Neo Calderon MD as Physician (Orthopedic Surgery)  Venus Catherine MD (Endocrinology)  Jessee Johnson MD (General Surgery)  Erik Stevens MD (Ophthalmology)    Follow-up and Dispositions    · Return in about 4 weeks (around 7/7/2020) for MWE. Signed By: Ramon Varma MD     June 9, 2020      ATTENTION:   This medical record was transcribed using an electronic medical records/speech recognition system. Although proofread, it may and can contain electronic, spelling and other errors. Corrections may be executed at a later time. Please feel free to contact me for any clarifications as needed.

## 2020-07-13 ENCOUNTER — OFFICE VISIT (OUTPATIENT)
Dept: ENDOCRINOLOGY | Age: 80
End: 2020-07-13

## 2020-07-13 VITALS
RESPIRATION RATE: 16 BRPM | TEMPERATURE: 97.8 F | SYSTOLIC BLOOD PRESSURE: 131 MMHG | OXYGEN SATURATION: 96 % | BODY MASS INDEX: 40.3 KG/M2 | HEART RATE: 72 BPM | WEIGHT: 199.9 LBS | DIASTOLIC BLOOD PRESSURE: 57 MMHG | HEIGHT: 59 IN

## 2020-07-13 DIAGNOSIS — I10 ESSENTIAL HYPERTENSION: Chronic | ICD-10-CM

## 2020-07-13 DIAGNOSIS — E78.00 HYPERCHOLESTEROLEMIA: Chronic | ICD-10-CM

## 2020-07-13 DIAGNOSIS — E11.65 TYPE 2 DIABETES MELLITUS WITH HYPERGLYCEMIA, WITHOUT LONG-TERM CURRENT USE OF INSULIN (HCC): Primary | Chronic | ICD-10-CM

## 2020-07-13 NOTE — PROGRESS NOTES
Slade Mina MD        Patient Information  Date:7/13/2020  Name : Miya Gould 67 y.o.     YOB: 1940         Referred by: Yaa Washington MD         Chief Complaint   Patient presents with    Diabetes       History of Present Illness: Miya Gould is a 67 y.o. female here for fu of  Type 2 Diabetes Mellitus. Type 2 Diabetes was diagnosed in 10/2014 . End organ effects of diabetes: none. Cardiovascular risk factors: family history, dyslipidemia, diabetes mellitus   Monitoring frequency: 3 times a week. Blood glucose are at goal  Lost her  2020, feels low  There is a creamery built next to her house, she has cut down eating ice cream  No weight gain  Added Actos October 2019    Could not tolerate Metformin IR, XR formualtion. Hyperlipidemia - was on tricor, zetia, colestipol, Niacin,    Wt Readings from Last 3 Encounters:   07/13/20 199 lb 14.4 oz (90.7 kg)   06/09/20 199 lb (90.3 kg)   03/09/20 200 lb 12.8 oz (91.1 kg)       BP Readings from Last 3 Encounters:   07/13/20 131/57   06/09/20 130/77   03/09/20 124/69           Past Medical History:   Diagnosis Date    Arthritis 2/28/2011    OSTEO    Chronic pain     RIGHT KNEE    Diabetes ([address]     GERD (gastroesophageal reflux disease)     Hot flashes, menopausal     Hypercholesterolemia     Hypertension     Ill-defined condition     Elevated liver enzymes (has family history)    Nausea & vomiting     Seizures (HCC) 1980'S    AFTER DRINKING 2 MARGARITAS    Vitamin D deficiency 6/28/2010     Current Outpatient Medications   Medication Sig    pioglitazone (ACTOS) 30 mg tablet Take 1 Tab by mouth every morning.     esomeprazole (NEXIUM) 40 mg capsule TAKE 1 CAPSULE DAILY FOR GASTROESOPHAGEAL REFLUX DISEASE    dilTIAZem CD (CARDIZEM CD) 180 mg ER capsule TAKE 1 CAPSULE DAILY    dulaglutide (TRULICITY) 1.5 YE/9.2 mL sub-q pen 0.5 mL by SubCUTAneous route every seven (7) days. Stop Byduereon    losartan-hydroCHLOROthiazide (HYZAAR) 100-25 mg per tablet TAKE 1 TABLET DAILY    cloNIDine HCl (CATAPRES) 0.1 mg tablet TAKE 1 TABLET NIGHTLY    loratadine (CLARITIN) 10 mg tablet TAKE 1 TABLET DAILY    ezetimibe (ZETIA) 10 mg tablet TAKE 1 TABLET DAILY    TRICOR 145 mg tablet TAKE 1 TABLET DAILY    aspirin 81 mg chewable tablet Take 81 mg by mouth daily.  lancets (FREESTYLE LANCETS) 28 gauge misc TEST ONCE DAILY DX: E11.65    glucose blood VI test strips (FREESTYLE LITE STRIPS) strip TEST ONCE A DAY DX CODE: E11.65    omega-3 fatty acids-vitamin e (FISH OIL) 1,000 mg cap Take 2 Caps by mouth two (2) times a day.  cholecalciferol, vitamin d3, (VITAMIN D) 1,000 unit tablet Take 1,000 Units by mouth two (2) times a day. No current facility-administered medications for this visit. Allergies   Allergen Reactions    Other Food Nausea and Vomiting     Artificial sweeteners - gas and beldging    Ciprofloxacin Nausea Only    Erythromycin Nausea Only    Metformin Other (comments) and Nausea Only     Felt bad  Felt bad      Pcn [Penicillins] Swelling     Caused lips to swell  Tolerates Amoxicillin         Review of Systems:  - Constitutional Symptoms: no fevers, no chills,   - Eyes: no blurry vision no double vision  - Cardiovascular: no chest pain ,no palpitations  - Respiratory: no cough no shortness of breath  - Gastrointestinal: no dysphagia no  abdominal pain  - Musculoskeletal: + joint pains no  weakness  - Integumentary: no rashes  -     Physical Examination:   Blood pressure 131/57, pulse 72, temperature 97.8 °F (36.6 °C), temperature source Oral, resp. rate 16, height 4' 11\" (1.499 m), weight 199 lb 14.4 oz (90.7 kg), SpO2 96 %. Estimated body mass index is 40.37 kg/m² as calculated from the following:    Height as of this encounter: 4' 11\" (1.499 m). -   Weight as of this encounter: 199 lb 14.4 oz (90.7 kg).   - General: pleasant, no distress, good eye contact  - HEENT: no pallor, no periorbital edema, EOMI  - Neck: supple,  - Cardiovascular: regular, normal rate, normal S1 and S2  - Respiratory: clear to auscultation bilaterally  - Gastrointestinal: soft, nontender,  - Musculoskeletal: no proximal muscle weakness in upper or lower extremities  - Integumentary: alert and oriented , foot 12/17   - Psychiatric: normal mood and affect  - Skin: color, texture, turgor normal.       Data Reviewed:         Lab Results   Component Value Date/Time    Hemoglobin A1c 6.3 (H) 06/09/2020 12:11 PM    Hemoglobin A1c 6.9 (H) 01/13/2020 11:05 AM    Hemoglobin A1c 8.0 (H) 10/04/2019 11:06 AM    Glucose 113 (H) 06/09/2020 12:11 PM    Glucose (POC) 152 (H) 10/18/2019 08:37 AM    Microalbumin/Creat ratio (mg/g creat) 163 (H) 01/13/2020 11:05 AM    Microalbumin,urine random 15.30 01/13/2020 11:05 AM    LDL, calculated 75.6 06/09/2020 12:11 PM    Creatinine 1.05 (H) 06/09/2020 12:11 PM      Lab Results   Component Value Date/Time    Cholesterol, total 158 06/09/2020 12:11 PM    HDL Cholesterol 54 06/09/2020 12:11 PM    LDL, calculated 75.6 06/09/2020 12:11 PM    Triglyceride 142 06/09/2020 12:11 PM    CHOL/HDL Ratio 2.9 06/09/2020 12:11 PM     Lab Results   Component Value Date/Time    ALT (SGPT) 24 06/09/2020 12:11 PM    Alk. phosphatase 47 06/09/2020 12:11 PM    Bilirubin, total 0.4 06/09/2020 12:11 PM     Lab Results   Component Value Date/Time    TSH 1.220 01/29/2018 02:47 PM    T4, Free 1.55 09/09/2015 02:16 PM      Lab Results   Component Value Date/Time    Glucose 113 (H) 06/09/2020 12:11 PM    Glucose (POC) 152 (H) 10/18/2019 08:37 AM        Assessment/Plan:     1. Type 2 diabetes mellitus with hyperglycemia, without long-term current use of insulin ([address]    2. Essential hypertension    3. Hypercholesterolemia        1.  Type 2 Diabetes Mellitus   Lab Results   Component Value Date/Time    Hemoglobin A1c 6.3 (H) 06/09/2020 12:11 PM    Hemoglobin A1c (POC) 6.5 05/11/2018 11:37 AM controlled  Actos 15 mg  Trulicity  FLU annually ,Pneumovax ,aspirin daily,annual eye exam,microalbumin    2. HTN : Continue current therapy     3. Mixed Hyperlipidemia : low carb diet. Statin intolerance hx,   Continue Zetia, Tricor    4. Obesity:Body mass index is 40.37 kg/m². Discussed about the importance of exercise and carbohydrate portion control. 5.  GI symptoms: work up was negative  It was due to artificial sweetners according to the patient      Thank you for allowing me to participate in the care of this patient.     Shahbaz Treadwell MD    Patient verbalized understanding

## 2020-07-13 NOTE — PROGRESS NOTES
Greg Slater is a 67 y.o. female here for   Chief Complaint   Patient presents with    Diabetes       1. Have you been to the ER, urgent care clinic since your last visit? Hospitalized since your last visit? -no    2. Have you seen or consulted any other health care providers outside of the [address] [place] [address] since your last visit?   Include any pap smears or colon screening.-no

## 2020-07-13 NOTE — LETTER
7/14/20 Patient: Meryle Flake YOB: 1940 Date of Visit: 7/13/2020 Jackie Montague MD 
[address] [address] VIA In Basket Dear Jackie Montague MD, Thank you for referring Ms. Ben Nichole to [address] [address] for evaluation. My notes for this consultation are attached. If you have questions, please do not hesitate to call me. I look forward to following your patient along with you. Sincerely, Rhoda Castro MD

## 2020-07-14 ENCOUNTER — TELEPHONE (OUTPATIENT)
Dept: FAMILY MEDICINE CLINIC | Age: 80
End: 2020-07-14

## 2020-07-14 NOTE — TELEPHONE ENCOUNTER
Phone call to patient to reschedule appointment on 7/15/20 as a virtual visit. She says that she thinks she may have cracked a rib. She fell a week ago in the bath tub. She is complaining of pain in her rib cage and has been taking Tylenol and Aleve. She went to Dr. Nelson Raphael yesterday for an appointment and mentioned it to her. Dr. Nelson Raphael checked her and told her it was a bone and now a muscle. Is it okay to leave her appointment as coming in the office so she can have an xray? And do you recommend anything in the mean time?

## 2020-07-14 NOTE — TELEPHONE ENCOUNTER
Spoke with patient and gave her the following message from Dr. Tyorne Reynolds:    Dania Sagastumeafia she is having shortness of breath or the pain is getting worse rather than better we usually do not do chest x-rays even if the ribs are broken.  If she has any question I will be glad to see her in person. \"    She verbalized understanding and would like to keep her appointment tomorrow as coming in to the office.

## 2020-07-15 ENCOUNTER — OFFICE VISIT (OUTPATIENT)
Dept: FAMILY MEDICINE CLINIC | Age: 80
End: 2020-07-15

## 2020-07-15 ENCOUNTER — TELEPHONE (OUTPATIENT)
Dept: FAMILY MEDICINE CLINIC | Age: 80
End: 2020-07-15

## 2020-07-15 VITALS
RESPIRATION RATE: 20 BRPM | HEART RATE: 60 BPM | TEMPERATURE: 97.4 F | SYSTOLIC BLOOD PRESSURE: 111 MMHG | WEIGHT: 201 LBS | DIASTOLIC BLOOD PRESSURE: 79 MMHG | HEIGHT: 59 IN | OXYGEN SATURATION: 96 % | BODY MASS INDEX: 40.52 KG/M2
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
## 2020-07-15 DIAGNOSIS — I10 ESSENTIAL HYPERTENSION: ICD-10-CM

## 2020-07-15 DIAGNOSIS — R07.81 RIB PAIN ON RIGHT SIDE: ICD-10-CM

## 2020-07-15 DIAGNOSIS — Z00.00 MEDICARE ANNUAL WELLNESS VISIT, SUBSEQUENT: Primary | ICD-10-CM

## 2020-07-15 NOTE — PROGRESS NOTES
[address], [address] [place] [address]  [phone]           Date of visit: 7/15/2020     Renetta Munroe MD    This is an Subsequent Medicare Annual Wellness Visit (AWV), (Performed more than 12 months after effective date of Medicare Part B enrollment and 12 months after last preventive visit, Once in a lifetime)    I have reviewed the patient's medical history in detail and updated the computerized patient record. Dave Quinones is a 67 y.o. female   History obtained from: the patient.     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    Morbid obesity ([address] E66.01    Type 2 diabetes mellitus with hyperglycemia, without long-term current use of insulin ([address] E11.65    Type 2 diabetes mellitus with nephropathy (HCC) E11.21    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     Past Medical History:   Diagnosis Date    Arthritis 2/28/2011    OSTEO    Chronic pain     RIGHT KNEE    Diabetes ([address]     GERD (gastroesophageal reflux disease)     Hot flashes, menopausal     Hypercholesterolemia     Hypertension     Ill-defined condition     Elevated liver enzymes (has family history)    Nausea & vomiting     Seizures ([address] 1980'S    AFTER DRINKING 2 MARGARITAS    Vitamin D deficiency 6/28/2010      Past Surgical History:   Procedure Laterality Date    COLONOSCOPY N/A 6/3/2019    [facility] HX APPENDECTOMY  2001    HX CATARACT REMOVAL Bilateral     HX CHOLECYSTECTOMY  06/28/2018    [address]  HX KNEE REPLACEMENT Left 2014    HX OOPHORECTOMY  2001    bilateral    HX OTHER SURGICAL      LIPOMA LEFT SIDE    HX ROTATOR CUFF REPAIR Right 1998     Allergies   Allergen Reactions    Other Food Nausea and Vomiting     Artificial sweeteners - gas and beldging    Ciprofloxacin Nausea Only    Erythromycin Nausea Only    Metformin Other (comments) and Nausea Only     Felt bad  Felt bad      Pcn [Penicillins] Swelling     Caused lips to swell  Tolerates Amoxicillin     Current Outpatient Medications   Medication Sig Dispense Refill    pioglitazone (ACTOS) 30 mg tablet Take 1 Tab by mouth every morning. 90 Tab 3    esomeprazole (NEXIUM) 40 mg capsule TAKE 1 CAPSULE DAILY FOR GASTROESOPHAGEAL REFLUX DISEASE 90 Cap 3    dilTIAZem CD (CARDIZEM CD) 180 mg ER capsule TAKE 1 CAPSULE DAILY 90 Cap 2    dulaglutide (TRULICITY) 1.5 XN/6.4 mL sub-q pen 0.5 mL by SubCUTAneous route every seven (7) days. Stop Byduereon 12 Syringe 3    losartan-hydroCHLOROthiazide (HYZAAR) 100-25 mg per tablet TAKE 1 TABLET DAILY 90 Tab 4    cloNIDine HCl (CATAPRES) 0.1 mg tablet TAKE 1 TABLET NIGHTLY 90 Tab 3    loratadine (CLARITIN) 10 mg tablet TAKE 1 TABLET DAILY 90 Tab 4    ezetimibe (ZETIA) 10 mg tablet TAKE 1 TABLET DAILY 90 Tab 2    TRICOR 145 mg tablet TAKE 1 TABLET DAILY 90 Tab 4    aspirin 81 mg chewable tablet Take 81 mg by mouth daily.  lancets (FREESTYLE LANCETS) 28 gauge misc TEST ONCE DAILY DX: E11.65 100 Lancet 3    glucose blood VI test strips (FREESTYLE LITE STRIPS) strip TEST ONCE A DAY DX CODE: E11.65 100 Strip 3    omega-3 fatty acids-vitamin e (FISH OIL) 1,000 mg cap Take 2 Caps by mouth two (2) times a day.  cholecalciferol, vitamin d3, (VITAMIN D) 1,000 unit tablet Take 1,000 Units by mouth two (2) times a day.        Family History   Problem Relation Age of Onset    Pulmonary Embolism Father     Arthritis-rheumatoid Paternal Grandmother     Alzheimer Mother     Arthritis-rheumatoid Sister     Emphysema Brother     Arthritis-rheumatoid Sister     Anesth Problems Neg Hx      Social History     Tobacco Use    Smoking status: Former Smoker     Packs/day: 1.00     Years: 20.00     Pack years: 20.00     Last attempt to quit: 1988     Years since quittin.5    Smokeless tobacco: Never Used   Substance Use Topics    Alcohol use: Yes     Comment: rarely - 1 glass of wine every 3 months       Specialists/Care Team   Jay Wright has established care with the following healthcare providers:  Patient Care Team:  Ellie Castañeda MD as PCP - Yamilka Goss MD as PCP - [address] [place] [address]  [facility] Provider  Kisha Torres MD as Physician (Cardiology)  Leslie Rodriges MD as Physician (Orthopedic Surgery)  Payal Elmore MD (Endocrinology)  Shavonne Lugo MD (General Surgery)  Maddie Jack MD (Ophthalmology)      Health Risk Assessment     Demographics   female  67 y.o. Advance Care Planning (ACP) Provider Note - Comprehensive     Date of ACP Conversation: 07/15/20, on file in the chart. Advance Care Planning 2018   Confirm Advance Directive Yes, on file         General Health Questions   -During the past 4 weeks:   -how would you rate your health in general? Fair   -how often have you been bothered by feeling dizzy when standing up? occasionally   -how much have you been bothered by bodily pain? not   -Have you noticed any hearing difficulties? yes   -has your physical and emotional health limited your social activities with family or friends? no    Emotional Health Questions   -Do you have a history of depression, anxiety, or emotional problems? no  -Over the past 2 weeks, have you felt down, depressed or hopeless? yes  -Over the past 2 weeks, have you felt little interest or pleasure in doing things? no  -Are you being threatened or harassed by anyone?     Health Habits   Please describe your diet habits: Self grade C  Do you get 5 servings of fruits or vegetables daily? yes  Do you exercise regularly? yes    Alcohol Risk Factor Screening: On any occasion during the past 3 months, have you had more than 4 drinks containing alcohol? No     Do you average more than 14 drinks per week? No       Activities of Daily Living and Functional Status   -Do you need help with eating, walking, dressing, bathing, toileting, the phone, transportation, shopping, preparing meals, housework, laundry, medications or managing money? no  -In the past four weeks, was someone available to help you if you needed and wanted help with anything? yes  -Are you confident are you that you can control and manage most of your health problems? yes  -Have you been given information to help you keep track of your medications? yes  -How often do you have trouble taking your medications as prescribed? never    Fall Risk and Home Safety   Have you fallen 2 or more times in the past year? no  Does your home have rugs in the hallway, lack grab bars in the bathroom, lack handrails on the stairs or have poor lighting? no  Do you have smoke detectors and check them regularly? yes  Do you have difficulties driving a car? no  Do you always fasten your seat belt when you are in a car? yes    Physical Examination     Wt Readings from Last 3 Encounters:   07/15/20 201 lb (91.2 kg)   07/13/20 199 lb 14.4 oz (90.7 kg)   06/09/20 199 lb (90.3 kg)     Temp Readings from Last 3 Encounters:   07/15/20 97.4 °F (36.3 °C) (Oral)   07/13/20 97.8 °F (36.6 °C) (Oral)   06/09/20 98.1 °F (36.7 °C) (Oral)     BP Readings from Last 3 Encounters:   07/15/20 111/79   07/13/20 131/57   06/09/20 130/77     Pulse Readings from Last 3 Encounters:   07/15/20 60   07/13/20 72   06/09/20 65       Body mass index is 40.6 kg/m².    No exam data present  Was the patient's timed Up & Go test unsteady or longer than 10 seconds? no    Evaluation of Cognitive Function   Mood/affect:  neutral  Orientation: Person, Place, Time and Situation  Appearance: age appropriate and casually dressed  Family member/caregiver input: no    Additional exam if indicated for problems addressed today:        Advice/Referrals/Counseling (as indicated)   Education and counseling provided for any problems identified above:     Preventive Services     (Preventive care checklist to be included in patient instructions)  Discussed today Done Previously Not Needed     x  Pneumococcal vaccines    x  Flu vaccine     x Hepatitis B vaccine (if at risk)   x   Shingles vaccine    x  TDAP vaccine    x  Mammogram    x  Pap smear    x  Colorectal cancer screening     x Low-dose CT for lung cancer screening    x  Bone density test    x  Glaucoma screening    x  Cholesterol test    x  Diabetes screening test     x  Diabetes self-management class     x Nutritionist referral for diabetes or renal disease     Discussion of Advance Directive   Discussed with Sherrill Venegas her ability to prepare and advance directive in the case that an injury or illness causes her to be unable to make health care decisions. Assessment/Plan   Z00.00    ICD-10-CM ICD-9-CM    1. Medicare annual wellness visit, subsequent  Z00.00 V70.0    2. Essential hypertension  I10 401.9    3. Fall, initial encounter  [address] XXXA E888.9    4. Rib pain on right side  R07.81 786.50        No orders of the defined types were placed in this encounter.       Patient Care Team:  Vernon Kanner, MD as PCP - Brook Galloway MD as PCP - [facility] Empaneled Provider  Ludin Cox MD as Physician (Cardiology)  Vianey Lucero MD as Physician (Orthopedic Surgery)  Elijah Bennett MD (Endocrinology)  Kelly Dick MD (General Surgery)  Abner Lopez MD (Ophthalmology)        Future Appointments   Date Time Provider Hay Sloan   9/9/2020  9:50 AM Vernon Kanner, MD [facility] SCHED   11/17/2020  1:45 PM Elijah Bennett MD SAMEER Rodrigez MD      Medicare exam ends here      -------------------------------------------------------------------------------------------------------------------------------------------------------------------------------------------      Separate Progress Note, Same day as MWE. Patient: Davey Delacruz MRN: [id number]  SSN: [id number]    YOB: 1940  Age: 67 y.o. Sex: female        Chief Complaint   Patient presents with   [facility] Annual Wellness Visit    Fall         Subjective:     Encounter Diagnoses   Name Primary? Yes    Essential hypertension:  BP Readings from Last 3 Encounters:   07/15/20 111/79   07/13/20 131/57   06/09/20 130/77     The patient reports:  taking medications as instructed, no medication side effects noted, no TIA's, no chest pain on exertion, no dyspnea on exertion, no swelling of ankles. Key CAD CHF Meds             dilTIAZem CD (CARDIZEM CD) 180 mg ER capsule (Taking) TAKE 1 CAPSULE DAILY    losartan-hydroCHLOROthiazide (HYZAAR) 100-25 mg per tablet (Taking) TAKE 1 TABLET DAILY    cloNIDine HCl (CATAPRES) 0.1 mg tablet (Taking) TAKE 1 TABLET NIGHTLY    ezetimibe (ZETIA) 10 mg tablet (Taking) TAKE 1 TABLET DAILY    TRICOR 145 mg tablet (Taking) TAKE 1 TABLET DAILY    aspirin 81 mg chewable tablet (Taking) Take 81 mg by mouth daily. omega-3 fatty acids-vitamin e (FISH OIL) 1,000 mg cap (Taking) Take 2 Caps by mouth two (2) times a day. Lab Results   Component Value Date/Time    Sodium 140 06/09/2020 12:11 PM    Potassium 3.8 06/09/2020 12:11 PM    Chloride 104 06/09/2020 12:11 PM    CO2 28 06/09/2020 12:11 PM    Anion gap 8 06/09/2020 12:11 PM    Glucose 113 (H) 06/09/2020 12:11 PM    BUN 22 (H) 06/09/2020 12:11 PM    Creatinine 1.05 (H) 06/09/2020 12:11 PM    BUN/Creatinine ratio 21 (H) 06/09/2020 12:11 PM    GFR est AA >60 06/09/2020 12:11 PM    GFR est non-AA 51 (L) 06/09/2020 12:11 PM    Calcium 9.6 06/09/2020 12:11 PM    Bilirubin, total 0.4 06/09/2020 12:11 PM    Alk. phosphatase 47 06/09/2020 12:11 PM    Protein, total 7.1 06/09/2020 12:11 PM    Albumin 4.0 06/09/2020 12:11 PM    Globulin 3.1 06/09/2020 12:11 PM    A-G Ratio 1.3 06/09/2020 12:11 PM    ALT (SGPT) 24 06/09/2020 12:11 PM     Low salt diet? yes  Aerobic exercise? no  Our goal is to normalize the blood pressure to decrease the risks of strokes and heart attacks. The patient is in agreement with the plan.  Fall, initial encounter     Rib pain on right side              Current and past medical information:    Current Medications after this visit[de-identified]     Current Outpatient Medications   Medication Sig    pioglitazone (ACTOS) 30 mg tablet Take 1 Tab by mouth every morning.  esomeprazole (NEXIUM) 40 mg capsule TAKE 1 CAPSULE DAILY FOR GASTROESOPHAGEAL REFLUX DISEASE    dilTIAZem CD (CARDIZEM CD) 180 mg ER capsule TAKE 1 CAPSULE DAILY    dulaglutide (TRULICITY) 1.5 NG/6.1 mL sub-q pen 0.5 mL by SubCUTAneous route every seven (7) days. Stop Byduereon    losartan-hydroCHLOROthiazide (HYZAAR) 100-25 mg per tablet TAKE 1 TABLET DAILY    cloNIDine HCl (CATAPRES) 0.1 mg tablet TAKE 1 TABLET NIGHTLY    loratadine (CLARITIN) 10 mg tablet TAKE 1 TABLET DAILY    ezetimibe (ZETIA) 10 mg tablet TAKE 1 TABLET DAILY    TRICOR 145 mg tablet TAKE 1 TABLET DAILY    aspirin 81 mg chewable tablet Take 81 mg by mouth daily.  lancets (FREESTYLE LANCETS) 28 gauge misc TEST ONCE DAILY DX: E11.65    glucose blood VI test strips (FREESTYLE LITE STRIPS) strip TEST ONCE A DAY DX CODE: E11.65    omega-3 fatty acids-vitamin e (FISH OIL) 1,000 mg cap Take 2 Caps by mouth two (2) times a day.  cholecalciferol, vitamin d3, (VITAMIN D) 1,000 unit tablet Take 1,000 Units by mouth two (2) times a day. No current facility-administered medications for this visit.         Patient Active Problem List    Diagnosis Date Noted    Type 2 diabetes mellitus with nephropathy ([facility] 01/23/2018     Priority: 1 - One    Type 2 diabetes mellitus with hyperglycemia, without long-term current use of insulin ([facility] 12/19/2016     Priority: 1 - One    Morbid obesity ([facility] 10/18/2015     Priority: 1 - One    Vitamin D deficiency 06/28/2010     Priority: 1 - One    Hypertension      Priority: 1 - One    Hypercholesterolemia      Priority: 1 - One    GERD (gastroesophageal reflux disease)      Priority: 1 - One    Allergic rhinitis 11/18/2011     Priority: 6 - Six    Bile acid esophageal reflux 07/01/2019    S/P total knee replacement using cement 09/09/2015    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    Arthritis 02/28/2011       Past Medical History:   Diagnosis Date    Arthritis 2/28/2011    OSTEO    Chronic pain     RIGHT KNEE    Diabetes ([facility]     GERD (gastroesophageal reflux disease)     Hot flashes, menopausal     Hypercholesterolemia     Hypertension     Ill-defined condition     Elevated liver enzymes (has family history)    Nausea & vomiting     Seizures ([facility] 1980'S    AFTER DRINKING 2 MARGARITAS    Vitamin D deficiency 6/28/2010       Allergies   Allergen Reactions    Other Food Nausea and Vomiting     Artificial sweeteners - gas and beldging    Ciprofloxacin Nausea Only    Erythromycin Nausea Only    Metformin Other (comments) and Nausea Only     Felt bad  Felt bad      Pcn [Penicillins] Swelling     Caused lips to swell  Tolerates Amoxicillin       Past Surgical History:   Procedure Laterality Date    COLONOSCOPY N/A 6/3/2019    COLONOSCOPY performed by Brianda May MD at [address] [place] [address] HX APPENDECTOMY  2001    HX CATARACT REMOVAL Bilateral     HX CHOLECYSTECTOMY  06/28/2018    HX HYSTERECTOMY  1975    HX KNEE REPLACEMENT Left 2014    HX OOPHORECTOMY  2001    bilateral    HX OTHER SURGICAL      LIPOMA LEFT SIDE    HX ROTATOR CUFF REPAIR Right 1998       Social History     Socioeconomic History    Marital status:      Spouse name: Not on file    Number of children: Not on file    Years of education: Not on file  Highest education level: Not on file   Tobacco Use    Smoking status: Former Smoker     Packs/day: 1.00     Years: 20.00     Pack years: 20.00     Last attempt to quit:      Years since quittin.5    Smokeless tobacco: Never Used   Substance and Sexual Activity    Alcohol use: Yes     Comment: rarely - 1 glass of wine every 3 months    Drug use: No    Sexual activity: Yes       Review of Systems   Constitutional: Negative. Negative for chills, fever, malaise/fatigue and weight loss. HENT: Negative. Negative for hearing loss. Eyes: Negative. Negative for blurred vision and double vision. Respiratory: Positive for wheezing. Negative for cough, sputum production and shortness of breath. Cardiovascular: Positive for chest pain. Negative for palpitations. Pinpoint tenderness right post lat lower rib pain and tenderness. Gastrointestinal: Negative. Negative for abdominal pain, blood in stool, heartburn, nausea and vomiting. Genitourinary: Negative. Negative for dysuria, frequency and urgency. Musculoskeletal: Negative. Negative for back pain, falls, myalgias and neck pain. Sprain right ring finger at fall 8 days ago. Will alisa-tape. Skin: Negative. Negative for rash. Neurological: Negative. Negative for dizziness, tingling, tremors, weakness and headaches. Endo/Heme/Allergies: Negative. Psychiatric/Behavioral: Negative. Negative for depression. Objective:     Vitals:    07/15/20 1142   BP: 111/79   Pulse: 60   Resp: 20   Temp: 97.4 °F (36.3 °C)   TempSrc: Oral   SpO2: 96%   Weight: 201 lb (91.2 kg)   Height: 4' 11\" (1.499 m)      Body mass index is 40.6 kg/m². Physical Exam  Vitals signs and nursing note reviewed. Constitutional:       General: She is not in acute distress. Appearance: She is well-developed. She is not diaphoretic. HENT:      Head: Normocephalic and atraumatic. Eyes:      General: No scleral icterus. Conjunctiva/sclera: Conjunctivae normal.   Neck:      Thyroid: No thyromegaly. Comments: No carotid bruit. Cardiovascular:      Rate and Rhythm: Normal rate and regular rhythm. Heart sounds: Normal heart sounds. No murmur. No friction rub. No gallop. Pulmonary:      Effort: Pulmonary effort is normal.      Breath sounds: Wheezing present. No rales. Comments: Wheezes RLL. Tender right lower rib posterior segment. Abdominal:      General: Bowel sounds are normal. There is no distension. Palpations: Abdomen is soft. Tenderness: There is no abdominal tenderness. There is no guarding or rebound. Lymphadenopathy:      Cervical: No cervical adenopathy. Skin:     General: Skin is warm and dry. Findings: No rash. Neurological:      Mental Status: She is alert and oriented to person, place, and time. Psychiatric:         Behavior: Behavior normal.         Thought Content: Thought content normal.         Health Maintenance Due   Topic Date Due    Medicare Yearly Exam  05/10/2020         Assessment and orders:     Encounter Diagnoses     ICD-10-CM ICD-9-CM   1. Medicare annual wellness visit, subsequent  Z00.00 V70.0   2. Essential hypertension  I10 401.9   3. Fall, initial encounter  [address] XXXA E888.9   4. Rib pain on right side  R07.81 786.50     Diagnoses and all orders for this visit:    1. Essential hypertension- controlled    2. Fall, initial encounter- unusual to have chest pain start 5 days after fall. Associated with wheezing in RLL. Lung xray is negative. -     XR CHEST PA LAT; Future  -     XR RIBS RT UNI 2 V; Future    3. Rib pain on right side-see above. X-rays did not show fx.  -     XR CHEST PA LAT; Future  -     XR RIBS RT UNI 2 V; Future            Plan of care:  Discussed diagnoses in detail with patient. Medication risks/benefits/side effects discussed with patient. All of the patient's questions were addressed.  The patient understands and agrees with our plan of care. The patient knows to call back if they are unsure of or forget any changes we discussed today or if the symptoms change. The patient received an After-Visit Summary which contains VS, orders, medication list and allergy list. This can be used as a \"mini-medical record\" should they have to seek medical care while out of town.     Patient Care Team:  Latoya Shepherd MD as PCP - Anastacio Brady MD as PCP - [facility]  Roxana Davis MD as Physician (Cardiology)  Eden Kaminski MD as Physician (Orthopedic Surgery)  Remigio Rojo MD (Endocrinology)  Louie Whitley MD (General Surgery)  Maggie Dancer, MD (Ophthalmology)        Future Appointments   Date Time Provider Hay Sloan   9/9/2020  9:50 AM Latoya Shepherd MD [facility] [place]   11/17/2020  1:45 PM Remigio Rojo MD [address]           Signed By: Marta Wild MD     July 15, 2020

## 2020-07-15 NOTE — TELEPHONE ENCOUNTER
Call made to pt. Pt made aware of Dr. Sanchez Horse mess:       No rib fracture was identified and her lung x-ray was normal.  No further treatment is necessary.  If her chest wall continues to hurt in the area tell her to buy lidocaine patches from over-the-counter to apply 12 hours on and off for 12 hours. She verbalized understanding.

## 2020-07-15 NOTE — PROGRESS NOTES
Chief Complaint   Patient presents with   [facility] Annual Wellness Visit    Fall     Body mass index is 40.6 kg/m². 1. Have you been to the ER, urgent care clinic since your last visit? Hospitalized since your last visit? Yes    2. Have you seen or consulted any other health care providers outside of the [address] [place] [address] since your last visit? Include any pap smears or colon screening.  No    Reviewed record in preparation for visit and have necessary documentation  Pt did not bring medication to office visit for review  Information was given to pt on Advanced Directives, Living Will  Information was given on Shingles Vaccine  Opportunity was given for questions  Goals that were addressed and/or need to be completed after this appointment include:     Health Maintenance Due   Topic Date Due    Medicare Yearly Exam  05/10/2020

## 2020-07-15 NOTE — TELEPHONE ENCOUNTER
----- Message from Lex Spencer MD sent at 7/15/2020  1:17 PM EDT -----  Please call patient. No rib fracture was identified and her lung x-ray was normal.  No further treatment is necessary. If her chest wall continues to hurt in the area tell her to buy lidocaine patches from over-the-counter to apply 12 hours on and off for 12 hours.   Thank you,  Dr. Mayra English

## 2020-08-04 DIAGNOSIS — E11.65 TYPE 2 DIABETES MELLITUS WITH HYPERGLYCEMIA, UNSPECIFIED WHETHER LONG TERM INSULIN USE (HCC): ICD-10-CM

## 2020-08-04 RX ORDER — BLOOD-GLUCOSE METER
KIT MISCELLANEOUS
Qty: 100 STRIP | Refills: 3 | Status: SHIPPED | OUTPATIENT
Start: 2020-08-04

## 2020-09-09 ENCOUNTER — VIRTUAL VISIT (OUTPATIENT)
Dept: FAMILY MEDICINE CLINIC | Age: 80
End: 2020-09-09
Payer: MEDICARE

## 2020-09-09 ENCOUNTER — CLINICAL SUPPORT (OUTPATIENT)
Dept: FAMILY MEDICINE CLINIC | Age: 80
End: 2020-09-09
Payer: MEDICARE

## 2020-09-09 ENCOUNTER — LAB ONLY (OUTPATIENT)
Dept: FAMILY MEDICINE CLINIC | Age: 80
End: 2020-09-09

## 2020-09-09 VITALS — TEMPERATURE: 97.5 F

## 2020-09-09 DIAGNOSIS — E55.9 VITAMIN D DEFICIENCY: Chronic | ICD-10-CM

## 2020-09-09 DIAGNOSIS — Z23 ENCOUNTER FOR IMMUNIZATION: Primary | ICD-10-CM

## 2020-09-09 DIAGNOSIS — E78.00 HYPERCHOLESTEROLEMIA: Chronic | ICD-10-CM

## 2020-09-09 DIAGNOSIS — R74.8 ELEVATED LIVER ENZYMES: ICD-10-CM

## 2020-09-09 DIAGNOSIS — E11.65 TYPE 2 DIABETES MELLITUS WITH HYPERGLYCEMIA, WITHOUT LONG-TERM CURRENT USE OF INSULIN (HCC): Chronic | ICD-10-CM

## 2020-09-09 DIAGNOSIS — N18.30 CKD (CHRONIC KIDNEY DISEASE) STAGE 3, GFR 30-59 ML/MIN (HCC): Chronic | ICD-10-CM

## 2020-09-09 DIAGNOSIS — K21.00 GASTROESOPHAGEAL REFLUX DISEASE WITH ESOPHAGITIS: Chronic | ICD-10-CM

## 2020-09-09 DIAGNOSIS — I10 ESSENTIAL HYPERTENSION: Chronic | ICD-10-CM

## 2020-09-09 DIAGNOSIS — E11.21 TYPE 2 DIABETES MELLITUS WITH NEPHROPATHY (HCC): ICD-10-CM

## 2020-09-09 DIAGNOSIS — E11.65 TYPE 2 DIABETES MELLITUS WITH HYPERGLYCEMIA, WITHOUT LONG-TERM CURRENT USE OF INSULIN (HCC): Primary | Chronic | ICD-10-CM

## 2020-09-09 PROCEDURE — 90653 IIV ADJUVANT VACCINE IM: CPT | Performed by: FAMILY MEDICINE

## 2020-09-09 PROCEDURE — G0008 ADMIN INFLUENZA VIRUS VAC: HCPCS | Performed by: FAMILY MEDICINE

## 2020-09-09 PROCEDURE — 99214 OFFICE O/P EST MOD 30 MIN: CPT | Performed by: FAMILY MEDICINE

## 2020-09-09 NOTE — PROGRESS NOTES
[address]  [phone]           Progress Note    Patient: Ilene Frazier MRN: [id number]  SSN: [id number]    YOB: 1940  Age: 67 y.o. Sex: female        Chief Complaint   Patient presents with    Follow Up Chronic Condition         Ilene Frazier is a 67 y.o. female evaluated synchronous telemedicine visit on 9/9/2020. Nurse involved in care:Nurse Jacob  Location of patient:Home  Location of physician:My office    Consent:  She and/or health care decision maker is aware that that she may receive a bill for this telephone service, depending on her insurance coverage, and has provided verbal consent to proceed: Yes      Documentation:  I communicated with the patient and/or health care decision maker about multiple problems. Details of this discussion including any medical advice provided: See Below. I affirm this is a Patient Initiated Episode with an Established Patient who has not had a related appointment within my department in the past 7 days or scheduled within the next 24 hours. Total Time: minutes: 11-20 minutes    Note: not billable if this call serves to triage the patient into an appointment for the relevant concern      Franklin Guillen MD   __________________________________________________________________________________________    Subjective:     Encounter Diagnoses   Name Primary?  Type 2 diabetes mellitus with hyperglycemia, without long-term current use of insulin ([facility]): No weight gain during pandemic. This patient is managed under a comprehensive plan of care for Diabetes. Overall the patient feels well with good energy level. Key Antihyperglycemic Medications             pioglitazone (ACTOS) 30 mg tablet Take 1 Tab by mouth every morning. dulaglutide (TRULICITY) 1.5 IG/4.1 mL sub-q pen 0.5 mL by SubCUTAneous route every seven (7) days.  Stop Byduereon           Pertinent Labs:   Lab Results   Component Value Date/Time    Hemoglobin A1c 6.3 (H) 2020 12:11 PM    Hemoglobin A1c 6.9 (H) 2020 11:05 AM    Hemoglobin A1c 8.0 (H) 10/04/2019 11:06 AM      There is no height or weight on file to calculate BMI. Lab Results   Component Value Date/Time    LDL, calculated 75.6 2020 12:11 PM         Lab Results   Component Value Date/Time    Sodium 140 2020 12:11 PM    Potassium 3.8 2020 12:11 PM    Chloride 104 2020 12:11 PM    CO2 28 2020 12:11 PM    Anion gap 8 2020 12:11 PM    Glucose 113 (H) 2020 12:11 PM    BUN 22 (H) 2020 12:11 PM    Creatinine 1.05 (H) 2020 12:11 PM    BUN/Creatinine ratio 21 (H) 2020 12:11 PM    GFR est AA >60 2020 12:11 PM    GFR est non-AA 51 (L) 2020 12:11 PM    Calcium 9.6 2020 12:11 PM    Alk. phosphatase 47 2020 12:11 PM    Protein, total 7.1 2020 12:11 PM    Albumin 4.0 2020 12:11 PM    Globulin 3.1 2020 12:11 PM    A-G Ratio 1.3 2020 12:11 PM    ALT (SGPT) 24 2020 12:11 PM     Lab Results   Component Value Date/Time    Microalbumin/Creat ratio (mg/g creat) 163 (H) 2020 11:05 AM    Microalbumin,urine random 15.30 2020 11:05 AM      Frequency of home glucose testing: No logs   Blood Sugar range at home:    Last eye exam: In past 12 months. Last foot exam: This year.    Polyuria, polyphagia or polydipsia: No   Retinopathy: No   Neuropathy SX: No    Low blood sugar symptoms: No   Dietary compliance: Good   Medication compliance:Good   On ASA: Yes   Depression: No   CKD: Stage III     Wt Readings from Last 3 Encounters:   07/15/20 201 lb (91.2 kg)   20 199 lb 14.4 oz (90.7 kg)   20 199 lb (90.3 kg)        Social History     Tobacco Use   Smoking Status Former Smoker    Packs/day: 1.00    Years: 20.00    Pack years: 20.00    Last attempt to quit:     Years since quittin.7   Smokeless Tobacco Never Used There is no height or weight on file to calculate BMI. Diabetic Consultants: All the patient's questions regarding medications, diet and exercise were answered. Goal of A1C of less than 7.5% is our goal.   Our overall goal is to reduce or eliminate the long term consequences of poorly controlled diabetes. Yes    Type 2 diabetes mellitus with nephropathy ([facility]): See above.  CKD (chronic kidney disease) stage 3, GFR 30-59 ml/min ([facility] Nephrologist:   Lab Results   Component Value Date/Time    GFR est AA >60 06/09/2020 12:11 PM    GFR est non-AA 51 (L) 06/09/2020 12:11 PM    Creatinine 1.05 (H) 06/09/2020 12:11 PM    BUN 22 (H) 06/09/2020 12:11 PM    Sodium 140 06/09/2020 12:11 PM    Potassium 3.8 06/09/2020 12:11 PM    Chloride 104 06/09/2020 12:11 PM    CO2 28 06/09/2020 12:11 PM     Lab Results   Component Value Date/Time    WBC 8.0 10/06/2014 01:09 PM    HGB 14.6 07/01/2019 11:20 AM    HCT 43.2 05/23/2018 04:13 PM    PLATELET 969 76/29/0316 01:09 PM    MCV 83.7 10/06/2014 01:09 PM     Lab Results   Component Value Date/Time    Vitamin D 25-Hydroxy 52.6 01/13/2020 11:05 AM       Lab Results   Component Value Date/Time    Calcium 9.6 06/09/2020 12:11 PM            Essential hypertension:  BP Readings from Last 3 Encounters:   07/15/20 111/79   07/13/20 131/57   06/09/20 130/77     The patient reports:  taking medications as instructed, no medication side effects noted, no TIA's, no chest pain on exertion, no dyspnea on exertion, no swelling of ankles. Key CAD CHF Meds             dilTIAZem CD (CARDIZEM CD) 180 mg ER capsule TAKE 1 CAPSULE DAILY    losartan-hydroCHLOROthiazide (HYZAAR) 100-25 mg per tablet TAKE 1 TABLET DAILY    cloNIDine HCl (CATAPRES) 0.1 mg tablet TAKE 1 TABLET NIGHTLY    ezetimibe (ZETIA) 10 mg tablet TAKE 1 TABLET DAILY    TRICOR 145 mg tablet TAKE 1 TABLET DAILY    aspirin 81 mg chewable tablet Take 81 mg by mouth daily.     omega-3 fatty acids-vitamin e (FISH OIL) 1,000 mg cap Take 2 Caps by mouth two (2) times a day. Lab Results   Component Value Date/Time    Sodium 140 06/09/2020 12:11 PM    Potassium 3.8 06/09/2020 12:11 PM    Chloride 104 06/09/2020 12:11 PM    CO2 28 06/09/2020 12:11 PM    Anion gap 8 06/09/2020 12:11 PM    Glucose 113 (H) 06/09/2020 12:11 PM    BUN 22 (H) 06/09/2020 12:11 PM    Creatinine 1.05 (H) 06/09/2020 12:11 PM    BUN/Creatinine ratio 21 (H) 06/09/2020 12:11 PM    GFR est AA >60 06/09/2020 12:11 PM    GFR est non-AA 51 (L) 06/09/2020 12:11 PM    Calcium 9.6 06/09/2020 12:11 PM    Bilirubin, total 0.4 06/09/2020 12:11 PM    Alk. phosphatase 47 06/09/2020 12:11 PM    Protein, total 7.1 06/09/2020 12:11 PM    Albumin 4.0 06/09/2020 12:11 PM    Globulin 3.1 06/09/2020 12:11 PM    A-G Ratio 1.3 06/09/2020 12:11 PM    ALT (SGPT) 24 06/09/2020 12:11 PM     Low salt diet? yes  Aerobic exercise? no  Our goal is to normalize the blood pressure to decrease the risks of strokes and heart attacks. The patient is in agreement with the plan.  Hypercholesterolemia:  Cardiovascular risks for her are: LDL goal is under 80  diabetic  hypertension  hyperlipidemia. Key Antihyperlipidemia Meds             ezetimibe (ZETIA) 10 mg tablet TAKE 1 TABLET DAILY    TRICOR 145 mg tablet TAKE 1 TABLET DAILY    omega-3 fatty acids-vitamin e (FISH OIL) 1,000 mg cap Take 2 Caps by mouth two (2) times a day. Lab Results   Component Value Date/Time    Cholesterol, total 158 06/09/2020 12:11 PM    HDL Cholesterol 54 06/09/2020 12:11 PM    LDL, calculated 75.6 06/09/2020 12:11 PM    Triglyceride 142 06/09/2020 12:11 PM    CHOL/HDL Ratio 2.9 06/09/2020 12:11 PM     Lab Results   Component Value Date/Time    ALT (SGPT) 24 06/09/2020 12:11 PM    Alk.  phosphatase 47 06/09/2020 12:11 PM    Bilirubin, total 0.4 06/09/2020 12:11 PM      Myalgias: No   Fatigue: No   Other side effects: no  Wt Readings from Last 3 Encounters:   07/15/20 201 lb (91.2 kg)   07/13/20 199 lb 14.4 oz (90.7 kg)   06/09/20 199 lb (90.3 kg)     The patient is aware of our goal to reduce or eliminate the long term problems (such as strokes and heart attacks) related to poorly controlled hyperlipidemia.  Gastroesophageal reflux disease with esophagitis:  Current control of Symptoms:good  Hiatal Hernia:no  Current Medications:esomeprazole  The patient has no history melena or bright red blood in the stools. The patient avoids high dose aspirin and NSAID therapy. The patient is aware of diet changes needed, elevating the head of the bed and appropriate use of antacids.  Vitamin D deficiency:  No sx. Due for testing. Lab Results   Component Value Date/Time    Vitamin D 25-Hydroxy 52.6 01/13/2020 11:05 AM                     Current and past medical information:    Current Medications after this visit[de-identified]     Current Outpatient Medications   Medication Sig    glucose blood VI test strips (FreeStyle Lite Strips) strip TEST ONCE A DAY DX CODE: E11.65    pioglitazone (ACTOS) 30 mg tablet Take 1 Tab by mouth every morning.  esomeprazole (NEXIUM) 40 mg capsule TAKE 1 CAPSULE DAILY FOR GASTROESOPHAGEAL REFLUX DISEASE    dilTIAZem CD (CARDIZEM CD) 180 mg ER capsule TAKE 1 CAPSULE DAILY    dulaglutide (TRULICITY) 1.5 MY/7.2 mL sub-q pen 0.5 mL by SubCUTAneous route every seven (7) days. Stop Byduereon    losartan-hydroCHLOROthiazide (HYZAAR) 100-25 mg per tablet TAKE 1 TABLET DAILY    cloNIDine HCl (CATAPRES) 0.1 mg tablet TAKE 1 TABLET NIGHTLY    loratadine (CLARITIN) 10 mg tablet TAKE 1 TABLET DAILY    ezetimibe (ZETIA) 10 mg tablet TAKE 1 TABLET DAILY    TRICOR 145 mg tablet TAKE 1 TABLET DAILY    aspirin 81 mg chewable tablet Take 81 mg by mouth daily.  lancets (FREESTYLE LANCETS) 28 gauge misc TEST ONCE DAILY DX: E11.65    omega-3 fatty acids-vitamin e (FISH OIL) 1,000 mg cap Take 2 Caps by mouth two (2) times a day.     cholecalciferol, vitamin d3, (VITAMIN D) 1,000 unit tablet Take 1,000 Units by mouth two (2) times a day. No current facility-administered medications for this visit.         Patient Active Problem List    Diagnosis Date Noted    Type 2 diabetes mellitus with nephropathy ([facility] 01/23/2018     Priority: 1 - One    Type 2 diabetes mellitus with hyperglycemia, without long-term current use of insulin ([facility] 12/19/2016     Priority: 1 - One    Morbid obesity ([facility] 10/18/2015     Priority: 1 - One    Vitamin D deficiency 06/28/2010     Priority: 1 - One    Hypertension      Priority: 1 - One    Hypercholesterolemia      Priority: 1 - One    GERD (gastroesophageal reflux disease)      Priority: 1 - One    Allergic rhinitis 11/18/2011     Priority: 6 - Six    Bile acid esophageal reflux 07/01/2019    S/P total knee replacement using cement 09/09/2015    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    Arthritis 02/28/2011       Past Medical History:   Diagnosis Date    Arthritis 2/28/2011    OSTEO    Chronic pain     RIGHT KNEE    Diabetes ([facility]     GERD (gastroesophageal reflux disease)     Hot flashes, menopausal     Hypercholesterolemia     Hypertension     Ill-defined condition     Elevated liver enzymes (has family history)    Nausea & vomiting     Seizures ([address] 1980'S    AFTER DRINKING 2 MARGARITAS    Vitamin D deficiency 6/28/2010       Allergies   Allergen Reactions    Other Food Nausea and Vomiting     Artificial sweeteners - gas and beldging    Ciprofloxacin Nausea Only    Erythromycin Nausea Only    Metformin Other (comments) and Nausea Only     Felt bad  Felt bad      Pcn [Penicillins] Swelling     Caused lips to swell  Tolerates Amoxicillin       Past Surgical History:   Procedure Laterality Date    COLONOSCOPY N/A 6/3/2019    [facility] HX APPENDECTOMY  2001    HX CATARACT REMOVAL Bilateral     HX CHOLECYSTECTOMY  06/28/2018    HX HYSTERECTOMY  1975    HX KNEE REPLACEMENT Left 2014    HX OOPHORECTOMY 2001    bilateral    HX OTHER SURGICAL      LIPOMA LEFT SIDE    HX ROTATOR CUFF REPAIR Right 1998       Social History     Socioeconomic History    Marital status:      Spouse name: Not on file    Number of children: Not on file    Years of education: Not on file    Highest education level: Not on file   Tobacco Use    Smoking status: Former Smoker     Packs/day: 1.00     Years: 20.00     Pack years: 20.00     Last attempt to quit:      Years since quittin.7    Smokeless tobacco: Never Used   Substance and Sexual Activity    Alcohol use: Yes     Comment: rarely - 1 glass of wine every 3 months    Drug use: No    Sexual activity: Yes       Review of Systems   Constitutional: Negative. Negative for chills, fever, malaise/fatigue and weight loss. HENT: Negative. Negative for hearing loss. Eyes: Negative. Negative for blurred vision and double vision. Respiratory: Negative. Negative for cough, sputum production and shortness of breath. Cardiovascular: Negative. Negative for chest pain and palpitations. Gastrointestinal: Negative. Negative for abdominal pain, blood in stool, heartburn, nausea and vomiting. Genitourinary: Negative. Negative for dysuria, frequency and urgency. Musculoskeletal: Negative. Negative for back pain, falls, myalgias and neck pain. Skin: Negative. Negative for rash. Neurological: Negative. Negative for dizziness, tingling, tremors, weakness and headaches. Endo/Heme/Allergies: Negative. Psychiatric/Behavioral: Negative. Negative for depression. She is still mourning her 's death but it seems to be an appropriate grief reaction. She has started attending [employer] and plans on 2 trips this fall. Objective: There were no vitals filed for this visit. There is no height or weight on file to calculate BMI. Physical Exam  Constitutional:       Appearance: Normal appearance.    Eyes:      Conjunctiva/sclera: Conjunctivae normal.   Skin:     Coloration: Skin is not jaundiced. Neurological:      Mental Status: She is alert and oriented to person, place, and time. Psychiatric:         Mood and Affect: Mood normal.           Health Maintenance Due   Topic Date Due    Flu Vaccine (1) 09/01/2020         Assessment and orders:     Encounter Diagnoses     ICD-10-CM ICD-9-CM   1. Type 2 diabetes mellitus with hyperglycemia, without long-term current use of insulin ([facility])  E11.65 250.00     790.29   2. Type 2 diabetes mellitus with nephropathy ([facility])  E11.21 250.40     583.81   3. CKD (chronic kidney disease) stage 3, GFR 30-59 ml/min ([facility])  N18.3 585.3   4. Essential hypertension  I10 401.9   5. Hypercholesterolemia  E78.00 272.0   6. Gastroesophageal reflux disease with esophagitis  K21.0 530.11   7. Vitamin D deficiency  E55.9 268.9         Diagnoses and all orders for this visit:    1. Type 2 diabetes mellitus with hyperglycemia, without long-term current use of insulin ([facility])-last A1c was acceptable  -     PROTEIN/CREATININE RATIO, URINE; Future    2. Type 2 diabetes mellitus with nephropathy ([facility]    3. CKD (chronic kidney disease) stage 3, GFR 30-59 ml/min ([facility])-retest  -     PROTEIN/CREATININE RATIO, URINE; Future  -     RENAL FUNCTION PANEL; Future  -     HEMOGLOBIN; Future    4. Essential hypertension-controlled  -     HEMOGLOBIN; Future    5. Hypercholesterolemia-treated  -     AST; Future  -     ALT; Future    6. Gastroesophageal reflux disease with esophagitis  -     HEMOGLOBIN; Future    7. Vitamin D deficiency    8. Elevated liver enzymes-most likely fatty infiltration. Most recent liver function tests have been normal.  Low risk for cirrhosis. -     AST; Future  -     ALT; Future      She will follow-up with Dr. Lisbeth Duenas in 2 to 3 months. She has an appointment with Dr. Jose Gaines for colonoscopy in December. On her colonoscopy she had 20 polyps. 10 years ago she had none.     Plan of care:  Discussed diagnoses in detail with patient. Medication risks/benefits/side effects discussed with patient. All of the patient's questions were addressed. The patient understands and agrees with our plan of care. The patient knows to call back if they are unsure of or forget any changes we discussed today or if the symptoms change. The patient received an After-Visit Summary which contains VS, orders, medication list and allergy list. This can be used as a \"mini-medical record\" should they have to seek medical care while out of town. Patient Care Team:  Ede Chavarria MD as PCP - General (Family Medicine)  Mac Pierre MD as PCP - [facility]  Ira Marie MD as Physician (Cardiology)  Christine Smiley MD as Physician (Orthopedic Surgery)  Williams Hayes MD (Endocrinology)  Nicki Ahmadi MD (General Surgery)  Ludmila Solomon MD (Ophthalmology)          Signed By: Jackie Montague MD     September 9, 2020      ATTENTION:   This medical record was transcribed using an electronic medical records/speech recognition system. Although proofread, it may and can contain electronic, spelling and other errors. Corrections may be executed at a later time. Please feel free to contact me for any clarifications as needed.

## 2020-09-11 LAB
ALBUMIN SERPL-MCNC: 4.2 G/DL (ref 3.5–5)
ALT SERPL-CCNC: 27 U/L (ref 12–78)
ANION GAP SERPL CALC-SCNC: 9 MMOL/L (ref 5–15)
AST SERPL-CCNC: 22 U/L (ref 15–37)
BUN SERPL-MCNC: 23 MG/DL (ref 6–20)
BUN/CREAT SERPL: 20 (ref 12–20)
CALCIUM SERPL-MCNC: 10 MG/DL (ref 8.5–10.1)
CHLORIDE SERPL-SCNC: 100 MMOL/L (ref 97–108)
CO2 SERPL-SCNC: 29 MMOL/L (ref 21–32)
CREAT SERPL-MCNC: 1.16 MG/DL (ref 0.55–1.02)
GLUCOSE SERPL-MCNC: 144 MG/DL (ref 65–100)
PHOSPHATE SERPL-MCNC: 3 MG/DL (ref 2.6–4.7)
POTASSIUM SERPL-SCNC: 4.1 MMOL/L (ref 3.5–5.1)
SODIUM SERPL-SCNC: 138 MMOL/L (ref 136–145)

## 2020-09-14 ENCOUNTER — TELEPHONE (OUTPATIENT)
Dept: FAMILY MEDICINE CLINIC | Age: 80
End: 2020-09-14

## 2020-09-14 DIAGNOSIS — K21.9 GASTROESOPHAGEAL REFLUX DISEASE WITHOUT ESOPHAGITIS: ICD-10-CM

## 2020-09-14 RX ORDER — ESOMEPRAZOLE MAGNESIUM 40 MG/1
40 CAPSULE, DELAYED RELEASE ORAL DAILY
Qty: 90 CAP | Refills: 3 | Status: SHIPPED | OUTPATIENT
Start: 2020-09-14 | End: 2021-05-07 | Stop reason: SDUPTHER

## 2020-09-14 NOTE — TELEPHONE ENCOUNTER
Returned call to pt. Pt states the off brand of nexium is not working and wants to know if you could send a prescription to the mail order pharmacy and specify brand name only.

## 2020-09-15 NOTE — TELEPHONE ENCOUNTER
Call made to pt. Pt made aware a prescription for brand name only [facility] but her insurance may not cover it. She verbalized understanding.

## 2020-10-27 ENCOUNTER — TELEPHONE (OUTPATIENT)
Dept: FAMILY MEDICINE CLINIC | Age: 80
End: 2020-10-27

## 2020-10-27 NOTE — TELEPHONE ENCOUNTER
----- Message from [email or web address] sent at 10/27/2020  3:20 PM EDT -----  Regarding: : Dr. Kathy Garcia first and last name: Pt  Reason for call: requesting order for shingles shot  Callback required yes/no and why: No  Best contact number(s): [phone]  Details to clarify the request: Pt is requesting an order for shingles shot to be sent over to [facility] in [place].  [address]: [phone] FAX: [phone]

## 2020-11-25 DIAGNOSIS — I10 ESSENTIAL HYPERTENSION: Chronic | ICD-10-CM

## 2020-11-25 RX ORDER — LOSARTAN POTASSIUM AND HYDROCHLOROTHIAZIDE 25; 100 MG/1; MG/1
TABLET ORAL
Qty: 90 TAB | Refills: 0 | Status: SHIPPED | OUTPATIENT
Start: 2020-11-25 | End: 2021-03-01 | Stop reason: SDUPTHER

## 2020-11-25 NOTE — TELEPHONE ENCOUNTER
----- Message from Delicia Plasencia sent at 11/25/2020  1:00 PM EST -----  Regarding: Dr. Robbie Estrada  General Message/Vendor Calls    Caller's first and last name: Pt      Reason for call: Losartan-Hydrochlorothiazide      Callback required yes/no and why: Yes, to notify pt if she can pick this up elsewhere      Best contact number(s): [phone]       Details to clarify the request: [facility] is currently out of pt's losartan-hydroCHLOROthiazide (HYZAAR) 100-25 mg per tablet. Pt contacted this today to see if she can get a refill and they told her they are out of this med and don't know when they'll have any back in stock.       Delicia Plasencia

## 2020-11-25 NOTE — TELEPHONE ENCOUNTER
Spoke with patient, patient prefers medication to go to [facility] until [address] can get it in stock

## 2020-11-27 ENCOUNTER — HOSPITAL ENCOUNTER (OUTPATIENT)
Dept: LAB | Age: 80
Discharge: HOME | End: 2020-11-27
Payer: MEDICARE

## 2020-11-27 ENCOUNTER — TRANSCRIBE ORDER (OUTPATIENT)
Dept: EMERGENCY DEPT | Age: 80
End: 2020-11-27

## 2020-11-27 DIAGNOSIS — Z01.812 PRE-PROCEDURAL LABORATORY EXAMINATIONS: ICD-10-CM

## 2020-11-27 DIAGNOSIS — Z01.812 PRE-PROCEDURAL LABORATORY EXAMINATIONS: Primary | ICD-10-CM

## 2020-11-27 PROCEDURE — 87635 SARS-COV-2 COVID-19 AMP PRB: CPT

## 2020-11-28 LAB — SARS-COV-2, COV2NT: NOT DETECTED

## 2020-12-01 ENCOUNTER — ANESTHESIA (OUTPATIENT)
Dept: ENDOSCOPY | Age: 80
End: 2020-12-01
Payer: MEDICARE

## 2020-12-01 ENCOUNTER — HOSPITAL ENCOUNTER (OUTPATIENT)
Age: 80
Setting detail: OUTPATIENT SURGERY
Discharge: HOME | End: 2020-12-01
Attending: INTERNAL MEDICINE | Admitting: INTERNAL MEDICINE
Payer: MEDICARE

## 2020-12-01 ENCOUNTER — ANESTHESIA EVENT (OUTPATIENT)
Dept: ENDOSCOPY | Age: 80
End: 2020-12-01
Payer: MEDICARE

## 2020-12-01 VITALS
RESPIRATION RATE: 16 BRPM | WEIGHT: 199.52 LBS | TEMPERATURE: 97.7 F | DIASTOLIC BLOOD PRESSURE: 74 MMHG | OXYGEN SATURATION: 98 % | HEART RATE: 71 BPM | BODY MASS INDEX: 39.17 KG/M2 | HEIGHT: 60 IN | SYSTOLIC BLOOD PRESSURE: 143 MMHG

## 2020-12-01 LAB
GLUCOSE BLD STRIP.AUTO-MCNC: 105 MG/DL (ref 65–100)
SERVICE CMNT-IMP: ABNORMAL

## 2020-12-01 PROCEDURE — 82962 GLUCOSE BLOOD TEST: CPT

## 2020-12-01 PROCEDURE — 76040000019: Performed by: INTERNAL MEDICINE

## 2020-12-01 PROCEDURE — 76060000031 HC ANESTHESIA FIRST 0.5 HR: Performed by: INTERNAL MEDICINE

## 2020-12-01 PROCEDURE — 74011000250 HC RX REV CODE- 250: Performed by: NURSE ANESTHETIST, CERTIFIED REGISTERED

## 2020-12-01 PROCEDURE — 74011250636 HC RX REV CODE- 250/636: Performed by: NURSE ANESTHETIST, CERTIFIED REGISTERED

## 2020-12-01 PROCEDURE — 74011250636 HC RX REV CODE- 250/636: Performed by: INTERNAL MEDICINE

## 2020-12-01 PROCEDURE — 2709999900 HC NON-CHARGEABLE SUPPLY: Performed by: INTERNAL MEDICINE

## 2020-12-01 PROCEDURE — 77030013992 HC SNR POLYP ENDOSC BSC -B: Performed by: INTERNAL MEDICINE

## 2020-12-01 PROCEDURE — 88305 TISSUE EXAM BY PATHOLOGIST: CPT

## 2020-12-01 RX ORDER — PROPOFOL 10 MG/ML
INJECTION, EMULSION INTRAVENOUS AS NEEDED
Status: DISCONTINUED | OUTPATIENT
Start: 2020-12-01 | End: 2020-12-01 | Stop reason: HOSPADM

## 2020-12-01 RX ORDER — NALOXONE HYDROCHLORIDE 0.4 MG/ML
0.4 INJECTION, SOLUTION INTRAMUSCULAR; INTRAVENOUS; SUBCUTANEOUS
Status: DISCONTINUED | OUTPATIENT
Start: 2020-12-01 | End: 2020-12-01 | Stop reason: HOSPADM

## 2020-12-01 RX ORDER — SODIUM CHLORIDE 9 MG/ML
50 INJECTION, SOLUTION INTRAVENOUS CONTINUOUS
Status: DISCONTINUED | OUTPATIENT
Start: 2020-12-01 | End: 2020-12-01 | Stop reason: HOSPADM

## 2020-12-01 RX ORDER — LIDOCAINE HYDROCHLORIDE 20 MG/ML
INJECTION, SOLUTION EPIDURAL; INFILTRATION; INTRACAUDAL; PERINEURAL AS NEEDED
Status: DISCONTINUED | OUTPATIENT
Start: 2020-12-01 | End: 2020-12-01 | Stop reason: HOSPADM

## 2020-12-01 RX ORDER — EPINEPHRINE 0.1 MG/ML
1 INJECTION INTRACARDIAC; INTRAVENOUS
Status: DISCONTINUED | OUTPATIENT
Start: 2020-12-01 | End: 2020-12-01 | Stop reason: HOSPADM

## 2020-12-01 RX ORDER — DEXTROMETHORPHAN/PSEUDOEPHED 2.5-7.5/.8
1.2 DROPS ORAL
Status: DISCONTINUED | OUTPATIENT
Start: 2020-12-01 | End: 2020-12-01 | Stop reason: HOSPADM

## 2020-12-01 RX ORDER — PROPOFOL 10 MG/ML
INJECTION, EMULSION INTRAVENOUS
Status: DISCONTINUED | OUTPATIENT
Start: 2020-12-01 | End: 2020-12-01 | Stop reason: HOSPADM

## 2020-12-01 RX ORDER — ATROPINE SULFATE 0.1 MG/ML
0.4 INJECTION INTRAVENOUS
Status: DISCONTINUED | OUTPATIENT
Start: 2020-12-01 | End: 2020-12-01 | Stop reason: HOSPADM

## 2020-12-01 RX ORDER — MIDAZOLAM HYDROCHLORIDE 1 MG/ML
.25-5 INJECTION, SOLUTION INTRAMUSCULAR; INTRAVENOUS
Status: DISCONTINUED | OUTPATIENT
Start: 2020-12-01 | End: 2020-12-01 | Stop reason: HOSPADM

## 2020-12-01 RX ORDER — FLUMAZENIL 0.1 MG/ML
0.2 INJECTION INTRAVENOUS
Status: DISCONTINUED | OUTPATIENT
Start: 2020-12-01 | End: 2020-12-01 | Stop reason: HOSPADM

## 2020-12-01 RX ADMIN — LIDOCAINE HYDROCHLORIDE 40 MG: 20 INJECTION, SOLUTION INTRAVENOUS at 13:05

## 2020-12-01 RX ADMIN — PROPOFOL 140 MCG/KG/MIN: 10 INJECTION, EMULSION INTRAVENOUS at 13:05

## 2020-12-01 RX ADMIN — PROPOFOL 50 MG: 10 INJECTION, EMULSION INTRAVENOUS at 13:06

## 2020-12-01 RX ADMIN — PROPOFOL 50 MG: 10 INJECTION, EMULSION INTRAVENOUS at 13:05

## 2020-12-01 RX ADMIN — SODIUM CHLORIDE: 9 INJECTION, SOLUTION INTRAVENOUS at 13:00

## 2020-12-01 NOTE — PROGRESS NOTES
David Barba  1940  [id number]    Situation:  Verbal report received from:   Rudy Aguirre RN   Procedure: Procedure(s):  COLONOSCOPY  ENDOSCOPIC POLYPECTOMY    Background:    Preoperative diagnosis: PERSONAL HX OF POLYPS  Postoperative diagnosis: 1.- Colonic Polyps  2.- Diverticulosis  3.- Hemorrhoids    :  Dr. Marck Brice   Assistant(s): Endoscopy Technician-1: Ruiz Max  Endoscopy RN-1: Rhonda Palencia RN    Specimens:   ID Type Source Tests Collected by Time Destination   1 :  Preservative Colon, Ascending  Yany Martinez MD 12/1/2020 1311 Pathology   2 :  Preservative Colon, Transverse  Yany Martinez MD 12/1/2020 1316 Pathology     H. Pylori  no    Assessment:  Intra-procedure medications       Anesthesia gave intra-procedure sedation and medications, see anesthesia flow sheet yes    Intravenous fluids: NS@ KVO     Vital signs stable   yes    Abdominal assessment: round and soft   yes    Recommendation:  Discharge patient per MD order  yes.   Return to floor  outpatient  Family or Friend   Friend   Permission to share finding with family or friend yes

## 2020-12-01 NOTE — DISCHARGE INSTRUCTIONS
[facility]. Jorge Mcdonough M.D.  [phone]            COLON DISCHARGE INSTRUCTIONS       2020    Kari Curling  :  1940  [facility] Medical Record Number:  [id number]      COLONOSCOPY FINDINGS:  Your colonoscopy showed four diminutive polyps that were removed and sent to pathology, mild diverticulosis and small internal hemorrhoids. DISCOMFORT:  Redness at IV site- apply warm compress to area; if redness or soreness persist- contact your physician  There may be a slight amount of blood passed from the rectum  Gaseous discomfort- walking, belching will help relieve any discomfort  You may not operate a vehicle for 12 hours  You may not engage in an occupation involving machinery or appliances for rest of today  You may not drink alcoholic beverages for at least 12 hours  Avoid making any critical decisions for at least 24 hour  DIET:   High fiber diet. - however -  remember your colon is empty and a heavy meal will produce gas. Avoid these foods:  vegetables, fried / greasy foods, carbonated drinks for today     ACTIVITY:  You may resume your normal daily activities it is recommended that you spend the remainder of the day resting -  avoid any strenuous activity. CALL M.D. ANY SIGN OF:   Increasing pain, nausea, vomiting  Abdominal distension (swelling)  New increased bleeding (oral or rectal)  Fever (chills)  Pain in chest area  Bloody discharge from nose or mouth   Shortness of breath    Follow-up Instructions:   Call Dr. Lynette Jones if any questions or problems. Telephone # [phone]  Biopsy results will be available in  5 to 7 days  Should have a repeat colonoscopy in 3 years.

## 2020-12-01 NOTE — PROCEDURES
Sarika Cummings M.D.  [phone]            2020          Colonoscopy Operative Report  Katherine Elizabeth  :  1940  [facility] Medical Record Number:  [id number]      Indications:    Personal history of colon polyps (screening only)     :  Lisa Roque MD    Referring Provider: Cami Burroughs MD    Sedation:  MAC anesthesia    Pre-Procedural Exam:      Airway: clear,  No airway problems anticipated  Heart: RRR, without gallops or rubs  Lungs: clear bilaterally without wheezes, crackles, or rhonchi  Abdomen: soft, nontender, nondistended, bowel sounds present  Mental Status: awake, alert and oriented to person, place and time     Procedure Details:  After informed consent was obtained with all risks and benefits of procedure explained and preoperative exam completed, the patient was taken to the endoscopy suite and placed in the left lateral decubitus position. Upon sequential sedation as per above, a digital rectal exam was performed. The Olympus videocolonoscope  was inserted in the rectum and carefully advanced to the cecum, which was identified by the ileocecal valve and appendiceal orifice. The quality of preparation was good. The colonoscope was slowly withdrawn with careful inspection and evaluation between folds. Retroflexion in the rectum was performed. Findings:   Terminal Ileum: not intubated  Cecum: normal  Ascending Colon: 1  Sessile polyp(s), the largest 3 mm in size;  Transverse Colon: 3  Sessile polyp(s), the largest 4 mm in size; Descending Colon: normal  Sigmoid: no mucosal lesion appreciated  mild diverticulosis; Rectum: no mucosal lesion appreciated  Grade 1 internal hemorrhoid(s);     Interventions:  4 complete polypectomy were performed using cold snare  and the polyps were  retrieved    Specimen Removed:  specimen #1, 3 mm in size, located in the ascending colon removed by cold snare and retrieved for pathology  #2, 3 and 4 mm in size, located in the transverse colon removed by cold snare and retrieved for pathology    Complications: None. EBL:  None. Impression:  Four diminutive polyps were removed and sent to pathology, mild diverticulosis and small internal hemorrhoids    Recommendations:  -Repeat colonoscopy in 3 years.   -High fiber diet.    -Resume normal medication(s). Discharge Disposition:  Home in the company of a  when able to ambulate.     Wset Stone MD  12/1/2020  1:27 PM

## 2020-12-01 NOTE — PROGRESS NOTES

## 2020-12-01 NOTE — ANESTHESIA PREPROCEDURE EVALUATION
Relevant Problems   No relevant active problems       Anesthetic History     PONV          Review of Systems / Medical History      Pulmonary  Within defined limits                 Neuro/Psych   Within defined limits           Cardiovascular    Hypertension          Hyperlipidemia    Exercise tolerance: >4 METS     GI/Hepatic/Renal     GERD: well controlled           Endo/Other    Diabetes: well controlled, type 2    Morbid obesity and arthritis     Other Findings              Physical Exam    Airway  Mallampati: III  TM Distance: 4 - 6 cm  Neck ROM: normal range of motion        Cardiovascular    Rhythm: regular  Rate: normal         Dental    Dentition: Bridges  Comments: Permanent upper and lower bridge   Pulmonary  Breath sounds clear to auscultation               Abdominal         Other Findings            Anesthetic Plan    ASA: 3  Anesthesia type: MAC            Anesthetic plan and risks discussed with: Patient

## 2020-12-01 NOTE — H&P
Duane Sharp M.D.  [phone]            History and Physical       NAME:  Adina Armijo   :   1940   MRN:   [id number]       Referring Physician:  Dr. Angelica Fairchild Date: 2020 1:05 PM    Chief Complaint:  Colon cancer screening    History of Present Illness:  Patient is a 67 y.o. who is seen for colon cancer screening and colon polyp surveillance. Denies any ongoing GI complaints. PMH:  Past Medical History:   Diagnosis Date    Arthritis 2011    OSTEO    Chronic pain     RIGHT KNEE    Diabetes ([address]     GERD (gastroesophageal reflux disease)     Hot flashes, menopausal     Hypercholesterolemia     Hypertension     Ill-defined condition     Elevated liver enzymes (has family history)    Nausea & vomiting     Seizures ([address] 'S    AFTER DRINKING 2 MARGARITAS    Vitamin D deficiency 2010       PSH:  Past Surgical History:   Procedure Laterality Date    COLONOSCOPY N/A 6/3/2019    COLONOSCOPY performed by Alma Delia Lainez MD at [facility] HX APPENDECTOMY      HX CATARACT REMOVAL Bilateral     HX CHOLECYSTECTOMY  2018    HX HYSTERECTOMY  1975    HX KNEE REPLACEMENT Left 2014    HX OOPHORECTOMY  2001    bilateral    HX OTHER SURGICAL      LIPOMA LEFT SIDE    HX ROTATOR CUFF REPAIR Right 1998       Allergies: Allergies   Allergen Reactions    Other Food Nausea and Vomiting     Artificial sweeteners - gas and beldging    Ciprofloxacin Nausea Only    Erythromycin Nausea Only    Metformin Other (comments) and Nausea Only     Felt bad  Felt bad      Pcn [Penicillins] Swelling     Caused lips to swell  Tolerates Amoxicillin       Home Medications:  Prior to Admission Medications   Prescriptions Last Dose Informant Patient Reported? Taking?    TRICOR 145 mg tablet 2020 at Unknown time  No Yes   Sig: TAKE 1 TABLET DAILY   aspirin 81 mg chewable tablet 2020 at Unknown time  Yes Yes   Sig: Take 81 mg by mouth daily.   cholecalciferol, vitamin d3, (VITAMIN D) 1,000 unit tablet 2020 at Unknown time  Yes Yes   Sig: Take 1,000 Units by mouth two (2) times a day. cloNIDine HCl (CATAPRES) 0.1 mg tablet 2020 at Unknown time  No Yes   Sig: TAKE 1 TABLET NIGHTLY   dilTIAZem CD (CARDIZEM CD) 180 mg ER capsule 2020 at Unknown time  No Yes   Sig: TAKE 1 CAPSULE DAILY   dulaglutide (TRULICITY) 1.5 PT/5.4 mL sub-q pen 2020  No No   Si.5 mL by SubCUTAneous route every seven (7) days. Stop Byduereon   esomeprazole (NEXIUM) 40 mg capsule 2020 at Unknown time  No Yes   Sig: Take 1 Cap by mouth daily. ezetimibe (ZETIA) 10 mg tablet 2020 at Unknown time  No Yes   Sig: TAKE 1 TABLET DAILY   glucose blood VI test strips (FreeStyle Lite Strips) strip 2020 at Unknown time  No Yes   Sig: TEST ONCE A DAY DX CODE: E11.65   lancets (FREESTYLE LANCETS) 28 gauge misc 2020 at Unknown time  No Yes   Sig: TEST ONCE DAILY DX: E11.65   loratadine (CLARITIN) 10 mg tablet 2020 at Unknown time  No Yes   Sig: TAKE 1 TABLET DAILY   losartan-hydroCHLOROthiazide (HYZAAR) 100-25 mg per tablet 2020 at Unknown time  No Yes   Sig: TAKE 1 TABLET DAILY   omega-3 fatty acids-vitamin e (FISH OIL) 1,000 mg cap 2020 at Unknown time  Yes Yes   Sig: Take 2 Caps by mouth two (2) times a day. pioglitazone (ACTOS) 30 mg tablet 2020 at Unknown time  No Yes   Sig: Take 1 Tab by mouth every morning. Patient taking differently: Take 30 mg by mouth every morning.  Patient took 1/2 tablet this am      Facility-Administered Medications: None       Hospital Medications:  Current Facility-Administered Medications   Medication Dose Route Frequency    0.9% sodium chloride infusion  50 mL/hr IntraVENous CONTINUOUS    midazolam (VERSED) injection 0.25-5 mg  0.25-5 mg IntraVENous Multiple    naloxone (NARCAN) injection 0.4 mg  0.4 mg IntraVENous Multiple    flumazeniL (ROMAZICON) 0.1 mg/mL injection 0.2 mg 0.2 mg IntraVENous Multiple    simethicone (MYLICON) 69DV/1.3VR oral drops 80 mg  1.2 mL Oral Multiple    atropine injection 0.4 mg  0.4 mg IntraVENous ONCE PRN    EPINEPHrine (ADRENALIN) 0.1 mg/mL syringe 1 mg  1 mg Endoscopically ONCE PRN     Facility-Administered Medications Ordered in Other Encounters   Medication Dose Route Frequency    lidocaine (PF) (XYLOCAINE) 20 mg/mL (2 %) injection   IntraVENous PRN    propofoL (DIPRIVAN) 10 mg/mL injection   IntraVENous PRN    propofoL (DIPRIVAN) 10 mg/mL injection   IntraVENous CONTINUOUS       Social History:  Social History     Tobacco Use    Smoking status: Former Smoker     Packs/day: 1.00     Years: 20.00     Pack years: 20.00     Last attempt to quit:      Years since quittin.9    Smokeless tobacco: Never Used   Substance Use Topics    Alcohol use: Yes     Comment: rarely - 1 glass of wine every 3 months       Family History:  Family History   Problem Relation Age of Onset    Pulmonary Embolism Father     Arthritis-rheumatoid Paternal Grandmother     Alzheimer Mother     Arthritis-rheumatoid Sister     Emphysema Brother     Arthritis-rheumatoid Sister     Anesth Problems Neg Hx              Review of Systems:      Constitutional: negative fever, negative chills, negative weight loss  Eyes:   negative visual changes  ENT:   negative sore throat, tongue or lip swelling  Respiratory:  negative cough, negative dyspnea  Cards:  negative for chest pain, palpitations, lower extremity edema  GI:   See HPI  :  negative for frequency, dysuria  Integument:  negative for rash and pruritus  Heme:  negative for easy bruising and gum/nose bleeding  Musculoskel: negative for myalgias,  back pain and muscle weakness  Neuro: negative for headaches, dizziness, vertigo  Psych:  negative for feelings of anxiety, depression       Objective:     Patient Vitals for the past 8 hrs:   BP Temp Pulse Resp SpO2 Height Weight   20 1229 (!) 141/77 98.3 °F (36.8 °C) 70 16 97 % 5' (1.524 m) 90.5 kg (199 lb 8.3 oz)     No intake/output data recorded. No intake/output data recorded. EXAM:     NEURO-a&o   HEENT-wnl   LUNGS-clear    COR-regular rate and rhythym     ABD-soft , no tenderness, no rebound, good bs     EXT-no edema     Data Review     No results for input(s): WBC, HGB, HCT, PLT, HGBEXT, HCTEXT, PLTEXT in the last 72 hours. No results for input(s): NA, K, CL, CO2, BUN, CREA, GLU, PHOS, CA in the last 72 hours. No results for input(s): AP, TBIL, TP, ALB, GLOB, GGT, AML, LPSE in the last 72 hours. No lab exists for component: SGOT, GPT, AMYP, HLPSE  No results for input(s): INR, PTP, APTT, INREXT in the last 72 hours. 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      Signed By: Caprice Danielle MD     12/1/2020  1:05 PM

## 2020-12-01 NOTE — PROGRESS NOTES
Endoscopy discharge instructions have been reviewed and given to patient. The patient verbalized understanding and acceptance of instructions. Dr. Angelic Liao  discussed with patient procedure findings and next steps.

## 2020-12-02 NOTE — ANESTHESIA POSTPROCEDURE EVALUATION
Procedure(s):  COLONOSCOPY  ENDOSCOPIC POLYPECTOMY. MAC    Anesthesia Post Evaluation      Multimodal analgesia: multimodal analgesia used between 6 hours prior to anesthesia start to PACU discharge  Patient location during evaluation: bedside  Patient participation: complete - patient participated  Level of consciousness: awake  Pain management: adequate  Airway patency: patent  Anesthetic complications: no  Cardiovascular status: acceptable  Respiratory status: acceptable  Hydration status: acceptable        INITIAL Post-op Vital signs:   Vitals Value Taken Time   /74 12/1/2020  1:55 PM   Temp 36.5 °C (97.7 °F) 12/1/2020  1:30 PM   Pulse 67 12/1/2020  1:57 PM   Resp 13 12/1/2020  1:57 PM   SpO2 97 % 12/1/2020  1:57 PM   Vitals shown include unvalidated device data.

## 2020-12-09 ENCOUNTER — VIRTUAL VISIT (OUTPATIENT)
Dept: FAMILY MEDICINE CLINIC | Age: 80
End: 2020-12-09
Payer: MEDICARE

## 2020-12-09 DIAGNOSIS — I10 ESSENTIAL HYPERTENSION: Primary | Chronic | ICD-10-CM

## 2020-12-09 DIAGNOSIS — E78.00 HYPERCHOLESTEROLEMIA: ICD-10-CM

## 2020-12-09 DIAGNOSIS — K21.9 GASTROESOPHAGEAL REFLUX DISEASE WITHOUT ESOPHAGITIS: ICD-10-CM

## 2020-12-09 DIAGNOSIS — E11.21 TYPE 2 DIABETES MELLITUS WITH NEPHROPATHY (HCC): ICD-10-CM

## 2020-12-09 PROCEDURE — 99442 PR PHYS/QHP TELEPHONE EVALUATION 11-20 MIN: CPT | Performed by: FAMILY MEDICINE

## 2020-12-09 NOTE — PROGRESS NOTES
Sylvester Abrams is a 67 y.o. female evaluated via Telephone on 20. Patient Identity confirmed by . Consent:  He and/or health care decision maker is aware that that he may receive a bill for this telephone service, depending on his insurance coverage, and has provided verbal consent to proceed: Yes    Physician Location: Office  Patient Location: Home  Nurse Assisting with Encounter: Silverio Le LPN    Chief Complaint   Patient presents with    Follow-up     routine      Information gathered from patient and/or health care decision maker. HPI:   Diabetes Follow up: Overall the patient feels well with good energy level. Current Medications:   Key Antihyperglycemic Medications             pioglitazone (ACTOS) 30 mg tablet (Taking) Take 1 Tab by mouth every morning. dulaglutide (TRULICITY) 1.5 CW/8.0 mL sub-q pen (Taking) 0.5 mL by SubCUTAneous route every seven (7) days. Stop Byduereon      . Insulin dependence: NO   Frequency of home glucose testing: Daily   Blood Sugar range at home: 100-120    Last eye exam: In past 12 months. Last foot exam: This year. Polyuria, polyphagia or polydipsia: NO   Retinopathy: NO   Neuropathy SX: NO   Low blood sugar symptoms: NO   Dietary compliance: compliant most of the time   Medication compliance: compliant most of the time   On ASA: YES   Tobacco Use: NO   Depression: NO     Wt Readings from Last 3 Encounters:   20 199 lb 8.3 oz (90.5 kg)   07/15/20 201 lb (91.2 kg)   20 199 lb 14.4 oz (90.7 kg)     Hypertension Follow up:  Currently Taking Diltiazem 180 mg ER daily, Clonidine 0.1 mg nightly, . The patient reports:  taking medications as instructed, no medication side effects noted, no TIA's, no chest pain on exertion, no dyspnea on exertion, no swelling of ankles.      BP Readings from Last 3 Encounters:   20 (!) 143/74   07/15/20 111/79   20 131/57      GERD: Well controlled on Nexium     Hypertriglyceridemia Follow up: Cardiovascular risks for her are: diabetic  hypertension  hyperlipidemia  former smoker. Currently she takes Zetia 10 mg, Tricor 145 mg. Myalgias: NO   Fatigue: NO   Other side effects: YES     Wt Readings from Last 3 Encounters:   12/01/20 199 lb 8.3 oz (90.5 kg)   07/15/20 201 lb (91.2 kg)   07/13/20 199 lb 14.4 oz (90.7 kg)          Review of Systems   Constitutional: Negative for chills and fever. HENT: Negative for congestion. Respiratory: Negative for cough. Cardiovascular: Negative for chest pain and palpitations. Gastrointestinal: Negative for abdominal pain, nausea and vomiting. Limited Exam:  Due to this being a TeleHealth evaluation, many elements of the physical examination are unable to be assessed. Constitutional: Appears well-developed and well-nourished in no apparent distress   Mental status: Alert and awake, Oriented to person/place/time, Able to follow commands  Psychiatric: Normal affect, normal judgment/insight. No hallucinations     Current Outpatient Medications on File Prior to Visit   Medication Sig Dispense Refill    losartan-hydroCHLOROthiazide (HYZAAR) 100-25 mg per tablet TAKE 1 TABLET DAILY 90 Tab 0    ezetimibe (ZETIA) 10 mg tablet TAKE 1 TABLET DAILY 90 Tab 0    esomeprazole (NEXIUM) 40 mg capsule Take 1 Cap by mouth daily. 90 Cap 3    glucose blood VI test strips (FreeStyle Lite Strips) strip TEST ONCE A DAY DX CODE: E11.65 100 Strip 3    pioglitazone (ACTOS) 30 mg tablet Take 1 Tab by mouth every morning. (Patient taking differently: Take 30 mg by mouth every morning. Patient took 1/2 tablet this am) 90 Tab 3    dilTIAZem CD (CARDIZEM CD) 180 mg ER capsule TAKE 1 CAPSULE DAILY 90 Cap 2    dulaglutide (TRULICITY) 1.5 GG/5.1 mL sub-q pen 0.5 mL by SubCUTAneous route every seven (7) days.  Stop Byduereon 12 Syringe 3    cloNIDine HCl (CATAPRES) 0.1 mg tablet TAKE 1 TABLET NIGHTLY 90 Tab 3    loratadine (CLARITIN) 10 mg tablet TAKE 1 TABLET DAILY 90 Tab 4    TRICOR 145 mg tablet TAKE 1 TABLET DAILY 90 Tab 4    aspirin 81 mg chewable tablet Take 81 mg by mouth daily.  lancets (FREESTYLE LANCETS) 28 gauge misc TEST ONCE DAILY DX: E11.65 100 Lancet 3    omega-3 fatty acids-vitamin e (FISH OIL) 1,000 mg cap Take 2 Caps by mouth two (2) times a day.  cholecalciferol, vitamin d3, (VITAMIN D) 1,000 unit tablet Take 1,000 Units by mouth two (2) times a day. No current facility-administered medications on file prior to visit. Allergies   Allergen Reactions    Other Food Nausea and Vomiting     Artificial sweeteners - gas and beldging    Ciprofloxacin Nausea Only    Erythromycin Nausea Only    Metformin Other (comments) and Nausea Only     Felt bad  Felt bad      Pcn [Penicillins] Swelling     Caused lips to swell  Tolerates Amoxicillin        Patient Active Problem List    Diagnosis Date Noted    Bile acid esophageal reflux 07/01/2019    Type 2 diabetes mellitus with nephropathy ([facility] 01/23/2018    Type 2 diabetes mellitus with hyperglycemia, without long-term current use of insulin ([facility] [address] 12/19/2016    Morbid obesity ([facility] [address] [facility] 10/18/2015    S/P total knee replacement using cement 09/09/2015    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    Allergic rhinitis 11/18/2011    Arthritis 02/28/2011    Vitamin D deficiency 06/28/2010    Hypertension     Hypercholesterolemia     GERD (gastroesophageal reflux disease)         Health Maintenance Due   Topic Date Due    A1C test (Diabetic or Prediabetic)  12/09/2020        Assessment/Plan:  Details of this discussion including any medical advice provided: Labs ordered for follow    ICD-10-CM ICD-9-CM    1. Essential hypertension  E99 773.0 METABOLIC PANEL, COMPREHENSIVE      CBC W/O DIFF   2. Type 2 diabetes mellitus with nephropathy (HCC)  E11.21 250.40 HEMOGLOBIN A1C WITH EAG     583.81    3.  Hypercholesterolemia  E78.00 272.0 LIPID PANEL   4. Gastroesophageal reflux disease without esophagitis  K21.9 530.81 Total Time: minutes: 11-20 minutes was spent on telemedicine encounter discussing above problems and plans. Patient Problem list, medications, and Allergies were reviewed during this encounter. Pursuant to the emergency declaration under the [address] [place] [address], [facility] waiver authority and the [employer] Act, this Telephone Visit was conducted, with patient's consent, to reduce the patient's risk of exposure to COVID-19 and provide continuity of care for an established patient. I affirm this is a Patient Initiated Episode with an Established Patient who has not had a related appointment within my department in the past 7 days or scheduled within the next 24 hours. Discussed diagnoses in detail with patient. Medication risks/benefits/side effects discussed with patient. All of the patient's questions were addressed. The patient understands and agrees with our plan of care. The patient knows to call back if they are unsure of or forget any changes we discussed today or if the symptoms change.     Note: not billable if this call serves to triage the patient into an appointment for the relevant concern    MD INA Pennington & SONYA BUSCH [facility]  12/09/20

## 2020-12-09 NOTE — PROGRESS NOTES
Chief Complaint   Patient presents with    Follow-up     routine     1. Have you been to the ER, urgent care clinic since your last visit? Hospitalized since your last visit? No    2. Have you seen or consulted any other health care providers outside of the [address] [place] [address] since your last visit? Include any pap smears or colon screening.  No    Reviewed record in preparation for visit and have necessary documentation  Pt did not bring medication to office visit for review  opportunity was given for questions  Goals that were addressed and/or need to be completed during or after this appointment include   Health Maintenance Due   Topic Date Due    A1C test (Diabetic or Prediabetic)  12/09/2020

## 2020-12-14 ENCOUNTER — OFFICE VISIT (OUTPATIENT)
Dept: ENDOCRINOLOGY | Age: 80
End: 2020-12-14
Payer: MEDICARE

## 2020-12-14 VITALS
HEIGHT: 60 IN | BODY MASS INDEX: 39.46 KG/M2 | OXYGEN SATURATION: 96 % | DIASTOLIC BLOOD PRESSURE: 77 MMHG | TEMPERATURE: 97.2 F | SYSTOLIC BLOOD PRESSURE: 151 MMHG | HEART RATE: 74 BPM | WEIGHT: 201 LBS | RESPIRATION RATE: 18 BRPM

## 2020-12-14 DIAGNOSIS — E11.65 TYPE 2 DIABETES MELLITUS WITH HYPERGLYCEMIA, WITHOUT LONG-TERM CURRENT USE OF INSULIN (HCC): Primary | Chronic | ICD-10-CM

## 2020-12-14 DIAGNOSIS — I10 ESSENTIAL HYPERTENSION: Chronic | ICD-10-CM

## 2020-12-14 DIAGNOSIS — E78.00 HYPERCHOLESTEROLEMIA: Chronic | ICD-10-CM

## 2020-12-14 LAB — HBA1C MFR BLD HPLC: 6.2 %

## 2020-12-14 PROCEDURE — 1101F PT FALLS ASSESS-DOCD LE1/YR: CPT | Performed by: INTERNAL MEDICINE

## 2020-12-14 PROCEDURE — 1090F PRES/ABSN URINE INCON ASSESS: CPT | Performed by: INTERNAL MEDICINE

## 2020-12-14 PROCEDURE — 83036 HEMOGLOBIN GLYCOSYLATED A1C: CPT | Performed by: INTERNAL MEDICINE

## 2020-12-14 PROCEDURE — G8427 DOCREV CUR MEDS BY ELIG CLIN: HCPCS | Performed by: INTERNAL MEDICINE

## 2020-12-14 PROCEDURE — G8417 CALC BMI ABV UP PARAM F/U: HCPCS | Performed by: INTERNAL MEDICINE

## 2020-12-14 PROCEDURE — G8753 SYS BP > OR = 140: HCPCS | Performed by: INTERNAL MEDICINE

## 2020-12-14 PROCEDURE — G8432 DEP SCR NOT DOC, RNG: HCPCS | Performed by: INTERNAL MEDICINE

## 2020-12-14 PROCEDURE — G8400 PT W/DXA NO RESULTS DOC: HCPCS | Performed by: INTERNAL MEDICINE

## 2020-12-14 PROCEDURE — 99214 OFFICE O/P EST MOD 30 MIN: CPT | Performed by: INTERNAL MEDICINE

## 2020-12-14 PROCEDURE — G8536 NO DOC ELDER MAL SCRN: HCPCS | Performed by: INTERNAL MEDICINE

## 2020-12-14 PROCEDURE — G8754 DIAS BP LESS 90: HCPCS | Performed by: INTERNAL MEDICINE

## 2020-12-14 NOTE — PROGRESS NOTES
Daniela Campos is a 67 y.o. female here for   Chief Complaint   Patient presents with    Diabetes       1. Have you been to the ER, urgent care clinic since your last visit? Hospitalized since your last visit? -no    2. Have you seen or consulted any other health care providers outside of the [address] [place] [address] since your last visit?   Include any pap smears or colon screening.-no

## 2020-12-14 NOTE — PROGRESS NOTES
Eboni Forbes MD        Patient Information  Date:12/14/2020  Name : Janina Gilbert 67 y.o.     YOB: 1940         Referred by: Rolando Portillo MD         Chief Complaint   Patient presents with    Diabetes       History of Present Illness: Janina Gilbert is a 67 y.o. female here for fu of  Type 2 Diabetes Mellitus. Type 2 Diabetes was diagnosed in 10/2014 . End organ effects of diabetes: none. Cardiovascular risk factors: family history, dyslipidemia, diabetes mellitus   Monitoring frequency: 3 times a week. Blood glucose are at goal  Lost her  2020,  Added Actos October 2019, decreased to 15 mg     Could not tolerate Metformin IR, XR formualtion. Hyperlipidemia - was on tricor, zetia, colestipol, Niacin,    Wt Readings from Last 3 Encounters:   12/14/20 201 lb (91.2 kg)   12/01/20 199 lb 8.3 oz (90.5 kg)   07/15/20 201 lb (91.2 kg)       BP Readings from Last 3 Encounters:   12/14/20 (!) 151/77   12/01/20 (!) 143/74   07/15/20 111/79           Past Medical History:   Diagnosis Date    Arthritis 2/28/2011    OSTEO    Chronic pain     RIGHT KNEE    Diabetes ([address]     GERD (gastroesophageal reflux disease)     Hot flashes, menopausal     Hypercholesterolemia     Hypertension     Ill-defined condition     Elevated liver enzymes (has family history)    Nausea & vomiting     Seizures (HCC) 1980'S    AFTER DRINKING 2 MARGARITAS    Vitamin D deficiency 6/28/2010     Current Outpatient Medications   Medication Sig    Lactobacillus acidophilus (PROBIOTIC PO) Take 1 Tab by mouth daily.  losartan-hydroCHLOROthiazide (HYZAAR) 100-25 mg per tablet TAKE 1 TABLET DAILY    ezetimibe (ZETIA) 10 mg tablet TAKE 1 TABLET DAILY    esomeprazole (NEXIUM) 40 mg capsule Take 1 Cap by mouth daily.     glucose blood VI test strips (FreeStyle Lite Strips) strip TEST ONCE A DAY DX CODE: E11.65    pioglitazone (ACTOS) 30 mg tablet Take 1 Tab by mouth every morning. (Patient taking differently: Take 30 mg by mouth every morning. Patient took 1/2 tablet this am)    dilTIAZem CD (CARDIZEM CD) 180 mg ER capsule TAKE 1 CAPSULE DAILY    dulaglutide (TRULICITY) 1.5 CESIA/6.3 mL sub-q pen 0.5 mL by SubCUTAneous route every seven (7) days. Stop Byduereon    cloNIDine HCl (CATAPRES) 0.1 mg tablet TAKE 1 TABLET NIGHTLY    loratadine (CLARITIN) 10 mg tablet TAKE 1 TABLET DAILY    TRICOR 145 mg tablet TAKE 1 TABLET DAILY    aspirin 81 mg chewable tablet Take 81 mg by mouth daily.  lancets (FREESTYLE LANCETS) 28 gauge misc TEST ONCE DAILY DX: E11.65    omega-3 fatty acids-vitamin e (FISH OIL) 1,000 mg cap Take 2 Caps by mouth two (2) times a day.  cholecalciferol, vitamin d3, (VITAMIN D) 1,000 unit tablet Take 1,000 Units by mouth two (2) times a day. No current facility-administered medications for this visit. Allergies   Allergen Reactions    Other Food Nausea and Vomiting     Artificial sweeteners - gas and beldging    Ciprofloxacin Nausea Only    Erythromycin Nausea Only    Metformin Other (comments) and Nausea Only     Felt bad  Felt bad      Pcn [Penicillins] Swelling     Caused lips to swell  Tolerates Amoxicillin         Review of Systems:  - Constitutional Symptoms: no fevers, no chills,   - Eyes: no blurry vision no double vision  - Cardiovascular: no chest pain ,no palpitations  - Respiratory: no cough no shortness of breath  - Gastrointestinal: no dysphagia no  abdominal pain  - Musculoskeletal: + joint pains no  weakness  - Integumentary: no rashes  -     Physical Examination:   Blood pressure (!) 151/77, pulse 74, temperature 97.2 °F (36.2 °C), temperature source Oral, resp. rate 18, height 5' (1.524 m), weight 201 lb (91.2 kg), SpO2 96 %. Estimated body mass index is 39.26 kg/m² as calculated from the following:    Height as of this encounter: 5' (1.524 m).   -   Weight as of this encounter: 201 lb (91.2 kg).  - General: pleasant, no distress, good eye contact  - HEENT: no pallor, no periorbital edema, EOMI  - Neck: supple,  - Cardiovascular: regular, normal rate, normal S1 and S2  - Respiratory: clear to auscultation bilaterally  - Gastrointestinal: soft, nontender,  - Musculoskeletal: no proximal muscle weakness in upper or lower extremities  - Integumentary: alert and oriented , foot 12/17   - Psychiatric: normal mood and affect  - Skin: color, texture, turgor normal.       Data Reviewed:         Lab Results   Component Value Date/Time    Hemoglobin A1c 6.3 (H) 06/09/2020 12:11 PM    Hemoglobin A1c 6.9 (H) 01/13/2020 11:05 AM    Hemoglobin A1c 8.0 (H) 10/04/2019 11:06 AM    Glucose 144 (H) 09/09/2020 03:00 PM    Glucose (POC) 105 (H) 12/01/2020 12:29 PM    Microalbumin/Creat ratio (mg/g creat) 163 (H) 01/13/2020 11:05 AM    Microalbumin,urine random 15.30 01/13/2020 11:05 AM    LDL, calculated 75.6 06/09/2020 12:11 PM    Creatinine 1.16 (H) 09/09/2020 03:00 PM      Lab Results   Component Value Date/Time    Cholesterol, total 158 06/09/2020 12:11 PM    HDL Cholesterol 54 06/09/2020 12:11 PM    LDL, calculated 75.6 06/09/2020 12:11 PM    Triglyceride 142 06/09/2020 12:11 PM    CHOL/HDL Ratio 2.9 06/09/2020 12:11 PM     Lab Results   Component Value Date/Time    ALT (SGPT) 27 09/09/2020 03:00 PM    Alk. phosphatase 47 06/09/2020 12:11 PM    Bilirubin, total 0.4 06/09/2020 12:11 PM     Lab Results   Component Value Date/Time    TSH 1.220 01/29/2018 02:47 PM    T4, Free 1.55 09/09/2015 02:16 PM      Lab Results   Component Value Date/Time    Glucose 144 (H) 09/09/2020 03:00 PM    Glucose (POC) 105 (H) 12/01/2020 12:29 PM        Assessment/Plan:     1. Type 2 diabetes mellitus with hyperglycemia, without long-term current use of insulin ([address]    2. Essential hypertension    3. Hypercholesterolemia        1.  Type 2 Diabetes Mellitus   Lab Results   Component Value Date/Time    Hemoglobin A1c 6.3 (H) 06/09/2020 12:11 PM Hemoglobin A1c (POC) 6.2 12/14/2020 11:21 AM   controlled  Actos 15 mg: Discontinued December 4577  Trulicity  FLU annually ,Pneumovax ,aspirin daily,annual eye exam,microalbumin    2. HTN : Continue current therapy     3. Mixed Hyperlipidemia : low carb diet. Statin intolerance hx,   Continue Zetia, Tricor    4. Obesity:Body mass index is 39.26 kg/m². Discussed about the importance of exercise and carbohydrate portion control. 5.  GI symptoms: work up was negative  It was due to artificial sweetners according to the patient      Thank you for allowing me to participate in the care of this patient.     Alan Bryant MD    Patient verbalized understanding

## 2020-12-14 NOTE — LETTER
12/17/2020 Patient: Dedrick Bernard YOB: 1940 Date of Visit: 12/14/2020 Bhumika Sewell MD 
[address] [address] [address] [employer] Dear Bhumika Sewell MD, Thank you for referring Ms. Yaritza Nichols to [address] [address] for evaluation. My notes for this consultation are attached. If you have questions, please do not hesitate to call me. I look forward to following your patient along with you. Sincerely, Jannet George MD

## 2020-12-15 ENCOUNTER — LAB ONLY (OUTPATIENT)
Dept: FAMILY MEDICINE CLINIC | Age: 80
End: 2020-12-15

## 2020-12-15 DIAGNOSIS — E78.00 HYPERCHOLESTEROLEMIA: ICD-10-CM

## 2020-12-15 DIAGNOSIS — E11.21 TYPE 2 DIABETES MELLITUS WITH NEPHROPATHY (HCC): ICD-10-CM

## 2020-12-15 DIAGNOSIS — I10 ESSENTIAL HYPERTENSION: Chronic | ICD-10-CM

## 2020-12-15 LAB
ALBUMIN SERPL-MCNC: 3.9 G/DL (ref 3.5–5)
ALBUMIN/GLOB SERPL: 1.1 {RATIO} (ref 1.1–2.2)
ALP SERPL-CCNC: 59 U/L (ref 45–117)
ALT SERPL-CCNC: 24 U/L (ref 12–78)
ANION GAP SERPL CALC-SCNC: 7 MMOL/L (ref 5–15)
AST SERPL-CCNC: 17 U/L (ref 15–37)
BILIRUB SERPL-MCNC: 0.3 MG/DL (ref 0.2–1)
BUN SERPL-MCNC: 19 MG/DL (ref 6–20)
BUN/CREAT SERPL: 20 (ref 12–20)
CALCIUM SERPL-MCNC: 9.8 MG/DL (ref 8.5–10.1)
CHLORIDE SERPL-SCNC: 101 MMOL/L (ref 97–108)
CHOLEST SERPL-MCNC: 132 MG/DL
CO2 SERPL-SCNC: 30 MMOL/L (ref 21–32)
CREAT SERPL-MCNC: 0.93 MG/DL (ref 0.55–1.02)
ERYTHROCYTE [DISTWIDTH] IN BLOOD BY AUTOMATED COUNT: 14 % (ref 11.5–14.5)
EST. AVERAGE GLUCOSE BLD GHB EST-MCNC: 134 MG/DL
GLOBULIN SER CALC-MCNC: 3.4 G/DL (ref 2–4)
GLUCOSE SERPL-MCNC: 123 MG/DL (ref 65–100)
HBA1C MFR BLD: 6.3 % (ref 4–5.6)
HCT VFR BLD AUTO: 44.9 % (ref 35–47)
HDLC SERPL-MCNC: 57 MG/DL
HDLC SERPL: 2.3 {RATIO} (ref 0–5)
HGB BLD-MCNC: 14 G/DL (ref 11.5–16)
LDLC SERPL CALC-MCNC: 43.2 MG/DL (ref 0–100)
LIPID PROFILE,FLP: ABNORMAL
MCH RBC QN AUTO: 28.4 PG (ref 26–34)
MCHC RBC AUTO-ENTMCNC: 31.2 G/DL (ref 30–36.5)
MCV RBC AUTO: 91.1 FL (ref 80–99)
NRBC # BLD: 0 K/UL (ref 0–0.01)
NRBC BLD-RTO: 0 PER 100 WBC
PLATELET # BLD AUTO: 323 K/UL (ref 150–400)
PMV BLD AUTO: 10 FL (ref 8.9–12.9)
POTASSIUM SERPL-SCNC: 4.1 MMOL/L (ref 3.5–5.1)
PROT SERPL-MCNC: 7.3 G/DL (ref 6.4–8.2)
RBC # BLD AUTO: 4.93 M/UL (ref 3.8–5.2)
SODIUM SERPL-SCNC: 138 MMOL/L (ref 136–145)
TRIGL SERPL-MCNC: 159 MG/DL (ref ?–150)
VLDLC SERPL CALC-MCNC: 31.8 MG/DL
WBC # BLD AUTO: 6.3 K/UL (ref 3.6–11)

## 2020-12-23 DIAGNOSIS — I10 ESSENTIAL HYPERTENSION: ICD-10-CM

## 2020-12-23 RX ORDER — CLONIDINE HYDROCHLORIDE 0.1 MG/1
TABLET ORAL
Qty: 90 TAB | Refills: 3 | Status: SHIPPED | OUTPATIENT
Start: 2020-12-23 | End: 2021-11-29

## 2020-12-28 DIAGNOSIS — E11.65 TYPE 2 DIABETES MELLITUS WITH HYPERGLYCEMIA, WITHOUT LONG-TERM CURRENT USE OF INSULIN (HCC): Chronic | ICD-10-CM

## 2020-12-29 RX ORDER — DULAGLUTIDE 1.5 MG/.5ML
INJECTION, SOLUTION SUBCUTANEOUS
Qty: 6 ML | Refills: 3 | Status: SHIPPED | OUTPATIENT
Start: 2020-12-29 | End: 2021-11-08

## 2021-02-08 RX ORDER — EZETIMIBE 10 MG/1
TABLET ORAL
Qty: 90 TAB | Refills: 3 | Status: SHIPPED | OUTPATIENT
Start: 2021-02-08 | End: 2022-02-04

## 2021-03-01 DIAGNOSIS — I10 ESSENTIAL HYPERTENSION: Chronic | ICD-10-CM

## 2021-03-01 RX ORDER — LOSARTAN POTASSIUM AND HYDROCHLOROTHIAZIDE 25; 100 MG/1; MG/1
TABLET ORAL
Qty: 90 TAB | Refills: 1 | Status: SHIPPED | OUTPATIENT
Start: 2021-03-01 | End: 2021-07-18

## 2021-03-04 RX ORDER — LORATADINE 10 MG/1
TABLET ORAL
Qty: 90 TAB | Refills: 3 | Status: SHIPPED | OUTPATIENT
Start: 2021-03-04 | End: 2022-02-28

## 2021-03-10 ENCOUNTER — OFFICE VISIT (OUTPATIENT)
Dept: FAMILY MEDICINE CLINIC | Age: 81
End: 2021-03-10
Payer: MEDICARE

## 2021-03-10 VITALS
SYSTOLIC BLOOD PRESSURE: 142 MMHG | HEART RATE: 73 BPM | BODY MASS INDEX: 39.7 KG/M2 | WEIGHT: 202.2 LBS | HEIGHT: 60 IN | TEMPERATURE: 97.3 F | DIASTOLIC BLOOD PRESSURE: 79 MMHG | OXYGEN SATURATION: 95 % | RESPIRATION RATE: 18 BRPM

## 2021-03-10 DIAGNOSIS — I10 ESSENTIAL HYPERTENSION: Primary | ICD-10-CM

## 2021-03-10 DIAGNOSIS — E11.21 TYPE 2 DIABETES MELLITUS WITH NEPHROPATHY (HCC): ICD-10-CM

## 2021-03-10 DIAGNOSIS — E78.00 HYPERCHOLESTEROLEMIA: ICD-10-CM

## 2021-03-10 DIAGNOSIS — K21.9 GASTROESOPHAGEAL REFLUX DISEASE WITHOUT ESOPHAGITIS: ICD-10-CM

## 2021-03-10 PROCEDURE — G8427 DOCREV CUR MEDS BY ELIG CLIN: HCPCS | Performed by: FAMILY MEDICINE

## 2021-03-10 PROCEDURE — G8753 SYS BP > OR = 140: HCPCS | Performed by: FAMILY MEDICINE

## 2021-03-10 PROCEDURE — G8417 CALC BMI ABV UP PARAM F/U: HCPCS | Performed by: FAMILY MEDICINE

## 2021-03-10 PROCEDURE — G8510 SCR DEP NEG, NO PLAN REQD: HCPCS | Performed by: FAMILY MEDICINE

## 2021-03-10 PROCEDURE — G8400 PT W/DXA NO RESULTS DOC: HCPCS | Performed by: FAMILY MEDICINE

## 2021-03-10 PROCEDURE — G8754 DIAS BP LESS 90: HCPCS | Performed by: FAMILY MEDICINE

## 2021-03-10 PROCEDURE — 1090F PRES/ABSN URINE INCON ASSESS: CPT | Performed by: FAMILY MEDICINE

## 2021-03-10 PROCEDURE — 1101F PT FALLS ASSESS-DOCD LE1/YR: CPT | Performed by: FAMILY MEDICINE

## 2021-03-10 PROCEDURE — 99214 OFFICE O/P EST MOD 30 MIN: CPT | Performed by: FAMILY MEDICINE

## 2021-03-10 PROCEDURE — G8536 NO DOC ELDER MAL SCRN: HCPCS | Performed by: FAMILY MEDICINE

## 2021-03-10 NOTE — PROGRESS NOTES
[facility]    History of Present Illness:   Gracie Reddy is a 79 y.o. female with history of Diabetes, HTN, HLD, GERD  CC: Follow up Chronic conditions  History provided by patient and Records    HPI:  Diabetes Follow up: Overall the patient feels well with good energy level. Current Medications:   Key Antihyperglycemic Medications             Trulicity 1.5 EC/5.0 mL sub-q pen (Taking) INJECT 0.5 ML UNDER THE SKIN EVERY 7 DAYS      . Insulin dependence: NO   Frequency of home glucose testing: Weekly   Blood Sugar range at home: Controlled    Last eye exam: In past 12 months. Last foot exam: Not this year. Polyuria, polyphagia or polydipsia: NO   Retinopathy: NO   Neuropathy SX: NO   Low blood sugar symptoms: NO   Dietary compliance: compliant most of the time   Medication compliance: compliant most of the time   On ASA: YES   Tobacco Use: NO   Depression: NO     Wt Readings from Last 3 Encounters:   03/10/21 202 lb 3.2 oz (91.7 kg)   12/14/20 201 lb (91.2 kg)   12/01/20 199 lb 8.3 oz (90.5 kg)     Hypertension Follow up:  Currently Taking Losartan-HCTZ 100-25mg, Diltiazem 180 mg, Clonidine 0.1 mg daily. The patient reports:  taking medications as instructed, no medication side effects noted, home BP monitoring in range of 134-998'U systolic over 14-65'Y diastolic but sometime into the 170's/90's, no TIA's, no chest pain on exertion, no dyspnea on exertion, no swelling of ankles. BP Readings from Last 3 Encounters:   03/10/21 (!) 142/79   12/14/20 (!) 151/77   12/01/20 (!) 143/74      Hypertriglyceridemia Follow up:   Cardiovascular risks for her are: diabetic  hypertension  hyperlipidemia. Currently she takes Ezetimibe 10 mg, Tricor 145 mg.    Myalgias: NO   Fatigue: NO   Other side effects: NO     Wt Readings from Last 3 Encounters:   03/10/21 202 lb 3.2 oz (91.7 kg)   12/14/20 201 lb (91.2 kg)   12/01/20 199 lb 8.3 oz (90.5 kg)      GERD:  Well controlled at this time.    Health Maintenance  Health Maintenance Due   Topic Date Due    Hepatitis C Screening  Never done    COVID-19 Vaccine (1 of 2) Never done    Shingrix Vaccine Age 50> (2 of 2) 01/12/2021    MICROALBUMIN Q1  01/13/2021       Past Medical, Family, and Social History:     Current Outpatient Medications on File Prior to Visit   Medication Sig Dispense Refill    loratadine (CLARITIN) 10 mg tablet TAKE 1 TABLET DAILY 90 Tab 3    losartan-hydroCHLOROthiazide (HYZAAR) 100-25 mg per tablet TAKE 1 TABLET DAILY 90 Tab 1    ezetimibe (ZETIA) 10 mg tablet TAKE 1 TABLET DAILY 90 Tab 3    Trulicity 1.5 WS/5.3 mL sub-q pen INJECT 0.5 ML UNDER THE SKIN EVERY 7 DAYS 6 mL 3    cloNIDine HCL (CATAPRES) 0.1 mg tablet TAKE 1 TABLET NIGHTLY 90 Tab 3    Tricor 145 mg tablet TAKE 1 TABLET DAILY 90 Tab 1    Lactobacillus acidophilus (PROBIOTIC PO) Take 1 Tab by mouth daily.  esomeprazole (NEXIUM) 40 mg capsule Take 1 Cap by mouth daily. 90 Cap 3    glucose blood VI test strips (FreeStyle Lite Strips) strip TEST ONCE A DAY DX CODE: E11.65 100 Strip 3    dilTIAZem CD (CARDIZEM CD) 180 mg ER capsule TAKE 1 CAPSULE DAILY 90 Cap 2    aspirin 81 mg chewable tablet Take 81 mg by mouth daily.  lancets (FREESTYLE LANCETS) 28 gauge misc TEST ONCE DAILY DX: E11.65 100 Lancet 3    omega-3 fatty acids-vitamin e (FISH OIL) 1,000 mg cap Take 2 Caps by mouth two (2) times a day.  cholecalciferol, vitamin d3, (VITAMIN D) 1,000 unit tablet Take 1,000 Units by mouth two (2) times a day. No current facility-administered medications on file prior to visit.         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    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       Social History     Socioeconomic History    Marital status:      Spouse name: Not on file    Number of children: Not on file    Years of education: Not on file    Highest education level: Not on file   Occupational History    Not on file   Social Needs    Financial resource strain: Not on file    Food insecurity     Worry: Not on file     Inability: Not on file    Transportation needs     Medical: Not on file     Non-medical: Not on file   Tobacco Use    Smoking status: Former Smoker     Packs/day: 1.00     Years: 20.00     Pack years: 20.00     Quit date:      Years since quittin.2    Smokeless tobacco: Never Used   Substance and Sexual Activity    Alcohol use: Yes     Comment: rarely - 1 glass of wine every 3 months    Drug use: No    Sexual activity: Yes   Lifestyle    Physical activity     Days per week: Not on file     Minutes per session: Not on file    Stress: Not on file   Relationships    Social connections     Talks on phone: Not on file     Gets together: Not on file     Attends Confucianism service: Not on file     Active member of club or organization: Not on file     Attends meetings of clubs or organizations: Not on file     Relationship status: Not on file    Intimate partner violence     Fear of current or ex partner: Not on file     Emotionally abused: Not on file     Physically abused: Not on file     Forced sexual activity: Not on file   Other Topics Concern    Not on file   Social History Narrative    Not on file       Review of Systems   Review of Systems   Constitutional: Negative for chills, fever and malaise/fatigue. HENT: Negative for congestion. Respiratory: Negative for cough. Cardiovascular: Negative for chest pain and palpitations.    Genitourinary: Negative for dysuria, frequency and urgency. Endo/Heme/Allergies: Negative for polydipsia. Objective:     Visit Vitals  BP (!) 142/79 (BP 1 Location: Right arm, BP Patient Position: Sitting, BP Cuff Size: Adult)   Pulse 73   Temp 97.3 °F (36.3 °C) (Temporal)   Resp 18   Ht 5' (1.524 m)   Wt 202 lb 3.2 oz (91.7 kg)   SpO2 95%   BMI 39.49 kg/m²        Physical Exam  Vitals signs and nursing note reviewed. Constitutional:       Appearance: Normal appearance. HENT:      Head: Normocephalic and atraumatic. Neck:      Musculoskeletal: Normal range of motion and neck supple. Cardiovascular:      Rate and Rhythm: Normal rate and regular rhythm. Pulses: Normal pulses. Heart sounds: Normal heart sounds. No murmur. No friction rub. No gallop. Pulmonary:      Effort: Pulmonary effort is normal.      Breath sounds: Normal breath sounds. Abdominal:      General: Abdomen is flat. Bowel sounds are normal.      Palpations: Abdomen is soft. Musculoskeletal: Normal range of motion. Skin:     General: Skin is warm and dry. Neurological:      Mental Status: She is alert. Diabetic foot exam:     Left Foot:   Visual Exam: normal    Pulse DP: 2+ (normal)   Filament test: normal sensation    Vibratory sensation: normal      Right Foot:   Visual Exam: normal    Pulse DP: 2+ (normal)   Filament test: normal sensation    Vibratory sensation: normal    Pertinent Labs/Studies:      Assessment and orders:       ICD-10-CM ICD-9-CM    1. Essential hypertension  I10 401.9    2. Hypercholesterolemia  E78.00 272.0    3. Type 2 diabetes mellitus with nephropathy (HCC)  E11.21 250.40  DIABETES FOOT EXAM     583.81    4. Gastroesophageal reflux disease without esophagitis  K21.9 530.81      Diagnoses and all orders for this visit:    1. Essential hypertension: Our goal is to normalize the blood pressure to decrease the risks of strokes and heart attacks. Noting BP elevates toward evening.   Starting Clonidine in the midday/noo period to controll BP better. 2. Hypercholesterolemia The patient is aware of our goal to reduce or eliminate the long term problems (such as strokes and heart attacks) related to poorly controlled Triglycerides, LDL, Cholesterol. Condition and symptoms are well controlled at this time. No changes to therapy at this time. 3. Type 2 diabetes mellitus with nephropathy [facility]): Discussed with patient today that the goal for their diabetes is to have a HgA1C<7 and ideally as close to 6.5 as possible. We discussed diet and medications. The goal for the cholesterol LDL is less than 70 and HDL>40. Patient is aware of the need for yearly eye exams and to take care of their feet daily. Discussed with patient that blood pressure should be less than 130/80 and watching salt intake is very important.   -      DIABETES FOOT EXAM    4. Gastroesophageal reflux disease without esophagitis: Stable, continue current medications      Follow-up and Dispositions    · Return in about 3 months (around 6/10/2021). I have discussed the diagnosis with the patient and the intended plan as seen in the above orders. Social history, medical history, and labs were reviewed. The patient has received an after-visit summary and questions were answered concerning future plans. I have discussed medication side effects and warnings with the patient as well.     MD INA Weir & SONYA BUSCH [facility]  03/11/21

## 2021-03-10 NOTE — PATIENT INSTRUCTIONS
- Shift Clonidine to Midday/Noon [address] [facility] with [address] [place] [address], [facility] [address] [place], [facility] 
[phone] Log blood pressures at home while sitting, relaxed 3-5 times weekly and bring to next visit. Goal BP of 130/80 on average or lower. Call office as soon as possible if BP's over 140/90 or below 110/50 on multiple occasions and/or with symptoms of dizziness, chest pain, shortness of breath, headache or ankle swelling. Recheck Log and BP in office in [unfilled] Blood Pressure Record Patient Name:  ______________________ :  ______________________ Date/Time BP Reading Pulse

## 2021-03-10 NOTE — PROGRESS NOTES
Chief Complaint   Patient presents with    Follow Up Chronic Condition     Visit Vitals  BP (!) 142/79 (BP 1 Location: Right arm, BP Patient Position: Sitting, BP Cuff Size: Adult)   Pulse 73   Temp 97.3 °F (36.3 °C) (Temporal)   Resp 18   Ht 5' (1.524 m)   Wt 202 lb 3.2 oz (91.7 kg)   SpO2 95%   BMI 39.49 kg/m²     1. Have you been to the ER, urgent care clinic since your last visit? Hospitalized since your last visit? No    2. Have you seen or consulted any other health care providers outside of the [address] [place] [address] since your last visit? Include any pap smears or colon screening.  No    Reviewed record in preparation for visit and have necessary documentation  Pt did not bring medication to office visit for review  opportunity was given for questions  Goals that were addressed and/or need to be completed during or after this appointment include   Health Maintenance Due   Topic Date Due    Hepatitis C Screening  Never done    COVID-19 Vaccine (1 of 2) Never done    Shingrix Vaccine Age 50> (2 of 2) 01/12/2021    MICROALBUMIN Q1  01/13/2021    Foot Exam Q1  03/09/2021

## 2021-03-12 RX ORDER — DILTIAZEM HYDROCHLORIDE 180 MG/1
CAPSULE, COATED, EXTENDED RELEASE ORAL
Qty: 90 CAP | Refills: 1 | Status: SHIPPED | OUTPATIENT
Start: 2021-03-12 | End: 2021-09-09

## 2021-04-21 ENCOUNTER — OFFICE VISIT (OUTPATIENT)
Dept: ENDOCRINOLOGY | Age: 81
End: 2021-04-21
Payer: MEDICARE

## 2021-04-21 VITALS
DIASTOLIC BLOOD PRESSURE: 72 MMHG | BODY MASS INDEX: 39.66 KG/M2 | WEIGHT: 202 LBS | HEART RATE: 66 BPM | HEIGHT: 60 IN | RESPIRATION RATE: 16 BRPM | SYSTOLIC BLOOD PRESSURE: 127 MMHG | TEMPERATURE: 97.2 F | OXYGEN SATURATION: 96 %

## 2021-04-21 DIAGNOSIS — E78.00 HYPERCHOLESTEROLEMIA: Chronic | ICD-10-CM

## 2021-04-21 DIAGNOSIS — I10 ESSENTIAL HYPERTENSION: Chronic | ICD-10-CM

## 2021-04-21 DIAGNOSIS — E11.65 TYPE 2 DIABETES MELLITUS WITH HYPERGLYCEMIA, WITHOUT LONG-TERM CURRENT USE OF INSULIN (HCC): Primary | Chronic | ICD-10-CM

## 2021-04-21 LAB — HBA1C MFR BLD HPLC: 6.9 %

## 2021-04-21 PROCEDURE — G8754 DIAS BP LESS 90: HCPCS | Performed by: INTERNAL MEDICINE

## 2021-04-21 PROCEDURE — 1101F PT FALLS ASSESS-DOCD LE1/YR: CPT | Performed by: INTERNAL MEDICINE

## 2021-04-21 PROCEDURE — G8427 DOCREV CUR MEDS BY ELIG CLIN: HCPCS | Performed by: INTERNAL MEDICINE

## 2021-04-21 PROCEDURE — G8417 CALC BMI ABV UP PARAM F/U: HCPCS | Performed by: INTERNAL MEDICINE

## 2021-04-21 PROCEDURE — 99214 OFFICE O/P EST MOD 30 MIN: CPT | Performed by: INTERNAL MEDICINE

## 2021-04-21 PROCEDURE — G8400 PT W/DXA NO RESULTS DOC: HCPCS | Performed by: INTERNAL MEDICINE

## 2021-04-21 PROCEDURE — G8752 SYS BP LESS 140: HCPCS | Performed by: INTERNAL MEDICINE

## 2021-04-21 PROCEDURE — G8536 NO DOC ELDER MAL SCRN: HCPCS | Performed by: INTERNAL MEDICINE

## 2021-04-21 PROCEDURE — G8510 SCR DEP NEG, NO PLAN REQD: HCPCS | Performed by: INTERNAL MEDICINE

## 2021-04-21 PROCEDURE — 83036 HEMOGLOBIN GLYCOSYLATED A1C: CPT | Performed by: INTERNAL MEDICINE

## 2021-04-21 PROCEDURE — 1090F PRES/ABSN URINE INCON ASSESS: CPT | Performed by: INTERNAL MEDICINE

## 2021-04-21 NOTE — PROGRESS NOTES
Mansi Paniagua is a 79 y.o. female here for   Chief Complaint   Patient presents with    Diabetes       1. Have you been to the ER, urgent care clinic since your last visit? Hospitalized since your last visit? -no    2. Have you seen or consulted any other health care providers outside of the [address] [place] [address] since your last visit?   Include any pap smears or colon screening.-no

## 2021-04-21 NOTE — PROGRESS NOTES
Rebekah Flanagan MD        Patient Information  Date:4/21/2021  Name : Drake Bergeron 79 y.o.     YOB: 1940         Referred by: Dori Reed MD         Chief Complaint   Patient presents with    Diabetes       History of Present Illness: Drake Bergeron is a 79 y.o. female here for fu of  Type 2 Diabetes Mellitus. Type 2 Diabetes was diagnosed in 10/2014 . End organ effects of diabetes: none. Cardiovascular risk factors: family history, dyslipidemia, diabetes mellitus   Monitoring frequency: 3 times a week. Blood glucose are at goal  Lost her  in 2020, she goes to aerobic classes  Added Actos October 2019, decreased to 15 mg, discontinued December 2020      Could not tolerate Metformin IR, XR formualtion.         Hyperlipidemia - was on tricor, zetia, colestipol, Niacin,    Wt Readings from Last 3 Encounters:   04/21/21 202 lb (91.6 kg)   03/10/21 202 lb 3.2 oz (91.7 kg)   12/14/20 201 lb (91.2 kg)       BP Readings from Last 3 Encounters:   04/21/21 127/72   03/10/21 (!) 142/79   12/14/20 (!) 151/77           Past Medical History:   Diagnosis Date    Arthritis 2/28/2011    OSTEO    Chronic pain     RIGHT KNEE    Diabetes ([address]     GERD (gastroesophageal reflux disease)     Hot flashes, menopausal     Hypercholesterolemia     Hypertension     Ill-defined condition     Elevated liver enzymes (has family history)    Nausea & vomiting     Seizures (HCC) 1980'S    AFTER DRINKING 2 MARGARITAS    Vitamin D deficiency 6/28/2010     Current Outpatient Medications   Medication Sig    dilTIAZem ER (CARDIZEM CD) 180 mg capsule TAKE 1 CAPSULE DAILY    loratadine (CLARITIN) 10 mg tablet TAKE 1 TABLET DAILY    losartan-hydroCHLOROthiazide (HYZAAR) 100-25 mg per tablet TAKE 1 TABLET DAILY    ezetimibe (ZETIA) 10 mg tablet TAKE 1 TABLET DAILY    Trulicity 1.5 QN/9.9 mL sub-q pen INJECT 0.5 ML UNDER THE SKIN EVERY 7 DAYS    cloNIDine HCL (CATAPRES) 0.1 mg tablet TAKE 1 TABLET NIGHTLY (Patient taking differently: TAKE 1 TABLET NOON)    Tricor 145 mg tablet TAKE 1 TABLET DAILY    Lactobacillus acidophilus (PROBIOTIC PO) Take 1 Tab by mouth daily.  esomeprazole (NEXIUM) 40 mg capsule Take 1 Cap by mouth daily.  glucose blood VI test strips (FreeStyle Lite Strips) strip TEST ONCE A DAY DX CODE: E11.65    aspirin 81 mg chewable tablet Take 81 mg by mouth daily.  lancets (FREESTYLE LANCETS) 28 gauge misc TEST ONCE DAILY DX: E11.65    omega-3 fatty acids-vitamin e (FISH OIL) 1,000 mg cap Take 2 Caps by mouth two (2) times a day.  cholecalciferol, vitamin d3, (VITAMIN D) 1,000 unit tablet Take 1,000 Units by mouth two (2) times a day. No current facility-administered medications for this visit. Allergies   Allergen Reactions    Other Food Nausea and Vomiting     Artificial sweeteners - gas and beldging    Ciprofloxacin Nausea Only    Erythromycin Nausea Only    Metformin Other (comments) and Nausea Only     Felt bad  Felt bad      Pcn [Penicillins] Swelling     Caused lips to swell  Tolerates Amoxicillin         Review of Systems:  - Constitutional Symptoms: no fevers, no chills,   - Eyes: no blurry vision no double vision  - Cardiovascular: no chest pain ,no palpitations  - Respiratory: no cough no shortness of breath  - Gastrointestinal: no dysphagia no  abdominal pain  - Musculoskeletal: + joint pains no  weakness  - Integumentary: no rashes  -     Physical Examination:   Blood pressure 127/72, pulse 66, temperature 97.2 °F (36.2 °C), temperature source Oral, resp. rate 16, height 5' (1.524 m), weight 202 lb (91.6 kg), SpO2 96 %. Estimated body mass index is 39.45 kg/m² as calculated from the following:    Height as of this encounter: 5' (1.524 m). -   Weight as of this encounter: 202 lb (91.6 kg).   - General: pleasant, no distress, good eye contact  - HEENT: no pallor, no periorbital edema, EOMI  - Neck: supple,  - Cardiovascular: regular, normal rate, normal S1 and S2  - Respiratory: clear to auscultation bilaterally  -   - Musculoskeletal: no proximal muscle weakness in upper or lower extremities  - Integumentary: alert and oriented , foot 12/17   - Psychiatric: normal mood and affect  - Skin: color, texture, turgor normal.       Data Reviewed:         Lab Results   Component Value Date/Time    Hemoglobin A1c 6.3 (H) 12/15/2020 10:07 AM    Hemoglobin A1c 6.3 (H) 06/09/2020 12:11 PM    Hemoglobin A1c 6.9 (H) 01/13/2020 11:05 AM    Glucose 123 (H) 12/15/2020 10:07 AM    Glucose (POC) 105 (H) 12/01/2020 12:29 PM    Microalbumin/Creat ratio (mg/g creat) 163 (H) 01/13/2020 11:05 AM    Microalbumin,urine random 15.30 01/13/2020 11:05 AM    LDL, calculated 43.2 12/15/2020 10:07 AM    Creatinine 0.93 12/15/2020 10:07 AM      Lab Results   Component Value Date/Time    Cholesterol, total 132 12/15/2020 10:07 AM    HDL Cholesterol 57 12/15/2020 10:07 AM    LDL, calculated 43.2 12/15/2020 10:07 AM    Triglyceride 159 (H) 12/15/2020 10:07 AM    CHOL/HDL Ratio 2.3 12/15/2020 10:07 AM     Lab Results   Component Value Date/Time    ALT (SGPT) 24 12/15/2020 10:07 AM    Alk. phosphatase 59 12/15/2020 10:07 AM    Bilirubin, total 0.3 12/15/2020 10:07 AM     Lab Results   Component Value Date/Time    TSH 1.220 01/29/2018 02:47 PM    T4, Free 1.55 09/09/2015 02:16 PM      Lab Results   Component Value Date/Time    Glucose 123 (H) 12/15/2020 10:07 AM    Glucose (POC) 105 (H) 12/01/2020 12:29 PM        Assessment/Plan:     1. Type 2 diabetes mellitus with hyperglycemia, without long-term current use of insulin ([address]    2. Essential hypertension    3. Hypercholesterolemia        1.  Type 2 Diabetes Mellitus   Lab Results   Component Value Date/Time    Hemoglobin A1c 6.3 (H) 12/15/2020 10:07 AM    Hemoglobin A1c (POC) 6.2 12/14/2020 11:21 AM   controlled  Actos 15 mg: Discontinued December 2778  Trulicity  FLU annually ,Pneumovax ,aspirin daily,annual eye exam,microalbumin    2. HTN : Continue current therapy     3. Mixed Hyperlipidemia : low carb diet. Statin intolerance hx,   Continue Zetia, Tricor    4. Obesity:Body mass index is 39.45 kg/m². Discussed about the importance of exercise and carbohydrate portion control. 5.  GI symptoms: work up was negative  It was due to artificial sweetners according to the patient      Thank you for allowing me to participate in the care of this patient.     Yasmine oRbin MD    Patient verbalized understanding

## 2021-05-07 ENCOUNTER — TELEPHONE (OUTPATIENT)
Dept: FAMILY MEDICINE CLINIC | Age: 81
End: 2021-05-07

## 2021-05-07 DIAGNOSIS — K21.9 GASTROESOPHAGEAL REFLUX DISEASE WITHOUT ESOPHAGITIS: ICD-10-CM

## 2021-05-07 RX ORDER — ESOMEPRAZOLE MAGNESIUM 40 MG/1
40 CAPSULE, DELAYED RELEASE ORAL DAILY
Qty: 90 CAP | Refills: 3 | Status: SHIPPED | OUTPATIENT
Start: 2021-05-07 | End: 2021-05-10 | Stop reason: SDUPTHER

## 2021-05-07 NOTE — TELEPHONE ENCOUNTER
----- Message from Anthony Wade sent at 5/7/2021 12:50 PM EDT -----  Regarding: Dr. Dorina Live  Medication Refill    Caller (if not patient): n/a      Relationship of caller (if not patient): n/a      Best contact number(s): [phone]      Name of medication and dosage if known: \"esomeprazole\" magnesium 40 mg      Is patient out of this medication (yes/no): no      Pharmacy name: [facility]    Pharmacy listed in chart? (yes/no): yes  Pharmacy phone number: [phone]      Details to clarify the request: [address]

## 2021-05-10 RX ORDER — ESOMEPRAZOLE MAGNESIUM 40 MG/1
40 CAPSULE, DELAYED RELEASE ORAL DAILY
Qty: 90 CAP | Refills: 3 | Status: SHIPPED | OUTPATIENT
Start: 2021-05-10 | End: 2022-03-16

## 2021-05-10 NOTE — TELEPHONE ENCOUNTER
Reordered with allowing Generic.     MD INA Dutton & SONYA BUSCH [facility]  05/10/21

## 2021-05-10 NOTE — TELEPHONE ENCOUNTER
Aryan DEMPSEY [facility] Front [facility] [place]             General Message/Vendor Calls     Caller's first and last name: Mauricio Sevilla       Reason for call: [facility] called to advise Dr. Luisa Christian listed on refill of Nexium as \"Nexium only \" and she has been taking the generic form.  IF he wants her to take Nexium only then he has to do a prior authorization.        Callback required yes/no and why: yes to advise       Best contact number(s): [phone]       Details to clarify the request:       Susanna Dominguez

## 2021-06-09 ENCOUNTER — OFFICE VISIT (OUTPATIENT)
Dept: FAMILY MEDICINE CLINIC | Age: 81
End: 2021-06-09
Payer: MEDICARE

## 2021-06-09 VITALS
WEIGHT: 197 LBS | HEIGHT: 60 IN | DIASTOLIC BLOOD PRESSURE: 66 MMHG | OXYGEN SATURATION: 95 % | SYSTOLIC BLOOD PRESSURE: 136 MMHG | RESPIRATION RATE: 22 BRPM | HEART RATE: 68 BPM | BODY MASS INDEX: 38.68 KG/M2 | TEMPERATURE: 97.8 F

## 2021-06-09 DIAGNOSIS — E11.65 TYPE 2 DIABETES MELLITUS WITH HYPERGLYCEMIA, WITHOUT LONG-TERM CURRENT USE OF INSULIN (HCC): ICD-10-CM

## 2021-06-09 DIAGNOSIS — I10 ESSENTIAL HYPERTENSION: Primary | ICD-10-CM

## 2021-06-09 DIAGNOSIS — E78.00 HYPERCHOLESTEROLEMIA: ICD-10-CM

## 2021-06-09 DIAGNOSIS — Z11.59 ENCOUNTER FOR HEPATITIS C SCREENING TEST FOR LOW RISK PATIENT: ICD-10-CM

## 2021-06-09 DIAGNOSIS — M17.11 PRIMARY OSTEOARTHRITIS OF RIGHT KNEE: ICD-10-CM

## 2021-06-09 DIAGNOSIS — E66.01 MORBID OBESITY (HCC): ICD-10-CM

## 2021-06-09 PROCEDURE — 99214 OFFICE O/P EST MOD 30 MIN: CPT | Performed by: FAMILY MEDICINE

## 2021-06-09 PROCEDURE — G8510 SCR DEP NEG, NO PLAN REQD: HCPCS | Performed by: FAMILY MEDICINE

## 2021-06-09 PROCEDURE — 1101F PT FALLS ASSESS-DOCD LE1/YR: CPT | Performed by: FAMILY MEDICINE

## 2021-06-09 PROCEDURE — G8400 PT W/DXA NO RESULTS DOC: HCPCS | Performed by: FAMILY MEDICINE

## 2021-06-09 PROCEDURE — 1090F PRES/ABSN URINE INCON ASSESS: CPT | Performed by: FAMILY MEDICINE

## 2021-06-09 PROCEDURE — G8427 DOCREV CUR MEDS BY ELIG CLIN: HCPCS | Performed by: FAMILY MEDICINE

## 2021-06-09 PROCEDURE — G8752 SYS BP LESS 140: HCPCS | Performed by: FAMILY MEDICINE

## 2021-06-09 PROCEDURE — G8754 DIAS BP LESS 90: HCPCS | Performed by: FAMILY MEDICINE

## 2021-06-09 PROCEDURE — G8536 NO DOC ELDER MAL SCRN: HCPCS | Performed by: FAMILY MEDICINE

## 2021-06-09 PROCEDURE — G8417 CALC BMI ABV UP PARAM F/U: HCPCS | Performed by: FAMILY MEDICINE

## 2021-06-09 RX ORDER — DICLOFENAC SODIUM 10 MG/G
1 GEL TOPICAL 4 TIMES DAILY
Qty: 150 G | Refills: 1 | Status: SHIPPED | OUTPATIENT
Start: 2021-06-09

## 2021-06-09 RX ORDER — FENOFIBRATE 145 MG/1
TABLET, COATED ORAL
Qty: 90 TABLET | Refills: 1 | Status: SHIPPED | OUTPATIENT
Start: 2021-06-09 | End: 2021-12-27

## 2021-06-09 NOTE — PROGRESS NOTES
[facility]    History of Present Illness:   Aries Turner is a 79 y.o. female with history of Diabetes, HTN, HLD, GERD  CC: Follow up BP, Right knee pain  History provided by patient and Records    HPI:  Hypertension Follow up:  Currently Taking Diltiazem 180 mg, Hyzaar 100-25 mg, . The patient reports:  taking medications as instructed, no medication side effects noted, no TIA's, no chest pain on exertion, no dyspnea on exertion, no swelling of ankles, no orthostatic dizziness or lightheadedness, no orthopnea or paroxysmal nocturnal dyspnea. BP Readings from Last 3 Encounters:   06/09/21 136/66   04/21/21 127/72   03/10/21 (!) 142/79      Diabetes Follow up: Overall the patient feels well with good energy level. Current Medications:   Key Antihyperglycemic Medications             Trulicity 1.5 LY/7.7 mL sub-q pen (Taking) INJECT 0.5 ML UNDER THE SKIN EVERY 7 DAYS      . Insulin dependence: NO   Frequency of home glucose testing: Daily   Blood Sugar range at home: <150s    Last eye exam: In past 12 months. Last foot exam: This year. Polyuria, polyphagia or polydipsia: NO   Retinopathy: NO   Neuropathy SX: NO   Low blood sugar symptoms: NO   Dietary compliance: compliant most of the time   Medication compliance: compliant most of the time   On ASA: NO   Tobacco Use: NO   Depression: NO     Wt Readings from Last 3 Encounters:   06/09/21 197 lb (89.4 kg)   04/21/21 202 lb (91.6 kg)   03/10/21 202 lb 3.2 oz (91.7 kg)     OA Right Knee: Has been acting up in particular, had Injection 2 days ago. Health Maintenance  Health Maintenance Due   Topic Date Due    Hepatitis C Screening  Never done    Shingrix Vaccine Age 49> (2 of 2) 01/12/2021    MICROALBUMIN Q1  01/13/2021       Past Medical, Family, and Social History:     Current Outpatient Medications on File Prior to Visit   Medication Sig Dispense Refill    esomeprazole (NEXIUM) 40 mg capsule Take 1 Cap by mouth daily.  90 Cap 3    dilTIAZem ER (CARDIZEM CD) 180 mg capsule TAKE 1 CAPSULE DAILY 90 Cap 1    loratadine (CLARITIN) 10 mg tablet TAKE 1 TABLET DAILY 90 Tab 3    losartan-hydroCHLOROthiazide (HYZAAR) 100-25 mg per tablet TAKE 1 TABLET DAILY 90 Tab 1    ezetimibe (ZETIA) 10 mg tablet TAKE 1 TABLET DAILY 90 Tab 3    Trulicity 1.5 PA/4.8 mL sub-q pen INJECT 0.5 ML UNDER THE SKIN EVERY 7 DAYS 6 mL 3    cloNIDine HCL (CATAPRES) 0.1 mg tablet TAKE 1 TABLET NIGHTLY (Patient taking differently: TAKE 1 TABLET NOON) 90 Tab 3    Lactobacillus acidophilus (PROBIOTIC PO) Take 1 Tab by mouth daily.  glucose blood VI test strips (FreeStyle Lite Strips) strip TEST ONCE A DAY DX CODE: E11.65 100 Strip 3    aspirin 81 mg chewable tablet Take 81 mg by mouth daily.  lancets (FREESTYLE LANCETS) 28 gauge misc TEST ONCE DAILY DX: E11.65 100 Lancet 3    omega-3 fatty acids-vitamin e (FISH OIL) 1,000 mg cap Take 2 Caps by mouth two (2) times a day.  cholecalciferol, vitamin d3, (VITAMIN D) 1,000 unit tablet Take 1,000 Units by mouth two (2) times a day.  [DISCONTINUED] Tricor 145 mg tablet TAKE 1 TABLET DAILY 90 Tab 1     No current facility-administered medications on file prior to visit.        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    Morbid obesity ([facility] [address] E66.01    Type 2 diabetes mellitus with hyperglycemia, without long-term current use of insulin (HCC) E11.65    Type 2 diabetes mellitus with nephropathy (HCC) E11.21    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       Social History     Socioeconomic History    Marital status:      Spouse name: Not on file    Number of children: Not on file    Years of education: Not on file    Highest education level: Not on file   Occupational History    Not on file   Tobacco Use    Smoking status: Former Smoker     Packs/day: 1.00     Years: 20.00     Pack years: 20.00     Quit date: 80     Years since quittin.4    Smokeless tobacco: Never Used   Substance and Sexual Activity    Alcohol use: Yes     Comment: rarely - 1 glass of wine every 3 months    Drug use: No    Sexual activity: Yes   Other Topics Concern    Not on file   Social History Narrative    Not on file     Social Determinants of Health     Financial Resource Strain:     Difficulty of Paying Living Expenses:    Food Insecurity:     Worried About Running Out of Food in the Last Year:     [address] in the Last Year:    Transportation Needs:     Lack of Transportation (Medical):  Lack of Transportation (Non-Medical):    Physical Activity:     Days of Exercise per Week:     Minutes of Exercise per Session:    Stress:     Feeling of Stress :    Social Connections:     Frequency of Communication with Friends and Family:     Frequency of Social Gatherings with Friends and Family:     Attends Zoroastrianism Services:     Active Member of Clubs or Organizations:     Attends Club or Organization Meetings:     Marital Status:    Intimate Partner Violence:     Fear of Current or Ex-Partner:     Emotionally Abused:     Physically Abused:     Sexually Abused:        Review of Systems   Review of Systems   Constitutional: Negative for chills and fever. HENT: Negative for congestion. Cardiovascular: Negative for chest pain and palpitations. Gastrointestinal: Negative for nausea and vomiting. Neurological: Negative for headaches. Psychiatric/Behavioral: Negative for depression and suicidal ideas.        Objective:     Visit Vitals  /66 (BP 1 Location: Left upper arm, BP Patient Position: Sitting, BP Cuff Size: Adult)   Pulse 68   Temp 97.8 °F (36.6 °C) (Oral)   Resp 22   Ht 5' (1.524 m)   Wt 197 lb (89.4 kg)   SpO2 95%   BMI 38.47 kg/m²        Physical Exam  Vitals reviewed. Constitutional:       Appearance: Normal appearance. HENT:      Head: Normocephalic and atraumatic. Cardiovascular:      Rate and Rhythm: Normal rate and regular rhythm. Pulses: Normal pulses. Heart sounds: Normal heart sounds. No murmur heard. No friction rub. No gallop. Pulmonary:      Effort: Pulmonary effort is normal.      Breath sounds: Normal breath sounds. Abdominal:      General: Abdomen is flat. Bowel sounds are normal.      Palpations: Abdomen is soft. Musculoskeletal:         General: Normal range of motion. Cervical back: Normal range of motion and neck supple. Skin:     General: Skin is warm and dry. Neurological:      Mental Status: She is alert. Pertinent Labs/Studies:      Assessment and orders:       ICD-10-CM ICD-9-CM    1. Essential hypertension  L72 204.6 METABOLIC PANEL, COMPREHENSIVE      HGB & HCT   2. Type 2 diabetes mellitus with hyperglycemia, without long-term current use of insulin (HCC)  E11.65 250.00 MICROALBUMIN, UR, RAND W/ MICROALB/CREAT RATIO     790.29    3. Hypercholesterolemia  E78.00 272.0 LIPID PANEL      fenofibrate nanocrystallized (Tricor) 145 mg tablet   4. Morbid obesity ([facility] [address]  E66.01 278.01    5. Encounter for hepatitis C screening test for low risk patient  Z11.59 V73.89 HEPATITIS C AB   6. Primary osteoarthritis of right knee  M17.11 715.16 diclofenac (VOLTAREN) 1 % gel     Diagnoses and all orders for this visit:    1. Essential hypertension: Our goal is to normalize the blood pressure to decrease the risks of strokes and heart attacks. The patient is in agreement with the plan. -     METABOLIC PANEL, COMPREHENSIVE; Future  -     HGB & HCT; Future    2. Type 2 diabetes mellitus with hyperglycemia, without long-term current use of insulin [facility]): Discussed with patient today that the goal for their diabetes is to have a HgA1C<7 and ideally as close to 6.5 as possible.   We discussed diet and medications. The goal for the cholesterol LDL is less than 70 and HDL>40. Patient is aware of the need for yearly eye exams and to take care of their feet daily. Discussed with patient that blood pressure should be less than 130/80 and watching salt intake is very important.    -     MICROALBUMIN, UR, RAND W/ MICROALB/CREAT RATIO; Future    3. Hypercholesterolemia: The patient is aware of our goal to reduce or eliminate the long term problems (such as strokes and heart attacks) related to poorly controlled Triglycerides, LDL, Cholesterol.  -     LIPID PANEL; Future  -     fenofibrate nanocrystallized (Tricor) 145 mg tablet; TAKE 1 TABLET DAILY    4. Morbid obesity ([address]    5. Encounter for hepatitis C screening test for low risk patient  -     HEPATITIS C AB; Future    6. Primary osteoarthritis of right knee: Had a steroid Shot 2 days ago. -     diclofenac (VOLTAREN) 1 % gel; Apply 1 g to affected area four (4) times daily. Apply to the right knee      Follow-up and Dispositions    · Return in about 3 months (around 9/9/2021). I have discussed the diagnosis with the patient and the intended plan as seen in the above orders. Social history, medical history, and labs were reviewed. The patient has received an after-visit summary and questions were answered concerning future plans. I have discussed medication side effects and warnings with the patient as well.     MD INA Alston & SONYA BUSCH [facility]  06/09/21

## 2021-06-09 NOTE — PROGRESS NOTES
1. Have you been to the ER, urgent care clinic since your last visit? Hospitalized since your last visit? No    2. Have you seen or consulted any other health care providers outside of the [address] [place] [address] since your last visit? Include any pap smears or colon screening. No    Reviewed record in preparation for visit and have necessary documentation  Goals that were addressed and/or need to be completed during or after this appointment include     Health Maintenance Due   Topic Date Due    Hepatitis C Screening  Never done    COVID-19 Vaccine (1) Never done    Shingrix Vaccine Age 50> (2 of 2) 01/12/2021    MICROALBUMIN Q1  01/13/2021       Patient is accompanied by self I have received verbal consent from Kelsey Stevenson to discuss any/all medical information while they are present in the room.

## 2021-06-10 LAB
ALBUMIN SERPL-MCNC: 4.6 G/DL (ref 3.5–5)
ALBUMIN/GLOB SERPL: 1.4 {RATIO} (ref 1.1–2.2)
ALP SERPL-CCNC: 68 U/L (ref 45–117)
ALT SERPL-CCNC: 25 U/L (ref 12–78)
ANION GAP SERPL CALC-SCNC: 10 MMOL/L (ref 5–15)
AST SERPL-CCNC: 13 U/L (ref 15–37)
BILIRUB SERPL-MCNC: 0.6 MG/DL (ref 0.2–1)
BUN SERPL-MCNC: 24 MG/DL (ref 6–20)
BUN/CREAT SERPL: 24 (ref 12–20)
CALCIUM SERPL-MCNC: 10.5 MG/DL (ref 8.5–10.1)
CHLORIDE SERPL-SCNC: 101 MMOL/L (ref 97–108)
CHOLEST SERPL-MCNC: 171 MG/DL
CO2 SERPL-SCNC: 27 MMOL/L (ref 21–32)
CREAT SERPL-MCNC: 0.98 MG/DL (ref 0.55–1.02)
CREAT UR-MCNC: 205 MG/DL
GLOBULIN SER CALC-MCNC: 3.3 G/DL (ref 2–4)
GLUCOSE SERPL-MCNC: 165 MG/DL (ref 65–100)
HCT VFR BLD AUTO: 46 % (ref 35–47)
HCV AB SERPL QL IA: NONREACTIVE
HCV COMMENT,HCGAC: NORMAL
HDLC SERPL-MCNC: 60 MG/DL
HDLC SERPL: 2.9 {RATIO} (ref 0–5)
HGB BLD-MCNC: 14.3 G/DL (ref 11.5–16)
LDLC SERPL CALC-MCNC: 79.4 MG/DL (ref 0–100)
MICROALBUMIN UR-MCNC: 64.2 MG/DL
MICROALBUMIN/CREAT UR-RTO: 313 MG/G (ref 0–30)
POTASSIUM SERPL-SCNC: 4.1 MMOL/L (ref 3.5–5.1)
PROT SERPL-MCNC: 7.9 G/DL (ref 6.4–8.2)
SODIUM SERPL-SCNC: 138 MMOL/L (ref 136–145)
TRIGL SERPL-MCNC: 158 MG/DL (ref ?–150)
VLDLC SERPL CALC-MCNC: 31.6 MG/DL

## 2021-07-18 DIAGNOSIS — I10 ESSENTIAL HYPERTENSION: Chronic | ICD-10-CM

## 2021-07-18 RX ORDER — LOSARTAN POTASSIUM AND HYDROCHLOROTHIAZIDE 25; 100 MG/1; MG/1
TABLET ORAL
Qty: 90 TABLET | Refills: 3 | Status: SHIPPED | OUTPATIENT
Start: 2021-07-18 | End: 2022-07-13

## 2021-08-03 PROBLEM — K21.9 GERD (GASTROESOPHAGEAL REFLUX DISEASE): Status: RESOLVED | Noted: 2021-08-03 | Resolved: 2021-08-03

## 2021-08-23 ENCOUNTER — OFFICE VISIT (OUTPATIENT)
Dept: ENDOCRINOLOGY | Age: 81
End: 2021-08-23
Payer: MEDICARE

## 2021-08-23 VITALS
OXYGEN SATURATION: 96 % | SYSTOLIC BLOOD PRESSURE: 127 MMHG | BODY MASS INDEX: 38.09 KG/M2 | DIASTOLIC BLOOD PRESSURE: 71 MMHG | RESPIRATION RATE: 20 BRPM | WEIGHT: 194 LBS | HEIGHT: 60 IN | HEART RATE: 86 BPM | TEMPERATURE: 98.6 F

## 2021-08-23 DIAGNOSIS — I10 ESSENTIAL HYPERTENSION: Chronic | ICD-10-CM

## 2021-08-23 DIAGNOSIS — E11.65 TYPE 2 DIABETES MELLITUS WITH HYPERGLYCEMIA, WITHOUT LONG-TERM CURRENT USE OF INSULIN (HCC): Primary | Chronic | ICD-10-CM

## 2021-08-23 DIAGNOSIS — E78.00 HYPERCHOLESTEROLEMIA: Chronic | ICD-10-CM

## 2021-08-23 LAB — HBA1C MFR BLD HPLC: 6.9 %

## 2021-08-23 PROCEDURE — G8752 SYS BP LESS 140: HCPCS | Performed by: INTERNAL MEDICINE

## 2021-08-23 PROCEDURE — 1090F PRES/ABSN URINE INCON ASSESS: CPT | Performed by: INTERNAL MEDICINE

## 2021-08-23 PROCEDURE — G8536 NO DOC ELDER MAL SCRN: HCPCS | Performed by: INTERNAL MEDICINE

## 2021-08-23 PROCEDURE — G8427 DOCREV CUR MEDS BY ELIG CLIN: HCPCS | Performed by: INTERNAL MEDICINE

## 2021-08-23 PROCEDURE — G8417 CALC BMI ABV UP PARAM F/U: HCPCS | Performed by: INTERNAL MEDICINE

## 2021-08-23 PROCEDURE — G8754 DIAS BP LESS 90: HCPCS | Performed by: INTERNAL MEDICINE

## 2021-08-23 PROCEDURE — 1101F PT FALLS ASSESS-DOCD LE1/YR: CPT | Performed by: INTERNAL MEDICINE

## 2021-08-23 PROCEDURE — G8432 DEP SCR NOT DOC, RNG: HCPCS | Performed by: INTERNAL MEDICINE

## 2021-08-23 PROCEDURE — 99214 OFFICE O/P EST MOD 30 MIN: CPT | Performed by: INTERNAL MEDICINE

## 2021-08-23 PROCEDURE — G8400 PT W/DXA NO RESULTS DOC: HCPCS | Performed by: INTERNAL MEDICINE

## 2021-08-23 PROCEDURE — 83036 HEMOGLOBIN GLYCOSYLATED A1C: CPT | Performed by: INTERNAL MEDICINE

## 2021-08-23 NOTE — PROGRESS NOTES
Kathy Mcwilliams MD        Patient Information  Date:8/23/2021  Name : Lucille Gallo 79 y.o.     YOB: 1940         Referred by: Korin Pacheco MD         Chief Complaint   Patient presents with    Diabetes       History of Present Illness: Lucille Gallo is a 79 y.o. female here for fu of  Type 2 Diabetes Mellitus. Type 2 Diabetes was diagnosed in 10/2014 . End organ effects of diabetes: none. Cardiovascular risk factors: family history, dyslipidemia, diabetes mellitus   Monitoring frequency: 3 times a week. Blood glucose are at goal  Lost her  in 2020, she goes to aerobic classes  Added Actos October 2019, decreased to 15 mg, discontinued December 2020  since discontinuing Actos, blood glucose have been little bit higher    Could not tolerate Metformin IR, XR formualtion. Hyperlipidemia - was on tricor, zetia, colestipol, Niacin,    Wt Readings from Last 3 Encounters:   08/23/21 194 lb (88 kg)   06/09/21 197 lb (89.4 kg)   04/21/21 202 lb (91.6 kg)       BP Readings from Last 3 Encounters:   08/23/21 127/71   06/09/21 136/66   04/21/21 127/72           Past Medical History:   Diagnosis Date    Arthritis 2/28/2011    OSTEO    Chronic pain     RIGHT KNEE    Diabetes ([address]     GERD (gastroesophageal reflux disease)     Hot flashes, menopausal     Hypercholesterolemia     Hypertension     Ill-defined condition     Elevated liver enzymes (has family history)    Nausea & vomiting     Seizures (HCC) 1980'S    AFTER DRINKING 2 MARGARITAS    Vitamin D deficiency 6/28/2010     Current Outpatient Medications   Medication Sig    losartan-hydroCHLOROthiazide (HYZAAR) 100-25 mg per tablet TAKE 1 TABLET DAILY    fenofibrate nanocrystallized (Tricor) 145 mg tablet TAKE 1 TABLET DAILY    diclofenac (VOLTAREN) 1 % gel Apply 1 g to affected area four (4) times daily.  Apply to the right knee    esomeprazole (NEXIUM) 40 mg capsule Take 1 Cap by mouth daily.  dilTIAZem ER (CARDIZEM CD) 180 mg capsule TAKE 1 CAPSULE DAILY    loratadine (CLARITIN) 10 mg tablet TAKE 1 TABLET DAILY    ezetimibe (ZETIA) 10 mg tablet TAKE 1 TABLET DAILY    Trulicity 1.5 MP/8.8 mL sub-q pen INJECT 0.5 ML UNDER THE SKIN EVERY 7 DAYS    cloNIDine HCL (CATAPRES) 0.1 mg tablet TAKE 1 TABLET NIGHTLY (Patient taking differently: TAKE 1 TABLET NOON)    Lactobacillus acidophilus (PROBIOTIC PO) Take 1 Tab by mouth daily.  glucose blood VI test strips (FreeStyle Lite Strips) strip TEST ONCE A DAY DX CODE: E11.65    aspirin 81 mg chewable tablet Take 81 mg by mouth daily.  lancets (FREESTYLE LANCETS) 28 gauge misc TEST ONCE DAILY DX: E11.65    omega-3 fatty acids-vitamin e (FISH OIL) 1,000 mg cap Take 2 Caps by mouth two (2) times a day.  cholecalciferol, vitamin d3, (VITAMIN D) 1,000 unit tablet Take 1,000 Units by mouth two (2) times a day. No current facility-administered medications for this visit. Allergies   Allergen Reactions    Other Food Nausea and Vomiting     Artificial sweeteners - gas and beldging    Ciprofloxacin Nausea Only    Erythromycin Nausea Only    Metformin Other (comments) and Nausea Only     Felt bad  Felt bad      Pcn [Penicillins] Swelling     Caused lips to swell  Tolerates Amoxicillin         Review of Systems:  - Constitutional Symptoms: no fevers, no chills,   - Eyes: no blurry vision no double vision  - Cardiovascular: no chest pain ,no palpitations  - Respiratory: no cough no shortness of breath  - Gastrointestinal: no dysphagia no  abdominal pain  - Musculoskeletal: + joint pains no  weakness  - Integumentary: no rashes  -     Physical Examination:   Blood pressure 127/71, pulse 86, temperature 98.6 °F (37 °C), temperature source Temporal, resp. rate 20, height 5' (1.524 m), weight 194 lb (88 kg), SpO2 96 %.  Estimated body mass index is 37.89 kg/m² as calculated from the following:    Height as of this encounter: 5' (1.524 m). -   Weight as of this encounter: 194 lb (88 kg). - General: pleasant, no distress, good eye contact  - HEENT: no pallor, no periorbital edema, EOMI  - Neck: supple,  - Cardiovascular: regular, normal rate, normal S1 and S2  - Respiratory: clear to auscultation bilaterally  -   - Musculoskeletal: no proximal muscle weakness in upper or lower extremities  - Integumentary: alert and oriented , foot 12/17   - Psychiatric: normal mood and affect  - Skin: color, texture, turgor normal.       Data Reviewed:         Lab Results   Component Value Date/Time    Hemoglobin A1c 6.3 (H) 12/15/2020 10:07 AM    Hemoglobin A1c 6.3 (H) 06/09/2020 12:11 PM    Hemoglobin A1c 6.9 (H) 01/13/2020 11:05 AM    Glucose 165 (H) 06/09/2021 11:35 AM    Glucose (POC) 105 (H) 12/01/2020 12:29 PM    Microalbumin/Creat ratio (mg/g creat) 313 (H) 06/09/2021 11:35 AM    Microalbumin,urine random 64.20 06/09/2021 11:35 AM    LDL, calculated 79.4 06/09/2021 11:35 AM    Creatinine 0.98 06/09/2021 11:35 AM      Lab Results   Component Value Date/Time    Cholesterol, total 171 06/09/2021 11:35 AM    HDL Cholesterol 60 06/09/2021 11:35 AM    LDL, calculated 79.4 06/09/2021 11:35 AM    Triglyceride 158 (H) 06/09/2021 11:35 AM    CHOL/HDL Ratio 2.9 06/09/2021 11:35 AM     Lab Results   Component Value Date/Time    ALT (SGPT) 25 06/09/2021 11:35 AM    Alk. phosphatase 68 06/09/2021 11:35 AM    Bilirubin, total 0.6 06/09/2021 11:35 AM     Lab Results   Component Value Date/Time    TSH 1.220 01/29/2018 02:47 PM    T4, Free 1.55 09/09/2015 02:16 PM      Lab Results   Component Value Date/Time    Glucose 165 (H) 06/09/2021 11:35 AM    Glucose (POC) 105 (H) 12/01/2020 12:29 PM        Assessment/Plan:     1. Type 2 diabetes mellitus with hyperglycemia, without long-term current use of insulin ([facility]    2. Essential hypertension    3. Hypercholesterolemia        1.  Type 2 Diabetes Mellitus   Lab Results   Component Value Date/Time Hemoglobin A1c 6.3 (H) 12/15/2020 10:07 AM    Hemoglobin A1c (POC) 6.9 08/23/2021 02:44 PM   controlled  Actos 15 mg: Discontinued December 2475   Trulicity  FLU annually ,Pneumovax ,aspirin daily,annual eye exam,microalbumin    2. HTN : Continue current therapy     3. Mixed Hyperlipidemia : low carb diet. Statin intolerance hx,   Continue Zetia, Tricor    4. Obesity:Body mass index is 37.89 kg/m². Discussed about the importance of exercise and carbohydrate portion control. 5.  GI symptoms: work up was negative  It was due to artificial sweetners according to the patient      Thank you for allowing me to participate in the care of this patient.     Adrián Santos MD    Patient verbalized understanding

## 2021-08-23 NOTE — LETTER
8/25/2021    Patient: Lele Baeza   YOB: 1940   Date of Visit: 8/23/2021     [address] [facility] [address]    Dear Edilia Galan MD,      Thank you for referring Ms. Charles Shah to [id number] [place] [address] for evaluation. My notes for this consultation are attached. If you have questions, please do not hesitate to call me. I look forward to following your patient along with you.       Sincerely,    Gemma Ventura MD

## 2021-08-23 NOTE — PROGRESS NOTES
Rupesh Guillen is a 79 y.o. female here for   Chief Complaint   Patient presents with    Diabetes       1. Have you been to the ER, urgent care clinic since your last visit? Hospitalized since your last visit? -no    2. Have you seen or consulted any other health care providers outside of the [address] [place] [address] since your last visit?   Include any pap smears or colon screening.-no

## 2021-09-08 ENCOUNTER — OFFICE VISIT (OUTPATIENT)
Dept: FAMILY MEDICINE CLINIC | Age: 81
End: 2021-09-08
Payer: MEDICARE

## 2021-09-08 VITALS
HEIGHT: 60 IN | RESPIRATION RATE: 18 BRPM | WEIGHT: 192.6 LBS | HEART RATE: 70 BPM | TEMPERATURE: 97.5 F | SYSTOLIC BLOOD PRESSURE: 140 MMHG | OXYGEN SATURATION: 95 % | BODY MASS INDEX: 37.81 KG/M2 | DIASTOLIC BLOOD PRESSURE: 84 MMHG

## 2021-09-08 DIAGNOSIS — E11.21 TYPE 2 DIABETES MELLITUS WITH NEPHROPATHY (HCC): ICD-10-CM

## 2021-09-08 DIAGNOSIS — E11.65 TYPE 2 DIABETES MELLITUS WITH HYPERGLYCEMIA, WITHOUT LONG-TERM CURRENT USE OF INSULIN (HCC): ICD-10-CM

## 2021-09-08 DIAGNOSIS — I10 ESSENTIAL HYPERTENSION: Primary | ICD-10-CM

## 2021-09-08 DIAGNOSIS — Z23 ENCOUNTER FOR IMMUNIZATION: ICD-10-CM

## 2021-09-08 DIAGNOSIS — E78.00 HYPERCHOLESTEROLEMIA: ICD-10-CM

## 2021-09-08 DIAGNOSIS — K21.9 GASTROESOPHAGEAL REFLUX DISEASE WITHOUT ESOPHAGITIS: ICD-10-CM

## 2021-09-08 PROCEDURE — G8753 SYS BP > OR = 140: HCPCS | Performed by: FAMILY MEDICINE

## 2021-09-08 PROCEDURE — 1101F PT FALLS ASSESS-DOCD LE1/YR: CPT | Performed by: FAMILY MEDICINE

## 2021-09-08 PROCEDURE — G8400 PT W/DXA NO RESULTS DOC: HCPCS | Performed by: FAMILY MEDICINE

## 2021-09-08 PROCEDURE — 90694 VACC AIIV4 NO PRSRV 0.5ML IM: CPT | Performed by: FAMILY MEDICINE

## 2021-09-08 PROCEDURE — 99214 OFFICE O/P EST MOD 30 MIN: CPT | Performed by: FAMILY MEDICINE

## 2021-09-08 PROCEDURE — G8417 CALC BMI ABV UP PARAM F/U: HCPCS | Performed by: FAMILY MEDICINE

## 2021-09-08 PROCEDURE — G0008 ADMIN INFLUENZA VIRUS VAC: HCPCS | Performed by: FAMILY MEDICINE

## 2021-09-08 PROCEDURE — 1090F PRES/ABSN URINE INCON ASSESS: CPT | Performed by: FAMILY MEDICINE

## 2021-09-08 PROCEDURE — G8510 SCR DEP NEG, NO PLAN REQD: HCPCS | Performed by: FAMILY MEDICINE

## 2021-09-08 PROCEDURE — G8754 DIAS BP LESS 90: HCPCS | Performed by: FAMILY MEDICINE

## 2021-09-08 PROCEDURE — G8427 DOCREV CUR MEDS BY ELIG CLIN: HCPCS | Performed by: FAMILY MEDICINE

## 2021-09-08 PROCEDURE — G8536 NO DOC ELDER MAL SCRN: HCPCS | Performed by: FAMILY MEDICINE

## 2021-09-08 NOTE — PROGRESS NOTES
Chief Complaint   Patient presents with    Follow Up Chronic Condition     Visit Vitals  BP (!) 145/79 (BP 1 Location: Right arm)   Pulse 70   Temp 97.5 °F (36.4 °C) (Temporal)   Resp 18   Ht 5' (1.524 m)   Wt 192 lb 9.6 oz (87.4 kg)   SpO2 95%   BMI 37.61 kg/m²     1. Have you been to the ER, urgent care clinic since your last visit? Hospitalized since your last visit? No    2. Have you seen or consulted any other health care providers outside of the [address] [place] [address] since your last visit? Include any pap smears or colon screening.  No    Reviewed record in preparation for visit and have necessary documentation  Pt did not bring medication to office visit for review  opportunity was given for questions  Goals that were addressed and/or need to be completed during or after this appointment include   Health Maintenance Due   Topic Date Due    Shingrix Vaccine Age 50> (2 of 2) 01/12/2021    Medicare Yearly Exam  07/16/2021    Flu Vaccine (1) 09/01/2021

## 2021-09-08 NOTE — PROGRESS NOTES
[facility]    History of Present Illness:   Cathyann Apley is a 79 y.o. female with history of HTN, type 2 diabetes, Arthritis, HHLD, Obesity  CC: Follow up Chronic conditions  History provided by patient and Records    HPI:  Diabetes Follow up: Overall the patient feels well with good energy level. Current Medications:   Key Antihyperglycemic Medications             Trulicity 1.5 BD/6.7 mL sub-q pen (Taking) INJECT 0.5 ML UNDER THE SKIN EVERY 7 DAYS      . Insulin dependence: NO   Frequency of home glucose testing: Daily   Blood Sugar range at home: 150-200    Last eye exam: In past 12 months. Last foot exam: This year. Polyuria, polyphagia or polydipsia: NO   Retinopathy: NO   Neuropathy SX: NO   Low blood sugar symptoms: NO   Dietary compliance: compliant most of the time   Medication compliance: compliant most of the time   On ASA: NO   Tobacco Use: NO   Depression: NO     Wt Readings from Last 3 Encounters:   09/08/21 192 lb 9.6 oz (87.4 kg)   08/23/21 194 lb (88 kg)   06/09/21 197 lb (89.4 kg)      Lab Results   Component Value Date/Time    Hemoglobin A1c 6.3 (H) 12/15/2020 10:07 AM    Hemoglobin A1c (POC) 6.9 08/23/2021 02:44 PM      Lab Results   Component Value Date/Time    Microalbumin/Creat ratio (mg/g creat) 313 (H) 06/09/2021 11:35 AM    Microalbumin,urine random 64.20 06/09/2021 11:35 AM       Lab Results   Component Value Date/Time    Creatinine 0.98 06/09/2021 11:35 AM      Hypertension Follow up:  Currently Taking:   Key CAD CHF Meds             losartan-hydroCHLOROthiazide (HYZAAR) 100-25 mg per tablet (Taking) TAKE 1 TABLET DAILY    fenofibrate nanocrystallized (Tricor) 145 mg tablet (Taking) TAKE 1 TABLET DAILY    dilTIAZem ER (CARDIZEM CD) 180 mg capsule (Taking) TAKE 1 CAPSULE DAILY    cloNIDine HCL (CATAPRES) 0.1 mg tablet (Taking) TAKE 1 TABLET NIGHTLY    aspirin 81 mg chewable tablet (Taking) Take 81 mg by mouth daily.          The patient reports:  taking medications as instructed, no medication side effects noted, no TIA's, no chest pain on exertion, no dyspnea on exertion, no swelling of ankles, no orthostatic dizziness or lightheadedness, no orthopnea or paroxysmal nocturnal dyspnea. BP Readings from Last 3 Encounters:   09/08/21 (!) 140/84   08/23/21 127/71   06/09/21 136/66      Hypertriglyceridemia Follow up:   Cardiovascular risks for her are: diabetic  hypertension  hyperlipidemia. Current Medications:  Key Antihyperlipidemia Meds             fenofibrate nanocrystallized (Tricor) 145 mg tablet (Taking) TAKE 1 TABLET DAILY    ezetimibe (ZETIA) 10 mg tablet (Taking) TAKE 1 TABLET DAILY    omega-3 fatty acids-vitamin e (FISH OIL) 1,000 mg cap (Taking) Take 2 Caps by mouth two (2) times a day. Compliance: YES   Myalgias: NO   Fatigue: NO   Other side effects: NO     Wt Readings from Last 3 Encounters:   09/08/21 192 lb 9.6 oz (87.4 kg)   08/23/21 194 lb (88 kg)   06/09/21 197 lb (89.4 kg)     Lab Results   Component Value Date/Time    Cholesterol, total 171 06/09/2021 11:35 AM    HDL Cholesterol 60 06/09/2021 11:35 AM    LDL, calculated 79.4 06/09/2021 11:35 AM    VLDL, calculated 31.6 06/09/2021 11:35 AM    Triglyceride 158 (H) 06/09/2021 11:35 AM    CHOL/HDL Ratio 2.9 06/09/2021 11:35 AM      Lab Results   Component Value Date/Time    ALT (SGPT) 25 06/09/2021 11:35 AM    AST (SGOT) 13 (L) 06/09/2021 11:35 AM    Alk.  phosphatase 68 06/09/2021 11:35 AM    Bilirubin, total 0.6 06/09/2021 11:35 AM      GERD: Taking Nexium    Health Maintenance  Health Maintenance Due   Topic Date Due    Shingrix Vaccine Age 49> (2 of 2) 01/12/2021    Medicare Yearly Exam  07/16/2021       Past Medical, Family, and Social History:     Current Outpatient Medications on File Prior to Visit   Medication Sig Dispense Refill    losartan-hydroCHLOROthiazide (HYZAAR) 100-25 mg per tablet TAKE 1 TABLET DAILY 90 Tablet 3    fenofibrate nanocrystallized (Tricor) 145 mg tablet TAKE 1 TABLET DAILY 90 Tablet 1    diclofenac (VOLTAREN) 1 % gel Apply 1 g to affected area four (4) times daily. Apply to the right knee 150 g 1    esomeprazole (NEXIUM) 40 mg capsule Take 1 Cap by mouth daily. 90 Cap 3    dilTIAZem ER (CARDIZEM CD) 180 mg capsule TAKE 1 CAPSULE DAILY 90 Cap 1    loratadine (CLARITIN) 10 mg tablet TAKE 1 TABLET DAILY 90 Tab 3    ezetimibe (ZETIA) 10 mg tablet TAKE 1 TABLET DAILY 90 Tab 3    Trulicity 1.5 WG/2.6 mL sub-q pen INJECT 0.5 ML UNDER THE SKIN EVERY 7 DAYS 6 mL 3    cloNIDine HCL (CATAPRES) 0.1 mg tablet TAKE 1 TABLET NIGHTLY (Patient taking differently: TAKE 1 TABLET NOON) 90 Tab 3    Lactobacillus acidophilus (PROBIOTIC PO) Take 1 Tab by mouth daily.  aspirin 81 mg chewable tablet Take 81 mg by mouth daily.  omega-3 fatty acids-vitamin e (FISH OIL) 1,000 mg cap Take 2 Caps by mouth two (2) times a day.  cholecalciferol, vitamin d3, (VITAMIN D) 1,000 unit tablet Take 1,000 Units by mouth two (2) times a day.  glucose blood VI test strips (FreeStyle Lite Strips) strip TEST ONCE A DAY DX CODE: E11.65 100 Strip 3    lancets (FREESTYLE LANCETS) 28 gauge misc TEST ONCE DAILY DX: E11.65 100 Lancet 3     No current facility-administered medications on file prior to visit.        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    Morbid obesity ([facility] E66.01    Type 2 diabetes mellitus with hyperglycemia, without long-term current use of insulin (HCC) E11.65    Type 2 diabetes mellitus with nephropathy (HCC) E11.21    Gastroesophageal reflux disease without esophagitis K21.9       Social History     Socioeconomic History    Marital status:      Spouse name: Not on file    Number of children: Not on file    Years of education: Not on file    Highest education level: Not on file   Occupational History    Not on file   Tobacco Use    Smoking status: Former Smoker     Packs/day: 1.00     Years: 20.00     Pack years: 20.00     Quit date: 80     Years since quittin.7    Smokeless tobacco: Never Used   Substance and Sexual Activity    Alcohol use: Yes     Comment: rarely - 1 glass of wine every 3 months    Drug use: No    Sexual activity: Yes   Other Topics Concern    Not on file   Social History Narrative    Not on file     Social Determinants of Health     Financial Resource Strain:     Difficulty of Paying Living Expenses:    Food Insecurity:     Worried About Running Out of Food in the Last Year:     [address] in the Last Year:    Transportation Needs:     Lack of Transportation (Medical):  Lack of Transportation (Non-Medical):    Physical Activity:     Days of Exercise per Week:     Minutes of Exercise per Session:    Stress:     Feeling of Stress :    Social Connections:     Frequency of Communication with Friends and Family:     Frequency of Social Gatherings with Friends and Family:     Attends Confucianism Services:     Active Member of Clubs or Organizations:     Attends Club or Organization Meetings:     Marital Status:    Intimate Partner Violence:     Fear of Current or Ex-Partner:     Emotionally Abused:     Physically Abused:     Sexually Abused:        Review of Systems   Review of Systems   Constitutional: Negative for chills and fever. HENT: Negative for congestion. Cardiovascular: Negative for chest pain and palpitations. Gastrointestinal: Negative for nausea and vomiting. Objective:     Visit Vitals  BP (!) 140/84 (BP 1 Location: Right arm)   Pulse 70   Temp 97.5 °F (36.4 °C) (Temporal)   Resp 18   Ht 5' (1.524 m)   Wt 192 lb 9.6 oz (87.4 kg)   SpO2 95%   BMI 37.61 kg/m²        Physical Exam  Vitals and nursing note reviewed.    Constitutional:       Appearance: Normal appearance. HENT:      Head: Normocephalic and atraumatic. Right Ear: Tympanic membrane normal.   Cardiovascular:      Rate and Rhythm: Normal rate and regular rhythm. Pulses: Normal pulses. Heart sounds: Normal heart sounds. Pulmonary:      Effort: Pulmonary effort is normal.      Breath sounds: Normal breath sounds. Abdominal:      General: Abdomen is flat. Bowel sounds are normal.      Palpations: Abdomen is soft. Musculoskeletal:      Cervical back: Normal range of motion and neck supple. Neurological:      Mental Status: She is alert. Pertinent Labs/Studies:      Assessment and orders:       ICD-10-CM ICD-9-CM    1. Essential hypertension  I10 401.9 CBC W/O DIFF      METABOLIC PANEL, COMPREHENSIVE      METABOLIC PANEL, COMPREHENSIVE      CBC W/O DIFF   2. Type 2 diabetes mellitus with hyperglycemia, without long-term current use of insulin ([facility])  E11.65 250.00 HEMOGLOBIN A1C WITH EAG     790.29 HEMOGLOBIN A1C WITH EAG   3. Type 2 diabetes mellitus with nephropathy (HCC)  E11.21 250.40 HEMOGLOBIN A1C WITH EAG     583.81 HEMOGLOBIN A1C WITH EAG   4. Hypercholesterolemia  E78.00 272.0 LIPID PANEL      LIPID PANEL   5. Gastroesophageal reflux disease without esophagitis  K21.9 530.81    6. Encounter for immunization  Z23 V03.89 ADMIN INFLUENZA VIRUS VAC      FLU (FLUAD QUAD INFLUENZA VACCINE,QUAD,ADJUVANTED)     Diagnoses and all orders for this visit:    1. Essential hypertension: Our goal is to normalize the blood pressure to decrease the risks of strokes and heart attacks. The patient is in agreement with the plan. -     CBC W/O DIFF; Future  -     METABOLIC PANEL, COMPREHENSIVE; Future    2. Type 2 diabetes mellitus with hyperglycemia, without long-term current use of insulin [facility]): Discussed with patient today that the goal for their diabetes is to have a HgA1C<7 and ideally as close to 6.5 as possible. We discussed diet and medications.   The goal for the cholesterol LDL is less than 70 and HDL>40. Patient is aware of the need for yearly eye exams and to take care of their feet daily. Discussed with patient that blood pressure should be less than 130/80 and watching salt intake is very important.    -     HEMOGLOBIN A1C WITH EAG; Future    3. Type 2 diabetes mellitus with nephropathy (HCC)  -     HEMOGLOBIN A1C WITH EAG; Future    4. Hypercholesterolemia  -     LIPID PANEL; Future    5. Gastroesophageal reflux disease without esophagitis      Follow-up and Dispositions    · Return in about 2 months (around 11/8/2021) for MWE after Knee replacement. I have discussed the diagnosis with the patient and the intended plan as seen in the above orders. Social history, medical history, and labs were reviewed. The patient has received an after-visit summary and questions were answered concerning future plans. I have discussed medication side effects and warnings with the patient as well.     MD INA Hurt & SONYA BUSCH [facility]  09/08/21

## 2021-09-09 LAB
ALBUMIN SERPL-MCNC: 4 G/DL (ref 3.5–5)
ALBUMIN/GLOB SERPL: 1.3 {RATIO} (ref 1.1–2.2)
ALP SERPL-CCNC: 49 U/L (ref 45–117)
ALT SERPL-CCNC: 37 U/L (ref 12–78)
ANION GAP SERPL CALC-SCNC: 8 MMOL/L (ref 5–15)
AST SERPL-CCNC: 31 U/L (ref 15–37)
BILIRUB SERPL-MCNC: 0.7 MG/DL (ref 0.2–1)
BUN SERPL-MCNC: 20 MG/DL (ref 6–20)
BUN/CREAT SERPL: 22 (ref 12–20)
CALCIUM SERPL-MCNC: 9.3 MG/DL (ref 8.5–10.1)
CHLORIDE SERPL-SCNC: 104 MMOL/L (ref 97–108)
CHOLEST SERPL-MCNC: 144 MG/DL
CO2 SERPL-SCNC: 26 MMOL/L (ref 21–32)
CREAT SERPL-MCNC: 0.93 MG/DL (ref 0.55–1.02)
ERYTHROCYTE [DISTWIDTH] IN BLOOD BY AUTOMATED COUNT: 14.6 % (ref 11.5–14.5)
EST. AVERAGE GLUCOSE BLD GHB EST-MCNC: 154 MG/DL
GLOBULIN SER CALC-MCNC: 3.2 G/DL (ref 2–4)
GLUCOSE SERPL-MCNC: 99 MG/DL (ref 65–100)
HBA1C MFR BLD: 7 % (ref 4–5.6)
HCT VFR BLD AUTO: 44.9 % (ref 35–47)
HDLC SERPL-MCNC: 44 MG/DL
HDLC SERPL: 3.3 {RATIO} (ref 0–5)
HGB BLD-MCNC: 14.3 G/DL (ref 11.5–16)
LDLC SERPL CALC-MCNC: 58 MG/DL (ref 0–100)
MCH RBC QN AUTO: 29.1 PG (ref 26–34)
MCHC RBC AUTO-ENTMCNC: 31.8 G/DL (ref 30–36.5)
MCV RBC AUTO: 91.4 FL (ref 80–99)
NRBC # BLD: 0 K/UL (ref 0–0.01)
NRBC BLD-RTO: 0 PER 100 WBC
PLATELET # BLD AUTO: 310 K/UL (ref 150–400)
PMV BLD AUTO: 10 FL (ref 8.9–12.9)
POTASSIUM SERPL-SCNC: 4 MMOL/L (ref 3.5–5.1)
PROT SERPL-MCNC: 7.2 G/DL (ref 6.4–8.2)
RBC # BLD AUTO: 4.91 M/UL (ref 3.8–5.2)
SODIUM SERPL-SCNC: 138 MMOL/L (ref 136–145)
TRIGL SERPL-MCNC: 210 MG/DL (ref ?–150)
VLDLC SERPL CALC-MCNC: 42 MG/DL
WBC # BLD AUTO: 7.5 K/UL (ref 3.6–11)

## 2021-09-09 RX ORDER — DILTIAZEM HYDROCHLORIDE 180 MG/1
CAPSULE, COATED, EXTENDED RELEASE ORAL
Qty: 90 CAPSULE | Refills: 3 | Status: SHIPPED | OUTPATIENT
Start: 2021-09-09 | End: 2022-08-12

## 2021-09-27 PROBLEM — M17.11 PRIMARY LOCALIZED OSTEOARTHRITIS OF RIGHT KNEE: Status: ACTIVE | Noted: 2021-09-27

## 2021-09-27 NOTE — H&P
Patient ID: Sonia Drew is a 79 y.o. female.     Chief Complaint: Follow-up of the Right Knee        Sonia Drew is a 79 y.o. female who returns for follow up of right knee pain. Patient has a past history of left total knee arthroplasty. reports chronic right knee pain that is exacerbated by movement/walking. She rates the pain to be 6 out of 10 in intensity at this time. Patient was last seen on 06/06/2021 at which time he as injected with cortisone with improvement until recently. She reports twisting awkwardly in her bed which exacerbated her right knee pain. She currently does not have any radiculopathy symptoms such as radiation of the pain, numbness, tingling or other complaints. She presents today to discuss right total knee arthroplasty. She has a history of diabetes; her most recent A1C was 6.2.     Review of Systems   7/19/2021     Constitutional: Unexplained: Negative  Genitourinary: Frequent Urination: Negative  HEENT: Vision Loss: Negative  Neurological: Memory Loss: Negative  Integumentary: Rash: Negative  Cardiovascular: Palpatations: Negative  Hematologic: Bruises/Bleeds Easily: Negative   Immunological: Seasonal Allergies: Negative  Musculoskeletal: Joint Pain: Negative     Medical History        Past Medical History:   Diagnosis Date    Diabetes mellitus      Hyperlipidemia      Hypertension      Osteoarthritis              Surgical History         Past Surgical History:   Procedure Laterality Date    JOINT REPLACEMENT        KNEE SURGERY        NO RELEVANT SURGERIES        SHOULDER SURGERY                Objective:      There were no vitals filed for this visit.     Constitutional:  No acute distress. Well nourished. Well developed. Eyes:  Sclera are nonicteric. Respiratory:  No labored breathing. Cardiovascular:  No marked edema. Skin:  No marked skin ulcers. Neurological:  No marked sensory loss noted. Psychiatric: Alert and oriented x3.   Musculoskeletal      Examination of the RIGHT knee reveals ROM -3-117 degrees. Skin intact. The cruciate and collateral ligaments are stable. No effusion. No sign of infection. No ecchymosis or erythema. No cellulitis or rash. No calf pain, swelling, or other evidence of DVT. I detect no obvious motor or sensory deficits in the lower extremities. The extensor mechanism is intact. The neurovascular status is intact.     Examination of the LEFT knee reveals ROM 0-119 degrees. Skin intact. The cruciate and collateral ligaments are stable. No effusion. No sign of infection. No ecchymosis or erythema. No cellulitis or rash. No calf pain, swelling, or other evidence of DVT. I detect no obvious motor or sensory deficits in the lower extremities. The extensor mechanism is intact. The neurovascular status is intact.        Imaging/Studies:            No imaging obtained      Assessment:   There is no problem list on file for this patient.            ICD-10-CM   1. Primary osteoarthritis of right knee  M17.11   2. History of total knee arthroplasty, left  M49.198         Plan:   I personally reviewed my findings with the patient. I reviewed the pathophysiology and explained that she has severe osteoarthritis of the right knee. I reviewed her previous x-rays which revealed complete loss of medial joint space. Slight varus deformity and significant patellofemoral changes noted. I reviewed different procedural vs non-procedural treatment options. I have explained to the patient that the conservative measures might have reduced efficacy because of the level of arthritis and pathology endorsed by the patient. I have explained to her the procedure of the right total knee arthroplasty. I explained the hospitalization, post-op and rehabilitation for the procedure. We discussed all complications associated with the surgery. She understands the potential risk of infection and that she would be on antibiotics following the surgery.  She understands the potential risk of DVT/PE and that she would be on an anticoagulant following surgery. We discussed the course of post-op physical therapy for rehabilitation.     She has a history of diabetes, hypertension and hyperlipidemia but denies previous history of blood clots or strokes. She is compliant with her medication. Her last A1C was 6.2    PROCEDURE: Right total knee replacement. Date of Surgery Update:  Bernadine Reza was seen and examined. Past Medical History:   Diagnosis Date    Arthritis 2/28/2011    OSTEO    Chronic pain     RIGHT KNEE    Diabetes ([address]     GERD (gastroesophageal reflux disease)     Hot flashes, menopausal     Hypercholesterolemia     Hypertension     Ill-defined condition     Elevated liver enzymes (has family history)    Nausea & vomiting     Seizures ([address] 1980'S    AFTER DRINKING 2 MARGARITAS    Vitamin D deficiency 6/28/2010     Prior to Admission Medications   Prescriptions Last Dose Informant Patient Reported? Taking? Lactobacillus acidophilus (PROBIOTIC PO) 10/4/2021  Yes No   Sig: Take 1 Tablet by mouth two (2) times a day. Trulicity 1.5 HW/1.2 mL sub-q pen 10/4/2021  No No   Sig: INJECT 0.5 ML UNDER THE SKIN EVERY 7 DAYS   Patient taking differently: 1.5 mg by SubCUTAneous route every seven (7) days. TAKES EVERY MONDAY   aspirin 81 mg chewable tablet 10/5/2021 at Unknown time  Yes Yes   Sig: Take 81 mg by mouth daily. cholecalciferol, vitamin d3, (VITAMIN D) 1,000 unit tablet 10/4/2021  Yes No   Sig: Take 2,000 Units by mouth nightly. cloNIDine HCL (CATAPRES) 0.1 mg tablet 10/6/2021 at 0600  No Yes   Sig: TAKE 1 TABLET NIGHTLY   Patient taking differently: Take 0.1 mg by mouth daily (with lunch). TAKE 1 TABLET NOON   diclofenac (VOLTAREN) 1 % gel 10/2/2021  No No   Sig: Apply 1 g to affected area four (4) times daily.  Apply to the right knee   dilTIAZem ER (CARDIZEM CD) 180 mg capsule 10/6/2021 at 0600  No Yes   Sig: TAKE 1 CAPSULE DAILY   esomeprazole (NEXIUM) 40 mg capsule 10/6/2021 at 0600  No Yes   Sig: Take 1 Cap by mouth daily. ezetimibe (ZETIA) 10 mg tablet 10/5/2021 at Unknown time  No Yes   Sig: TAKE 1 TABLET DAILY   Patient taking differently: Take 10 mg by mouth nightly. TAKE 1 TABLET DAILY   fenofibrate nanocrystallized (Tricor) 145 mg tablet 10/5/2021 at Unknown time  No Yes   Sig: TAKE 1 TABLET DAILY   Patient taking differently: Take 145 mg by mouth nightly. TAKE 1 TABLET DAILY   glucose blood VI test strips (FreeStyle Lite Strips) strip   No No   Sig: TEST ONCE A DAY DX CODE: E11.65   lancets (FREESTYLE LANCETS) 28 gauge misc   No No   Sig: TEST ONCE DAILY DX: E11.65   loratadine (CLARITIN) 10 mg tablet 10/5/2021 at Unknown time  No Yes   Sig: TAKE 1 TABLET DAILY   losartan-hydroCHLOROthiazide (HYZAAR) 100-25 mg per tablet 10/5/2021 at Unknown time  No Yes   Sig: TAKE 1 TABLET DAILY   naproxen sodium (Aleve) 220 mg tablet 10/1/2021  Yes No   Sig: Take 440 mg by mouth two (2) times daily (with meals). omega-3 fatty acids-vitamin e (FISH OIL) 1,000 mg cap 10/2/2021  Yes No   Sig: Take 2 Caps by mouth two (2) times a day. Facility-Administered Medications: None      Allergy to: Other food, Ciprofloxacin, Erythromycin, Metformin, and Pcn [penicillins]  Physical Examination: General appearance - alert, well appearing, and in no distress  History and physical has been reviewed. The patient has been examined.  There have been no significant clinical changes since the completion of the originally dated History and Physical.    Signed By: Graeme Halsted, PA-C     October 6, 2021 10:01 AM

## 2021-09-29 ENCOUNTER — TRANSCRIBE ORDER (OUTPATIENT)
Dept: REGISTRATION | Age: 81
End: 2021-09-29

## 2021-09-29 DIAGNOSIS — Z20.822 ENCOUNTER FOR PREOPERATIVE SCREENING LABORATORY TESTING FOR COVID-19 VIRUS: Primary | ICD-10-CM

## 2021-09-29 DIAGNOSIS — Z01.812 ENCOUNTER FOR PREOPERATIVE SCREENING LABORATORY TESTING FOR COVID-19 VIRUS: Primary | ICD-10-CM

## 2021-09-30 ENCOUNTER — HOSPITAL ENCOUNTER (OUTPATIENT)
Dept: PREADMISSION TESTING | Age: 81
Discharge: HOME | End: 2021-09-30
Payer: MEDICARE

## 2021-09-30 VITALS
DIASTOLIC BLOOD PRESSURE: 73 MMHG | HEART RATE: 70 BPM | SYSTOLIC BLOOD PRESSURE: 125 MMHG | HEIGHT: 60 IN | BODY MASS INDEX: 37.61 KG/M2 | TEMPERATURE: 98 F | OXYGEN SATURATION: 95 % | WEIGHT: 191.58 LBS

## 2021-09-30 DIAGNOSIS — Z01.812 ENCOUNTER FOR PREOPERATIVE SCREENING LABORATORY TESTING FOR COVID-19 VIRUS: ICD-10-CM

## 2021-09-30 DIAGNOSIS — Z20.822 ENCOUNTER FOR PREOPERATIVE SCREENING LABORATORY TESTING FOR COVID-19 VIRUS: ICD-10-CM

## 2021-09-30 LAB
ABO + RH BLD: NORMAL
APPEARANCE UR: CLEAR
ATRIAL RATE: 70 BPM
BACTERIA URNS QL MICRO: NEGATIVE /HPF
BILIRUB UR QL: NEGATIVE
BLOOD GROUP ANTIBODIES SERPL: NORMAL
CALCULATED P AXIS, ECG09: 48 DEGREES
CALCULATED R AXIS, ECG10: -37 DEGREES
CALCULATED T AXIS, ECG11: 114 DEGREES
COLOR UR: ABNORMAL
DIAGNOSIS, 93000: NORMAL
EPITH CASTS URNS QL MICRO: ABNORMAL /LPF
GLUCOSE UR STRIP.AUTO-MCNC: 100 MG/DL
HGB UR QL STRIP: NEGATIVE
HYALINE CASTS URNS QL MICRO: ABNORMAL /LPF (ref 0–5)
INR PPP: 1.1 (ref 0.9–1.1)
KETONES UR QL STRIP.AUTO: NEGATIVE MG/DL
LEUKOCYTE ESTERASE UR QL STRIP.AUTO: NEGATIVE
NITRITE UR QL STRIP.AUTO: NEGATIVE
P-R INTERVAL, ECG05: 234 MS
PH UR STRIP: 7 [PH] (ref 5–8)
PROT UR STRIP-MCNC: ABNORMAL MG/DL
PROTHROMBIN TIME: 11.2 SEC (ref 9–11.1)
Q-T INTERVAL, ECG07: 410 MS
QRS DURATION, ECG06: 92 MS
QTC CALCULATION (BEZET), ECG08: 442 MS
RBC #/AREA URNS HPF: ABNORMAL /HPF (ref 0–5)
SP GR UR REFRACTOMETRY: 1.01 (ref 1–1.03)
SPECIMEN EXP DATE BLD: NORMAL
UA: UC IF INDICATED,UAUC: ABNORMAL
UROBILINOGEN UR QL STRIP.AUTO: 0.2 EU/DL (ref 0.2–1)
VENTRICULAR RATE, ECG03: 70 BPM
WBC URNS QL MICRO: ABNORMAL /HPF (ref 0–4)

## 2021-09-30 PROCEDURE — 85610 PROTHROMBIN TIME: CPT

## 2021-09-30 PROCEDURE — 93005 ELECTROCARDIOGRAM TRACING: CPT

## 2021-09-30 PROCEDURE — U0003 INFECTIOUS AGENT DETECTION BY NUCLEIC ACID (DNA OR RNA); SEVERE ACUTE RESPIRATORY SYNDROME CORONAVIRUS 2 (SARS-COV-2) (CORONAVIRUS DISEASE [COVID-19]), AMPLIFIED PROBE TECHNIQUE, MAKING USE OF HIGH THROUGHPUT TECHNOLOGIES AS DESCRIBED BY CMS-2020-01-R: HCPCS

## 2021-09-30 PROCEDURE — U0005 INFEC AGEN DETEC AMPLI PROBE: HCPCS

## 2021-09-30 PROCEDURE — 81001 URINALYSIS AUTO W/SCOPE: CPT

## 2021-09-30 PROCEDURE — 36415 COLL VENOUS BLD VENIPUNCTURE: CPT

## 2021-09-30 PROCEDURE — 86901 BLOOD TYPING SEROLOGIC RH(D): CPT

## 2021-09-30 RX ORDER — NAPROXEN SODIUM 220 MG
440 TABLET ORAL 2 TIMES DAILY WITH MEALS
COMMUNITY

## 2021-09-30 NOTE — PERIOP NOTES
Preoperative instructions reviewed with patient. Patient given two bottles CHG soap. Instructions  reviewed in class on use of CHG soap. Patient given SSI infection sheet. MRSA/MSSA treatment instruction sheet given with an explanation to patient that they  will be notified if treatment instructions need to be initiated. Patient was given the opportunity to ask questions on the information provided. Patient made aware of COVID 19 testing to be done 96  hours prior to surgery. Patient instructed to self quarantine between testing and arrival time day of surgery. Patient instructed re: check-in procedure for day of surgery.

## 2021-10-01 LAB
BACTERIA SPEC CULT: NORMAL
BACTERIA SPEC CULT: NORMAL
SARS-COV-2, XPLCVT: NOT DETECTED
SERVICE CMNT-IMP: NORMAL
SOURCE, COVRS: NORMAL

## 2021-10-01 NOTE — PERIOP NOTES
PAT Nurse Practitioner   Pre-Operative Chart Review/Assessment:-ORTHOPEDIC                Patient Name:  Sanjuana Winter                                                           Age:   79 y.o.    :  1940     Today's Date:  10/4/2021     Date of PAT:   2021      Date of Surgery:    10/6/2021      Procedure(s):  Right Total Knee Arthroplasty     Surgeon:   Dr. Lopez Honor                       PLAN:      1)  Medical Clearance:  Dr. Paul Bob      2)  Cardiac Clearance:  EKG and METs reviewed. No further cardiac evaluation requested. 3)  Diabetic Treatment Consult:  Not indicated. A1c-7.0      4)  Sleep Apnea evaluation:   Not indicated.  CHELSIE Score 3.       5) Treatment for MRSA/Staph Aureus:  Neg      6) Additional Concerns:  Former smoker, PONV, HTN, GERD, T2DM, [place]                Vital Signs:         Vitals:    21 1148   BP: 125/73   Pulse: 70   Temp: 98 °F (36.7 °C)   SpO2: 95%   Weight: 86.9 kg (191 lb 9.3 oz)   Height: 5' (1.524 m)            ____________________________________________  PAST MEDICAL HISTORY  Past Medical History:   Diagnosis Date    Arthritis 2011    OSTEO    Chronic pain     RIGHT KNEE    Diabetes ([address]     GERD (gastroesophageal reflux disease)     Hot flashes, menopausal     Hypercholesterolemia     Hypertension     Ill-defined condition     Elevated liver enzymes (has family history)    Nausea & vomiting     Seizures ([address] 'S    AFTER DRINKING 2 MARGARITAS    Vitamin D deficiency 2010      ____________________________________________  PAST SURGICAL HISTORY  Past Surgical History:   Procedure Laterality Date    COLONOSCOPY N/A 6/3/2019    COLONOSCOPY performed by Le Hoff MD at [address] [place] [address] COLONOSCOPY N/A 2020    COLONOSCOPY performed by Le Hoff MD at [address] [place] [address] HX APPENDECTOMY      HX CATARACT REMOVAL Bilateral     HX CHOLECYSTECTOMY  2018    HX COLONOSCOPY  2020    [address] HX KNEE REPLACEMENT Left 2014    HX OOPHORECTOMY  2001    bilateral    HX OTHER SURGICAL      LIPOMA LEFT SIDE    HX ROTATOR CUFF REPAIR Right 1998      ____________________________________________  HOME MEDICATIONS  Current Outpatient Medications   Medication Sig    naproxen sodium (Aleve) 220 mg tablet Take 440 mg by mouth two (2) times daily (with meals).  dilTIAZem ER (CARDIZEM CD) 180 mg capsule TAKE 1 CAPSULE DAILY    losartan-hydroCHLOROthiazide (HYZAAR) 100-25 mg per tablet TAKE 1 TABLET DAILY    fenofibrate nanocrystallized (Tricor) 145 mg tablet TAKE 1 TABLET DAILY (Patient taking differently: Take 145 mg by mouth nightly. TAKE 1 TABLET DAILY)    diclofenac (VOLTAREN) 1 % gel Apply 1 g to affected area four (4) times daily. Apply to the right knee    esomeprazole (NEXIUM) 40 mg capsule Take 1 Cap by mouth daily.  loratadine (CLARITIN) 10 mg tablet TAKE 1 TABLET DAILY    ezetimibe (ZETIA) 10 mg tablet TAKE 1 TABLET DAILY (Patient taking differently: Take 10 mg by mouth nightly. TAKE 1 TABLET DAILY)    Trulicity 1.5 AW/4.4 mL sub-q pen INJECT 0.5 ML UNDER THE SKIN EVERY 7 DAYS (Patient taking differently: 1.5 mg by SubCUTAneous route every seven (7) days. TAKES EVERY MONDAY)    cloNIDine HCL (CATAPRES) 0.1 mg tablet TAKE 1 TABLET NIGHTLY (Patient taking differently: Take 0.1 mg by mouth daily (with lunch). TAKE 1 TABLET NOON)    Lactobacillus acidophilus (PROBIOTIC PO) Take 1 Tablet by mouth two (2) times a day.  glucose blood VI test strips (FreeStyle Lite Strips) strip TEST ONCE A DAY DX CODE: E11.65    aspirin 81 mg chewable tablet Take 81 mg by mouth daily.  lancets (FREESTYLE LANCETS) 28 gauge misc TEST ONCE DAILY DX: E11.65    omega-3 fatty acids-vitamin e (FISH OIL) 1,000 mg cap Take 2 Caps by mouth two (2) times a day.  cholecalciferol, vitamin d3, (VITAMIN D) 1,000 unit tablet Take 2,000 Units by mouth nightly.      No current facility-administered medications for this encounter.      ____________________________________________  ALLERGIES  Allergies   Allergen Reactions    Other Food Nausea and Vomiting     Artificial sweeteners - gas and beldging    Ciprofloxacin Nausea Only    Erythromycin Nausea Only    Metformin Other (comments) and Nausea Only     Felt bad  Felt bad      Pcn [Penicillins] Swelling     Caused lips to swell  Tolerates Amoxicillin      ____________________________________________  SOCIAL HISTORY  Social History     Tobacco Use    Smoking status: Former Smoker     Packs/day: 1.00     Years: 20.00     Pack years: 20.00     Quit date:      Years since quittin.7    Smokeless tobacco: Never Used   Substance Use Topics    Alcohol use: Yes     Comment: rarely - 1 glass of wine every 3 months      ____________________________________________   Internal Administration   First Dose Covid-19, MODERNA, Mrna, Lnp-s, Pf, 100mcg/0.5mL  2021   Second Dose Covid-19, MODERNA, Mrna, Lnp-s, Pf, 100mcg/0.5mL  2021      Last COVID Lab SARS-CoV-2 ( )   Date Value   2021 Not detected        Labs:     Hospital Outpatient Visit on 2021   Component Date Value Ref Range Status    Specimen source 2021 Nasopharyngeal    Final    SARS-CoV-2 2021 Not detected  NOTD   Final    Comment:      The specimen is NEGATIVE for SARS-CoV-2, the novel coronavirus associated with COVID-19. A negative result does not rule out COVID-19. Brenda SARS-CoV-2 for use on the Brenda 6800/8800 [employer] is a real-time RT-PCR test intended for the qualitative detection of nucleic acids from SARS-CoV-2  in clinician-collected nasal, nasopharyngeal,and oropharyngeal swab specimens from individuals who meet COVID-19 clinical and/or epidemiological criteria. Brenda SARS-CoV-2 is for use only under Emergency Use Authorization (EUA) in laboratories certified under [address] (CLIA), 42 [place].C. 256, that meet requirements to perform high or moderate complexity tests. An individual without symptoms of COVID-19 and who is not shedding SARS-CoV-2 virus would expect to have a negative (not detected) result in this assay. Fact sheet for Healthcare Providers: 500pxdate.co.nz  Fact sheet for Patients: http://Soxiable.CHOOMOGO/                           [address]/download       Methodology: RT-PCR     Hospital Outpatient Visit on 09/30/2021   Component Date Value Ref Range Status    Crossmatch Expiration 09/30/2021 10/09/2021,2359   Final    ABO/Rh(D) 09/30/2021 A POSITIVE   Final    Antibody screen 09/30/2021 NEG   Final    INR 09/30/2021 1.1  0.9 - 1.1   Final    A single therapeutic range for Vit K antagonists may not be optimal for all indications - see June, 2008 issue of Chest, American College of Chest Physicians Evidence-Based Clinical Practice Guidelines, 8th Edition.     Prothrombin time 09/30/2021 11.2* 9.0 - 11.1 sec Final    Instrument and technical errors have been ruled out    Color 09/30/2021 YELLOW/STRAW    Final    Color Reference Range: Straw, Yellow or Dark Yellow    Appearance 09/30/2021 CLEAR  CLEAR   Final    Specific gravity 09/30/2021 1.014  1.003 - 1.030   Final    pH (UA) 09/30/2021 7.0  5.0 - 8.0   Final    Protein 09/30/2021 TRACE* NEG mg/dL Final    Glucose 09/30/2021 100* NEG mg/dL Final    Ketone 09/30/2021 Negative  NEG mg/dL Final    Bilirubin 09/30/2021 Negative  NEG   Final    Blood 09/30/2021 Negative  NEG   Final    Urobilinogen 09/30/2021 0.2  0.2 - 1.0 EU/dL Final    Nitrites 09/30/2021 Negative  NEG   Final    Leukocyte Esterase 09/30/2021 Negative  NEG   Final    UA:UC IF INDICATED 09/30/2021 CULTURE NOT INDICATED BY UA RESULT  CNI   Final    WBC 09/30/2021 0-4  0 - 4 /hpf Final    RBC 09/30/2021 0-5  0 - 5 /hpf Final    Epithelial cells 09/30/2021 FEW  FEW /lpf Final    Epithelial cell category consists of squamous cells and /or transitional urothelial cells. Renal tubular cells, if present, are separately identified as such.  Bacteria 09/30/2021 Negative  NEG /hpf Final    Hyaline cast 09/30/2021 0-2  0 - 5 /lpf Final    Ventricular Rate 09/30/2021 70  BPM Final    Atrial Rate 09/30/2021 70  BPM Final    P-R Interval 09/30/2021 234  ms Final    QRS Duration 09/30/2021 92  ms Final    Q-T Interval 09/30/2021 410  ms Final    QTC Calculation (Bezet) 09/30/2021 442  ms Final    Calculated P Axis 09/30/2021 48  degrees Final    Calculated R Axis 09/30/2021 -37  degrees Final    Calculated T Axis 09/30/2021 114  degrees Final    Diagnosis 09/30/2021    Final                    Value:Sinus rhythm with 1st degree AV block  Left axis deviation  Voltage criteria for left ventricular hypertrophy    When compared with ECG of 20-JUN-2018 10:50,  No significant change  Confirmed by Vishal Gimenez M.D., [place] ([id number]) on 9/30/2021 11:10:32 PM      Special Requests: 09/30/2021 NO SPECIAL REQUESTS    Final    Culture result: 09/30/2021 MRSA NOT PRESENT    Final           Office Visit on 09/08/2021   Component Date Value Ref Range Status    Hemoglobin A1c 09/08/2021 7.0* 4.0 - 5.6 % Final    Comment: NEW METHOD PLEASE NOTE NEW REFERENCE RANGE  (NOTE)  HbA1C Interpretive Ranges  <5.7              Normal  5.7 - 6.4         Consider Prediabetes  >6.5              Consider Diabetes      Est. average glucose 09/08/2021 154  mg/dL Final    Cholesterol, total 09/08/2021 144  <200 MG/DL Final    Triglyceride 09/08/2021 210* <150 MG/DL Final    Comment: Based on NCEP-ATP III:  Triglycerides <150 mg/dL  is considered normal, 150-199  mg/dL  borderline high,  200-499 mg/dL high and  greater than or equal to 500  mg/dL very high.  HDL Cholesterol 09/08/2021 44  MG/DL Final    Comment: Based on NCEP ATP III, HDL Cholesterol <40 mg/dL is considered low and >60  mg/dL is elevated.       LDL, calculated 09/08/2021 58  0 - 100 MG/DL Final    Comment: Based on the NCEP-ATP: LDL-C concentrations are considered  optimal <100 mg/dL,  near optimal/above Normal 100-129 mg/dL Borderline High: 130-159, High: 160-189  mg/dL Very High: Greater than or equal to 190 mg/dL      VLDL, calculated 09/08/2021 42  MG/DL Final    CHOL/HDL Ratio 09/08/2021 3.3  0.0 - 5.0   Final    Sodium 09/08/2021 138  136 - 145 mmol/L Final    Potassium 09/08/2021 4.0  3.5 - 5.1 mmol/L Final    Chloride 09/08/2021 104  97 - 108 mmol/L Final    CO2 09/08/2021 26  21 - 32 mmol/L Final    Anion gap 09/08/2021 8  5 - 15 mmol/L Final    Glucose 09/08/2021 99  65 - 100 mg/dL Final    BUN 09/08/2021 20  6 - 20 MG/DL Final    Creatinine 09/08/2021 0.93  0.55 - 1.02 MG/DL Final    BUN/Creatinine ratio 09/08/2021 22* 12 - 20   Final    GFR est AA 09/08/2021 >60  >60 ml/min/1.73m2 Final    GFR est non-AA 09/08/2021 58* >60 ml/min/1.73m2 Final    Comment: Estimated GFR is calculated using the IDMS-traceable Modification of Diet in  Renal Disease (MDRD) Study equation, reported for both  Americans  (GFRAA) and non- Americans (GFRNA), and normalized to 1.73m2 body  surface area. The physician must decide which value applies to the patient.  Calcium 09/08/2021 9.3  8.5 - 10.1 MG/DL Final    Bilirubin, total 09/08/2021 0.7  0.2 - 1.0 MG/DL Final    ALT (SGPT) 09/08/2021 37  12 - 78 U/L Final    AST (SGOT) 09/08/2021 31  15 - 37 U/L Final    Alk.  phosphatase 09/08/2021 49  45 - 117 U/L Final    Protein, total 09/08/2021 7.2  6.4 - 8.2 g/dL Final    Albumin 09/08/2021 4.0  3.5 - 5.0 g/dL Final    Globulin 09/08/2021 3.2  2.0 - 4.0 g/dL Final    A-G Ratio 09/08/2021 1.3  1.1 - 2.2   Final    WBC 09/08/2021 7.5  3.6 - 11.0 K/uL Final    RBC 09/08/2021 4.91  3.80 - 5.20 M/uL Final    HGB 09/08/2021 14.3  11.5 - 16.0 g/dL Final    HCT 09/08/2021 44.9  35.0 - 47.0 % Final    MCV 09/08/2021 91.4  80.0 - 99.0 FL Final    MCH 09/08/2021 29.1  26.0 - 34.0 PG Final    MCHC 09/08/2021 31.8  30.0 - 36.5 g/dL Final    RDW 09/08/2021 14.6* 11.5 - 14.5 % Final    PLATELET 32/84/1395 395  150 - 400 K/uL Final    MPV 09/08/2021 10.0  8.9 - 12.9 FL Final    NRBC 09/08/2021 0.0  0  WBC Final    ABSOLUTE NRBC 09/08/2021 0.00  0.00 - 0.01 K/uL Final       Skin:     Denies open wounds, cuts, sores, rashes or other areas of concern in PAT assessment.           Lovina Kawasaki, NP

## 2021-10-04 NOTE — PERIOP NOTES
SARAI NOTIFIED IN DR. JARVIS'S OFFICE OF THE FOLLOWING:  PT-11.2  [id number]  RESULTS ALSO FAXED TO PCP.

## 2021-10-05 ENCOUNTER — ANESTHESIA EVENT (OUTPATIENT)
Dept: SURGERY | Age: 81
End: 2021-10-05
Payer: MEDICARE

## 2021-10-06 ENCOUNTER — ANESTHESIA (OUTPATIENT)
Dept: SURGERY | Age: 81
End: 2021-10-06
Payer: MEDICARE

## 2021-10-06 ENCOUNTER — HOSPITAL ENCOUNTER (OUTPATIENT)
Age: 81
Discharge: HOME | End: 2021-10-08
Attending: ORTHOPAEDIC SURGERY | Admitting: ORTHOPAEDIC SURGERY
Payer: MEDICARE

## 2021-10-06 DIAGNOSIS — Z96.659 STATUS POST KNEE REPLACEMENT, UNSPECIFIED LATERALITY: Primary | ICD-10-CM

## 2021-10-06 LAB
GLUCOSE BLD STRIP.AUTO-MCNC: 135 MG/DL (ref 65–117)
GLUCOSE BLD STRIP.AUTO-MCNC: 145 MG/DL (ref 65–117)
GLUCOSE BLD STRIP.AUTO-MCNC: 194 MG/DL (ref 65–117)
GLUCOSE BLD STRIP.AUTO-MCNC: 231 MG/DL (ref 65–117)
SERVICE CMNT-IMP: ABNORMAL

## 2021-10-06 PROCEDURE — 77030006835 HC BLD SAW SAG STRY -B: Performed by: ORTHOPAEDIC SURGERY

## 2021-10-06 PROCEDURE — 2709999900 HC NON-CHARGEABLE SUPPLY: Performed by: ORTHOPAEDIC SURGERY

## 2021-10-06 PROCEDURE — C1713 ANCHOR/SCREW BN/BN,TIS/BN: HCPCS | Performed by: ORTHOPAEDIC SURGERY

## 2021-10-06 PROCEDURE — 74011000250 HC RX REV CODE- 250: Performed by: NURSE ANESTHETIST, CERTIFIED REGISTERED

## 2021-10-06 PROCEDURE — 77030005513 HC CATH URETH FOL11 MDII -B: Performed by: ORTHOPAEDIC SURGERY

## 2021-10-06 PROCEDURE — 82962 GLUCOSE BLOOD TEST: CPT

## 2021-10-06 PROCEDURE — 77030035236 HC SUT PDS STRATFX BARB J&J -B: Performed by: ORTHOPAEDIC SURGERY

## 2021-10-06 PROCEDURE — 77030007866 HC KT SPN ANES BBMI -B: Performed by: STUDENT IN AN ORGANIZED HEALTH CARE EDUCATION/TRAINING PROGRAM

## 2021-10-06 PROCEDURE — C1776 JOINT DEVICE (IMPLANTABLE): HCPCS | Performed by: ORTHOPAEDIC SURGERY

## 2021-10-06 PROCEDURE — 74011250636 HC RX REV CODE- 250/636: Performed by: STUDENT IN AN ORGANIZED HEALTH CARE EDUCATION/TRAINING PROGRAM

## 2021-10-06 PROCEDURE — 74011000250 HC RX REV CODE- 250: Performed by: ORTHOPAEDIC SURGERY

## 2021-10-06 PROCEDURE — 74011250636 HC RX REV CODE- 250/636: Performed by: PHYSICIAN ASSISTANT

## 2021-10-06 PROCEDURE — 76060000035 HC ANESTHESIA 2 TO 2.5 HR: Performed by: ORTHOPAEDIC SURGERY

## 2021-10-06 PROCEDURE — 74011000250 HC RX REV CODE- 250: Performed by: STUDENT IN AN ORGANIZED HEALTH CARE EDUCATION/TRAINING PROGRAM

## 2021-10-06 PROCEDURE — 74011000250 HC RX REV CODE- 250: Performed by: PHYSICIAN ASSISTANT

## 2021-10-06 PROCEDURE — 77030041690 HC SYS PINNING KN JNJ -D: Performed by: ORTHOPAEDIC SURGERY

## 2021-10-06 PROCEDURE — 77030014077 HC TOWER MX CEM J&J -C: Performed by: ORTHOPAEDIC SURGERY

## 2021-10-06 PROCEDURE — 77030031139 HC SUT VCRL2 J&J -A: Performed by: ORTHOPAEDIC SURGERY

## 2021-10-06 PROCEDURE — 97116 GAIT TRAINING THERAPY: CPT

## 2021-10-06 PROCEDURE — 77030003601 HC NDL NRV BLK BBMI -A

## 2021-10-06 PROCEDURE — 76210000001 HC OR PH I REC 2.5 TO 3 HR: Performed by: ORTHOPAEDIC SURGERY

## 2021-10-06 PROCEDURE — C9290 INJ, BUPIVACAINE LIPOSOME: HCPCS | Performed by: PHYSICIAN ASSISTANT

## 2021-10-06 PROCEDURE — 74011000258 HC RX REV CODE- 258: Performed by: PHYSICIAN ASSISTANT

## 2021-10-06 PROCEDURE — 77030039497 HC CST PAD STERILE CHCS -A: Performed by: ORTHOPAEDIC SURGERY

## 2021-10-06 PROCEDURE — 77030040361 HC SLV COMPR DVT MDII -B: Performed by: ORTHOPAEDIC SURGERY

## 2021-10-06 PROCEDURE — 77030012935 HC DRSG AQUACEL BMS -B: Performed by: ORTHOPAEDIC SURGERY

## 2021-10-06 PROCEDURE — 76010000171 HC OR TIME 2 TO 2.5 HR INTENSV-TIER 1: Performed by: ORTHOPAEDIC SURGERY

## 2021-10-06 PROCEDURE — 77030000032 HC CUF TRNQT ZIMM -B: Performed by: ORTHOPAEDIC SURGERY

## 2021-10-06 PROCEDURE — 74011636637 HC RX REV CODE- 636/637: Performed by: PHYSICIAN ASSISTANT

## 2021-10-06 PROCEDURE — 97161 PT EVAL LOW COMPLEX 20 MIN: CPT

## 2021-10-06 PROCEDURE — 77030008462 HC STPLR SKN PROX J&J -A: Performed by: ORTHOPAEDIC SURGERY

## 2021-10-06 PROCEDURE — 97530 THERAPEUTIC ACTIVITIES: CPT

## 2021-10-06 PROCEDURE — 74011250637 HC RX REV CODE- 250/637: Performed by: PHYSICIAN ASSISTANT

## 2021-10-06 PROCEDURE — 77030040922 HC BLNKT HYPOTHRM STRY -A

## 2021-10-06 PROCEDURE — 74011250636 HC RX REV CODE- 250/636: Performed by: NURSE ANESTHETIST, CERTIFIED REGISTERED

## 2021-10-06 DEVICE — ATTUNE PATELLA MEDIALIZED DOME 35MM CEMENTED AOX
Type: IMPLANTABLE DEVICE | Site: KNEE | Status: FUNCTIONAL
Brand: ATTUNE

## 2021-10-06 DEVICE — ATTUNE KNEE SYSTEM REVISION CEMENTED STEM CEMENTED 14X30MM
Type: IMPLANTABLE DEVICE | Site: KNEE | Status: FUNCTIONAL
Brand: ATTUNE

## 2021-10-06 DEVICE — ATTUNE KNEE SYSTEM TIBIAL INSERT FIXED BEARING POSTERIOR STABILIZED 5 7MM AOX
Type: IMPLANTABLE DEVICE | Site: KNEE | Status: FUNCTIONAL
Brand: ATTUNE

## 2021-10-06 DEVICE — KNEE K1 TOT HEMI STD CEM IMPL CAPPED SYNTHES: Type: IMPLANTABLE DEVICE | Site: KNEE | Status: FUNCTIONAL

## 2021-10-06 DEVICE — ATTUNE KNEE SYSTEM REVISION FIXED BEARING TIBIAL BASE CEMENTED SIZE 4
Type: IMPLANTABLE DEVICE | Site: KNEE | Status: FUNCTIONAL
Brand: ATTUNE

## 2021-10-06 DEVICE — SMARTSET GHV GENTAMICIN HIGH VISCOSITY BONE CEMENT 40G
Type: IMPLANTABLE DEVICE | Site: KNEE | Status: FUNCTIONAL
Brand: SMARTSET

## 2021-10-06 DEVICE — ATTUNE KNEE SYSTEM FEMORAL POSTERIOR STABILIZED SIZE 5 RIGHT CEMENTED
Type: IMPLANTABLE DEVICE | Site: KNEE | Status: FUNCTIONAL
Brand: ATTUNE

## 2021-10-06 RX ORDER — ROPIVACAINE HYDROCHLORIDE 5 MG/ML
INJECTION, SOLUTION EPIDURAL; INFILTRATION; PERINEURAL
Status: DISCONTINUED | OUTPATIENT
Start: 2021-10-06 | End: 2021-10-06 | Stop reason: HOSPADM

## 2021-10-06 RX ORDER — POLYETHYLENE GLYCOL 3350 17 G/17G
17 POWDER, FOR SOLUTION ORAL DAILY
Status: DISCONTINUED | OUTPATIENT
Start: 2021-10-07 | End: 2021-10-08 | Stop reason: HOSPADM

## 2021-10-06 RX ORDER — SODIUM CHLORIDE 0.9 % (FLUSH) 0.9 %
5-40 SYRINGE (ML) INJECTION EVERY 8 HOURS
Status: DISCONTINUED | OUTPATIENT
Start: 2021-10-06 | End: 2021-10-06 | Stop reason: HOSPADM

## 2021-10-06 RX ORDER — DEXAMETHASONE SODIUM PHOSPHATE 4 MG/ML
INJECTION, SOLUTION INTRA-ARTICULAR; INTRALESIONAL; INTRAMUSCULAR; INTRAVENOUS; SOFT TISSUE AS NEEDED
Status: DISCONTINUED | OUTPATIENT
Start: 2021-10-06 | End: 2021-10-06 | Stop reason: HOSPADM

## 2021-10-06 RX ORDER — SODIUM CHLORIDE 9 MG/ML
125 INJECTION, SOLUTION INTRAVENOUS CONTINUOUS
Status: DISPENSED | OUTPATIENT
Start: 2021-10-06 | End: 2021-10-07

## 2021-10-06 RX ORDER — SODIUM CHLORIDE, SODIUM LACTATE, POTASSIUM CHLORIDE, CALCIUM CHLORIDE 600; 310; 30; 20 MG/100ML; MG/100ML; MG/100ML; MG/100ML
125 INJECTION, SOLUTION INTRAVENOUS CONTINUOUS
Status: DISCONTINUED | OUTPATIENT
Start: 2021-10-06 | End: 2021-10-06 | Stop reason: HOSPADM

## 2021-10-06 RX ORDER — PROPOFOL 10 MG/ML
INJECTION, EMULSION INTRAVENOUS AS NEEDED
Status: DISCONTINUED | OUTPATIENT
Start: 2021-10-06 | End: 2021-10-06 | Stop reason: HOSPADM

## 2021-10-06 RX ORDER — BUPIVACAINE HYDROCHLORIDE 5 MG/ML
INJECTION, SOLUTION EPIDURAL; INTRACAUDAL
Status: COMPLETED | OUTPATIENT
Start: 2021-10-06 | End: 2021-10-06

## 2021-10-06 RX ORDER — HYDROXYZINE HYDROCHLORIDE 10 MG/1
10 TABLET, FILM COATED ORAL
Status: DISCONTINUED | OUTPATIENT
Start: 2021-10-06 | End: 2021-10-08 | Stop reason: HOSPADM

## 2021-10-06 RX ORDER — SODIUM CHLORIDE, SODIUM LACTATE, POTASSIUM CHLORIDE, CALCIUM CHLORIDE 600; 310; 30; 20 MG/100ML; MG/100ML; MG/100ML; MG/100ML
1000 INJECTION, SOLUTION INTRAVENOUS CONTINUOUS
Status: DISCONTINUED | OUTPATIENT
Start: 2021-10-06 | End: 2021-10-06 | Stop reason: HOSPADM

## 2021-10-06 RX ORDER — PANTOPRAZOLE SODIUM 40 MG/1
40 TABLET, DELAYED RELEASE ORAL
Status: DISCONTINUED | OUTPATIENT
Start: 2021-10-07 | End: 2021-10-08 | Stop reason: HOSPADM

## 2021-10-06 RX ORDER — MORPHINE SULFATE 2 MG/ML
2 INJECTION, SOLUTION INTRAMUSCULAR; INTRAVENOUS
Status: DISCONTINUED | OUTPATIENT
Start: 2021-10-06 | End: 2021-10-06 | Stop reason: HOSPADM

## 2021-10-06 RX ORDER — CLONIDINE HYDROCHLORIDE 0.1 MG/1
0.1 TABLET ORAL DAILY
Status: DISCONTINUED | OUTPATIENT
Start: 2021-10-07 | End: 2021-10-08 | Stop reason: HOSPADM

## 2021-10-06 RX ORDER — MAGNESIUM SULFATE 100 %
4 CRYSTALS MISCELLANEOUS AS NEEDED
Status: DISCONTINUED | OUTPATIENT
Start: 2021-10-06 | End: 2021-10-08 | Stop reason: HOSPADM

## 2021-10-06 RX ORDER — SODIUM CHLORIDE 9 MG/ML
125 INJECTION, SOLUTION INTRAVENOUS CONTINUOUS
Status: DISCONTINUED | OUTPATIENT
Start: 2021-10-06 | End: 2021-10-06 | Stop reason: HOSPADM

## 2021-10-06 RX ORDER — ASPIRIN 325 MG
325 TABLET, DELAYED RELEASE (ENTERIC COATED) ORAL 2 TIMES DAILY
Status: DISCONTINUED | OUTPATIENT
Start: 2021-10-06 | End: 2021-10-08 | Stop reason: HOSPADM

## 2021-10-06 RX ORDER — PROPOFOL 10 MG/ML
INJECTION, EMULSION INTRAVENOUS AS NEEDED
Status: DISCONTINUED | OUTPATIENT
Start: 2021-10-06 | End: 2021-10-06

## 2021-10-06 RX ORDER — OXYCODONE AND ACETAMINOPHEN 5; 325 MG/1; MG/1
1 TABLET ORAL AS NEEDED
Status: DISCONTINUED | OUTPATIENT
Start: 2021-10-06 | End: 2021-10-06 | Stop reason: HOSPADM

## 2021-10-06 RX ORDER — ACETAMINOPHEN 325 MG/1
650 TABLET ORAL EVERY 6 HOURS
Status: DISCONTINUED | OUTPATIENT
Start: 2021-10-06 | End: 2021-10-08 | Stop reason: HOSPADM

## 2021-10-06 RX ORDER — HYDROMORPHONE HYDROCHLORIDE 1 MG/ML
0.5 INJECTION, SOLUTION INTRAMUSCULAR; INTRAVENOUS; SUBCUTANEOUS
Status: DISPENSED | OUTPATIENT
Start: 2021-10-06 | End: 2021-10-07

## 2021-10-06 RX ORDER — AMOXICILLIN 250 MG
1 CAPSULE ORAL 2 TIMES DAILY
Status: DISCONTINUED | OUTPATIENT
Start: 2021-10-06 | End: 2021-10-08 | Stop reason: HOSPADM

## 2021-10-06 RX ORDER — DEXTROSE 50 % IN WATER (D50W) INTRAVENOUS SYRINGE
12.5-25 AS NEEDED
Status: DISCONTINUED | OUTPATIENT
Start: 2021-10-06 | End: 2021-10-08 | Stop reason: HOSPADM

## 2021-10-06 RX ORDER — EPHEDRINE SULFATE/0.9% NACL/PF 50 MG/5 ML
5 SYRINGE (ML) INTRAVENOUS AS NEEDED
Status: DISCONTINUED | OUTPATIENT
Start: 2021-10-06 | End: 2021-10-06 | Stop reason: HOSPADM

## 2021-10-06 RX ORDER — LOSARTAN POTASSIUM 50 MG/1
100 TABLET ORAL DAILY
Status: DISCONTINUED | OUTPATIENT
Start: 2021-10-07 | End: 2021-10-08 | Stop reason: HOSPADM

## 2021-10-06 RX ORDER — SODIUM CHLORIDE 0.9 % (FLUSH) 0.9 %
5-40 SYRINGE (ML) INJECTION EVERY 8 HOURS
Status: DISCONTINUED | OUTPATIENT
Start: 2021-10-06 | End: 2021-10-08 | Stop reason: HOSPADM

## 2021-10-06 RX ORDER — OXYCODONE HYDROCHLORIDE 5 MG/1
10 TABLET ORAL
Status: DISCONTINUED | OUTPATIENT
Start: 2021-10-06 | End: 2021-10-08 | Stop reason: HOSPADM

## 2021-10-06 RX ORDER — LIDOCAINE HYDROCHLORIDE 10 MG/ML
0.1 INJECTION, SOLUTION EPIDURAL; INFILTRATION; INTRACAUDAL; PERINEURAL AS NEEDED
Status: DISCONTINUED | OUTPATIENT
Start: 2021-10-06 | End: 2021-10-06 | Stop reason: HOSPADM

## 2021-10-06 RX ORDER — FENTANYL CITRATE 50 UG/ML
25 INJECTION, SOLUTION INTRAMUSCULAR; INTRAVENOUS
Status: DISCONTINUED | OUTPATIENT
Start: 2021-10-06 | End: 2021-10-06 | Stop reason: HOSPADM

## 2021-10-06 RX ORDER — DIPHENHYDRAMINE HYDROCHLORIDE 50 MG/ML
12.5 INJECTION, SOLUTION INTRAMUSCULAR; INTRAVENOUS AS NEEDED
Status: DISCONTINUED | OUTPATIENT
Start: 2021-10-06 | End: 2021-10-06 | Stop reason: HOSPADM

## 2021-10-06 RX ORDER — SODIUM CHLORIDE 0.9 % (FLUSH) 0.9 %
5-40 SYRINGE (ML) INJECTION AS NEEDED
Status: DISCONTINUED | OUTPATIENT
Start: 2021-10-06 | End: 2021-10-06 | Stop reason: HOSPADM

## 2021-10-06 RX ORDER — INSULIN LISPRO 100 [IU]/ML
INJECTION, SOLUTION INTRAVENOUS; SUBCUTANEOUS
Status: DISCONTINUED | OUTPATIENT
Start: 2021-10-06 | End: 2021-10-08 | Stop reason: HOSPADM

## 2021-10-06 RX ORDER — DILTIAZEM HYDROCHLORIDE 180 MG/1
180 CAPSULE, COATED, EXTENDED RELEASE ORAL DAILY
Status: DISCONTINUED | OUTPATIENT
Start: 2021-10-07 | End: 2021-10-08 | Stop reason: HOSPADM

## 2021-10-06 RX ORDER — PHENYLEPHRINE HCL IN 0.9% NACL 0.4MG/10ML
SYRINGE (ML) INTRAVENOUS AS NEEDED
Status: DISCONTINUED | OUTPATIENT
Start: 2021-10-06 | End: 2021-10-06 | Stop reason: HOSPADM

## 2021-10-06 RX ORDER — CETIRIZINE HCL 10 MG
10 TABLET ORAL DAILY
Status: DISCONTINUED | OUTPATIENT
Start: 2021-10-07 | End: 2021-10-08 | Stop reason: HOSPADM

## 2021-10-06 RX ORDER — ONDANSETRON 4 MG/1
4 TABLET, ORALLY DISINTEGRATING ORAL
Status: DISCONTINUED | OUTPATIENT
Start: 2021-10-06 | End: 2021-10-08 | Stop reason: HOSPADM

## 2021-10-06 RX ORDER — ONDANSETRON 2 MG/ML
4 INJECTION INTRAMUSCULAR; INTRAVENOUS
Status: ACTIVE | OUTPATIENT
Start: 2021-10-06 | End: 2021-10-07

## 2021-10-06 RX ORDER — KETAMINE HYDROCHLORIDE 10 MG/ML
INJECTION, SOLUTION INTRAMUSCULAR; INTRAVENOUS AS NEEDED
Status: DISCONTINUED | OUTPATIENT
Start: 2021-10-06 | End: 2021-10-06 | Stop reason: HOSPADM

## 2021-10-06 RX ORDER — TRANEXAMIC ACID 100 MG/ML
INJECTION, SOLUTION INTRAVENOUS AS NEEDED
Status: DISCONTINUED | OUTPATIENT
Start: 2021-10-06 | End: 2021-10-06 | Stop reason: HOSPADM

## 2021-10-06 RX ORDER — PROPOFOL 10 MG/ML
INJECTION, EMULSION INTRAVENOUS
Status: DISCONTINUED | OUTPATIENT
Start: 2021-10-06 | End: 2021-10-06 | Stop reason: HOSPADM

## 2021-10-06 RX ORDER — SODIUM CHLORIDE 9 MG/ML
INJECTION, SOLUTION INTRAVENOUS
Status: DISCONTINUED | OUTPATIENT
Start: 2021-10-06 | End: 2021-10-06 | Stop reason: HOSPADM

## 2021-10-06 RX ORDER — FENTANYL CITRATE 50 UG/ML
50 INJECTION, SOLUTION INTRAMUSCULAR; INTRAVENOUS AS NEEDED
Status: DISCONTINUED | OUTPATIENT
Start: 2021-10-06 | End: 2021-10-06 | Stop reason: HOSPADM

## 2021-10-06 RX ORDER — MIDAZOLAM HYDROCHLORIDE 1 MG/ML
0.5 INJECTION, SOLUTION INTRAMUSCULAR; INTRAVENOUS
Status: DISCONTINUED | OUTPATIENT
Start: 2021-10-06 | End: 2021-10-06 | Stop reason: HOSPADM

## 2021-10-06 RX ORDER — MIDAZOLAM HYDROCHLORIDE 1 MG/ML
1 INJECTION, SOLUTION INTRAMUSCULAR; INTRAVENOUS AS NEEDED
Status: DISCONTINUED | OUTPATIENT
Start: 2021-10-06 | End: 2021-10-06 | Stop reason: HOSPADM

## 2021-10-06 RX ORDER — HYDROCHLOROTHIAZIDE 25 MG/1
25 TABLET ORAL DAILY
Status: DISCONTINUED | OUTPATIENT
Start: 2021-10-07 | End: 2021-10-08 | Stop reason: HOSPADM

## 2021-10-06 RX ORDER — ONDANSETRON 2 MG/ML
4 INJECTION INTRAMUSCULAR; INTRAVENOUS AS NEEDED
Status: DISCONTINUED | OUTPATIENT
Start: 2021-10-06 | End: 2021-10-06 | Stop reason: HOSPADM

## 2021-10-06 RX ORDER — ACETAMINOPHEN 500 MG
1000 TABLET ORAL ONCE
Status: DISCONTINUED | OUTPATIENT
Start: 2021-10-06 | End: 2021-10-06 | Stop reason: HOSPADM

## 2021-10-06 RX ORDER — SODIUM CHLORIDE 9 MG/ML
1000 INJECTION, SOLUTION INTRAVENOUS CONTINUOUS
Status: DISCONTINUED | OUTPATIENT
Start: 2021-10-06 | End: 2021-10-06 | Stop reason: HOSPADM

## 2021-10-06 RX ORDER — EZETIMIBE 10 MG/1
10 TABLET ORAL
Status: DISCONTINUED | OUTPATIENT
Start: 2021-10-06 | End: 2021-10-08 | Stop reason: HOSPADM

## 2021-10-06 RX ORDER — HYDROMORPHONE HYDROCHLORIDE 1 MG/ML
0.2 INJECTION, SOLUTION INTRAMUSCULAR; INTRAVENOUS; SUBCUTANEOUS
Status: DISCONTINUED | OUTPATIENT
Start: 2021-10-06 | End: 2021-10-06 | Stop reason: HOSPADM

## 2021-10-06 RX ORDER — FAMOTIDINE 20 MG/1
20 TABLET, FILM COATED ORAL
Status: DISCONTINUED | OUTPATIENT
Start: 2021-10-06 | End: 2021-10-07

## 2021-10-06 RX ORDER — OXYCODONE HYDROCHLORIDE 5 MG/1
5 TABLET ORAL
Status: DISCONTINUED | OUTPATIENT
Start: 2021-10-06 | End: 2021-10-08 | Stop reason: HOSPADM

## 2021-10-06 RX ORDER — ONDANSETRON 2 MG/ML
INJECTION INTRAMUSCULAR; INTRAVENOUS AS NEEDED
Status: DISCONTINUED | OUTPATIENT
Start: 2021-10-06 | End: 2021-10-06 | Stop reason: HOSPADM

## 2021-10-06 RX ORDER — FACIAL-BODY WIPES
10 EACH TOPICAL DAILY PRN
Status: DISCONTINUED | OUTPATIENT
Start: 2021-10-08 | End: 2021-10-08 | Stop reason: HOSPADM

## 2021-10-06 RX ORDER — NALOXONE HYDROCHLORIDE 0.4 MG/ML
0.4 INJECTION, SOLUTION INTRAMUSCULAR; INTRAVENOUS; SUBCUTANEOUS AS NEEDED
Status: DISCONTINUED | OUTPATIENT
Start: 2021-10-06 | End: 2021-10-08 | Stop reason: HOSPADM

## 2021-10-06 RX ORDER — SODIUM CHLORIDE 0.9 % (FLUSH) 0.9 %
5-40 SYRINGE (ML) INJECTION AS NEEDED
Status: DISCONTINUED | OUTPATIENT
Start: 2021-10-06 | End: 2021-10-08 | Stop reason: HOSPADM

## 2021-10-06 RX ADMIN — OXYCODONE 10 MG: 5 TABLET ORAL at 22:41

## 2021-10-06 RX ADMIN — Medication 80 MCG: at 10:45

## 2021-10-06 RX ADMIN — DEXAMETHASONE SODIUM PHOSPHATE 4 MG: 4 INJECTION, SOLUTION INTRAMUSCULAR; INTRAVENOUS at 11:06

## 2021-10-06 RX ADMIN — ACETAMINOPHEN 650 MG: 325 TABLET ORAL at 16:14

## 2021-10-06 RX ADMIN — Medication 10 ML: at 22:00

## 2021-10-06 RX ADMIN — SODIUM CHLORIDE 125 ML/HR: 9 INJECTION, SOLUTION INTRAVENOUS at 14:24

## 2021-10-06 RX ADMIN — PROPOFOL 30 MG: 10 INJECTION, EMULSION INTRAVENOUS at 12:34

## 2021-10-06 RX ADMIN — ASPIRIN 325 MG: 325 TABLET, COATED ORAL at 17:46

## 2021-10-06 RX ADMIN — SODIUM CHLORIDE, POTASSIUM CHLORIDE, SODIUM LACTATE AND CALCIUM CHLORIDE 125 ML/HR: 600; 310; 30; 20 INJECTION, SOLUTION INTRAVENOUS at 10:13

## 2021-10-06 RX ADMIN — HYDROMORPHONE HYDROCHLORIDE 0.5 MG: 1 INJECTION, SOLUTION INTRAMUSCULAR; INTRAVENOUS; SUBCUTANEOUS at 21:31

## 2021-10-06 RX ADMIN — SODIUM CHLORIDE: 900 INJECTION, SOLUTION INTRAVENOUS at 12:47

## 2021-10-06 RX ADMIN — KETAMINE HYDROCHLORIDE 10 MG: 10 INJECTION, SOLUTION INTRAMUSCULAR; INTRAVENOUS at 11:34

## 2021-10-06 RX ADMIN — DOCUSATE SODIUM 50 MG AND SENNOSIDES 8.6 MG 1 TABLET: 8.6; 5 TABLET, FILM COATED ORAL at 17:46

## 2021-10-06 RX ADMIN — WATER 2 G: 1 INJECTION INTRAMUSCULAR; INTRAVENOUS; SUBCUTANEOUS at 10:52

## 2021-10-06 RX ADMIN — MIDAZOLAM HYDROCHLORIDE 2 MG: 1 INJECTION, SOLUTION INTRAMUSCULAR; INTRAVENOUS at 10:19

## 2021-10-06 RX ADMIN — Medication 80 MCG: at 10:54

## 2021-10-06 RX ADMIN — TRANEXAMIC ACID 1 G: 100 INJECTION, SOLUTION INTRAVENOUS at 11:00

## 2021-10-06 RX ADMIN — ONDANSETRON HYDROCHLORIDE 4 MG: 2 INJECTION, SOLUTION INTRAMUSCULAR; INTRAVENOUS at 12:21

## 2021-10-06 RX ADMIN — PROPOFOL 30 MCG/KG/MIN: 10 INJECTION, EMULSION INTRAVENOUS at 10:40

## 2021-10-06 RX ADMIN — Medication 80 MCG: at 10:47

## 2021-10-06 RX ADMIN — OXYCODONE 5 MG: 5 TABLET ORAL at 19:42

## 2021-10-06 RX ADMIN — KETAMINE HYDROCHLORIDE 5 MG: 10 INJECTION, SOLUTION INTRAMUSCULAR; INTRAVENOUS at 12:34

## 2021-10-06 RX ADMIN — BUPIVACAINE HYDROCHLORIDE 10 MG: 5 INJECTION, SOLUTION EPIDURAL; INTRACAUDAL; PERINEURAL at 10:46

## 2021-10-06 RX ADMIN — EZETIMIBE 10 MG: 10 TABLET ORAL at 21:31

## 2021-10-06 RX ADMIN — WATER 2 G: 1 INJECTION INTRAMUSCULAR; INTRAVENOUS; SUBCUTANEOUS at 17:47

## 2021-10-06 RX ADMIN — Medication 80 MCG: at 10:42

## 2021-10-06 RX ADMIN — INSULIN LISPRO 3 UNITS: 100 INJECTION, SOLUTION INTRAVENOUS; SUBCUTANEOUS at 21:31

## 2021-10-06 RX ADMIN — OXYCODONE 5 MG: 5 TABLET ORAL at 16:42

## 2021-10-06 RX ADMIN — ROPIVACAINE HYDROCHLORIDE 30 ML: 5 INJECTION, SOLUTION EPIDURAL; INFILTRATION; PERINEURAL at 10:25

## 2021-10-06 RX ADMIN — INSULIN LISPRO 2 UNITS: 100 INJECTION, SOLUTION INTRAVENOUS; SUBCUTANEOUS at 17:46

## 2021-10-06 RX ADMIN — Medication 80 MCG: at 10:43

## 2021-10-06 RX ADMIN — FENTANYL CITRATE 50 MCG: 50 INJECTION INTRAMUSCULAR; INTRAVENOUS at 10:19

## 2021-10-06 RX ADMIN — PHENYLEPHRINE HYDROCHLORIDE 50 MCG/MIN: 10 INJECTION INTRAVENOUS at 10:54

## 2021-10-06 RX ADMIN — PROPOFOL 30 MG: 10 INJECTION, EMULSION INTRAVENOUS at 10:40

## 2021-10-06 RX ADMIN — ACETAMINOPHEN 650 MG: 325 TABLET ORAL at 21:31

## 2021-10-06 NOTE — ANESTHESIA PREPROCEDURE EVALUATION
Relevant Problems   CARDIOVASCULAR   (+) Hypertension      GASTROINTESTINAL   (+) Gastroesophageal reflux disease without esophagitis      ENDOCRINE   (+) Arthritis   (+) Morbid obesity (HCC)   (+) Type 2 diabetes mellitus with hyperglycemia, without long-term current use of insulin (HCC)   (+) Type 2 diabetes mellitus with nephropathy (HCC)       Anesthetic History     PONV          Review of Systems / Medical History  Patient summary reviewed, nursing notes reviewed and pertinent labs reviewed    Pulmonary  Within defined limits                 Neuro/Psych   Within defined limits           Cardiovascular    Hypertension          Hyperlipidemia    Exercise tolerance: >4 METS  Comments: EKG 9/30/2021  Sinus rhythm with 1st degree AV block   Left axis deviation   Voltage criteria for left ventricular hypertrophy     When compared with ECG of 20-JUN-2018 10:50,   No significant change    GI/Hepatic/Renal     GERD: well controlled           Endo/Other    Diabetes: well controlled, type 2    Obesity and arthritis     Other Findings              Physical Exam    Airway  Mallampati: III  TM Distance: 4 - 6 cm  Neck ROM: normal range of motion        Cardiovascular    Rhythm: regular  Rate: normal         Dental    Dentition: Bridges  Comments: Permanent upper and lower bridge   Pulmonary  Breath sounds clear to auscultation               Abdominal         Other Findings            Anesthetic Plan    ASA: 3  Anesthesia type: spinal and regional - saphenous block          Induction: Intravenous  Anesthetic plan and risks discussed with: Patient

## 2021-10-06 NOTE — BRIEF OP NOTE
Brief Postoperative Note    Patient: Lupe Hernadez  YOB: 1940  MRN: [id number]    Date of Procedure: 10/6/2021     Pre-Op Diagnosis: DJD RIGHT KNEE    Post-Op Diagnosis: Same as preoperative diagnosis. Procedure(s):  RIGHT TOTAL KNEE ARTHROPLASTY    Surgeon(s):  Pratik Stoddard MD    Surgical Assistant: Physician Assistant: Orlando De PA-C  Surg Asst-1: Shellia Cake    Anesthesia: Spinal     Estimated Blood Loss (mL): 525     Complications: None    Specimens: * No specimens in log *     Implants:   Implant Name Type Inv.  Item Serial No.  Lot No. LRB No. Used Action   CEMENT BNE 40GM FULL DOSE PMMA W/ GENT HI VISC RADPQ LNG - SNA  CEMENT BNE 40GM FULL DOSE PMMA W/ GENT HI VISC RADPQ LNG NA [facility] [employer] [facility] [id number] Right 2 Implanted   STEM FEM [id number] MAY ATTUNE - SNA  STEM FEM [id number] MAY ATTUNE NA [facility] [employer] [facility] [id number] Right 1 Implanted   BASEPLATE TIB SZ 4 FIX BEAR CO CHROM MOLYBDENUM TI ALLY END - SNA  BASEPLATE TIB SZ 4 FIX BEAR CO CHROM MOLYBDENUM TI ALLY END NA [facility] [employer] [facility] [id number] Right 1 Implanted   COMPONENT FEM SZ 5 R KNEE POST STBL MAY ATTUNE - SNA  COMPONENT FEM SZ 5 R KNEE POST STBL MAY ATTUNE NA [email or web address] [facility] [id number] Right 1 Implanted   COMPONENT PAT [id number] KNEE POLY DOME MAY MEDIALIZED ATTUNE - SNA  COMPONENT PAT [id number] KNEE POLY DOME MAY MEDIALIZED ATTUNE NA [email or web address] [facility] [id number] Right 1 Implanted   INSERT TIB SZ 5 THK7MM KNEE POST STBL FIX BEAR ATTUNE - SNA  INSERT TIB SZ 5 THK7MM KNEE POST STBL FIX BEAR ATTUNE NA [email or web address] [facility] [id number] Right 1 Implanted       Drains: * No LDAs found *    Findings: DJD Right knee    Electronically Signed by Eric Adair PA-C on 10/6/2021 at 12:48 PM

## 2021-10-06 NOTE — OP NOTES
[address]   OPERATIVE REPORT    Name:  Tressa Diaz  MR#:  [id number]  :  1940  ACCOUNT #:  [de-identified]  DATE OF SERVICE:  10/06/2021      PREOPERATIVE DIAGNOSES:  1. Advanced degenerative arthritis of right knee. 2.  Left total knee replacement. POSTOPERATIVE DIAGNOSES:  1. Advanced degenerative arthritis of right knee. 2.  Left total knee replacement. PROCEDURE PERFORMED:  Left total knee replacement. SURGEON:  Trey Kaur MD    ASSISTANT:  Dipti Watkins PA-C    ANESTHESIA:  Regional block plus spinal.    COMPLICATIONS:  None. SPECIMENS REMOVED:  Tibial and femoral bone fragments. IMPLANTS:  Right total knee components as listed in operative report. ESTIMATED BLOOD LOSS:  200 mL. DRAINS:  None. INDICATIONS:  The patient has previously undergone left total knee replacement, doing well with the left knee. She now presents for right total knee replacement having failed conservative treatment. PROCEDURE:  On day of operation, the patient was taken to the holding area. Regional block administered. The patient was taken to the operating room. Consent confirmed. Antibiotics given. Spinal anesthesia administered. The patient was placed in the supine position. The right lower extremity was prepped and draped in the usual fashion. After exsanguination with an Esmarch, tourniquet inflated to 290 mmHg. A midline longitudinal incision was made over the knee and was carried through subcutaneous tissue. A medial parapatellar capsular incision made. Advanced degenerative changes were noted throughout the knee. The knee was debrided of osteophytes and soft tissue. A drill was used to gain access to the femoral canal.  Distal femoral cutting guide assembled with a 5-degree valgus cut. Distal femoral cut made with an oscillating saw. The femur was sized and felt to be #5 in the Attune system. Anterior and cuts were made.   Chamfer cuts were made.  Box cut for the posterior stabilized prosthesis made. External tibial alignment guide assembled. Tibial plateau cut made with an oscillating saw. Her bone quality was somewhat poor and it was felt that a short supplemental stem would be appropriate. The tibia was prepared with drill and punch to accommodate a short supplemental tibial stem. The patella was prepared with an oscillating saw and sized for 35 mm. Trial components put into place. The 7-mm insert provided the best fit, range of motion, with proper alignment and proper tracking of the patella. The knee was felt to be stable. The trial components were then removed. The knee was sterilely irrigated with pulse irrigation as well as antibiotic solution. The components were cemented into place with antibiotic impregnated cement. 7-mm trial insert was put into place. Soft tissues infiltrated with Exparel local anesthetic. After the cement matured, the 7-mm insert was put into place and felt to be stable. Tourniquet released. Coagulation achieved with electrocautery. The capsule repaired with combination of #2 and #1 Vicryl, supplemented with #2 PDS. 2-0 Vicryl was used to close subcutaneous tissue and staples used to close the skin. Sterile dressings applied. The patient taken to recovery room in satisfactory condition. The assistant, Suleman Willis PA-C, assisted with positioning, retraction, implant installation and incision closure.       Audrey Sheldon MD      [id number]  D:  10/06/2021 14:14  T:  10/06/2021 15:19  JOB #:  [id number]  CC:  MD Chad Baldwin MD

## 2021-10-06 NOTE — PROGRESS NOTES
Bedside shift change report given to [facility] JESS (oncoming nurse) by Fozia Arnold (offgoing nurse). Report included the following information SBAR, Kardex, Intake/Output, MAR and Recent Results.

## 2021-10-06 NOTE — PERIOP NOTES
TRANSFER - OUT REPORT:    Verbal report given to [facility] (name) on Rain Garcia  being transferred to [phone] (unit) for routine post - op       Report consisted of patients Situation, Background, Assessment and   Recommendations(SBAR). Time Pre op antibiotic given:1052  Anesthesia Stop time: 9793  Sage Present on Transfer to floor:N/A  Order for Sage on Chart:N/A  Discharge Prescriptions with Chart:N/A    Information from the following report(s) SBAR, OR Summary, Procedure Summary, Intake/Output and MAR was reviewed with the receiving nurse. Opportunity for questions and clarification was provided. Is the patient on 02? YES       L/Min 2       Other N/A    Is the patient on a monitor? NO    Is the nurse transporting with the patient? NO    Surgical Waiting Area notified of patient's transfer from PACU? NO, called her friend @ home. No one is waiting in the [place]. The following personal items collected during your admission accompanied patient upon transfer:   Dental Appliance: Dental Appliances: None  Vision:    Hearing Aid:    Jewelry:    Clothing:  W/ Pt.  To 578  Other Valuables:    Valuables sent to safe: Wallet & Phone

## 2021-10-06 NOTE — ANESTHESIA POSTPROCEDURE EVALUATION
Procedure(s):  RIGHT TOTAL KNEE ARTHROPLASTY. spinal, regional    Anesthesia Post Evaluation      Multimodal analgesia: multimodal analgesia used between 6 hours prior to anesthesia start to PACU discharge  Patient location during evaluation: bedside  Patient participation: complete - patient participated  Level of consciousness: awake  Pain score: 0  Pain management: satisfactory to patient  Airway patency: patent  Anesthetic complications: no  Cardiovascular status: acceptable and blood pressure returned to baseline  Respiratory status: acceptable  Hydration status: acceptable  Comments: I have evaluated the patient and meets criteria for discharge from PACU. Dayana Cheema DO. Post anesthesia nausea and vomiting:  none  Final Post Anesthesia Temperature Assessment:  Normothermia (36.0-37.5 degrees C)      INITIAL Post-op Vital signs:   Vitals Value Taken Time   /62 10/06/21 1305   Temp 36.4 °C (97.5 °F) 10/06/21 1255   Pulse 64 10/06/21 1306   Resp 14 10/06/21 1306   SpO2 98 % 10/06/21 1306   Vitals shown include unvalidated device data.

## 2021-10-06 NOTE — PROGRESS NOTES
Problem: Mobility Impaired (Adult and Pediatric)  Goal: *Acute Goals and Plan of Care (Insert Text)  Description: FUNCTIONAL STATUS PRIOR TO ADMISSION: Patient was modified independent using a single point cane for functional mobility in community. HOME SUPPORT PRIOR TO ADMISSION: The patient lived alone with friends/family to provide assistance. Pt's daughter yordan parisi is going to be staying with patient thru the weekend. Physical Therapy Goals  Initiated 10/6/2021    1. Patient will move from supine to sit and sit to supine  and scoot up and down in bed with modified independence within 4 days. 2. Patient will perform sit to stand with modified independence within 4 days. 3. Patient will ambulate with modified independence for > 150 feet with the least restrictive device within 4 days. 4. Patient will ascend/descend 4 stairs with no handrail(s) with modified independence within 4 days. 5. Patient will perform home exercise program per protocol with supervision/set-up within 4 days. 6. Patient will demonstrate AROM 0-90 degrees in operative joint within 4 days. PHYSICAL THERAPY EVALUATION  Patient: Damian Watts (3 y.o. female)  Date: 10/6/2021  Primary Diagnosis: Primary localized osteoarthritis of right knee [M17.11]  Procedure(s) (LRB):  RIGHT TOTAL KNEE ARTHROPLASTY (Right) Day of Surgery   Precautions:   WBAT    ASSESSMENT  Based on the objective data described below, the patient presents POD# 0 Right TKA with knee pain, decreased AROM/strength and function right leg, decreased activity tolerance, decline in functional mobility and impaired standing balance/gait with assistive device. Pt did well mobilizing OOB - and amb short distance with RW - stable VS.  Patient has 3 steps with no rails to enter home (deck entrance). Anticipate pt will require 2-3 additional PT sessions to become safe for discharge from PT standpoint.      Current Level of Function Impacting Discharge (mobility/balance): [facility] to [facility] for transfers/amb with RW    Functional Outcome Measure: The patient scored 50/100 on the Barthel Index outcome measure . Other factors to consider for discharge: Has arranged for assistance starting 10/8     Patient will benefit from skilled therapy intervention to address the above noted impairments. PLAN :  Recommendations and Planned Interventions: bed mobility training, transfer training, gait training, therapeutic exercises, patient and family training/education, and therapeutic activities      Frequency/Duration: Patient will be followed by physical therapy:  twice daily to address goals. Recommendation for discharge: (in order for the patient to meet his/her long term goals)  Physical therapy at least 2 days/week in the home     This discharge recommendation:  Has been made in collaboration with the attending provider and/or case management    IF patient discharges home will need the following DME: patient owns DME required for discharge         SUBJECTIVE:   Patient stated i'll be fine after my family leaves - friends and neighbors are taking turns helping me out.     OBJECTIVE DATA SUMMARY:   HISTORY:    Past Medical History:   Diagnosis Date    Arthritis 2/28/2011    OSTEO    Chronic pain     RIGHT KNEE    Diabetes ([address]     GERD (gastroesophageal reflux disease)     Hot flashes, menopausal     Hypercholesterolemia     Hypertension     Ill-defined condition     Elevated liver enzymes (has family history)    Nausea & vomiting     Seizures ([address] 1980'S    AFTER DRINKING 2 MARGARITAS    Vitamin D deficiency 6/28/2010     Past Surgical History:   Procedure Laterality Date    COLONOSCOPY N/A 6/3/2019    COLONOSCOPY performed by Layton Phelps MD at [address] [place] [address] N/A 12/1/2020    COLON[facility] Lay[facility]ps [facility] END[facility]COPY    HX APPENDECTOMY  2001    HX CATARACT REMOVAL Bilateral     HX CHOLECYSTECTOMY  06/28/2018    HX COLONOSCOPY  12/01/2020 HX HYSTERECTOMY  1975    HX KNEE REPLACEMENT Left 2014    HX OOPHORECTOMY  2001    bilateral    HX OTHER SURGICAL      LIPOMA LEFT SIDE    HX ROTATOR CUFF REPAIR Right 1998       Personal factors and/or comorbidities impacting plan of care: as above    Home Situation  Home Environment: Private residence  # Steps to Enter: 3  Rails to Enter: No  One/Two Story Residence: One story  Living Alone: Yes (friend will be staying with patient starting 10/8)  Support Systems: Friend/Neighbor  Patient Expects to be Discharged to[de-identified] [employer]  Current DME Used/Available at [facility], straight, Walker, rolling    EXAMINATION/PRESENTATION/DECISION MAKING:   Critical Behavior:  Neurologic State: Alert  Orientation Level: Oriented X4  Cognition: Appropriate decision making, Appropriate safety awareness  Safety/Judgement: Awareness of environment, Good awareness of safety precautions  Skin:  Right leg ace wrap in place - no drainage noted    Range Of Motion:  AROM: Within functional limits (except right leg)                       Strength:    Strength: Within functional limits (except right leg)                    Tone & Sensation:   Tone: Normal              Sensation: Intact               Coordination:  Coordination: Within functional limits  Functional Mobility:  Bed Mobility:     Supine to Sit: Stand-by assistance  Sit to Supine: Stand-by assistance  Scooting: Stand-by assistance  Transfers:  Sit to Stand: Contact guard assistance  Stand to Sit: Contact guard assistance                       Balance:   Sitting: Intact; Without support  Standing: Impaired; Without support  Standing - Static: Good;Constant support  Standing - Dynamic : Good;Constant support  Ambulation/Gait Training:  Distance (ft): 50 Feet (ft)  Assistive Device: Walker, rolling;Gait belt  Ambulation - Level of Assistance: Contact guard assistance        Gait Abnormalities: Antalgic;Decreased step clearance  Right Side Weight Bearing: As tolerated  Left Side Weight Bearing: Full  Base of Support: Center of gravity altered;Shift to left  Stance: Right decreased  Speed/Stacie: Slow  Step Length: Right shortened;Left shortened  Swing Pattern: Right asymmetrical             Therapeutic Exercises:   Pt instructed and performed ankle pumps, quad sets, hamstring sets and heel slides - to be performed as tolerated 5-10 reps once hr when awake. Pt demonstrated proper use of incentive spirometer - to be utilized x 10 once hr when awake. Functional Measure:  Barthel Index:    Bathin  Bladder: 5  Bowels: 10  Groomin  Dressin  Feeding: 10  Mobility: 0  Stairs: 0  Toilet Use: 5  Transfer (Bed to Chair and Back): 10  Total: 50/100       The Barthel ADL Index: Guidelines  1. The index should be used as a record of what a patient does, not as a record of what a patient could do. 2. The main aim is to establish degree of independence from any help, physical or verbal, however minor and for whatever reason. 3. The need for supervision renders the patient not independent. 4. A patient's performance should be established using the best available evidence. Asking the patient, friends/relatives and nurses are the usual sources, but direct observation and common sense are also important. However direct testing is not needed. 5. Usually the patient's performance over the preceding 24-48 hours is important, but occasionally longer periods will be relevant. 6. Middle categories imply that the patient supplies over 50 per cent of the effort. 7. Use of aids to be independent is allowed. Laine Abarca., Barthel, D.W. (2555). Functional evaluation: the Barthel Index. [facility] (14)2. Juliann Ferrell patricia ELVIE Oneil, Comfort Mcguire., David Ac[address] [place], [address] [place], [place] [address] (). Measuring the change indisability after inpatient rehabilitation; comparison of the responsiveness of the Barthel Index and Functional Tyler Measure.  Journal of Neurology, Neurosurgery, and Psychiatry, 66(4), [phone]. BOBO Palmer, LUZ Arguelles, & Benjie Houston M.A. (2004.) Assessment of post-stroke quality of life in cost-effectiveness studies: The usefulness of the Barthel Index and the EuroQoL-5D. Quality of Life Research, 15, 217-42           Physical Therapy Evaluation Charge Determination   History Examination Presentation Decision-Making   LOW Complexity : Zero comorbidities / personal factors that will impact the outcome / POC LOW Complexity : 1-2 Standardized tests and measures addressing body structure, function, activity limitation and / or participation in recreation  LOW Complexity : Stable, uncomplicated  LOW Complexity : FOTO score of       Based on the above components, the patient evaluation is determined to be of the following complexity level: LOW     Pain Rating:  Right knee 3/10    Activity Tolerance:   Good - POD# 0 tolerated OOB/amb with stable VS    After treatment patient left in no apparent distress:   Supine in bed and Call bell within reach    COMMUNICATION/EDUCATION:   The patients plan of care was discussed with: Registered nurse. Fall prevention education was provided and the patient/caregiver indicated understanding., Patient/family have participated as able in goal setting and plan of care. , and Patient/family agree to work toward stated goals and plan of care.     Thank you for this referral.  Ephraim Danielle, PT   Time Calculation: 25 mins

## 2021-10-06 NOTE — ANESTHESIA PROCEDURE NOTES
Peripheral Block    Start time: 10/6/2021 10:20 AM  End time: 10/6/2021 10:25 AM  Performed by: Katiuska Frias DO  Authorized by: Katiuska Frias DO       Pre-procedure: Indications: at surgeon's request and post-op pain management    Preanesthetic Checklist: patient identified, risks and benefits discussed, site marked, timeout performed and patient being monitored    Timeout Time: 10:20 EDT          Block Type:   Block Type:   Adductor canal  Laterality:  Right  Monitoring:  Standard ASA monitoring, continuous pulse ox, frequent vital sign checks, heart rate, responsive to questions and oxygen  Injection Technique:  Single shot  Procedures: ultrasound guided    Patient Position: supine  Prep: chlorhexidine    Location:  Mid thigh  Needle Type:  Stimuplex  Needle Gauge:  21 G  Needle Localization:  Ultrasound guidance and anatomical landmarks  Medication Injected:  Ropivacaine (PF) (NAROPIN)(0.5%) 5 mg/mL injection, 30 mL  Med Admin Time: 10/6/2021 10:25 AM    Assessment:  Number of attempts:  1  Injection Assessment:  Incremental injection every 5 mL, local visualized surrounding nerve on ultrasound, negative aspiration for blood, no paresthesia, no intravascular symptoms and ultrasound image on chart  Patient tolerance:  Patient tolerated the procedure well with no immediate complications

## 2021-10-06 NOTE — ANESTHESIA PROCEDURE NOTES
Spinal Block    Start time: 10/6/2021 10:41 AM  End time: 10/6/2021 10:46 AM  Performed by: Lefty Boone CRNA  Authorized by: Bala Martinez DO     Pre-procedure: Indications: at surgeon's request and primary anesthetic  Preanesthetic Checklist: patient identified, risks and benefits discussed, anesthesia consent, site marked, patient being monitored and timeout performed    Timeout Time: 10:41 EDT          Spinal Block:   Patient Position:  Seated  Prep Region:  Thoracic  Prep: Betadine      Location:  L3-4  Technique:  Single shot    Local Dose (mL):  2    Needle:   Needle type: Sprotte.   Needle Gauge:  24 G  Attempts:  1      Events: CSF confirmed, no blood with aspiration and no paresthesia        Assessment:  Insertion:  Uncomplicated  Patient tolerance:  Patient tolerated the procedure well with no immediate complications

## 2021-10-07 LAB
ANION GAP SERPL CALC-SCNC: 6 MMOL/L (ref 5–15)
BUN SERPL-MCNC: 14 MG/DL (ref 6–20)
BUN/CREAT SERPL: 11 (ref 12–20)
CALCIUM SERPL-MCNC: 8 MG/DL (ref 8.5–10.1)
CHLORIDE SERPL-SCNC: 105 MMOL/L (ref 97–108)
CO2 SERPL-SCNC: 24 MMOL/L (ref 21–32)
CREAT SERPL-MCNC: 1.23 MG/DL (ref 0.55–1.02)
GLUCOSE BLD STRIP.AUTO-MCNC: 165 MG/DL (ref 65–117)
GLUCOSE BLD STRIP.AUTO-MCNC: 181 MG/DL (ref 65–117)
GLUCOSE BLD STRIP.AUTO-MCNC: 202 MG/DL (ref 65–117)
GLUCOSE BLD STRIP.AUTO-MCNC: 210 MG/DL (ref 65–117)
GLUCOSE SERPL-MCNC: 196 MG/DL (ref 65–100)
HGB BLD-MCNC: 11.3 G/DL (ref 11.5–16)
INR PPP: 1.1 (ref 0.9–1.1)
POTASSIUM SERPL-SCNC: 3.8 MMOL/L (ref 3.5–5.1)
PROTHROMBIN TIME: 11.4 SEC (ref 9–11.1)
SERVICE CMNT-IMP: ABNORMAL
SODIUM SERPL-SCNC: 135 MMOL/L (ref 136–145)

## 2021-10-07 PROCEDURE — 97116 GAIT TRAINING THERAPY: CPT

## 2021-10-07 PROCEDURE — 82962 GLUCOSE BLOOD TEST: CPT

## 2021-10-07 PROCEDURE — 85018 HEMOGLOBIN: CPT

## 2021-10-07 PROCEDURE — 74011250636 HC RX REV CODE- 250/636: Performed by: PHYSICIAN ASSISTANT

## 2021-10-07 PROCEDURE — 85610 PROTHROMBIN TIME: CPT

## 2021-10-07 PROCEDURE — 36415 COLL VENOUS BLD VENIPUNCTURE: CPT

## 2021-10-07 PROCEDURE — 80048 BASIC METABOLIC PNL TOTAL CA: CPT

## 2021-10-07 PROCEDURE — 74011636637 HC RX REV CODE- 636/637: Performed by: PHYSICIAN ASSISTANT

## 2021-10-07 PROCEDURE — 74011250637 HC RX REV CODE- 250/637: Performed by: PHYSICIAN ASSISTANT

## 2021-10-07 PROCEDURE — 74011000250 HC RX REV CODE- 250: Performed by: PHYSICIAN ASSISTANT

## 2021-10-07 RX ORDER — ASPIRIN 325 MG
325 TABLET ORAL 2 TIMES DAILY
Qty: 60 TABLET | Refills: 0 | Status: SHIPPED | OUTPATIENT
Start: 2021-10-07 | End: 2021-11-06

## 2021-10-07 RX ORDER — FAMOTIDINE 20 MG/1
20 TABLET, FILM COATED ORAL DAILY PRN
Status: DISCONTINUED | OUTPATIENT
Start: 2021-10-07 | End: 2021-10-08 | Stop reason: HOSPADM

## 2021-10-07 RX ORDER — OXYCODONE HYDROCHLORIDE 5 MG/1
5-10 TABLET ORAL
Qty: 60 TABLET | Refills: 0 | Status: SHIPPED | OUTPATIENT
Start: 2021-10-07 | End: 2021-10-14

## 2021-10-07 RX ADMIN — LOSARTAN POTASSIUM 100 MG: 50 TABLET, FILM COATED ORAL at 08:27

## 2021-10-07 RX ADMIN — INSULIN LISPRO 3 UNITS: 100 INJECTION, SOLUTION INTRAVENOUS; SUBCUTANEOUS at 23:07

## 2021-10-07 RX ADMIN — OXYCODONE 5 MG: 5 TABLET ORAL at 10:33

## 2021-10-07 RX ADMIN — DOCUSATE SODIUM 50 MG AND SENNOSIDES 8.6 MG 1 TABLET: 8.6; 5 TABLET, FILM COATED ORAL at 08:26

## 2021-10-07 RX ADMIN — OXYCODONE 5 MG: 5 TABLET ORAL at 01:49

## 2021-10-07 RX ADMIN — ACETAMINOPHEN 650 MG: 325 TABLET ORAL at 17:08

## 2021-10-07 RX ADMIN — ACETAMINOPHEN 650 MG: 325 TABLET ORAL at 04:03

## 2021-10-07 RX ADMIN — ACETAMINOPHEN 650 MG: 325 TABLET ORAL at 10:33

## 2021-10-07 RX ADMIN — ASPIRIN 325 MG: 325 TABLET, COATED ORAL at 17:08

## 2021-10-07 RX ADMIN — INSULIN LISPRO 2 UNITS: 100 INJECTION, SOLUTION INTRAVENOUS; SUBCUTANEOUS at 06:45

## 2021-10-07 RX ADMIN — INSULIN LISPRO 2 UNITS: 100 INJECTION, SOLUTION INTRAVENOUS; SUBCUTANEOUS at 17:09

## 2021-10-07 RX ADMIN — EZETIMIBE 10 MG: 10 TABLET ORAL at 23:07

## 2021-10-07 RX ADMIN — DILTIAZEM HYDROCHLORIDE 180 MG: 180 CAPSULE, COATED, EXTENDED RELEASE ORAL at 08:26

## 2021-10-07 RX ADMIN — OXYCODONE 5 MG: 5 TABLET ORAL at 07:13

## 2021-10-07 RX ADMIN — ASPIRIN 325 MG: 325 TABLET, COATED ORAL at 08:26

## 2021-10-07 RX ADMIN — PANTOPRAZOLE SODIUM 40 MG: 40 TABLET, DELAYED RELEASE ORAL at 06:45

## 2021-10-07 RX ADMIN — OXYCODONE 10 MG: 5 TABLET ORAL at 20:27

## 2021-10-07 RX ADMIN — ACETAMINOPHEN 650 MG: 325 TABLET ORAL at 23:07

## 2021-10-07 RX ADMIN — OXYCODONE 10 MG: 5 TABLET ORAL at 17:08

## 2021-10-07 RX ADMIN — CLONIDINE HYDROCHLORIDE 0.1 MG: 0.1 TABLET ORAL at 12:56

## 2021-10-07 RX ADMIN — OXYCODONE 10 MG: 5 TABLET ORAL at 14:38

## 2021-10-07 RX ADMIN — DOCUSATE SODIUM 50 MG AND SENNOSIDES 8.6 MG 1 TABLET: 8.6; 5 TABLET, FILM COATED ORAL at 17:08

## 2021-10-07 RX ADMIN — WATER 2 G: 1 INJECTION INTRAMUSCULAR; INTRAVENOUS; SUBCUTANEOUS at 01:50

## 2021-10-07 RX ADMIN — HYDROCHLOROTHIAZIDE 25 MG: 25 TABLET ORAL at 08:26

## 2021-10-07 RX ADMIN — INSULIN LISPRO 3 UNITS: 100 INJECTION, SOLUTION INTRAVENOUS; SUBCUTANEOUS at 12:56

## 2021-10-07 RX ADMIN — CETIRIZINE HYDROCHLORIDE 10 MG: 10 TABLET, FILM COATED ORAL at 08:27

## 2021-10-07 NOTE — PROGRESS NOTES
Renal Dosing/Monitoring  Medication: Famotidine   Current regimen:  20 mg PO BID PRN  Recent Labs     10/07/21  0211   CREA 1.23*   BUN 14     Estimated CrCl:  36.4 ml/min  Plan: Change to famotidine 20 mg PO daily PRN per [facility] P&T Committee Protocol with respect to renal function (CrCl <50 ml/min). Pharmacy will continue to monitor patient daily and will make dosage adjustments based upon changing renal function.

## 2021-10-07 NOTE — PROGRESS NOTES
Bedside and Verbal shift change report given to Ashlie Cintron (oncoming nurse) by Ar Garcia (offgoing nurse). Report included the following information SBAR, Kardex, Intake/Output and MAR.

## 2021-10-07 NOTE — PROGRESS NOTES
Problem: Mobility Impaired (Adult and Pediatric)  Goal: *Acute Goals and Plan of Care (Insert Text)  Description: FUNCTIONAL STATUS PRIOR TO ADMISSION: Patient was modified independent using a single point cane for functional mobility in community. HOME SUPPORT PRIOR TO ADMISSION: The patient lived alone with friends/family to provide assistance. Pt's daughter yordan parisi is going to be staying with patient thru the weekend. Physical Therapy Goals  Initiated 10/6/2021    1. Patient will move from supine to sit and sit to supine  and scoot up and down in bed with modified independence within 4 days. 2. Patient will perform sit to stand with modified independence within 4 days. 3. Patient will ambulate with modified independence for > 150 feet with the least restrictive device within 4 days. 4. Patient will ascend/descend 4 stairs with no handrail(s) with modified independence within 4 days. 5. Patient will perform home exercise program per protocol with supervision/set-up within 4 days. 6. Patient will demonstrate AROM 0-90 degrees in operative joint within 4 days. Outcome: Progressing Towards Goal   PHYSICAL THERAPY TREATMENT  Patient: Citlaly William (89 y.o. female)  Date: 10/7/2021  Diagnosis: Primary localized osteoarthritis of right knee [M17.11] Primary localized osteoarthritis of right knee  Procedure(s) (LRB):  RIGHT TOTAL KNEE ARTHROPLASTY (Right) 1 Day Post-Op  Precautions: WBAT  Chart, physical therapy assessment, plan of care and goals were reviewed. ASSESSMENT  Patient continues with skilled PT services and is progressing towards goals. She demonstrates the ability to ambulate and transfer with CGA and use of a RW. Patient demonstrates the ability to ascend and descend 4 stairs with SPC in R hand and hand held assistance in the L. She demonstrates excellent balance and stability and very little weight bearing through L UE to ascend.  She ambulates an additional 100+ feet with step through gait pattern. Patient is cleared by PT at this time. Current Level of Function Impacting Discharge (mobility/balance): SBA, Min A stair training     Other factors to consider for discharge: medical stability         PLAN :  Patient continues to benefit from skilled intervention to address the above impairments. Continue treatment per established plan of care. to address goals. Recommendation for discharge: (in order for the patient to meet his/her long term goals)  Physical therapy at least 2 days/week in the home     This discharge recommendation:  Has been made in collaboration with the attending provider and/or case management    IF patient discharges home will need the following DME: patient owns DME required for discharge       SUBJECTIVE:   Patient stated I did my exercises for 2 months before my surgery.     OBJECTIVE DATA SUMMARY:   Critical Behavior:  Neurologic State: Alert  Orientation Level: Oriented X4  Cognition: Follows commands, Appropriate decision making, Appropriate for age attention/concentration, Appropriate safety awareness  Safety/Judgement: Awareness of environment, Good awareness of safety precautions  Functional Mobility Training:  Bed Mobility:     Supine to Sit: Stand-by assistance;Bed Modified     Scooting: Stand-by assistance        Transfers:  Sit to Stand: Contact guard assistance  Stand to Sit: Contact guard assistance                             Balance:  Sitting: Intact; Without support  Standing: Impaired; Without support  Standing - Static: Good;Constant support  Standing - Dynamic : Good;Constant support  Ambulation/Gait Training:  Distance (ft): 200 Feet (ft)  Assistive Device: Walker, rolling;Gait belt  Ambulation - Level of Assistance: Contact guard assistance        Gait Abnormalities: Antalgic;Decreased step clearance           Stance: Right decreased  Speed/Stacie: Slow  Step Length: Right lengthened                    Stairs:               Therapeutic Exercises: Pain Ratin/10    Activity Tolerance:   Fair    After treatment patient left in no apparent distress:   Sitting in chair and Call bell within reach    COMMUNICATION/COLLABORATION:   The patients plan of care was discussed with: Registered nurse.      John Farris PT   Time Calculation: 23 mins

## 2021-10-07 NOTE — DISCHARGE INSTRUCTIONS
DC Orders All Total Knees    Case Management for DC planning to Home HC .   - PT for 3 times a week for 1st week (4 PT visits to be scheduled), patient will transition to outpatient PT in 10 days post-operatively; WBAT. -  mg BID for 30 days post-operatively. - Remove AQUACEL bandage on the 7th day post-operatively (PLEASE reference discharge instructions INCISION CARE for additional information). - Remove staples at 2 weeks post-op; 10/20/21 .   - Follow up in Office in 2 weeks. After Hospital Care Plan:  Discharge Instructions Knee Replacement-Dr. Jerrod Escobedo    Patient Name: Francine Morrison  Date of procedure: 10/6/2021   Procedure: Procedure(s):  RIGHT TOTAL KNEE ARTHROPLASTY  Surgeon: Destiny Madrid) and Role:     Nir Leong MD - Primary    PCP: Magnus Mercedes MD  Date of discharge: No discharge date for patient encounter. Follow up appointments   Follow up with Dr. Jerrod Escobedo in 2 weeks. Call [phone] to make an appointment.  If home health has been arranged for you the agency will contact you to arrange dates/times for visits. Please call them if you do not hear from them within 24 hours after you are discharged    When to call your Orthopaedic Surgeon: Call [phone].  If you call after 5pm or on a weekend, the on call physician will be contacted   Unrelieved pain   Signs of infection-if your incision is red; continues to have drainage; drainage has a foul odor or if you have a persistent fever over 101 degrees   Signs of a blood clot in your leg-calf pain, tenderness, redness, swelling of lower leg    When to call your Primary Care Physician:   Concerns about medical conditions such as diabetes, high blood pressure, asthma, congestive heart failure   Call if blood sugars are elevated, persistent headache or dizziness, coughing or congestion, constipation or diarrhea, burning with urination, abnormal heart rate    When to call [phone] go to the nearest emergency room   Acute onset of chest pain, shortness of breath, difficulty breathing      Activity   Weight bearing as tolerated with walker or crutches. Refer to your handbook for instructions and pictures   Complete your Home Exercise Program daily as instructed by your therapist.  Refer to your handbook for instructions and pictures   Get up every one hour and walk (except at night when sleeping)   Do not drive or operate heavy machinery    Incision Care   The Aquacel (brown, waterproof) surgical dressing is to remain on your knee for 7 days. On the 7th day have someone gently peel the dressing off by carefully lifting the edge and stretching it slightly to break the adhesive seal   If the home health agency has not removed the Aquacel bandage by the 7th day please remove it yourself   You will have staples in your knee incision. They will be removed by the home health agency staff   If your Aquacel dressing comes loose/off before the 7th day, you may replace it with a dry sterile gauze dressing; change it daily. Once your incision is not draining, you may leave it open to air   You may take a shower with the Aquacel dressing in place. Once the Aquacel is removed, you may shower and get your incision wet but do not submerge your incision under water in a bath tub, hot tub or swimming pool for 6 weeks after surgery. Preventing blood clots    Take  mg BID for 30 days post-operatively.  Wear elastic stockings (TEDS) for 4 weeks.   You should remove them for approximately 1 hour daily for showering/sponge bathing    Pain management   Keep ice wrap in place except when walking; changing gel packs every 4 hours   Lie down and elevate your leg on pillows for about 30 minutes after walking to decrease swelling and pain    Do ankle pumps (10 repetitions) every hour while awake and get up frequently to walk    Take Tylenol 500mg every 6 hours for 2 weeks    Take narcotic pain pill as prescribed if needed. Take with food; avoid alcohol while taking pain medication. Decrease the amount of narcotic pain medication as your pain lessens     Diet   Resume usual diet; drink plenty of fluids; eat foods high in fiber   You may want to take a stool softener (such as Senokot-S or Colace) to prevent constipation while you are taking pain medication.   If constipation occurs, take a laxative (such as Dulcolax tablets, Milk of Magnesia, or a suppository)

## 2021-10-07 NOTE — PROGRESS NOTES
Bedside shift change report given to [address] and Emily Enrique RN (oncoming nurse) by Carlito Monroe and Elinor Morales RN (offgoing nurse). Report included the following information SBAR, Kardex, Intake/Output and MAR.

## 2021-10-07 NOTE — PROGRESS NOTES
TOTAL KNEE PROGRESS NOTE      Subjective:     Post-Operative Day: 1 Status Post right Total Knee Arthroplasty  Systemic or Specific Complaints:No Complaints    Objective:     Patient Vitals for the past 24 hrs:   BP Temp Pulse Resp SpO2   10/07/21 0824 (!) 147/81 97.8 °F (36.6 °C) 71 16 95 %   10/07/21 0158 (!) 146/80 97.7 °F (36.5 °C) 82 14 92 %   10/06/21 2015 (!) 150/68 97.9 °F (36.6 °C) 85 16 94 %   10/06/21 1842 (!) 148/77 97.8 °F (36.6 °C) 84 14 93 %   10/06/21 1726 (!) 150/76 98.3 °F (36.8 °C) 76 14 90 %   10/06/21 1645 (!) 149/82 97.8 °F (36.6 °C) 71 16 93 %   10/06/21 1540 136/76 97.4 °F (36.3 °C) 69 16 94 %   10/06/21 1520 -- -- 65 16 97 %   10/06/21 1515 -- -- 71 18 98 %   10/06/21 1500 117/67 -- 69 15 93 %   10/06/21 1445 113/66 -- (!) 59 11 97 %   10/06/21 1430 121/68 -- (!) 57 11 97 %   10/06/21 1425 -- -- 60 11 97 %   10/06/21 1415 117/67 -- (!) 57 11 97 %   10/06/21 1400 119/67 -- 60 16 92 %   10/06/21 1345 125/68 -- (!) 57 19 98 %   10/06/21 1330 114/60 -- (!) 57 11 96 %   10/06/21 1315 105/64 -- (!) 56 11 99 %   10/06/21 1310 109/60 -- 60 12 98 %   10/06/21 1305 103/62 -- 64 14 98 %   10/06/21 1300 (!) 100/48 -- 70 19 97 %   10/06/21 1255 (!) 87/65 97.5 °F (36.4 °C) 83 12 94 %       General: alert, cooperative, no distress, appears stated age   Wound: Wound clean and dry no evidence of infection. , No Erythema, No Edema and Wound Intact   Motion: Extension: Full Extension   DVT Exam: No evidence of DVT seen on physical exam.  Negative David's sign. No cords or calf tenderness. No significant calf/ankle edema.      Data Review:    Recent Results (from the past 24 hour(s))   GLUCOSE, POC    Collection Time: 10/06/21  2:52 PM   Result Value Ref Range    Glucose (POC) 145 (H) 65 - 117 mg/dL    Performed by [employer], POC    Collection Time: 10/06/21  4:34 PM   Result Value Ref Range    Glucose (POC) 194 (H) 65 - 117 mg/dL    Performed by Norm Maguire  PCT    GLUCOSE, POC    Collection Time: 10/06/21  9:12 PM   Result Value Ref Range    Glucose (POC) 231 (H) 65 - 117 mg/dL    Performed by Danni Meyer    METABOLIC PANEL, BASIC    Collection Time: 10/07/21  2:11 AM   Result Value Ref Range    Sodium 135 (L) 136 - 145 mmol/L    Potassium 3.8 3.5 - 5.1 mmol/L    Chloride 105 97 - 108 mmol/L    CO2 24 21 - 32 mmol/L    Anion gap 6 5 - 15 mmol/L    Glucose 196 (H) 65 - 100 mg/dL    BUN 14 6 - 20 MG/DL    Creatinine 1.23 (H) 0.55 - 1.02 MG/DL    BUN/Creatinine ratio 11 (L) 12 - 20      GFR est AA 51 (L) >60 ml/min/1.73m2    GFR est non-AA 42 (L) >60 ml/min/1.73m2    Calcium 8.0 (L) 8.5 - 10.1 MG/DL   HEMOGLOBIN    Collection Time: 10/07/21  2:11 AM   Result Value Ref Range    HGB 11.3 (L) 11.5 - 16.0 g/dL   PROTHROMBIN TIME + INR    Collection Time: 10/07/21  2:11 AM   Result Value Ref Range    INR 1.1 0.9 - 1.1      Prothrombin time 11.4 (H) 9.0 - 11.1 sec   GLUCOSE, POC    Collection Time: 10/07/21  6:22 AM   Result Value Ref Range    Glucose (POC) 165 (H) 65 - 117 mg/dL    Performed by JENNY NOGUERA          Assessment:     Status Post right Total Knee Arthroplasty. Doing well postoperatively. . Bandage aquacel was soaked through. Labs stable. PT to progress today. Pain control WNL. Plan:     Changed dressing to betadine 4x4s/ABD, please re-inforce as needed  Continues current post-op course  Continue PT per protocol  Anticipate Home [facility]. discharge tomorrow.

## 2021-10-07 NOTE — PROGRESS NOTES
MAYELIN: The patient plans to discharge home with [facility] and friend, Lula Adair to transport when stable for discharge. RUR: N/A    1. The patient is from home and lives alone. 2. Orthopedics, PT/OT following. 3. The patient plans to discharge home with home health. Observation notice provided in writing to patient and/or caregiver as well as verbal explanation of the policy. Patients who are in outpatient status also receive the Observation notice. Patient has received notice and or patient representative has received via secure email, fax, or certified mail based on patient representative's preference. Care Management Interventions  PCP Verified by CM: Yes  Palliative Care Criteria Met (RRAT>21 & CHF Dx)?: No  Mode of Transport at Discharge: Other (see comment)  Transition of Care Consult (CM Consult): [address]: No  Reason Outside [place]: Physician referred to specific agency  MyChart Signup: Yes  Discharge Durable Medical Equipment: No  Health Maintenance Reviewed: Yes  Physical Therapy Consult: Yes  Occupational Therapy Consult: Yes  Speech Therapy Consult: No  Support Systems: Friend/Neighbor  Confirm Follow Up Transport: Family  The Plan for Transition of Care is Related to the Following Treatment Goals : The patient plans to discharge home with home health please. The Patient and/or Patient Representative was Provided with a Choice of Provider and Agrees with the Discharge Plan?: Yes  Freedom of Choice List was Provided with Basic Dialogue that Supports the Patient's Individualized Plan of Care/Goals, Treatment Preferences and Shares the Quality Data Associated with the Providers?: Yes   Resource Information Provided?: No  Discharge Location  Discharge Placement: Home with home health     Reason for Admission:  Right Total Knee                     RUR Score: N/A                    Plan for utilizing home health:      The Plan for Transition of Care is related to the following treatment goals: Home Health    The Patient and/or patient representative Kenn Pablo was provided with a choice of provider and agrees   with the discharge plan. [x] Yes [] No    Freedom of choice list was provided with basic dialogue that supports the patient's individualized plan of care/goals, treatment preferences and shares the quality data associated with the providers. [x] Yes [] No         PCP: First and Last name:  Minh Blevins MD, phone: [phone]   Name of Practice:    Are you a current patient: Yes/No: Yes   Approximate date of last visit: Sept, 2021   Can you participate in a virtual visit with your PCP: Yes                    Current Advanced Directive/Advance Care Plan: Full Code      Healthcare Decision Maker:   Click here to complete [place] including selection of the Healthcare Decision Maker Relationship (ie \"Primary\")                             Transition of Care Plan:      CM met with the patient in room 578. The patient is alert and oriented x4. Confirmed demographics. The patient is independent with ADL's/IADL's, drives a vehicle, owns a walker and uses [facility] to fill prescriptions. The patient lives alone and step daughter, Roxanna Aqiuno is planning to stay with the patient to help with her needs at home after discharge. The patient plans to discharge home with [facility] and friend, Mehran Adler to transport home when stable for discharge. CM following for discharge needs.     Naty Farrell RN/CRM

## 2021-10-08 VITALS
BODY MASS INDEX: 37.61 KG/M2 | OXYGEN SATURATION: 97 % | DIASTOLIC BLOOD PRESSURE: 72 MMHG | HEART RATE: 93 BPM | SYSTOLIC BLOOD PRESSURE: 146 MMHG | RESPIRATION RATE: 16 BRPM | TEMPERATURE: 99.4 F | HEIGHT: 60 IN | WEIGHT: 191.58 LBS

## 2021-10-08 LAB
GLUCOSE BLD STRIP.AUTO-MCNC: 162 MG/DL (ref 65–117)
GLUCOSE BLD STRIP.AUTO-MCNC: 175 MG/DL (ref 65–117)
SERVICE CMNT-IMP: ABNORMAL
SERVICE CMNT-IMP: ABNORMAL

## 2021-10-08 PROCEDURE — 82962 GLUCOSE BLOOD TEST: CPT

## 2021-10-08 PROCEDURE — 97116 GAIT TRAINING THERAPY: CPT

## 2021-10-08 PROCEDURE — 74011636637 HC RX REV CODE- 636/637: Performed by: PHYSICIAN ASSISTANT

## 2021-10-08 PROCEDURE — 97530 THERAPEUTIC ACTIVITIES: CPT

## 2021-10-08 PROCEDURE — 74011250637 HC RX REV CODE- 250/637: Performed by: PHYSICIAN ASSISTANT

## 2021-10-08 PROCEDURE — 2709999900 HC NON-CHARGEABLE SUPPLY

## 2021-10-08 RX ADMIN — ONDANSETRON 4 MG: 4 TABLET, ORALLY DISINTEGRATING ORAL at 09:59

## 2021-10-08 RX ADMIN — DOCUSATE SODIUM 50 MG AND SENNOSIDES 8.6 MG 1 TABLET: 8.6; 5 TABLET, FILM COATED ORAL at 09:58

## 2021-10-08 RX ADMIN — PANTOPRAZOLE SODIUM 40 MG: 40 TABLET, DELAYED RELEASE ORAL at 06:59

## 2021-10-08 RX ADMIN — ASPIRIN 325 MG: 325 TABLET, COATED ORAL at 09:58

## 2021-10-08 RX ADMIN — CLONIDINE HYDROCHLORIDE 0.1 MG: 0.1 TABLET ORAL at 13:49

## 2021-10-08 RX ADMIN — INSULIN LISPRO 2 UNITS: 100 INJECTION, SOLUTION INTRAVENOUS; SUBCUTANEOUS at 06:59

## 2021-10-08 RX ADMIN — OXYCODONE 10 MG: 5 TABLET ORAL at 09:57

## 2021-10-08 RX ADMIN — OXYCODONE 10 MG: 5 TABLET ORAL at 13:49

## 2021-10-08 RX ADMIN — ACETAMINOPHEN 650 MG: 325 TABLET ORAL at 09:58

## 2021-10-08 RX ADMIN — CETIRIZINE HYDROCHLORIDE 10 MG: 10 TABLET, FILM COATED ORAL at 09:58

## 2021-10-08 RX ADMIN — OXYCODONE 10 MG: 5 TABLET ORAL at 00:04

## 2021-10-08 RX ADMIN — LOSARTAN POTASSIUM 100 MG: 50 TABLET, FILM COATED ORAL at 09:58

## 2021-10-08 RX ADMIN — DILTIAZEM HYDROCHLORIDE 180 MG: 180 CAPSULE, COATED, EXTENDED RELEASE ORAL at 09:57

## 2021-10-08 RX ADMIN — HYDROCHLOROTHIAZIDE 25 MG: 25 TABLET ORAL at 09:57

## 2021-10-08 RX ADMIN — OXYCODONE 10 MG: 5 TABLET ORAL at 06:03

## 2021-10-08 RX ADMIN — OXYCODONE 10 MG: 5 TABLET ORAL at 02:59

## 2021-10-08 NOTE — PROGRESS NOTES
TOTAL KNEE PROGRESS NOTE      Subjective:     Post-Operative Day: 2 Status Post right Total Knee Arthroplasty  Systemic or Specific Complaints:No Complaints    Objective:     Patient Vitals for the past 24 hrs:   BP Temp Pulse Resp SpO2   10/08/21 0125 139/85 98 °F (36.7 °C) 76 16 92 %   10/07/21 2028 (!) 154/66 98.1 °F (36.7 °C) 65 16 94 %   10/07/21 1406 (!) 151/78 98 °F (36.7 °C) 68 16 93 %       General: alert, cooperative, no distress, appears stated age   Wound: Wound clean and dry no evidence of infection. , No Erythema, No Edema, No Drainage and Wound Intact   Motion: Extension: Full Extension   DVT Exam: No evidence of DVT seen on physical exam.  Negative David's sign. No cords or calf tenderness. No significant calf/ankle edema. Data Review:    Recent Results (from the past 24 hour(s))   GLUCOSE, POC    Collection Time: 10/07/21 12:36 PM   Result Value Ref Range    Glucose (POC) 202 (H) 65 - 117 mg/dL    Performed by [address] [place] [address] Drive, POC    Collection Time: 10/07/21  4:08 PM   Result Value Ref Range    Glucose (POC) 181 (H) 65 - 117 mg/dL    Performed by Rosie Bennett    GLUCOSE, POC    Collection Time: 10/07/21  9:45 PM   Result Value Ref Range    Glucose (POC) 210 (H) 65 - 117 mg/dL    Performed by Colletta Coward    GLUCOSE, POC    Collection Time: 10/08/21  6:05 AM   Result Value Ref Range    Glucose (POC) 162 (H) 65 - 117 mg/dL    Performed by Agnes La          Assessment:     Status Post right Total Knee Arthroplasty. Doing well postoperatively. . Bandage 4x4s, ABD; no drainage overnight. Labs stable. PT cleared for discharge. Pain control WNL. Plan:     Monitor dressing (Dr. Chilo Mcbride to see), switch to Aquacel dressing before discharge  Continues current post-op course  Continue PT per protocol  Plan for discharge to [facility]. today.

## 2021-10-08 NOTE — PROGRESS NOTES
Problem: Mobility Impaired (Adult and Pediatric)  Goal: *Acute Goals and Plan of Care (Insert Text)  Description: FUNCTIONAL STATUS PRIOR TO ADMISSION: Patient was modified independent using a single point cane for functional mobility in community. HOME SUPPORT PRIOR TO ADMISSION: The patient lived alone with friends/family to provide assistance. Pt's daughter yordan parisi is going to be staying with patient thru the weekend. Physical Therapy Goals  Initiated 10/6/2021    1. Patient will move from supine to sit and sit to supine  and scoot up and down in bed with modified independence within 4 days. 2. Patient will perform sit to stand with modified independence within 4 days. 3. Patient will ambulate with modified independence for > 150 feet with the least restrictive device within 4 days. 4. Patient will ascend/descend 4 stairs with no handrail(s) with modified independence within 4 days. 5. Patient will perform home exercise program per protocol with supervision/set-up within 4 days. 6. Patient will demonstrate AROM 0-90 degrees in operative joint within 4 days. Outcome: Progressing Towards Goal   PHYSICAL THERAPY TREATMENT  Patient: Spring Sorto (28 y.o. female)  Date: 10/8/2021  Diagnosis: Primary localized osteoarthritis of right knee [M17.11] Primary localized osteoarthritis of right knee  Procedure(s) (LRB):  RIGHT TOTAL KNEE ARTHROPLASTY (Right) 2 Days Post-Op  Precautions: WBAT  Chart, physical therapy assessment, plan of care and goals were reviewed. ASSESSMENT  Patient continues with skilled PT services and is progressing towards goals. She is provided Min A to achieve supine to sit with HOB flat. Patient performs sit to stand pushing straight down through bilateral hands of RW. She ambulates with alternating step pattern to and from the restroom. Patient reports significant increased pain and is due to pain medication. She is able to stand and transfer with RW and supervision.  On return to bed patient is sick and vomits a small amount of her breakfast. She is provided mouth wash and towel. Ice is reapplied to R knee. Patient has previously cleared PT. She is safe to mobilize with nursing and therapy staff. Current Level of Function Impacting Discharge (mobility/balance): SBA- supervision     Other factors to consider for discharge: medical stability         PLAN :  Patient continues to benefit from skilled intervention to address the above impairments. Continue treatment per established plan of care. to address goals. Recommendation for discharge: (in order for the patient to meet his/her long term goals)  Physical therapy at least 2 days/week in the home     This discharge recommendation:  Has been made in collaboration with the attending provider and/or case management    IF patient discharges home will need the following DME: patient owns DME required for discharge       SUBJECTIVE:   Patient stated I think I just did too much.     OBJECTIVE DATA SUMMARY:   Critical Behavior:  Neurologic State: Alert  Orientation Level: Oriented X4  Cognition: Appropriate decision making, Appropriate for age attention/concentration, Appropriate safety awareness, Follows commands  Safety/Judgement: Awareness of environment, Good awareness of safety precautions  Functional Mobility Training:  Bed Mobility:     Supine to Sit: Minimum assistance  Sit to Supine: Minimum assistance  Scooting: Minimum assistance        Transfers:  Sit to Stand: Stand-by assistance  Stand to Sit: Stand-by assistance                             Balance:  Sitting: Intact  Standing: Intact; With support  Standing - Static: Good;Constant support  Standing - Dynamic : Good;Constant support  Ambulation/Gait Training:  Distance (ft): 30 Feet (ft)  Assistive Device: Walker, rolling;Gait belt  Ambulation - Level of Assistance: Contact guard assistance        Gait Abnormalities: Antalgic;Decreased step clearance           Stance: Right decreased  Speed/Stacie: Slow                       Stairs: Therapeutic Exercises:     Pain Rating:      Activity Tolerance:   Good    After treatment patient left in no apparent distress:   Supine in bed, Call bell within reach, and Side rails x 3    COMMUNICATION/COLLABORATION:   The patients plan of care was discussed with: Registered nurse.      Ramírez Boyd, PT   Time Calculation: 25 mins

## 2021-10-08 NOTE — PROGRESS NOTES
Bedside shift change report given to Rimma Wade RN (oncoming nurse) by Se Lacy (offgoing nurse). Report included the following information SBAR, Kardex, Procedure Summary, Intake/Output, MAR and Recent Results.

## 2021-10-11 NOTE — DISCHARGE SUMMARY
Discharge Summary    Physician: Kel Naranjo MD    Physician Assistant: Marie Stephens PA-C    Admit Diagnosis:  Primary localized osteoarthritis of right knee [M17.11]    Final Diagnosis:   1. Advanced degenerative arthritis of right knee . Procedure: Right total knee replacement    Complications: None. History of Present Illness: The patient has a long-standing history of pain within the right knee . The patient has severe degenerative arthritis of the right knee and has exhausted conservative therapy at this time including prior intra-articular injections, activity modifications, OTC NSAIDS. The patient has been limited in their ability to perform ADLs, walk short distances or climb steps appropriately. The patient presents for the above-mentioned procedure. The patient has been cleared from a medical and cardiac standpoint. Past Medical History:  Recorded in the chart. Physical Examination: Also recorded in the chart. The patient is noted for ambulating with an antalgic gait favoring the right side. Examination of the right knee reveals ROM -3-117 degrees. Skin intact. The cruciate and collateral ligaments are stable. No effusion. No sign of infection. No ecchymosis or erythema. No cellulitis or rash. No calf pain, swelling, or other evidence of DVT. I detect no obvious motor or sensory deficits in the lower extremities. The extensor mechanism is intact. The neurovascular status is intact . X-rays of right knee reveal complete loss of medial joint space. Slight varus deformity and significant patellofemoral changes noted. Course in the Hospital:  The patient was admitted to the hospital.  The Pt. Underwent a right total knee replacement . Postoperatively, the pt. did well. The pt. Remained stable from a medical standpoint. The patient ambulated, WBAT weightbearing within the hospital with Physical Therapy. The patient continued with this therapy at [facility] with PT.   The patient began taking  mg BID post-operatively for DVT prophylaxis and will continue on this for 30 days. At the time of discharge, there was no evidence of any DVT noted. The patient had negative Homans signs bilateral lower extremities. At the time of discharge, the patient's right knee incision appeared with staples intact. No signs of infection or inflammation noted. The patient will follow up in our office in 2-1/2 weeks. DC med list:  Discharge Medication List as of 10/8/2021  1:10 PM      START taking these medications    Details   oxyCODONE IR (ROXICODONE) 5 mg immediate release tablet Take 1-2 Tablets by mouth every four (4) hours as needed for Pain for up to 7 days. Max Daily Amount: 60 mg., Normal, Disp-60 Tablet, R-0      aspirin (ASPIRIN) 325 mg tablet Take 1 Tablet by mouth two (2) times a day for 30 days. , Normal, Disp-60 Tablet, R-0         CONTINUE these medications which have NOT CHANGED    Details   naproxen sodium (Aleve) 220 mg tablet Take 440 mg by mouth two (2) times daily (with meals). , Historical Med      dilTIAZem ER (CARDIZEM CD) 180 mg capsule TAKE 1 CAPSULE DAILY, Normal, Disp-90 Capsule, R-3      losartan-hydroCHLOROthiazide (HYZAAR) 100-25 mg per tablet TAKE 1 TABLET DAILY, Normal, Disp-90 Tablet, R-3      fenofibrate nanocrystallized (Tricor) 145 mg tablet TAKE 1 TABLET DAILY, Normal, Disp-90 Tablet, R-1      diclofenac (VOLTAREN) 1 % gel Apply 1 g to affected area four (4) times daily. Apply to the right knee, Normal, Disp-150 g, R-1      esomeprazole (NEXIUM) 40 mg capsule Take 1 Cap by mouth daily. , Normal, Disp-90 Cap, R-3Generic allowed      loratadine (CLARITIN) 10 mg tablet TAKE 1 TABLET DAILY, Normal, Disp-90 Tab, R-3      ezetimibe (ZETIA) 10 mg tablet TAKE 1 TABLET DAILY, Normal, Disp-90 Tab, R-3      Trulicity 1.5 XV/1.2 mL sub-q pen INJECT 0.5 ML UNDER THE SKIN EVERY 7 DAYS, Normal, Disp-6 mL, R-3      cloNIDine HCL (CATAPRES) 0.1 mg tablet TAKE 1 TABLET NIGHTLY, Normal, Disp-90 Tab, R-3      Lactobacillus acidophilus (PROBIOTIC PO) Take 1 Tablet by mouth two (2) times a day., Historical Med      glucose blood VI test strips (FreeStyle Lite Strips) strip TEST ONCE A DAY DX CODE: E11.65, Normal, Disp-100 Strip,R-3      lancets (FREESTYLE LANCETS) 28 gauge misc TEST ONCE DAILY DX: E11.65, Normal, Disp-100 Lancet, R-3      omega-3 fatty acids-vitamin e (FISH OIL) 1,000 mg cap Take 2 Caps by mouth two (2) times a day., Historical Med      cholecalciferol, vitamin d3, (VITAMIN D) 1,000 unit tablet Take 2,000 Units by mouth nightly., Historical Med         STOP taking these medications       aspirin 81 mg chewable tablet Comments:   Reason for Stopping:               Patient Disposition: Home  Followup Care:  Discharge Condition: good  PT/OT per Home Health  Regular Diet  As directed    Follow-up Information     Follow up With Specialties Details Why [id number] [address] [facility]    Mitzi Gusman MD Family Medicine   [address]                    Caprice Marquez PA-C

## 2021-10-11 NOTE — NURSE NAVIGATOR
[facility]  [facility] [place] [facility]     FROM:                                TO: [address]                                                      ([address] [place] [address] or Facility name)  Aramis Suazo [address]  [address] [address]  Dept: [address] [facility] [facility] [address]: [phone]                                      Room#:  196/24                                                       Nurse Navigator:  Winnie Loco RN         SITUATION      ASAScore: ASA 3 - Patient with moderate systemic disease with functional limitations    Admitted:  10/6/2021  Hospital Day: 3      Attending Provider:  No att. providers found     Consultations:  None    PCP:  Steff Lopez MD   [phone]     Admitting Dx:  Primary localized osteoarthritis of right knee [M17.11]       Principal Problem:    Primary localized osteoarthritis of right knee (9/27/2021)      5 Days Post-Op of   Procedure(s):  RIGHT TOTAL KNEE ARTHROPLASTY   BY: Delores Kumari MD             ON: 10/6/2021                  Code Status: Prior             Advance Directive? Verified (Send w/patient)     Isolation:  There are currently no Active Isolations       MDRO: No current active infections    BACKGROUND     Allergies:   Allergies   Allergen Reactions    Other Food Nausea and Vomiting     Artificial sweeteners - gas and beldging    Ciprofloxacin Nausea Only    Erythromycin Nausea Only    Metformin Other (comments) and Nausea Only     Felt bad  Felt bad      Pcn [Penicillins] Swelling     Caused lips to swell  Tolerates Amoxicillin       Past Medical History:   Diagnosis Date    Arthritis 2/28/2011    OSTEO    Chronic pain     RIGHT KNEE    Diabetes ([facility] [address]     GERD (gastroesophageal reflux disease)     Hot flashes, menopausal     Hypercholesterolemia     Hypertension     Ill-defined condition     Elevated liver enzymes (has family history)    Nausea & vomiting     Seizures ([address] 1980'S    AFTER DRINKING 2 MARGARITAS    Vitamin D deficiency 6/28/2010       Past Surgical History:   Procedure Laterality Date    COLONOSCOPY N/A 6/3/2019    COLONOSCOPY performed by Bonny Figueroa MD at [facility] COLONOSCOPY N/A 12/1/2020    COLONOSCOPY performed by Bonny Figueroa MD at [facility] HX APPENDECTOMY  2001    HX CATARACT REMOVAL Bilateral     HX CHOLECYSTECTOMY  06/28/2018    HX COLONOSCOPY  12/01/2020    HX HYSTERECTOMY  1975    HX KNEE REPLACEMENT Left 2014    HX OOPHORECTOMY  2001    bilateral    HX OTHER SURGICAL      LIPOMA LEFT SIDE    HX ROTATOR CUFF REPAIR Right 1998       Prior to Admission Medications   Prescriptions Last Dose Informant Patient Reported? Taking? Lactobacillus acidophilus (PROBIOTIC PO) 10/4/2021  Yes No   Sig: Take 1 Tablet by mouth two (2) times a day. Trulicity 1.5 WT/9.4 mL sub-q pen 10/4/2021  No No   Sig: INJECT 0.5 ML UNDER THE SKIN EVERY 7 DAYS   Patient taking differently: 1.5 mg by SubCUTAneous route every seven (7) days. TAKES EVERY MONDAY   aspirin 81 mg chewable tablet 10/5/2021 at Unknown time  Yes No   Sig: Take 81 mg by mouth daily. cholecalciferol, vitamin d3, (VITAMIN D) 1,000 unit tablet 10/4/2021  Yes No   Sig: Take 2,000 Units by mouth nightly. cloNIDine HCL (CATAPRES) 0.1 mg tablet 10/6/2021 at 0600  No Yes   Sig: TAKE 1 TABLET NIGHTLY   Patient taking differently: Take 0.1 mg by mouth daily (with lunch). TAKE 1 TABLET NOON   diclofenac (VOLTAREN) 1 % gel 10/2/2021  No No   Sig: Apply 1 g to affected area four (4) times daily. Apply to the right knee   dilTIAZem ER (CARDIZEM CD) 180 mg capsule 10/6/2021 at 0600  No Yes   Sig: TAKE 1 CAPSULE DAILY   esomeprazole (NEXIUM) 40 mg capsule 10/6/2021 at 0600  No Yes   Sig: Take 1 Cap by mouth daily. ezetimibe (ZETIA) 10 mg tablet 10/5/2021 at Unknown time  No Yes   Sig: TAKE 1 TABLET DAILY   Patient taking differently: Take 10 mg by mouth nightly.  TAKE 1 TABLET DAILY fenofibrate nanocrystallized (Tricor) 145 mg tablet 10/5/2021 at Unknown time  No Yes   Sig: TAKE 1 TABLET DAILY   Patient taking differently: Take 145 mg by mouth nightly. TAKE 1 TABLET DAILY   glucose blood VI test strips (FreeStyle Lite Strips) strip   No No   Sig: TEST ONCE A DAY DX CODE: E11.65   lancets (FREESTYLE LANCETS) 28 gauge misc   No No   Sig: TEST ONCE DAILY DX: E11.65   loratadine (CLARITIN) 10 mg tablet 10/5/2021 at Unknown time  No Yes   Sig: TAKE 1 TABLET DAILY   losartan-hydroCHLOROthiazide (HYZAAR) 100-25 mg per tablet 10/5/2021 at Unknown time  No Yes   Sig: TAKE 1 TABLET DAILY   naproxen sodium (Aleve) 220 mg tablet 10/1/2021  Yes No   Sig: Take 440 mg by mouth two (2) times daily (with meals). omega-3 fatty acids-vitamin e (FISH OIL) 1,000 mg cap 10/2/2021  Yes No   Sig: Take 2 Caps by mouth two (2) times a day. Facility-Administered Medications: None       Vaccinations:    Immunization History   Administered Date(s) Administered    (RETIRED) Pneumococcal Vaccine (Unspecified Type) 12/01/2010    Covid-19, MODERNA, Mrna, Lnp-s, Pf, 100mcg/0.5mL 02/26/2021, 03/26/2021    H1N1 Influenza Virus Vaccine 03/04/2010    Influenza High Dose Vaccine PF 09/20/2017    Influenza Vaccine 09/26/2013, 10/17/2014, 10/08/2015    Influenza Vaccine (Quad) PF (>6 Mo Flulaval, Fluarix, and >3 Yrs Afluria, Fluzone [id number]) 12/02/2016    Influenza Vaccine (Tri) Adjuvanted (>65 Yrs FLUAD TRI [id number]) 09/18/2018, 09/17/2019, 09/09/2020    Influenza Vaccine Split 10/28/2010, 11/18/2011, 11/16/2012    Influenza, Quadrivalent, Adjuvanted (>65 Yrs FLUAD QUAD [id number]) 09/08/2021    Pneumococcal Conjugate (PCV-13) 05/05/2016    Pneumococcal Polysaccharide (PPSV-23) 09/09/2015    TDAP Vaccine 12/14/2011    Zoster Recombinant 11/17/2020    Zoster Vaccine, Live 01/10/2013         ASSESSMENT   Age: 79 y.o.              Gender: female        Height: Height: 5' (152.4 cm)                    Weight:Weight: 86.9 kg (191 lb 9.3 oz)     No data found. Active Orders   There are no active orders of the following types: Diet. Orientation: Orientation Level: Oriented X4    Active Lines/Drains:  (Peg Tube / Sage / CL or S/L?):no           Last BM: Last Bowel Movement Date: 10/07/21     Skin Integrity: Incision (comment), Scars (comment)             Mobility: Slightly limited   Weight Bearing Status: WBAT (Weight Bearing as Tolerated)      Gait Training  Assistive Device: Walker, rolling, Gait belt  Ambulation - Level of Assistance: Contact guard assistance  Distance (ft): 30 Feet (ft)     On Anticoagulation? YES  Aspirin                                         Pain Medications given:  oxycodone                                   Lab Results   Component Value Date/Time    Glucose 196 (H) 10/07/2021 02:11 AM    Hemoglobin A1c 7.0 (H) 09/08/2021 11:50 AM    INR 1.1 10/07/2021 02:11 AM    INR 1.1 09/30/2021 12:28 PM    HGB 11.3 (L) 10/07/2021 02:11 AM    HGB 14.3 09/08/2021 11:50 AM    HGB 14.3 06/09/2021 11:35 AM    HGB 14.0 12/15/2020 10:07 AM       Readmission Risks:  Score:         RECOMMENDATION     See After Visit Summary (AVS) for:  · Discharge instructions  · After [address]   · Medication Reconciliation          [facility] Nurse Navigator  NINO Felton, RN-BC       Office  [phone]  Cell      [phone]  Fax      [phone]  [email or web address]             . Elle

## 2021-10-14 ENCOUNTER — PATIENT OUTREACH (OUTPATIENT)
Dept: CASE MANAGEMENT | Age: 81
End: 2021-10-14

## 2021-10-14 ENCOUNTER — TELEPHONE (OUTPATIENT)
Dept: FAMILY MEDICINE CLINIC | Age: 81
End: 2021-10-14

## 2021-10-14 DIAGNOSIS — B37.0 ORAL THRUSH: Primary | ICD-10-CM

## 2021-10-14 RX ORDER — NYSTATIN 100000 [USP'U]/ML
200000 SUSPENSION ORAL 4 TIMES DAILY
Qty: 473 ML | Refills: 0 | Status: SHIPPED | OUTPATIENT
Start: 2021-10-14

## 2021-10-14 NOTE — PROGRESS NOTES
Post Discharge Follow-up contact after Joint Replacement    Patient discharged on 10/8/21  By  Masha Garcias   following  right knee Arthroplasty. Spoke with patient today, who reports they are \" doing well with my knee, but I think that I have thrush in my mouth. \"  Denies Fever, Shortness of Breath or Chest Pain. Home Health has visited. Patient also reports:  Surgical dressing has been removed. Incision is  clean, dry, with staples intact. Calf is non-tender, operative extremity has moderate swelling. Pain is well managed. Discussed use of ice & elevation. Patient is progressing with therapy and is exercising independently. Taking Aspirin for anticoagulation, oxycodone and Tylenol for pain. Patient is not experiencing symptoms of constipation & urinating without difficulty. Discussed side effects of anticoagulants & pain medications (bleeding/bruising, constipation, lightheaded/dizziness)  Follow up appointment is scheduled for 10/20. Discussed calling surgeon Dr Ian Guevara  for drainage, bleeding, swelling in operative extremity, fever or pain. Discussed calling PCP  with other medical issues. *I recommended that patient contact Dr. Eulogio Chi to discuss her concerns about sore tongue and mouth.

## 2021-10-14 NOTE — TELEPHONE ENCOUNTER
Pt called around 2:00 about Thrush to see if something could be called in. She can't eat anything. A message was sent out earlier? She says between her knee and her mouth she is in bad shape. Karie Lugo

## 2021-10-14 NOTE — TELEPHONE ENCOUNTER
Nystatin Suspension called in.     MD INA Disla & SONYA BUSCH [facility]  10/14/21

## 2021-11-08 DIAGNOSIS — E11.65 TYPE 2 DIABETES MELLITUS WITH HYPERGLYCEMIA, WITHOUT LONG-TERM CURRENT USE OF INSULIN (HCC): Chronic | ICD-10-CM

## 2021-11-08 RX ORDER — DULAGLUTIDE 1.5 MG/.5ML
1.5 INJECTION, SOLUTION SUBCUTANEOUS
Qty: 6 ML | Refills: 4 | Status: SHIPPED | OUTPATIENT
Start: 2021-11-08 | End: 2022-04-18 | Stop reason: ALTCHOICE

## 2021-11-29 DIAGNOSIS — I10 ESSENTIAL HYPERTENSION: ICD-10-CM

## 2021-11-29 RX ORDER — CLONIDINE HYDROCHLORIDE 0.1 MG/1
0.1 TABLET ORAL
Qty: 90 TABLET | Refills: 1 | Status: SHIPPED | OUTPATIENT
Start: 2021-11-29 | End: 2022-05-31

## 2021-12-03 ENCOUNTER — TELEPHONE (OUTPATIENT)
Dept: FAMILY MEDICINE CLINIC | Age: 81
End: 2021-12-03

## 2021-12-03 NOTE — TELEPHONE ENCOUNTER
Info faxed to [facility] verifying order and per Dr. Teodora Arce:     She takes 1 tablet daily at noon.

## 2021-12-07 ENCOUNTER — OFFICE VISIT (OUTPATIENT)
Dept: ENDOCRINOLOGY | Age: 81
End: 2021-12-07
Payer: MEDICARE

## 2021-12-07 VITALS
SYSTOLIC BLOOD PRESSURE: 146 MMHG | DIASTOLIC BLOOD PRESSURE: 77 MMHG | RESPIRATION RATE: 20 BRPM | TEMPERATURE: 97.7 F | BODY MASS INDEX: 36.66 KG/M2 | WEIGHT: 186.7 LBS | HEART RATE: 69 BPM | OXYGEN SATURATION: 94 % | HEIGHT: 60 IN

## 2021-12-07 DIAGNOSIS — E78.2 MIXED HYPERLIPIDEMIA: ICD-10-CM

## 2021-12-07 DIAGNOSIS — I10 ESSENTIAL HYPERTENSION: ICD-10-CM

## 2021-12-07 DIAGNOSIS — E11.65 TYPE 2 DIABETES MELLITUS WITH HYPERGLYCEMIA, UNSPECIFIED WHETHER LONG TERM INSULIN USE (HCC): Primary | ICD-10-CM

## 2021-12-07 PROCEDURE — 1090F PRES/ABSN URINE INCON ASSESS: CPT | Performed by: INTERNAL MEDICINE

## 2021-12-07 PROCEDURE — 99214 OFFICE O/P EST MOD 30 MIN: CPT | Performed by: INTERNAL MEDICINE

## 2021-12-07 PROCEDURE — G8510 SCR DEP NEG, NO PLAN REQD: HCPCS | Performed by: INTERNAL MEDICINE

## 2021-12-07 PROCEDURE — G8536 NO DOC ELDER MAL SCRN: HCPCS | Performed by: INTERNAL MEDICINE

## 2021-12-07 PROCEDURE — G8427 DOCREV CUR MEDS BY ELIG CLIN: HCPCS | Performed by: INTERNAL MEDICINE

## 2021-12-07 PROCEDURE — G8417 CALC BMI ABV UP PARAM F/U: HCPCS | Performed by: INTERNAL MEDICINE

## 2021-12-07 PROCEDURE — G8754 DIAS BP LESS 90: HCPCS | Performed by: INTERNAL MEDICINE

## 2021-12-07 PROCEDURE — 3051F HG A1C>EQUAL 7.0%<8.0%: CPT | Performed by: INTERNAL MEDICINE

## 2021-12-07 PROCEDURE — G8400 PT W/DXA NO RESULTS DOC: HCPCS | Performed by: INTERNAL MEDICINE

## 2021-12-07 PROCEDURE — G8753 SYS BP > OR = 140: HCPCS | Performed by: INTERNAL MEDICINE

## 2021-12-07 PROCEDURE — 1101F PT FALLS ASSESS-DOCD LE1/YR: CPT | Performed by: INTERNAL MEDICINE

## 2021-12-07 RX ORDER — ASPIRIN 81 MG/1
81 TABLET ORAL
COMMUNITY

## 2021-12-07 NOTE — PROGRESS NOTES
Cristina Pinzon MD        Patient Information  Date:12/7/2021  Name : Francine Morrison 79 y.o.     YOB: 1940         Referred by: Magnus Mercedes MD         Chief Complaint   Patient presents with    Diabetes       History of Present Illness: Francine Morrison is a 79 y.o. female here for fu of  Type 2 Diabetes Mellitus. Type 2 Diabetes was diagnosed in 10/2014 . End organ effects of diabetes: none. Cardiovascular risk factors: family history, dyslipidemia, diabetes mellitus   Monitoring frequency: 3 times a week. Blood glucose fasting 135-170, rest of the daytime blood glucose are better  Had knee surgery recently  Lost her  in 2020, she goes to aerobic classes  Added Actos October 2019, decreased to 15 mg, discontinued December 2020  since discontinuing Actos, blood glucose have been little bit higher    Could not tolerate Metformin IR, XR formualtion. Hyperlipidemia - was on tricor, zetia, colestipol, Niacin,    Wt Readings from Last 3 Encounters:   12/07/21 186 lb 11.2 oz (84.7 kg)   10/06/21 191 lb 9.3 oz (86.9 kg)   09/30/21 191 lb 9.3 oz (86.9 kg)       BP Readings from Last 3 Encounters:   12/07/21 (!) 146/77   10/08/21 (!) 146/72   09/30/21 125/73           Past Medical History:   Diagnosis Date    Arthritis 2/28/2011    OSTEO    Chronic pain     RIGHT KNEE    Diabetes ([address]     GERD (gastroesophageal reflux disease)     Hot flashes, menopausal     Hypercholesterolemia     Hypertension     Ill-defined condition     Elevated liver enzymes (has family history)    Nausea & vomiting     Seizures ([facility]) 1980'S    AFTER DRINKING 2 MARGARITAS    Vitamin D deficiency 6/28/2010     Current Outpatient Medications   Medication Sig    aspirin delayed-release (Adult Low Dose Aspirin) 81 mg tablet Take 81 mg by mouth.  cloNIDine HCL (CATAPRES) 0.1 mg tablet Take 1 Tablet by mouth daily (with lunch).  TAKE 1 TABLET NOON  dulaglutide (Trulicity) 1.5 HG/6.6 mL sub-q pen 0.5 mL by SubCUTAneous route every seven (7) days. TAKES EVERY MONDAY    naproxen sodium (Aleve) 220 mg tablet Take 440 mg by mouth two (2) times daily (with meals).  dilTIAZem ER (CARDIZEM CD) 180 mg capsule TAKE 1 CAPSULE DAILY    losartan-hydroCHLOROthiazide (HYZAAR) 100-25 mg per tablet TAKE 1 TABLET DAILY    fenofibrate nanocrystallized (Tricor) 145 mg tablet TAKE 1 TABLET DAILY (Patient taking differently: Take 145 mg by mouth nightly. TAKE 1 TABLET DAILY)    esomeprazole (NEXIUM) 40 mg capsule Take 1 Cap by mouth daily.  loratadine (CLARITIN) 10 mg tablet TAKE 1 TABLET DAILY    ezetimibe (ZETIA) 10 mg tablet TAKE 1 TABLET DAILY (Patient taking differently: Take 10 mg by mouth nightly. TAKE 1 TABLET DAILY)    Lactobacillus acidophilus (PROBIOTIC PO) Take 1 Tablet by mouth two (2) times a day.  glucose blood VI test strips (FreeStyle Lite Strips) strip TEST ONCE A DAY DX CODE: E11.65    lancets (FREESTYLE LANCETS) 28 gauge misc TEST ONCE DAILY DX: E11.65    omega-3 fatty acids-vitamin e (FISH OIL) 1,000 mg cap Take 2 Caps by mouth two (2) times a day.  cholecalciferol, vitamin d3, (VITAMIN D) 1,000 unit tablet Take 2,000 Units by mouth nightly.  nystatin (MYCOSTATIN) 100,000 unit/mL suspension Take 2 mL by mouth four (4) times daily. swish and spit (Patient not taking: Reported on 12/7/2021)    diclofenac (VOLTAREN) 1 % gel Apply 1 g to affected area four (4) times daily. Apply to the right knee (Patient not taking: Reported on 12/7/2021)     No current facility-administered medications for this visit.      Allergies   Allergen Reactions    Other Food Nausea and Vomiting     Artificial sweeteners - gas and beldging    Ciprofloxacin Nausea Only    Erythromycin Nausea Only    Metformin Other (comments) and Nausea Only     Felt bad  Felt bad      Pcn [Penicillins] Swelling     Caused lips to swell  Tolerates Amoxicillin         Review of Systems:  -   -     Physical Examination:   Blood pressure (!) 146/77, pulse 69, temperature 97.7 °F (36.5 °C), temperature source Temporal, resp. rate 20, height 5' (1.524 m), weight 186 lb 11.2 oz (84.7 kg), SpO2 94 %. Estimated body mass index is 36.46 kg/m² as calculated from the following:    Height as of this encounter: 5' (1.524 m). -   Weight as of this encounter: 186 lb 11.2 oz (84.7 kg). - General: pleasant, no distress, good eye contact  - HEENT: no pallor, no periorbital edema, EOMI  - Neck: supple,  - Cardiovascular: regular, normal rate, normal S1 and S2  - Respiratory: clear to auscultation bilaterally  -   - Musculoskeletal: no proximal muscle weakness in upper or lower extremities  - Integumentary: alert and oriented ,  - Psychiatric: normal mood and affect  - Skin: color, texture, turgor normal.     Diabetic foot exam ; December 2021    H/o partial or complete amputation of foot : No  H/o previous foot ulceration : No  H/o pre - ulcerative callus : No  H/o peripheral neuropathy and callus : No  H/o poor circulation  : No  Foot deformity : None      Data Reviewed:             Lab Results   Component Value Date/Time    Hemoglobin A1c 7.0 (H) 09/08/2021 11:50 AM    Hemoglobin A1c 6.3 (H) 12/15/2020 10:07 AM    Hemoglobin A1c 6.3 (H) 06/09/2020 12:11 PM    Glucose 196 (H) 10/07/2021 02:11 AM    Glucose (POC) 175 (H) 10/08/2021 01:42 PM    Microalbumin/Creat ratio (mg/g creat) 313 (H) 06/09/2021 11:35 AM    Microalbumin,urine random 64.20 06/09/2021 11:35 AM    LDL, calculated 58 09/08/2021 11:50 AM    Creatinine 1.23 (H) 10/07/2021 02:11 AM      Lab Results   Component Value Date/Time    Cholesterol, total 144 09/08/2021 11:50 AM    HDL Cholesterol 44 09/08/2021 11:50 AM    LDL, calculated 58 09/08/2021 11:50 AM    Triglyceride 210 (H) 09/08/2021 11:50 AM    CHOL/HDL Ratio 3.3 09/08/2021 11:50 AM     Lab Results   Component Value Date/Time    ALT (SGPT) 37 09/08/2021 11:50 AM    Alk.  phosphatase 49 09/08/2021 11:50 AM    Bilirubin, total 0.7 09/08/2021 11:50 AM     Lab Results   Component Value Date/Time    TSH 1.220 01/29/2018 02:47 PM    T4, Free 1.55 09/09/2015 02:16 PM      Lab Results   Component Value Date/Time    Glucose 196 (H) 10/07/2021 02:11 AM    Glucose (POC) 175 (H) 10/08/2021 01:42 PM        Assessment/Plan:     1. Type 2 diabetes mellitus with hyperglycemia, unspecified whether long term insulin use ([facility] [address]    2. Mixed hyperlipidemia    3. Essential hypertension        1. Type 2 Diabetes Mellitus   Lab Results   Component Value Date/Time    Hemoglobin A1c 7.0 (H) 09/08/2021 11:50 AM    Hemoglobin A1c (POC) 6.9 08/23/2021 02:44 PM   She get labs soon  Eye exam November 2021   Actos 15 mg: Discontinued December 4648   Trulicity  FLU annually ,Pneumovax ,aspirin daily,annual eye exam,microalbumin    2. HTN : Continue current therapy     3. Mixed Hyperlipidemia : low carb diet. Statin intolerance hx,   Continue Zetia, Tricor    4. Obesity:Body mass index is 36.46 kg/m². Discussed about the importance of exercise and carbohydrate portion control. 5.  GI symptoms: work up was negative  It was due to artificial sweetners according to the patient      Thank you for allowing me to participate in the care of this patient.     Danuta Brambila MD    Patient verbalized understanding

## 2021-12-07 NOTE — LETTER
12/7/2021    Patient: Edmar Pacheco   YOB: 1940   Date of Visit: 12/7/2021     Wilman Bro MD  [address] [facility] [address]  [place] [place]    Dear Wilman Bro MD,      Thank you for referring Ms. Laura Go to [id number] [place] [address] for evaluation. My notes for this consultation are attached. If you have questions, please do not hesitate to call me. I look forward to following your patient along with you.       Sincerely,    Dustin Vides MD

## 2021-12-13 ENCOUNTER — OFFICE VISIT (OUTPATIENT)
Dept: FAMILY MEDICINE CLINIC | Age: 81
End: 2021-12-13
Payer: MEDICARE

## 2021-12-13 VITALS
BODY MASS INDEX: 36.32 KG/M2 | TEMPERATURE: 97.8 F | OXYGEN SATURATION: 97 % | HEIGHT: 60 IN | DIASTOLIC BLOOD PRESSURE: 78 MMHG | WEIGHT: 185 LBS | RESPIRATION RATE: 18 BRPM | SYSTOLIC BLOOD PRESSURE: 142 MMHG | HEART RATE: 74 BPM

## 2021-12-13 DIAGNOSIS — Z00.00 MEDICARE ANNUAL WELLNESS VISIT, SUBSEQUENT: Primary | ICD-10-CM

## 2021-12-13 PROCEDURE — G8536 NO DOC ELDER MAL SCRN: HCPCS | Performed by: FAMILY MEDICINE

## 2021-12-13 PROCEDURE — G8432 DEP SCR NOT DOC, RNG: HCPCS | Performed by: FAMILY MEDICINE

## 2021-12-13 PROCEDURE — G8400 PT W/DXA NO RESULTS DOC: HCPCS | Performed by: FAMILY MEDICINE

## 2021-12-13 PROCEDURE — 1101F PT FALLS ASSESS-DOCD LE1/YR: CPT | Performed by: FAMILY MEDICINE

## 2021-12-13 PROCEDURE — G8427 DOCREV CUR MEDS BY ELIG CLIN: HCPCS | Performed by: FAMILY MEDICINE

## 2021-12-13 PROCEDURE — G8753 SYS BP > OR = 140: HCPCS | Performed by: FAMILY MEDICINE

## 2021-12-13 PROCEDURE — G8754 DIAS BP LESS 90: HCPCS | Performed by: FAMILY MEDICINE

## 2021-12-13 PROCEDURE — G8417 CALC BMI ABV UP PARAM F/U: HCPCS | Performed by: FAMILY MEDICINE

## 2021-12-13 PROCEDURE — G0439 PPPS, SUBSEQ VISIT: HCPCS | Performed by: FAMILY MEDICINE

## 2021-12-13 NOTE — PROGRESS NOTES
This is the Subsequent Medicare Annual Wellness Exam, performed 12 months or more after the Initial AWV or the last Subsequent AWV    I have reviewed the patient's medical history in detail and updated the computerized patient record. History     Well Woman exam:  Jens Nayak is a 79 y.o. female presenting for well woman exam.     How would you rate your health in generally over the last year? Good  Has your physical and emotional health limited your social activities with family or friends? Prioer to knee surgery issues, but now much better    Current Complaints? Nothing significant (See attached Problem visit note if applicable)    Menstrual/Sexual History:  Age at which menses began: 16 y.o. Vaginal Bleeding: None    PAP History: Normal    Sexually active: No    Risk factors for breast cancer: Low    Diet/Exercise History:  Diet: Favorite food Steak. - Does patient eat at least 5 servings of Fruits/vegetables daily? Yes   - Is patient currently dieting? No    Exercise: Patient Did have structured exercise  - Does patient get 150 minutes of structured exercise weekly? No  - Type of work patient has? retired  - Limitations to exercise? None    Healthcare maintenance:   Health Maintenance Due   Topic Date Due    Shingrix Vaccine Age 49> (2 of 2) 01/12/2021    COVID-19 Vaccine (3 - Booster for Iain Carbo series) 09/26/2021     Mammogram indicated? No   Colonoscopy indicated? No   DEXA scan indicated? No   HIV/STI testing indicated? No   Hepatitis C testing indicated? No   Lung cancer screening indicated? No   AAA screening indicated?   No     Immunization History   Administered Date(s) Administered    (RETIRED) Pneumococcal Vaccine (Unspecified Type) 12/01/2010    COVID-19, Moderna, Primary or Immunocompromised Series, MRNA, PF, 100mcg/0.5mL 02/26/2021, 03/26/2021    H1N1 Influenza Virus Vaccine 03/04/2010    Influenza High Dose Vaccine PF 09/20/2017    Influenza Vaccine 09/26/2013, 10/17/2014, 10/08/2015    Influenza Vaccine (Quad) PF (>6 Mo Flulaval, Fluarix, and >3 Yrs Afluria, Fluzone [id number]) 12/02/2016    Influenza Vaccine (Tri) Adjuvanted (>65 Yrs FLUAD TRI [id number]) 09/18/2018, 09/17/2019, 09/09/2020    Influenza Vaccine Split 10/28/2010, 11/18/2011, 11/16/2012    Influenza, Quadrivalent, Adjuvanted (>65 Yrs FLUAD QUAD [id number]) 09/08/2021    Pneumococcal Conjugate (PCV-13) 05/05/2016    Pneumococcal Polysaccharide (PPSV-23) 09/09/2015    TDAP Vaccine 12/14/2011    Zoster Recombinant 11/17/2020    Zoster Vaccine, Live 01/10/2013     Flu indicated? No   Tdap indicated? No   Pneumovax indicated? No   Zostervax indicated? No   Meningococcal indicated?  No      Past Medical History:   Diagnosis Date    Arthritis 2/28/2011    OSTEO    Chronic pain     RIGHT KNEE    Diabetes ([facility] [address]     GERD (gastroesophageal reflux disease)     Hot flashes, menopausal     Hypercholesterolemia     Hypertension     Ill-defined condition     Elevated liver enzymes (has family history)    Nausea & vomiting     Seizures ([address] 1980'S    AFTER DRINKING 2 MARGARITAS    Vitamin D deficiency 6/28/2010      Past Surgical History:   Procedure Laterality Date    COLONOSCOPY N/A 6/3/2019    COLONOSCOPY performed by Pietro Sanchez [facility] at [facility] COLONOSCOPY N/A 12/1/2020    COLONOSCOPY performed by Pietro Sanchez MD at [facility] HX APPENDECTOMY  2001    HX CATARACT REMOVAL Bilateral     HX CHOLECYSTECTOMY  06/28/2018    HX COLONOSCOPY  12/01/2020    HX HYSTERECTOMY  1975    HX KNEE REPLACEMENT Left 2014    HX OOPHORECTOMY  2001    bilateral    HX OTHER SURGICAL      LIPOMA LEFT SIDE    HX ROTATOR CUFF REPAIR Right 1998     Allergies   Allergen Reactions    Other Food Nausea and Vomiting     Artificial sweeteners - gas and beldging    Ciprofloxacin Nausea Only    Erythromycin Nausea Only    Metformin Other (comments) and Nausea Only     Felt bad  Felt bad      Pcn [Penicillins] Swelling     Caused lips to swell  Tolerates Amoxicillin     Family History   Problem Relation Age of Onset    Pulmonary Embolism Father     Arthritis-rheumatoid Paternal Grandmother     Alzheimer's Disease Mother     Arthritis-rheumatoid Sister     Emphysema Brother     Arthritis-rheumatoid Sister     Anesth Problems Neg Hx      Social History     Tobacco Use    Smoking status: Former Smoker     Packs/day: 1.00     Years: 20.00     Pack years: 20.00     Quit date:      Years since quittin.9    Smokeless tobacco: Never Used   Substance Use Topics    Alcohol use: Yes     Comment: rarely - 1 glass of wine every 3 months     Depression Risk Factor Screening:     3 most recent PHQ Screens 2021   Little interest or pleasure in doing things Not at all   Feeling down, depressed, irritable, or hopeless Not at all   Total Score PHQ 2 0     Alcohol Risk Factor Screening:    Do you average more than 1 drink per night or more than 7 drinks a week: No    In the past three months have you have had more than 4 drinks containing alcohol on one occasion: No  Functional Ability and Level of Safety:   Hearing Loss  Hearing is good.     Activities of Daily Living  The home contains: handrails and grab bars  Patient does total self care  ADL Assessment 2021   Feeding yourself No Help Needed   Getting from bed to chair No Help Needed   Getting dressed No Help Needed   Bathing or showering No Help Needed   Walk across the room (includes cane/walker) No Help Needed   Using the telphone No Help Needed   Taking your medications No Help Needed   Preparing meals No Help Needed   Managing money (expenses/bills) No Help Needed   Moderately strenuous housework (laundry) No Help Needed   Shopping for personal items (toiletries/medicines) No Help Needed   Shopping for groceries No Help Needed   Driving No Help Needed   Climbing a flight of stairs No Help Needed   Getting to places beyond walking distances No Help Needed       Ambulation: with no difficulty    Fall Risk  Fall Risk Assessment, last 12 mths 2021   Able to walk? Yes   Fall in past 12 months? 0   Do you feel unsteady? 0   Are you worried about falling 0   Number of falls in past 12 months -   Fall with injury? -       Abuse Screen  Abuse Screening Questionnaire 2021   Do you ever feel afraid of your partner? N   Are you in a relationship with someone who physically or mentally threatens you? N   Is it safe for you to go home?  Y     Cognitive Screening   Evaluation of Cognitive Function:  Has your family/caregiver stated any concerns about your memory: no  MMSE  Mini Mental State Exam 2021   What is the Year 1   What is the Season 1   What is the Date 1   What is the Day 1   What is the Month 1   Where are we State 1   Where are we Country 1   Where are we [address] [place] [address] or [place] 1   Where are we Floor 1   Name three objects, then ask the patient to say them 3   Serial sevens Subtract 7 from 100 in increments 5   Ask for the three objects repeated above 2   Name a pencil 1   Name a watch 1   Have the patient repeat this phrase \"No ifs, ands, or buts\" 1   Three stage command: Take the paper in your right hand 1   Fold the paper in half 1   Put the paper on the floor 1   Read and obey the followin [address] 1   Have the patient write a sentence 1   Have the patient copy a figure 1   Mini Mental Score 29   Some recent data might be hidden     Patient Care Team   Patient Care Team:  Zeny Barrientos MD as PCP - General (Family Medicine)  Zeny Barrientos MD as PCP - [facility] Provider  Destinee Méndez MD as Physician (Cardiology)  Jessie Paul MD as Physician (Orthopedic Surgery)  Deborah Kelly MD (Endocrinology)  Josette Pascual MD (General Surgery)  Maria A Angela MD (Ophthalmology)  Arleth Haq RN as Nurse Navigator (Orthopedic Surgery)  Assessment/Plan   Education and counseling provided:  Are appropriate based on today's review and evaluation    Diagnoses and all orders for this visit:    1. Medicare annual wellness visit, subsequent        Health Maintenance Topics with due status: Overdue       Topic Date Due    Shingrix Vaccine Age 50> 01/12/2021    COVID-19 Vaccine 09/26/2021     Health Maintenance Topics with due status: Not Due       Topic Last Completion Date    DTaP/Tdap/Td series 12/14/2011    MICROALBUMIN Q1 06/09/2021    A1C test (Diabetic or Prediabetic) 09/08/2021    Lipid Screen 09/08/2021    Eye Exam Retinal or Dilated 11/19/2021    Foot Exam Q1 12/07/2021    Medicare Yearly Exam 12/13/2021     Health Maintenance Topics with due status: Completed       Topic Last Completion Date    Pneumococcal 65+ years 05/05/2016    Hepatitis C Screening 06/09/2021    Flu Vaccine 09/08/2021     Health Maintenance Topics with due status:  Addressed       Topic Date Due    Bone Densitometry (Dexa) Screening Addressed

## 2021-12-13 NOTE — PROGRESS NOTES
1. Have you been to the ER, urgent care clinic since your last visit? Hospitalized since your last visit? Yes- Knee surgery     2. Have you seen or consulted any other health care providers outside of the [address] [place] [address] since your last visit? Include any pap smears or colon screening. No    Reviewed record in preparation for visit and have necessary documentation  Goals that were addressed and/or need to be completed during or after this appointment include     Health Maintenance Due   Topic Date Due    Shingrix Vaccine Age 49> (2 of 2) 01/12/2021    Medicare Yearly Exam  07/16/2021    COVID-19 Vaccine (3 - Booster for Jackquline Irving series) 09/26/2021       Patient is accompanied by self I have received verbal consent from Jennifer Harman to discuss any/all medical information while they are present in the room.

## 2021-12-13 NOTE — PATIENT INSTRUCTIONS

## 2022-02-04 RX ORDER — EZETIMIBE 10 MG/1
10 TABLET ORAL
Qty: 90 TABLET | Refills: 3 | Status: SHIPPED | OUTPATIENT
Start: 2022-02-04

## 2022-02-14 ENCOUNTER — OFFICE VISIT (OUTPATIENT)
Dept: FAMILY MEDICINE CLINIC | Age: 82
End: 2022-02-14
Payer: MEDICARE

## 2022-02-14 VITALS
OXYGEN SATURATION: 96 % | TEMPERATURE: 97.4 F | BODY MASS INDEX: 34.74 KG/M2 | SYSTOLIC BLOOD PRESSURE: 151 MMHG | RESPIRATION RATE: 20 BRPM | HEIGHT: 61 IN | WEIGHT: 184 LBS | DIASTOLIC BLOOD PRESSURE: 75 MMHG | HEART RATE: 76 BPM

## 2022-02-14 DIAGNOSIS — I10 PRIMARY HYPERTENSION: Primary | ICD-10-CM

## 2022-02-14 DIAGNOSIS — E11.21 TYPE 2 DIABETES MELLITUS WITH NEPHROPATHY (HCC): ICD-10-CM

## 2022-02-14 DIAGNOSIS — E55.9 VITAMIN D DEFICIENCY: ICD-10-CM

## 2022-02-14 DIAGNOSIS — R80.9 MICROALBUMINURIA: ICD-10-CM

## 2022-02-14 DIAGNOSIS — K21.9 GASTROESOPHAGEAL REFLUX DISEASE WITHOUT ESOPHAGITIS: ICD-10-CM

## 2022-02-14 DIAGNOSIS — E78.00 HYPERCHOLESTEROLEMIA: ICD-10-CM

## 2022-02-14 PROCEDURE — G8536 NO DOC ELDER MAL SCRN: HCPCS | Performed by: FAMILY MEDICINE

## 2022-02-14 PROCEDURE — G8417 CALC BMI ABV UP PARAM F/U: HCPCS | Performed by: FAMILY MEDICINE

## 2022-02-14 PROCEDURE — 1090F PRES/ABSN URINE INCON ASSESS: CPT | Performed by: FAMILY MEDICINE

## 2022-02-14 PROCEDURE — G8753 SYS BP > OR = 140: HCPCS | Performed by: FAMILY MEDICINE

## 2022-02-14 PROCEDURE — 1101F PT FALLS ASSESS-DOCD LE1/YR: CPT | Performed by: FAMILY MEDICINE

## 2022-02-14 PROCEDURE — G8400 PT W/DXA NO RESULTS DOC: HCPCS | Performed by: FAMILY MEDICINE

## 2022-02-14 PROCEDURE — G8510 SCR DEP NEG, NO PLAN REQD: HCPCS | Performed by: FAMILY MEDICINE

## 2022-02-14 PROCEDURE — 99214 OFFICE O/P EST MOD 30 MIN: CPT | Performed by: FAMILY MEDICINE

## 2022-02-14 PROCEDURE — G8755 DIAS BP > OR = 90: HCPCS | Performed by: FAMILY MEDICINE

## 2022-02-14 PROCEDURE — G8427 DOCREV CUR MEDS BY ELIG CLIN: HCPCS | Performed by: FAMILY MEDICINE

## 2022-02-14 NOTE — PROGRESS NOTES
[facility] [place]    History of Present Illness:   Nargis Pena is a [de-identified] y.o. female with history of HTN, type 2 diabetes, Arthritis, HLD, Obesity  CC: Follow up Chronic conditions  History provided by patient and Records    HPI:  Hypertension Follow up:  Currently Taking:   Key CAD CHF Meds             ezetimibe (ZETIA) 10 mg tablet (Taking) Take 1 Tablet by mouth nightly. TAKE 1 TABLET DAILY    fenofibrate nanocrystallized (TRICOR) 145 mg tablet (Taking) TAKE 1 TABLET DAILY    aspirin delayed-release (Adult Low Dose Aspirin) 81 mg tablet (Taking) Take 81 mg by mouth.    cloNIDine HCL (CATAPRES) 0.1 mg tablet (Taking) Take 1 Tablet by mouth daily (with lunch). TAKE 1 TABLET NOON    dilTIAZem ER (CARDIZEM CD) 180 mg capsule (Taking) TAKE 1 CAPSULE DAILY    losartan-hydroCHLOROthiazide (HYZAAR) 100-25 mg per tablet (Taking) TAKE 1 TABLET DAILY    omega-3 fatty acids-vitamin e (FISH OIL) 1,000 mg cap (Taking) Take 2 Caps by mouth two (2) times a day. The patient reports:  taking medications as instructed, no medication side effects noted, home BP monitoring in range of 380-695'Q systolic over 28-02'S diastolic, no TIA's, no chest pain on exertion, no dyspnea on exertion, no swelling of ankles. BP Readings from Last 3 Encounters:   02/14/22 (!) 155/101   12/13/21 (!) 142/78   12/07/21 (!) 146/77      Hypertriglyceridemia Follow up:   Cardiovascular risks for her are: diabetic  hypertension  hyperlipidemia. Current Medications:  Key Antihyperlipidemia Meds             ezetimibe (ZETIA) 10 mg tablet (Taking) Take 1 Tablet by mouth nightly. TAKE 1 TABLET DAILY    fenofibrate nanocrystallized (TRICOR) 145 mg tablet (Taking) TAKE 1 TABLET DAILY    omega-3 fatty acids-vitamin e (FISH OIL) 1,000 mg cap (Taking) Take 2 Caps by mouth two (2) times a day.           Compliance: YES   Myalgias: NO   Fatigue: NO   Other side effects: NO     Wt Readings from Last 3 Encounters:   02/14/22 184 lb (83.5 kg)   12/13/21 185 lb (83.9 kg)   12/07/21 186 lb 11.2 oz (84.7 kg)       Lab Results   Component Value Date/Time    Cholesterol, total 144 09/08/2021 11:50 AM    HDL Cholesterol 44 09/08/2021 11:50 AM    LDL, calculated 58 09/08/2021 11:50 AM    VLDL, calculated 42 09/08/2021 11:50 AM    Triglyceride 210 (H) 09/08/2021 11:50 AM    CHOL/HDL Ratio 3.3 09/08/2021 11:50 AM      Lab Results   Component Value Date/Time    ALT (SGPT) 37 09/08/2021 11:50 AM    AST (SGOT) 31 09/08/2021 11:50 AM    Alk. phosphatase 49 09/08/2021 11:50 AM    Bilirubin, total 0.7 09/08/2021 11:50 AM      Diabetes Follow up: Overall the patient feels well with good energy level. Current Medications:   Key Antihyperglycemic Medications             dulaglutide (Trulicity) 1.5 HW/1.9 mL sub-q pen (Taking) 0.5 mL by SubCUTAneous route every seven (7) days. TAKES EVERY MONDAY      . Insulin dependence: NO   Frequency of home glucose testing: prn   Blood Sugar range at home: COntrolled    Last eye exam: In past 12 months. Last foot exam: This year.    Polyuria, polyphagia or polydipsia: NO   Retinopathy: NO   Neuropathy SX: NO   Low blood sugar symptoms: NO   Dietary compliance: compliant most of the time   Medication compliance: compliant most of the time   On ASA: NO   Tobacco Use: NO   Depression: NO     Wt Readings from Last 3 Encounters:   02/14/22 184 lb (83.5 kg)   12/13/21 185 lb (83.9 kg)   12/07/21 186 lb 11.2 oz (84.7 kg)        Lab Results   Component Value Date/Time    Hemoglobin A1c 7.0 (H) 09/08/2021 11:50 AM    Hemoglobin A1c (POC) 6.9 08/23/2021 02:44 PM        Lab Results   Component Value Date/Time    Microalbumin/Creat ratio (mg/g creat) 313 (H) 06/09/2021 11:35 AM    Microalbumin,urine random 64.20 06/09/2021 11:35 AM         Lab Results   Component Value Date/Time    Creatinine 1.23 (H) 10/07/2021 02:11 AM      Gastroesophageal Reflux:  Current control of Symptoms: Controlled  Primary symptoms: hoarseness  Hiatal Hernia: No  Current Medications: Nexium  The patient has no history melena or bright red blood in the stools. The patient avoids high dose aspirin and NSAID therapy. The patient is aware of diet changes needed, elevating the head of the bed and appropriate use of antacids. Health Maintenance  Health Maintenance Due   Topic Date Due    Shingrix Vaccine Age 49> (2 of 2) 01/12/2021    COVID-19 Vaccine (3 - Booster for Claudeen Fermo series) 08/26/2021    DTaP/Tdap/Td series (2 - Td or Tdap) 12/14/2021       Past Medical, Family, and Social History:     Current Outpatient Medications on File Prior to Visit   Medication Sig Dispense Refill    ezetimibe (ZETIA) 10 mg tablet Take 1 Tablet by mouth nightly. TAKE 1 TABLET DAILY 90 Tablet 3    fenofibrate nanocrystallized (TRICOR) 145 mg tablet TAKE 1 TABLET DAILY 90 Tablet 1    aspirin delayed-release (Adult Low Dose Aspirin) 81 mg tablet Take 81 mg by mouth.  cloNIDine HCL (CATAPRES) 0.1 mg tablet Take 1 Tablet by mouth daily (with lunch). TAKE 1 TABLET NOON 90 Tablet 1    dulaglutide (Trulicity) 1.5 PL/6.9 mL sub-q pen 0.5 mL by SubCUTAneous route every seven (7) days. TAKES EVERY MONDAY 6 mL 4    nystatin (MYCOSTATIN) 100,000 unit/mL suspension Take 2 mL by mouth four (4) times daily. swish and spit 473 mL 0    naproxen sodium (Aleve) 220 mg tablet Take 440 mg by mouth two (2) times daily (with meals). As needed      dilTIAZem ER (CARDIZEM CD) 180 mg capsule TAKE 1 CAPSULE DAILY 90 Capsule 3    losartan-hydroCHLOROthiazide (HYZAAR) 100-25 mg per tablet TAKE 1 TABLET DAILY 90 Tablet 3    esomeprazole (NEXIUM) 40 mg capsule Take 1 Cap by mouth daily. 90 Cap 3    loratadine (CLARITIN) 10 mg tablet TAKE 1 TABLET DAILY 90 Tab 3    Lactobacillus acidophilus (PROBIOTIC PO) Take 1 Tablet by mouth two (2) times a day.       glucose blood VI test strips (FreeStyle Lite Strips) strip TEST ONCE A DAY DX CODE: E11.65 100 Strip 3    lancets (FREESTYLE LANCETS) 28 gauge misc TEST ONCE DAILY DX: E11.65 100 Lancet 3    omega-3 fatty acids-vitamin e (FISH OIL) 1,000 mg cap Take 2 Caps by mouth two (2) times a day.  cholecalciferol, vitamin d3, (VITAMIN D) 1,000 unit tablet Take 2,000 Units by mouth nightly.  diclofenac (VOLTAREN) 1 % gel Apply 1 g to affected area four (4) times daily. Apply to the right knee (Patient not taking: Reported on 2021) 150 g 1     No current facility-administered medications on file prior to visit. 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    Gastroesophageal reflux disease without esophagitis K21.9    Primary localized osteoarthritis of right knee M17.11       Social History     Socioeconomic History    Marital status:    Tobacco Use    Smoking status: Former Smoker     Packs/day: 1.00     Years: 20.00     Pack years: 20.00     Quit date:      Years since quittin.1    Smokeless tobacco: Never Used   Vaping Use    Vaping Use: Never used   Substance and Sexual Activity    Alcohol use: Yes     Comment: rarely - 1 glass of wine every 3 months    Drug use: No    Sexual activity: Yes        Review of Systems   Review of Systems   Constitutional: Negative for chills and fever. Cardiovascular: Negative for chest pain and palpitations. Gastrointestinal: Negative for nausea and vomiting. Psychiatric/Behavioral: The patient has insomnia.         Objective:     Visit Vitals  BP (!) 155/101   Pulse 76   Temp 97.4 °F (36.3 °C)   Resp 20   Ht 5' 1\" (1.549 m)   Wt 184 lb (83.5 kg)   SpO2 96%   BMI 34.77 kg/m² Physical Exam  Vitals and nursing note reviewed. Constitutional:       Appearance: Normal appearance. HENT:      Head: Normocephalic and atraumatic. Cardiovascular:      Rate and Rhythm: Normal rate and regular rhythm. Pulses: Normal pulses. Heart sounds: Normal heart sounds. No murmur heard. No friction rub. No gallop. Pulmonary:      Effort: Pulmonary effort is normal.      Breath sounds: Normal breath sounds. Abdominal:      General: Abdomen is flat. Bowel sounds are normal.      Palpations: Abdomen is soft. Musculoskeletal:         General: Normal range of motion. Cervical back: Normal range of motion and neck supple. Skin:     General: Skin is warm and dry. Neurological:      Mental Status: She is alert. Pertinent Labs/Studies:      Assessment and orders:       ICD-10-CM ICD-9-CM    1. Primary hypertension  I10 401.9 CBC W/O DIFF      METABOLIC PANEL, COMPREHENSIVE   2. Type 2 diabetes mellitus with nephropathy (HCC)  E11.21 250.40 HEMOGLOBIN A1C WITH EAG     583.81 MICROALBUMIN, UR, RAND W/ MICROALB/CREAT RATIO   3. Hypercholesterolemia  E78.00 272.0 LIPID PANEL   4. Vitamin D deficiency  E55.9 268.9    5. Gastroesophageal reflux disease without esophagitis  K21.9 530.81    6. Microalbuminuria  R80.9 791.0 MICROALBUMIN, UR, RAND W/ MICROALB/CREAT RATIO     Diagnoses and all orders for this visit:    1. Primary hypertension: Our goal is to normalize the blood pressure to decrease the risks of strokes and heart attacks. The patient is in agreement with the plan. -     CBC W/O DIFF; Future  -     METABOLIC PANEL, COMPREHENSIVE; Future    2. Type 2 diabetes mellitus with nephropathy (HCC)  -     HEMOGLOBIN A1C WITH EAG; Future  -     MICROALBUMIN, UR, RAND W/ MICROALB/CREAT RATIO; Future    3. Hypercholesterolemia  -     LIPID PANEL; Future    4. Vitamin D deficiency    5. Gastroesophageal reflux disease without esophagitis    6.  Microalbuminuria  -     MICROALBUMIN, UR, ANTONIO W/ MICROALB/CREAT RATIO; Future      Follow-up and Dispositions    · Return in about 4 weeks (around 3/14/2022). I have discussed the diagnosis with the patient and the intended plan as seen in the above orders. Social history, medical history, and labs were reviewed. The patient has received an after-visit summary and questions were answered concerning future plans. I have discussed medication side effects and warnings with the patient as well.     MD INA Josue & SONYA BUSCH [facility]  02/14/22

## 2022-02-14 NOTE — PROGRESS NOTES
1. Have you been to the ER, urgent care clinic since your last visit? Hospitalized since your last visit? No    2. Have you seen or consulted any other health care providers outside of the [address] [place] [address] since your last visit? Include any pap smears or colon screening.  No  Reviewed record in preparation for visit and have necessary documentation  Pt did not bring medication to office visit for review    Goals that were addressed and/or need to be completed during or after this appointment include   Health Maintenance Due   Topic Date Due    Shingrix Vaccine Age 50> (2 of 2) 01/12/2021    COVID-19 Vaccine (3 - Booster for Moderna series) 08/26/2021    DTaP/Tdap/Td series (2 - Td or Tdap) 12/14/2021

## 2022-02-15 LAB
ALBUMIN SERPL-MCNC: 4.2 G/DL (ref 3.5–5)
ALBUMIN/GLOB SERPL: 1.1 {RATIO} (ref 1.1–2.2)
ALP SERPL-CCNC: 65 U/L (ref 45–117)
ALT SERPL-CCNC: 28 U/L (ref 12–78)
ANION GAP SERPL CALC-SCNC: 4 MMOL/L (ref 5–15)
AST SERPL-CCNC: 21 U/L (ref 15–37)
BILIRUB SERPL-MCNC: 0.5 MG/DL (ref 0.2–1)
BUN SERPL-MCNC: 24 MG/DL (ref 6–20)
BUN/CREAT SERPL: 24 (ref 12–20)
CALCIUM SERPL-MCNC: 10.7 MG/DL (ref 8.5–10.1)
CHLORIDE SERPL-SCNC: 100 MMOL/L (ref 97–108)
CHOLEST SERPL-MCNC: 149 MG/DL
CO2 SERPL-SCNC: 31 MMOL/L (ref 21–32)
CREAT SERPL-MCNC: 1 MG/DL (ref 0.55–1.02)
CREAT UR-MCNC: 124 MG/DL
ERYTHROCYTE [DISTWIDTH] IN BLOOD BY AUTOMATED COUNT: 15.6 % (ref 11.5–14.5)
EST. AVERAGE GLUCOSE BLD GHB EST-MCNC: 166 MG/DL
GLOBULIN SER CALC-MCNC: 3.8 G/DL (ref 2–4)
GLUCOSE SERPL-MCNC: 149 MG/DL (ref 65–100)
HBA1C MFR BLD: 7.4 % (ref 4–5.6)
HCT VFR BLD AUTO: 46.2 % (ref 35–47)
HDLC SERPL-MCNC: 47 MG/DL
HDLC SERPL: 3.2 {RATIO} (ref 0–5)
HGB BLD-MCNC: 14.2 G/DL (ref 11.5–16)
LDLC SERPL CALC-MCNC: 53.2 MG/DL (ref 0–100)
MCH RBC QN AUTO: 25.8 PG (ref 26–34)
MCHC RBC AUTO-ENTMCNC: 30.7 G/DL (ref 30–36.5)
MCV RBC AUTO: 84 FL (ref 80–99)
MICROALBUMIN UR-MCNC: 47.3 MG/DL
MICROALBUMIN/CREAT UR-RTO: 381 MG/G (ref 0–30)
NRBC # BLD: 0 K/UL (ref 0–0.01)
NRBC BLD-RTO: 0 PER 100 WBC
PLATELET # BLD AUTO: 342 K/UL (ref 150–400)
PMV BLD AUTO: 10.3 FL (ref 8.9–12.9)
POTASSIUM SERPL-SCNC: 3.8 MMOL/L (ref 3.5–5.1)
PROT SERPL-MCNC: 8 G/DL (ref 6.4–8.2)
RBC # BLD AUTO: 5.5 M/UL (ref 3.8–5.2)
SODIUM SERPL-SCNC: 135 MMOL/L (ref 136–145)
TRIGL SERPL-MCNC: 244 MG/DL (ref ?–150)
VLDLC SERPL CALC-MCNC: 48.8 MG/DL
WBC # BLD AUTO: 6.9 K/UL (ref 3.6–11)

## 2022-02-28 RX ORDER — LORATADINE 10 MG/1
TABLET ORAL
Qty: 90 TABLET | Refills: 3 | Status: SHIPPED | OUTPATIENT
Start: 2022-02-28

## 2022-03-15 DIAGNOSIS — K21.9 GASTROESOPHAGEAL REFLUX DISEASE WITHOUT ESOPHAGITIS: ICD-10-CM

## 2022-03-16 RX ORDER — ESOMEPRAZOLE MAGNESIUM 40 MG/1
CAPSULE, DELAYED RELEASE ORAL
Qty: 90 CAPSULE | Refills: 3 | Status: SHIPPED | OUTPATIENT
Start: 2022-03-16

## 2022-03-18 PROBLEM — K21.9 GASTROESOPHAGEAL REFLUX DISEASE WITHOUT ESOPHAGITIS: Status: ACTIVE | Noted: 2019-07-01

## 2022-03-19 PROBLEM — M17.11 PRIMARY LOCALIZED OSTEOARTHRITIS OF RIGHT KNEE: Status: ACTIVE | Noted: 2021-09-27

## 2022-03-20 PROBLEM — E11.21 TYPE 2 DIABETES MELLITUS WITH NEPHROPATHY (HCC): Status: ACTIVE | Noted: 2018-01-23

## 2022-04-11 ENCOUNTER — OFFICE VISIT (OUTPATIENT)
Dept: ENDOCRINOLOGY | Age: 82
End: 2022-04-11
Payer: MEDICARE

## 2022-04-11 VITALS
HEART RATE: 85 BPM | TEMPERATURE: 98.5 F | SYSTOLIC BLOOD PRESSURE: 166 MMHG | OXYGEN SATURATION: 96 % | DIASTOLIC BLOOD PRESSURE: 92 MMHG | BODY MASS INDEX: 35.8 KG/M2 | HEIGHT: 61 IN | WEIGHT: 189.6 LBS

## 2022-04-11 DIAGNOSIS — E66.01 SEVERE OBESITY (BMI 35.0-39.9) WITH COMORBIDITY (HCC): ICD-10-CM

## 2022-04-11 DIAGNOSIS — E11.65 TYPE 2 DIABETES MELLITUS WITH HYPERGLYCEMIA, UNSPECIFIED WHETHER LONG TERM INSULIN USE (HCC): Primary | ICD-10-CM

## 2022-04-11 DIAGNOSIS — E78.2 MIXED HYPERLIPIDEMIA: ICD-10-CM

## 2022-04-11 PROCEDURE — 99214 OFFICE O/P EST MOD 30 MIN: CPT | Performed by: INTERNAL MEDICINE

## 2022-04-11 PROCEDURE — 1101F PT FALLS ASSESS-DOCD LE1/YR: CPT | Performed by: INTERNAL MEDICINE

## 2022-04-11 PROCEDURE — G8417 CALC BMI ABV UP PARAM F/U: HCPCS | Performed by: INTERNAL MEDICINE

## 2022-04-11 PROCEDURE — G8400 PT W/DXA NO RESULTS DOC: HCPCS | Performed by: INTERNAL MEDICINE

## 2022-04-11 PROCEDURE — G8432 DEP SCR NOT DOC, RNG: HCPCS | Performed by: INTERNAL MEDICINE

## 2022-04-11 PROCEDURE — G8755 DIAS BP > OR = 90: HCPCS | Performed by: INTERNAL MEDICINE

## 2022-04-11 PROCEDURE — G8536 NO DOC ELDER MAL SCRN: HCPCS | Performed by: INTERNAL MEDICINE

## 2022-04-11 PROCEDURE — G8753 SYS BP > OR = 140: HCPCS | Performed by: INTERNAL MEDICINE

## 2022-04-11 PROCEDURE — 1090F PRES/ABSN URINE INCON ASSESS: CPT | Performed by: INTERNAL MEDICINE

## 2022-04-11 PROCEDURE — 3051F HG A1C>EQUAL 7.0%<8.0%: CPT | Performed by: INTERNAL MEDICINE

## 2022-04-11 PROCEDURE — G8427 DOCREV CUR MEDS BY ELIG CLIN: HCPCS | Performed by: INTERNAL MEDICINE

## 2022-04-11 NOTE — PROGRESS NOTES
Do Franco MD        Patient Information  Date:4/16/2022  Name : Kiah Butler [de-identified] y.o.     YOB: 1940         Referred by: Jesi Jaime MD         Chief Complaint   Patient presents with    Diabetes       History of Present Illness: Kiah Butler is a [de-identified] y.o. female here for fu of  Type 2 Diabetes Mellitus. Type 2 Diabetes was diagnosed in 10/2014 . End organ effects of diabetes: none. Cardiovascular risk factors: family history, dyslipidemia, diabetes mellitus   Monitoring frequency: 3 times a week. Blood glucose fasting 135-170, rest of the daytime blood glucose are better  Had knee surgery recently  Lost her  in 2020, she goes to aerobic classes  Added Actos October 2019, decreased to 15 mg, discontinued December 2020  since discontinuing Actos, blood glucose have been little bit higher    Could not tolerate Metformin IR, XR formualtion. Hyperlipidemia - was on tricor, zetia, colestipol, Niacin,    Wt Readings from Last 3 Encounters:   04/11/22 189 lb 9.6 oz (86 kg)   02/14/22 184 lb (83.5 kg)   12/13/21 185 lb (83.9 kg)       BP Readings from Last 3 Encounters:   04/11/22 (!) 166/92   02/14/22 (!) 151/75   12/13/21 (!) 142/78           Past Medical History:   Diagnosis Date    Arthritis 2/28/2011    OSTEO    Chronic pain     RIGHT KNEE    Diabetes ([facility]     GERD (gastroesophageal reflux disease)     Hot flashes, menopausal     Hypercholesterolemia     Hypertension     Ill-defined condition     Elevated liver enzymes (has family history)    Nausea & vomiting     Seizures (HCC) 1980'S    AFTER DRINKING 2 MARGARITAS    Vitamin D deficiency 6/28/2010     Current Outpatient Medications   Medication Sig    esomeprazole (NEXIUM) 40 mg capsule TAKE 1 CAPSULE DAILY    loratadine (CLARITIN) 10 mg tablet TAKE 1 TABLET DAILY    ezetimibe (ZETIA) 10 mg tablet Take 1 Tablet by mouth nightly.  TAKE 1 TABLET DAILY    fenofibrate nanocrystallized (TRICOR) 145 mg tablet TAKE 1 TABLET DAILY    aspirin delayed-release (Adult Low Dose Aspirin) 81 mg tablet Take 81 mg by mouth.  cloNIDine HCL (CATAPRES) 0.1 mg tablet Take 1 Tablet by mouth daily (with lunch). TAKE 1 TABLET NOON    dulaglutide (Trulicity) 1.5 BC/8.7 mL sub-q pen 0.5 mL by SubCUTAneous route every seven (7) days. TAKES EVERY MONDAY    dilTIAZem ER (CARDIZEM CD) 180 mg capsule TAKE 1 CAPSULE DAILY    losartan-hydroCHLOROthiazide (HYZAAR) 100-25 mg per tablet TAKE 1 TABLET DAILY    Lactobacillus acidophilus (PROBIOTIC PO) Take 1 Tablet by mouth two (2) times a day.  glucose blood VI test strips (FreeStyle Lite Strips) strip TEST ONCE A DAY DX CODE: E11.65    lancets (FREESTYLE LANCETS) 28 gauge misc TEST ONCE DAILY DX: E11.65    omega-3 fatty acids-vitamin e (FISH OIL) 1,000 mg cap Take 2 Caps by mouth two (2) times a day.  cholecalciferol, vitamin d3, (VITAMIN D) 1,000 unit tablet Take 2,000 Units by mouth nightly.  nystatin (MYCOSTATIN) 100,000 unit/mL suspension Take 2 mL by mouth four (4) times daily. swish and spit (Patient not taking: Reported on 4/11/2022)    naproxen sodium (Aleve) 220 mg tablet Take 440 mg by mouth two (2) times daily (with meals). As needed (Patient not taking: Reported on 4/11/2022)    diclofenac (VOLTAREN) 1 % gel Apply 1 g to affected area four (4) times daily. Apply to the right knee (Patient not taking: Reported on 12/7/2021)     No current facility-administered medications for this visit.      Allergies   Allergen Reactions    Other Food Nausea and Vomiting     Artificial sweeteners - gas and beldging    Ciprofloxacin Nausea Only    Erythromycin Nausea Only    Metformin Other (comments) and Nausea Only     Felt bad  Felt bad      Pcn [Penicillins] Swelling     Caused lips to swell  Tolerates Amoxicillin         Review of Systems:  -   -     Physical Examination:   Blood pressure (!) 166/92, pulse 85, temperature 98.5 °F (36.9 °C), temperature source Temporal, height 5' 1\" (1.549 m), weight 189 lb 9.6 oz (86 kg), SpO2 96 %. Estimated body mass index is 35.82 kg/m² as calculated from the following:    Height as of this encounter: 5' 1\" (1.549 m). -   Weight as of this encounter: 189 lb 9.6 oz (86 kg). - General: pleasant, no distress, good eye contact  - HEENT: no pallor, no periorbital edema, EOMI  - Neck: supple,  - Cardiovascular: regular, normal rate, normal S1 and S2  - Respiratory: clear to auscultation bilaterally  -   - Musculoskeletal: no proximal muscle weakness in upper or lower extremities  - Integumentary: alert and oriented ,  - Psychiatric: normal mood and affect  - Skin: color, texture, turgor normal.     Diabetic foot exam ; December 2021    H/o partial or complete amputation of foot : No  H/o previous foot ulceration : No  H/o pre - ulcerative callus : No  H/o peripheral neuropathy and callus : No  H/o poor circulation  : No  Foot deformity : None      Data Reviewed:             Lab Results   Component Value Date/Time    Hemoglobin A1c 7.4 (H) 02/14/2022 10:20 AM    Hemoglobin A1c 7.0 (H) 09/08/2021 11:50 AM    Hemoglobin A1c 6.3 (H) 12/15/2020 10:07 AM    Glucose 149 (H) 02/14/2022 10:20 AM    Glucose (POC) 175 (H) 10/08/2021 01:42 PM    Microalbumin/Creat ratio (mg/g creat) 381 (H) 02/14/2022 10:20 AM    Microalbumin,urine random 47.30 02/14/2022 10:20 AM    LDL, calculated 53.2 02/14/2022 10:20 AM    Creatinine 1.00 02/14/2022 10:20 AM      Lab Results   Component Value Date/Time    Cholesterol, total 149 02/14/2022 10:20 AM    HDL Cholesterol 47 02/14/2022 10:20 AM    LDL, calculated 53.2 02/14/2022 10:20 AM    Triglyceride 244 (H) 02/14/2022 10:20 AM    CHOL/HDL Ratio 3.2 02/14/2022 10:20 AM     Lab Results   Component Value Date/Time    ALT (SGPT) 28 02/14/2022 10:20 AM    Alk.  phosphatase 65 02/14/2022 10:20 AM    Bilirubin, total 0.5 02/14/2022 10:20 AM     Lab Results   Component Value Date/Time    TSH 1.220 01/29/2018 02:47 PM    T4, Free 1.55 09/09/2015 02:16 PM      Lab Results   Component Value Date/Time    Glucose 149 (H) 02/14/2022 10:20 AM    Glucose (POC) 175 (H) 10/08/2021 01:42 PM        Assessment/Plan:     1. Type 2 diabetes mellitus with hyperglycemia, unspecified whether long term insulin use ([facility]    2. Severe obesity (BMI 35.0-39. 9) with comorbidity ([facility]    3. Mixed hyperlipidemia        1. Type 2 Diabetes Mellitus   Lab Results   Component Value Date/Time    Hemoglobin A1c 7.4 (H) 02/14/2022 10:20 AM    Hemoglobin A1c (POC) 6.9 08/23/2021 02:44 PM   She get labs soon  Eye exam November 2021   Actos 15 mg: Discontinued December 6317   Trulicity increase the dose if she tolerates 3 mg  FLU annually ,Pneumovax ,aspirin daily,annual eye exam,microalbumin    2. HTN : Continue current therapy     3. Mixed Hyperlipidemia : low carb diet. Statin intolerance hx,   Continue Zetia, Tricor    4. Obesity:Body mass index is 35.82 kg/m². Discussed about the importance of exercise and carbohydrate portion control. 5.  GI symptoms: work up was negative  It was due to artificial sweetners according to the patient  Follow-up and Dispositions    · Return in about 5 months (around 9/11/2022). Thank you for allowing me to participate in the care of this patient.     Jayjay Garrison MD    Patient verbalized understanding

## 2022-04-11 NOTE — PROGRESS NOTES
Richa العراقي is a [de-identified] y.o. female here for   Chief Complaint   Patient presents with    Diabetes       1. Have you been to the ER, urgent care clinic since your last visit? Hospitalized since your last visit? - no    2. Have you seen or consulted any other health care providers outside of the [address] [place] [address] since your last visit?   Include any pap smears or colon screening.- no

## 2022-04-11 NOTE — LETTER
4/16/2022    Patient: Parish Landrum   YOB: 1940   Date of Visit: 4/11/2022     Demario Timmons MD  [address] [address]  [address] [facility] [address]    Dear Demario Timmons MD,      Thank you for referring Ms. Brittney Parnell to [address] [place] [address] for evaluation. My notes for this consultation are attached. If you have questions, please do not hesitate to call me. I look forward to following your patient along with you.       Sincerely,    Dorothy Davila MD

## 2022-04-18 DIAGNOSIS — E11.65 TYPE 2 DIABETES MELLITUS WITH HYPERGLYCEMIA, UNSPECIFIED WHETHER LONG TERM INSULIN USE (HCC): Primary | ICD-10-CM

## 2022-04-18 RX ORDER — DULAGLUTIDE 3 MG/.5ML
3 INJECTION, SOLUTION SUBCUTANEOUS
Qty: 12 EACH | Refills: 3 | Status: SHIPPED | OUTPATIENT
Start: 2022-04-18

## 2022-04-18 NOTE — TELEPHONE ENCOUNTER
Pt called stating she would like the increased dose of Truilcity sent to [facility].

## 2022-05-30 DIAGNOSIS — I10 ESSENTIAL HYPERTENSION: ICD-10-CM

## 2022-05-31 RX ORDER — CLONIDINE HYDROCHLORIDE 0.1 MG/1
TABLET ORAL
Qty: 90 TABLET | Refills: 3 | Status: SHIPPED | OUTPATIENT
Start: 2022-05-31

## 2022-06-14 ENCOUNTER — OFFICE VISIT (OUTPATIENT)
Dept: FAMILY MEDICINE CLINIC | Age: 82
End: 2022-06-14
Payer: MEDICARE

## 2022-06-14 VITALS
TEMPERATURE: 97.4 F | HEART RATE: 72 BPM | BODY MASS INDEX: 35.68 KG/M2 | DIASTOLIC BLOOD PRESSURE: 74 MMHG | SYSTOLIC BLOOD PRESSURE: 137 MMHG | RESPIRATION RATE: 20 BRPM | HEIGHT: 61 IN | WEIGHT: 189 LBS | OXYGEN SATURATION: 96 %

## 2022-06-14 DIAGNOSIS — E11.65 TYPE 2 DIABETES MELLITUS WITH HYPERGLYCEMIA, WITHOUT LONG-TERM CURRENT USE OF INSULIN (HCC): Chronic | ICD-10-CM

## 2022-06-14 DIAGNOSIS — E78.00 HYPERCHOLESTEROLEMIA: Chronic | ICD-10-CM

## 2022-06-14 DIAGNOSIS — I10 PRIMARY HYPERTENSION: Primary | Chronic | ICD-10-CM

## 2022-06-14 DIAGNOSIS — K21.9 GASTROESOPHAGEAL REFLUX DISEASE WITHOUT ESOPHAGITIS: Chronic | ICD-10-CM

## 2022-06-14 PROCEDURE — G8536 NO DOC ELDER MAL SCRN: HCPCS | Performed by: FAMILY MEDICINE

## 2022-06-14 PROCEDURE — G8510 SCR DEP NEG, NO PLAN REQD: HCPCS | Performed by: FAMILY MEDICINE

## 2022-06-14 PROCEDURE — 1090F PRES/ABSN URINE INCON ASSESS: CPT | Performed by: FAMILY MEDICINE

## 2022-06-14 PROCEDURE — G8417 CALC BMI ABV UP PARAM F/U: HCPCS | Performed by: FAMILY MEDICINE

## 2022-06-14 PROCEDURE — G8752 SYS BP LESS 140: HCPCS | Performed by: FAMILY MEDICINE

## 2022-06-14 PROCEDURE — G8427 DOCREV CUR MEDS BY ELIG CLIN: HCPCS | Performed by: FAMILY MEDICINE

## 2022-06-14 PROCEDURE — 99214 OFFICE O/P EST MOD 30 MIN: CPT | Performed by: FAMILY MEDICINE

## 2022-06-14 PROCEDURE — 1101F PT FALLS ASSESS-DOCD LE1/YR: CPT | Performed by: FAMILY MEDICINE

## 2022-06-14 PROCEDURE — G8754 DIAS BP LESS 90: HCPCS | Performed by: FAMILY MEDICINE

## 2022-06-14 PROCEDURE — 1123F ACP DISCUSS/DSCN MKR DOCD: CPT | Performed by: FAMILY MEDICINE

## 2022-06-14 PROCEDURE — 3051F HG A1C>EQUAL 7.0%<8.0%: CPT | Performed by: FAMILY MEDICINE

## 2022-06-14 PROCEDURE — G8400 PT W/DXA NO RESULTS DOC: HCPCS | Performed by: FAMILY MEDICINE

## 2022-06-14 NOTE — PROGRESS NOTES
[facility]    History of Present Illness:   Tico Majano is a [de-identified] y.o. female with history of HTN, GERD, Type 2 diabetes, Allergies, Osteoarthritis, HLD,   CC: Follow up  History provided by patient and Records    HPI:  Hypertension Follow up:  Currently Taking:   Key CAD CHF Meds             cloNIDine HCL (CATAPRES) 0.1 mg tablet (Taking) TAKE 1 TABLET DAILY AT NOON WITH LUNCH    ezetimibe (ZETIA) 10 mg tablet (Taking) Take 1 Tablet by mouth nightly. TAKE 1 TABLET DAILY    fenofibrate nanocrystallized (TRICOR) 145 mg tablet (Taking) TAKE 1 TABLET DAILY    aspirin delayed-release (Adult Low Dose Aspirin) 81 mg tablet (Taking) Take 81 mg by mouth. dilTIAZem ER (CARDIZEM CD) 180 mg capsule (Taking) TAKE 1 CAPSULE DAILY    losartan-hydroCHLOROthiazide (HYZAAR) 100-25 mg per tablet (Taking) TAKE 1 TABLET DAILY    omega-3 fatty acids-vitamin e (FISH OIL) 1,000 mg cap (Taking) Take 2 Caps by mouth two (2) times a day. The patient reports:  taking medications as instructed, no medication side effects noted, no TIA's, no chest pain on exertion, no dyspnea on exertion, no swelling of ankles, no orthostatic dizziness or lightheadedness, no orthopnea or paroxysmal nocturnal dyspnea. BP Readings from Last 3 Encounters:   06/14/22 137/74   04/11/22 (!) 166/92   02/14/22 (!) 151/75      Diabetes Follow up: Overall the patient feels well with good energy level. Current Medications:   Key Antihyperglycemic Medications             dulaglutide (Trulicity) 3 LS/6.4 mL pnij (Taking) 3 mg by SubCUTAneous route every seven (7) days. Stop 1.5 mg      . Insulin dependence: NO   Frequency of home glucose testing: prn   Blood Sugar range at home: N/A    Last eye exam: In past 12 months. Last foot exam: This year.    Polyuria, polyphagia or polydipsia: NO   Retinopathy: NO   Neuropathy SX: NO   Low blood sugar symptoms: NO   Dietary compliance: compliant most of the time   Medication compliance: compliant most of the time   On ASA: NO   Tobacco Use: NO   Depression: NO     Wt Readings from Last 3 Encounters:   06/14/22 189 lb (85.7 kg)   04/11/22 189 lb 9.6 oz (86 kg)   02/14/22 184 lb (83.5 kg)      Lab Results   Component Value Date/Time    Hemoglobin A1c 7.4 (H) 02/14/2022 10:20 AM    Hemoglobin A1c (POC) 6.9 08/23/2021 02:44 PM      Lab Results   Component Value Date/Time    Microalbumin/Creat ratio (mg/g creat) 381 (H) 02/14/2022 10:20 AM    Microalbumin,urine random 47.30 02/14/2022 10:20 AM       Lab Results   Component Value Date/Time    Creatinine 1.00 02/14/2022 10:20 AM      Hypertriglyceridemia Follow up:   Cardiovascular risks for her are: diabetic  hypertension  hyperlipidemia. Current Medications:  Key Antihyperlipidemia Meds             ezetimibe (ZETIA) 10 mg tablet (Taking) Take 1 Tablet by mouth nightly. TAKE 1 TABLET DAILY    fenofibrate nanocrystallized (TRICOR) 145 mg tablet (Taking) TAKE 1 TABLET DAILY    omega-3 fatty acids-vitamin e (FISH OIL) 1,000 mg cap (Taking) Take 2 Caps by mouth two (2) times a day. Compliance: YES   Myalgias: NO   Fatigue: NO   Other side effects: NO     Wt Readings from Last 3 Encounters:   06/14/22 189 lb (85.7 kg)   04/11/22 189 lb 9.6 oz (86 kg)   02/14/22 184 lb (83.5 kg)       Lab Results   Component Value Date/Time    Cholesterol, total 149 02/14/2022 10:20 AM    HDL Cholesterol 47 02/14/2022 10:20 AM    LDL, calculated 53.2 02/14/2022 10:20 AM    VLDL, calculated 48.8 02/14/2022 10:20 AM    Triglyceride 244 (H) 02/14/2022 10:20 AM    CHOL/HDL Ratio 3.2 02/14/2022 10:20 AM      Lab Results   Component Value Date/Time    ALT (SGPT) 28 02/14/2022 10:20 AM    AST (SGOT) 21 02/14/2022 10:20 AM    Alk.  phosphatase 65 02/14/2022 10:20 AM    Bilirubin, total 0.5 02/14/2022 10:20 AM           Health Maintenance  Health Maintenance Due   Topic Date Due    Shingrix Vaccine Age 50> (2 of 2) 01/12/2021    COVID-19 Vaccine (3 - Booster for Elfida Dinning series) 08/26/2021    DTaP/Tdap/Td series (2 - Td or Tdap) 12/14/2021       Past Medical, Family, and Social History:     Current Outpatient Medications on File Prior to Visit   Medication Sig Dispense Refill    cloNIDine HCL (CATAPRES) 0.1 mg tablet TAKE 1 TABLET DAILY AT NOON WITH LUNCH 90 Tablet 3    dulaglutide (Trulicity) 3 QK/9.2 mL pnij 3 mg by SubCUTAneous route every seven (7) days. Stop 1.5 mg 12 Each 3    esomeprazole (NEXIUM) 40 mg capsule TAKE 1 CAPSULE DAILY 90 Capsule 3    loratadine (CLARITIN) 10 mg tablet TAKE 1 TABLET DAILY 90 Tablet 3    ezetimibe (ZETIA) 10 mg tablet Take 1 Tablet by mouth nightly. TAKE 1 TABLET DAILY 90 Tablet 3    fenofibrate nanocrystallized (TRICOR) 145 mg tablet TAKE 1 TABLET DAILY 90 Tablet 1    aspirin delayed-release (Adult Low Dose Aspirin) 81 mg tablet Take 81 mg by mouth.  dilTIAZem ER (CARDIZEM CD) 180 mg capsule TAKE 1 CAPSULE DAILY 90 Capsule 3    losartan-hydroCHLOROthiazide (HYZAAR) 100-25 mg per tablet TAKE 1 TABLET DAILY 90 Tablet 3    Lactobacillus acidophilus (PROBIOTIC PO) Take 1 Tablet by mouth two (2) times a day.  glucose blood VI test strips (FreeStyle Lite Strips) strip TEST ONCE A DAY DX CODE: E11.65 100 Strip 3    lancets (FREESTYLE LANCETS) 28 gauge misc TEST ONCE DAILY DX: E11.65 100 Lancet 3    omega-3 fatty acids-vitamin e (FISH OIL) 1,000 mg cap Take 2 Caps by mouth two (2) times a day.  cholecalciferol, vitamin d3, (VITAMIN D) 1,000 unit tablet Take 2,000 Units by mouth nightly.  nystatin (MYCOSTATIN) 100,000 unit/mL suspension Take 2 mL by mouth four (4) times daily. swish and spit (Patient not taking: Reported on 4/11/2022) 473 mL 0    naproxen sodium (Aleve) 220 mg tablet Take 440 mg by mouth two (2) times daily (with meals). As needed (Patient not taking: Reported on 4/11/2022)      diclofenac (VOLTAREN) 1 % gel Apply 1 g to affected area four (4) times daily.  Apply to the right knee (Patient not taking: Reported on 2021) 150 g 1     No current facility-administered medications on file prior to visit. 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    Gastroesophageal reflux disease without esophagitis K21.9    Primary localized osteoarthritis of right knee M17.11       Social History     Socioeconomic History    Marital status:    Tobacco Use    Smoking status: Former Smoker     Packs/day: 1.00     Years: 20.00     Pack years: 20.00     Quit date:      Years since quittin.4    Smokeless tobacco: Never Used   Vaping Use    Vaping Use: Never used   Substance and Sexual Activity    Alcohol use: Yes     Comment: rarely - 1 glass of wine every 3 months    Drug use: No    Sexual activity: Yes        Review of Systems   Review of Systems   Constitutional: Negative for chills and fever. Respiratory: Negative for cough. Cardiovascular: Negative for chest pain and palpitations. Gastrointestinal: Negative for abdominal pain, nausea and vomiting. Neurological: Negative for dizziness and headaches. Objective:     Visit Vitals  /74   Pulse 72   Temp 97.4 °F (36.3 °C)   Resp 20   Ht 5' 1\" (1.549 m)   Wt 189 lb (85.7 kg)   SpO2 96%   BMI 35.71 kg/m²        Physical Exam  Vitals and nursing note reviewed. Constitutional:       Appearance: Normal appearance. HENT:      Head: Normocephalic and atraumatic. Cardiovascular:      Rate and Rhythm: Normal rate and regular rhythm. Pulses: Normal pulses. Heart sounds: Normal heart sounds.    Pulmonary:      Effort: Pulmonary effort is normal.      Breath sounds: Normal breath sounds. Abdominal:      General: Abdomen is flat. Bowel sounds are normal.      Palpations: Abdomen is soft. Musculoskeletal:         General: Normal range of motion. Cervical back: Normal range of motion and neck supple. Skin:     General: Skin is warm and dry. Neurological:      Mental Status: She is alert. Pertinent Labs/Studies:      Assessment and orders:       ICD-10-CM ICD-9-CM    1. Primary hypertension  I10 401.9 CBC W/O DIFF      METABOLIC PANEL, COMPREHENSIVE   2. Type 2 diabetes mellitus with hyperglycemia, without long-term current use of insulin ([facility])  E11.65 250.00 HEMOGLOBIN A1C WITH EAG     790.29    3. Gastroesophageal reflux disease without esophagitis  K21.9 530.81    4. Hypercholesterolemia  E78.00 272.0 LIPID PANEL     Diagnoses and all orders for this visit:    1. Primary hypertension: Our goal is to normalize the blood pressure to decrease the risks of strokes and heart attacks. The patient is in agreement with the plan. -     CBC W/O DIFF; Future  -     METABOLIC PANEL, COMPREHENSIVE; Future    2. Type 2 diabetes mellitus with hyperglycemia, without long-term current use of insulin [facility]): Discussed with patient today that the goal for their diabetes is to have a HgA1C<7 and ideally as close to 6.5 as possible. We discussed diet and medications. The goal for the cholesterol LDL is less than 70 and HDL>40. Patient is aware of the need for yearly eye exams and to take care of their feet daily. Discussed with patient that blood pressure should be less than 130/80 and watching salt intake is very important.    -     HEMOGLOBIN A1C WITH EAG; Future    3. Gastroesophageal reflux disease without esophagitis    4.  Hypercholesterolemia: The patient is aware of our goal to reduce or eliminate the long term problems (such as strokes and heart attacks) related to poorly controlled Triglycerides, LDL, Cholesterol.   -     LIPID PANEL; Future      Follow-up and Dispositions    · Return in about 6 months (around 12/14/2022) for 3 month for labs only. I have discussed the diagnosis with the patient and the intended plan as seen in the above orders. Social history, medical history, and labs were reviewed. The patient has received an after-visit summary and questions were answered concerning future plans. I have discussed medication side effects and warnings with the patient as well.     MD INA López & SONYA BUSCH [facility]  06/14/22

## 2022-06-14 NOTE — PROGRESS NOTES
1. Have you been to the ER, urgent care clinic since your last visit? Hospitalized since your last visit? No    2. Have you seen or consulted any other health care providers outside of the [address] [place] [address] since your last visit? Include any pap smears or colon screening. No  Reviewed record in preparation for visit and have necessary documentation  Pt did not bring medication to office visit for review  opportunity was given for questions      3. For patients aged 39-70: Has the patient had a colonoscopy / FIT/ Cologuard? NA - based on age      If the patient is female:    4. For patients aged 41-77: Has the patient had a mammogram within the past 2 years? NA - based on age or sex      11. For patients aged 21-65: Has the patient had a pap smear?  NA - based on age or sex      Goals that were addressed and/or need to be completed during or after this appointment include   Health Maintenance Due   Topic Date Due    Shingrix Vaccine Age 49> (2 of 2) 01/12/2021    COVID-19 Vaccine (3 - Booster for Moderna series) 08/26/2021    DTaP/Tdap/Td series (2 - Td or Tdap) 12/14/2021

## 2022-06-15 LAB
ALBUMIN SERPL-MCNC: 3.9 G/DL (ref 3.5–5)
ALBUMIN/GLOB SERPL: 1.1 {RATIO} (ref 1.1–2.2)
ALP SERPL-CCNC: 54 U/L (ref 45–117)
ALT SERPL-CCNC: 26 U/L (ref 12–78)
ANION GAP SERPL CALC-SCNC: 6 MMOL/L (ref 5–15)
AST SERPL-CCNC: 17 U/L (ref 15–37)
BILIRUB SERPL-MCNC: 0.5 MG/DL (ref 0.2–1)
BUN SERPL-MCNC: 20 MG/DL (ref 6–20)
BUN/CREAT SERPL: 20 (ref 12–20)
CALCIUM SERPL-MCNC: 10.1 MG/DL (ref 8.5–10.1)
CHLORIDE SERPL-SCNC: 102 MMOL/L (ref 97–108)
CHOLEST SERPL-MCNC: 127 MG/DL
CO2 SERPL-SCNC: 30 MMOL/L (ref 21–32)
CREAT SERPL-MCNC: 1.01 MG/DL (ref 0.55–1.02)
ERYTHROCYTE [DISTWIDTH] IN BLOOD BY AUTOMATED COUNT: 16.1 % (ref 11.5–14.5)
EST. AVERAGE GLUCOSE BLD GHB EST-MCNC: 174 MG/DL
GLOBULIN SER CALC-MCNC: 3.5 G/DL (ref 2–4)
GLUCOSE SERPL-MCNC: 136 MG/DL (ref 65–100)
HBA1C MFR BLD: 7.7 % (ref 4–5.6)
HCT VFR BLD AUTO: 44.5 % (ref 35–47)
HDLC SERPL-MCNC: 46 MG/DL
HDLC SERPL: 2.8 {RATIO} (ref 0–5)
HGB BLD-MCNC: 13.4 G/DL (ref 11.5–16)
LDLC SERPL CALC-MCNC: 43.6 MG/DL (ref 0–100)
MCH RBC QN AUTO: 26.2 PG (ref 26–34)
MCHC RBC AUTO-ENTMCNC: 30.1 G/DL (ref 30–36.5)
MCV RBC AUTO: 87.1 FL (ref 80–99)
NRBC # BLD: 0 K/UL (ref 0–0.01)
NRBC BLD-RTO: 0 PER 100 WBC
PLATELET # BLD AUTO: 327 K/UL (ref 150–400)
PMV BLD AUTO: 9.7 FL (ref 8.9–12.9)
POTASSIUM SERPL-SCNC: 3.8 MMOL/L (ref 3.5–5.1)
PROT SERPL-MCNC: 7.4 G/DL (ref 6.4–8.2)
RBC # BLD AUTO: 5.11 M/UL (ref 3.8–5.2)
SODIUM SERPL-SCNC: 138 MMOL/L (ref 136–145)
TRIGL SERPL-MCNC: 187 MG/DL (ref ?–150)
VLDLC SERPL CALC-MCNC: 37.4 MG/DL
WBC # BLD AUTO: 6.3 K/UL (ref 3.6–11)

## 2022-06-27 DIAGNOSIS — E78.00 HYPERCHOLESTEROLEMIA: ICD-10-CM

## 2022-06-27 RX ORDER — FENOFIBRATE 145 MG/1
TABLET, COATED ORAL
Qty: 90 TABLET | Refills: 3 | Status: SHIPPED | OUTPATIENT
Start: 2022-06-27

## 2022-07-13 DIAGNOSIS — I10 ESSENTIAL HYPERTENSION: Chronic | ICD-10-CM

## 2022-07-13 RX ORDER — LOSARTAN POTASSIUM AND HYDROCHLOROTHIAZIDE 25; 100 MG/1; MG/1
TABLET ORAL
Qty: 90 TABLET | Refills: 3 | Status: SHIPPED | OUTPATIENT
Start: 2022-07-13

## 2022-09-02 ENCOUNTER — TELEPHONE (OUTPATIENT)
Dept: FAMILY MEDICINE CLINIC | Age: 82
End: 2022-09-02

## 2022-09-02 DIAGNOSIS — E11.65 TYPE 2 DIABETES MELLITUS WITH HYPERGLYCEMIA, WITHOUT LONG-TERM CURRENT USE OF INSULIN (HCC): ICD-10-CM

## 2022-09-02 DIAGNOSIS — E78.00 HYPERCHOLESTEROLEMIA: ICD-10-CM

## 2022-09-02 DIAGNOSIS — I10 PRIMARY HYPERTENSION: Primary | ICD-10-CM

## 2022-09-02 NOTE — TELEPHONE ENCOUNTER
Patient called re request for labs to be obtained, question regarding labs requested. She stated, \"anything that has to do with diabetes. \"

## 2022-09-07 LAB
ALBUMIN SERPL-MCNC: 4 G/DL (ref 3.5–5)
ALBUMIN/GLOB SERPL: 1.1 {RATIO} (ref 1.1–2.2)
ALP SERPL-CCNC: 58 U/L (ref 45–117)
ALT SERPL-CCNC: 37 U/L (ref 12–78)
ANION GAP SERPL CALC-SCNC: 8 MMOL/L (ref 5–15)
AST SERPL-CCNC: 31 U/L (ref 15–37)
BILIRUB SERPL-MCNC: 0.6 MG/DL (ref 0.2–1)
BUN SERPL-MCNC: 24 MG/DL (ref 6–20)
BUN/CREAT SERPL: 21 (ref 12–20)
CALCIUM SERPL-MCNC: 9.7 MG/DL (ref 8.5–10.1)
CHLORIDE SERPL-SCNC: 101 MMOL/L (ref 97–108)
CHOLEST SERPL-MCNC: 141 MG/DL
CO2 SERPL-SCNC: 29 MMOL/L (ref 21–32)
CREAT SERPL-MCNC: 1.14 MG/DL (ref 0.55–1.02)
CREAT UR-MCNC: 282 MG/DL
ERYTHROCYTE [DISTWIDTH] IN BLOOD BY AUTOMATED COUNT: 14.6 % (ref 11.5–14.5)
EST. AVERAGE GLUCOSE BLD GHB EST-MCNC: 177 MG/DL
GLOBULIN SER CALC-MCNC: 3.6 G/DL (ref 2–4)
GLUCOSE SERPL-MCNC: 170 MG/DL (ref 65–100)
HBA1C MFR BLD: 7.8 % (ref 4–5.6)
HCT VFR BLD AUTO: 43.7 % (ref 35–47)
HDLC SERPL-MCNC: 43 MG/DL
HDLC SERPL: 3.3 {RATIO} (ref 0–5)
HGB BLD-MCNC: 14.1 G/DL (ref 11.5–16)
LDLC SERPL CALC-MCNC: 52.2 MG/DL (ref 0–100)
MCH RBC QN AUTO: 27.1 PG (ref 26–34)
MCHC RBC AUTO-ENTMCNC: 32.3 G/DL (ref 30–36.5)
MCV RBC AUTO: 84 FL (ref 80–99)
MICROALBUMIN UR-MCNC: 54.3 MG/DL
MICROALBUMIN/CREAT UR-RTO: 193 MG/G (ref 0–30)
NRBC # BLD: 0 K/UL (ref 0–0.01)
NRBC BLD-RTO: 0 PER 100 WBC
PLATELET # BLD AUTO: 327 K/UL (ref 150–400)
PMV BLD AUTO: 10.3 FL (ref 8.9–12.9)
POTASSIUM SERPL-SCNC: 4.1 MMOL/L (ref 3.5–5.1)
PROT SERPL-MCNC: 7.6 G/DL (ref 6.4–8.2)
RBC # BLD AUTO: 5.2 M/UL (ref 3.8–5.2)
SODIUM SERPL-SCNC: 138 MMOL/L (ref 136–145)
TRIGL SERPL-MCNC: 229 MG/DL (ref ?–150)
VLDLC SERPL CALC-MCNC: 45.8 MG/DL
WBC # BLD AUTO: 5.9 K/UL (ref 3.6–11)

## 2022-09-13 ENCOUNTER — OFFICE VISIT (OUTPATIENT)
Dept: ENDOCRINOLOGY | Age: 82
End: 2022-09-13
Payer: MEDICARE

## 2022-09-13 VITALS
TEMPERATURE: 98 F | HEIGHT: 61 IN | RESPIRATION RATE: 20 BRPM | BODY MASS INDEX: 35.68 KG/M2 | SYSTOLIC BLOOD PRESSURE: 150 MMHG | DIASTOLIC BLOOD PRESSURE: 75 MMHG | OXYGEN SATURATION: 92 % | WEIGHT: 189 LBS | HEART RATE: 72 BPM

## 2022-09-13 DIAGNOSIS — E11.65 TYPE 2 DIABETES MELLITUS WITH HYPERGLYCEMIA, UNSPECIFIED WHETHER LONG TERM INSULIN USE (HCC): Primary | ICD-10-CM

## 2022-09-13 DIAGNOSIS — I10 ESSENTIAL HYPERTENSION: ICD-10-CM

## 2022-09-13 DIAGNOSIS — E78.2 MIXED HYPERLIPIDEMIA: ICD-10-CM

## 2022-09-13 PROCEDURE — G8753 SYS BP > OR = 140: HCPCS | Performed by: INTERNAL MEDICINE

## 2022-09-13 PROCEDURE — 1090F PRES/ABSN URINE INCON ASSESS: CPT | Performed by: INTERNAL MEDICINE

## 2022-09-13 PROCEDURE — G8432 DEP SCR NOT DOC, RNG: HCPCS | Performed by: INTERNAL MEDICINE

## 2022-09-13 PROCEDURE — G8754 DIAS BP LESS 90: HCPCS | Performed by: INTERNAL MEDICINE

## 2022-09-13 PROCEDURE — G8427 DOCREV CUR MEDS BY ELIG CLIN: HCPCS | Performed by: INTERNAL MEDICINE

## 2022-09-13 PROCEDURE — G8400 PT W/DXA NO RESULTS DOC: HCPCS | Performed by: INTERNAL MEDICINE

## 2022-09-13 PROCEDURE — 1101F PT FALLS ASSESS-DOCD LE1/YR: CPT | Performed by: INTERNAL MEDICINE

## 2022-09-13 PROCEDURE — G8417 CALC BMI ABV UP PARAM F/U: HCPCS | Performed by: INTERNAL MEDICINE

## 2022-09-13 PROCEDURE — G8536 NO DOC ELDER MAL SCRN: HCPCS | Performed by: INTERNAL MEDICINE

## 2022-09-13 PROCEDURE — 3051F HG A1C>EQUAL 7.0%<8.0%: CPT | Performed by: INTERNAL MEDICINE

## 2022-09-13 PROCEDURE — 99214 OFFICE O/P EST MOD 30 MIN: CPT | Performed by: INTERNAL MEDICINE

## 2022-09-13 PROCEDURE — 1123F ACP DISCUSS/DSCN MKR DOCD: CPT | Performed by: INTERNAL MEDICINE

## 2022-09-13 RX ORDER — PIOGLITAZONEHYDROCHLORIDE 30 MG/1
30 TABLET ORAL DAILY
Qty: 90 TABLET | Refills: 3 | Status: SHIPPED | OUTPATIENT
Start: 2022-09-13

## 2022-09-13 NOTE — PROGRESS NOTES
Vivi Hull is a [de-identified] y.o. female here for   Chief Complaint   Patient presents with    Diabetes       1. Have you been to the ER, urgent care clinic since your last visit? Hospitalized since your last visit? -no    2. Have you seen or consulted any other health care providers outside of the [address] [place] [address] since your last visit?   Include any pap smears or colon screening.-eye doc

## 2022-09-13 NOTE — PROGRESS NOTES
Maki Paige MD        Patient Information  Date:9/13/2022  Name : Rosi Srinivasan [de-identified] y.o.     YOB: 1940         Referred by: Kwasi Silva MD         Chief Complaint   Patient presents with    Diabetes       History of Present Illness: Rosi Srinivasan is a [de-identified] y.o. female here for fu of  Type 2 Diabetes Mellitus. Type 2 Diabetes was diagnosed in 10/2014 . End organ effects of diabetes: none. Cardiovascular risk factors: family history, dyslipidemia, diabetes mellitus   Monitoring frequency: 3 times a week. Blood glucose fasting 130-170, rest of the daytime blood glucose are better  Had knee surgery recently  Lost her  in 2020, she goes to aerobic classes  Added Actos October 2019, decreased to 15 mg, discontinued December 2020  since discontinuing Actos, blood glucose have been little bit higher    Could not tolerate Metformin IR, XR formualtion.         Hyperlipidemia - was on tricor, zetia, colestipol, Niacin,    Wt Readings from Last 3 Encounters:   09/13/22 189 lb (85.7 kg)   06/14/22 189 lb (85.7 kg)   04/11/22 189 lb 9.6 oz (86 kg)       BP Readings from Last 3 Encounters:   09/13/22 (!) 150/75   06/14/22 137/74   04/11/22 (!) 166/92           Past Medical History:   Diagnosis Date    Arthritis 2/28/2011    OSTEO    Chronic pain     RIGHT KNEE    Diabetes ([facility] [address]     GERD (gastroesophageal reflux disease)     Hot flashes, menopausal     Hypercholesterolemia     Hypertension     Ill-defined condition     Elevated liver enzymes (has family history)    Nausea & vomiting     Seizures (HCC) 1980'S    AFTER DRINKING 2 MARGARITAS    Vitamin D deficiency 6/28/2010     Current Outpatient Medications   Medication Sig    dilTIAZem ER (CARDIZEM CD) 180 mg capsule TAKE 1 CAPSULE DAILY    losartan-hydroCHLOROthiazide (HYZAAR) 100-25 mg per tablet TAKE 1 TABLET DAILY    fenofibrate nanocrystallized (TRICOR) 145 mg tablet TAKE 1 TABLET DAILY    cloNIDine HCL (CATAPRES) 0.1 mg tablet TAKE 1 TABLET DAILY AT NOON WITH LUNCH    dulaglutide (Trulicity) 3 CX/6.1 mL pnij 3 mg by SubCUTAneous route every seven (7) days. Stop 1.5 mg    esomeprazole (NEXIUM) 40 mg capsule TAKE 1 CAPSULE DAILY    loratadine (CLARITIN) 10 mg tablet TAKE 1 TABLET DAILY    ezetimibe (ZETIA) 10 mg tablet Take 1 Tablet by mouth nightly. TAKE 1 TABLET DAILY    aspirin delayed-release 81 mg tablet Take 81 mg by mouth. Lactobacillus acidophilus (PROBIOTIC PO) Take 1 Tablet by mouth two (2) times a day. glucose blood VI test strips (FreeStyle Lite Strips) strip TEST ONCE A DAY DX CODE: E11.65    lancets (FREESTYLE LANCETS) 28 gauge misc TEST ONCE DAILY DX: E11.65    omega-3 fatty acids-vitamin e 1,000 mg cap Take 2 Caps by mouth two (2) times a day. cholecalciferol (VITAMIN D3) (1000 Units /25 mcg) tablet Take 2,000 Units by mouth nightly. pioglitazone (ACTOS) 30 mg tablet Take 1 Tablet by mouth daily. nystatin (MYCOSTATIN) 100,000 unit/mL suspension Take 2 mL by mouth four (4) times daily. swish and spit (Patient not taking: No sig reported)    naproxen sodium (NAPROSYN) 220 mg tablet Take 440 mg by mouth two (2) times daily (with meals). As needed (Patient not taking: No sig reported)    diclofenac (VOLTAREN) 1 % gel Apply 1 g to affected area four (4) times daily. Apply to the right knee (Patient not taking: No sig reported)     No current facility-administered medications for this visit. Allergies   Allergen Reactions    Other Food Nausea and Vomiting     Artificial sweeteners - gas and beldging    Ciprofloxacin Nausea Only    Erythromycin Nausea Only    Metformin Other (comments) and Nausea Only     Felt bad  Felt bad      Pcn [Penicillins] Swelling     Caused lips to swell  Tolerates Amoxicillin         Review of Systems:        Physical Examination:   Blood pressure (!) 150/75, pulse 72, temperature 98 °F (36.7 °C), temperature source Temporal, resp.  rate 20, height 5' 1\" (1.549 m), weight 189 lb (85.7 kg), SpO2 92 %. Estimated body mass index is 35.71 kg/m² as calculated from the following:    Height as of this encounter: 5' 1\" (1.549 m). Weight as of this encounter: 189 lb (85.7 kg). General: pleasant, no distress, good eye contact  HEENT: no pallor, no periorbital edema, EOMI  Neck: supple,  Cardiovascular: regular, normal rate, normal S1 and S2  Respiratory: clear to auscultation bilaterally    Musculoskeletal: no proximal muscle weakness in upper or lower extremities  Integumentary: alert and oriented ,  Psychiatric: normal mood and affect  Skin: color, texture, turgor normal.     Diabetic foot exam ; December 2021    H/o partial or complete amputation of foot : No  H/o previous foot ulceration : No  H/o pre - ulcerative callus : No  H/o peripheral neuropathy and callus : No  H/o poor circulation  : No  Foot deformity : None      Data Reviewed:             Lab Results   Component Value Date/Time    Hemoglobin A1c 7.8 (H) 09/06/2022 09:25 AM    Hemoglobin A1c 7.7 (H) 06/14/2022 10:38 AM    Hemoglobin A1c 7.4 (H) 02/14/2022 10:20 AM    Glucose 170 (H) 09/06/2022 09:25 AM    Glucose (POC) 175 (H) 10/08/2021 01:42 PM    Microalbumin/Creat ratio (mg/g creat) 193 (H) 09/06/2022 09:25 AM    Microalbumin,urine random 54.30 09/06/2022 09:25 AM    LDL, calculated 52.2 09/06/2022 09:25 AM    Creatinine 1.14 (H) 09/06/2022 09:25 AM      Lab Results   Component Value Date/Time    Cholesterol, total 141 09/06/2022 09:25 AM    HDL Cholesterol 43 09/06/2022 09:25 AM    LDL, calculated 52.2 09/06/2022 09:25 AM    Triglyceride 229 (H) 09/06/2022 09:25 AM    CHOL/HDL Ratio 3.3 09/06/2022 09:25 AM     Lab Results   Component Value Date/Time    ALT (SGPT) 37 09/06/2022 09:25 AM    Alk.  phosphatase 58 09/06/2022 09:25 AM    Bilirubin, total 0.6 09/06/2022 09:25 AM     Lab Results   Component Value Date/Time    TSH 1.220 01/29/2018 02:47 PM    T4, Free 1.55 09/09/2015 02:16 PM Lab Results   Component Value Date/Time    Glucose 170 (H) 09/06/2022 09:25 AM    Glucose (POC) 175 (H) 10/08/2021 01:42 PM        Assessment/Plan:     1. Type 2 diabetes mellitus with hyperglycemia, unspecified whether long term insulin use ([facility]    2. Mixed hyperlipidemia    3. Essential hypertension          1. Type 2 Diabetes Mellitus   Lab Results   Component Value Date/Time    Hemoglobin A1c 7.8 (H) 09/06/2022 09:25 AM    Hemoglobin A1c (POC) 6.9 08/23/2021 02:44 PM   A1c is gradually increasing  Eye exam November 2021   Actos 15 mg: Discontinued December 2020, resume Actos  Trulicity increase the dose if she tolerates 3 mg  FLU annually ,Pneumovax ,aspirin daily,annual eye exam,microalbumin    2. HTN : Continue current therapy     3. Mixed Hyperlipidemia : low carb diet. Statin intolerance hx,   Continue Zetia, Tricor    4. Obesity:Body mass index is 35.71 kg/m². Discussed about the importance of exercise and carbohydrate portion control. 5.  GI symptoms: work up was negative  It was due to artificial sweetners according to the patient  Follow-up and Dispositions    Return in about 5 months (around 2/13/2023). Thank you for allowing me to participate in the care of this patient.     Rita Forebs MD    Patient verbalized understanding

## 2022-09-13 NOTE — LETTER
9/13/2022    Patient: Cornelio Jean   YOB: 1940   Date of Visit: 9/13/2022     Aruna Meehan MD  [address] [address]  [place]    Dear Aruna Meehan MD,      Thank you for referring Ms. Tariq Street to [id number] [place] [address] for evaluation. My notes for this consultation are attached. If you have questions, please do not hesitate to call me. I look forward to following your patient along with you.       Sincerely,    Ramon Baca MD

## 2022-12-12 ENCOUNTER — OFFICE VISIT (OUTPATIENT)
Dept: FAMILY MEDICINE CLINIC | Age: 82
End: 2022-12-12
Payer: MEDICARE

## 2022-12-12 VITALS
HEART RATE: 69 BPM | BODY MASS INDEX: 36.02 KG/M2 | SYSTOLIC BLOOD PRESSURE: 171 MMHG | HEIGHT: 61 IN | DIASTOLIC BLOOD PRESSURE: 75 MMHG | WEIGHT: 190.8 LBS | RESPIRATION RATE: 16 BRPM | TEMPERATURE: 97.4 F | OXYGEN SATURATION: 97 %

## 2022-12-12 DIAGNOSIS — R07.81 RIB PAIN ON LEFT SIDE: Primary | ICD-10-CM

## 2022-12-12 DIAGNOSIS — I10 PRIMARY HYPERTENSION: Chronic | ICD-10-CM

## 2022-12-12 DIAGNOSIS — E78.00 HYPERCHOLESTEROLEMIA: Chronic | ICD-10-CM

## 2022-12-12 DIAGNOSIS — E11.65 TYPE 2 DIABETES MELLITUS WITH HYPERGLYCEMIA, WITHOUT LONG-TERM CURRENT USE OF INSULIN (HCC): Chronic | ICD-10-CM

## 2022-12-12 DIAGNOSIS — K21.9 GASTROESOPHAGEAL REFLUX DISEASE WITHOUT ESOPHAGITIS: Chronic | ICD-10-CM

## 2022-12-12 PROCEDURE — G8753 SYS BP > OR = 140: HCPCS | Performed by: FAMILY MEDICINE

## 2022-12-12 PROCEDURE — 1101F PT FALLS ASSESS-DOCD LE1/YR: CPT | Performed by: FAMILY MEDICINE

## 2022-12-12 PROCEDURE — G8510 SCR DEP NEG, NO PLAN REQD: HCPCS | Performed by: FAMILY MEDICINE

## 2022-12-12 PROCEDURE — 1090F PRES/ABSN URINE INCON ASSESS: CPT | Performed by: FAMILY MEDICINE

## 2022-12-12 PROCEDURE — G8754 DIAS BP LESS 90: HCPCS | Performed by: FAMILY MEDICINE

## 2022-12-12 PROCEDURE — G8536 NO DOC ELDER MAL SCRN: HCPCS | Performed by: FAMILY MEDICINE

## 2022-12-12 PROCEDURE — G8400 PT W/DXA NO RESULTS DOC: HCPCS | Performed by: FAMILY MEDICINE

## 2022-12-12 PROCEDURE — 3078F DIAST BP <80 MM HG: CPT | Performed by: FAMILY MEDICINE

## 2022-12-12 PROCEDURE — 99214 OFFICE O/P EST MOD 30 MIN: CPT | Performed by: FAMILY MEDICINE

## 2022-12-12 PROCEDURE — 1123F ACP DISCUSS/DSCN MKR DOCD: CPT | Performed by: FAMILY MEDICINE

## 2022-12-12 PROCEDURE — G8417 CALC BMI ABV UP PARAM F/U: HCPCS | Performed by: FAMILY MEDICINE

## 2022-12-12 PROCEDURE — G8427 DOCREV CUR MEDS BY ELIG CLIN: HCPCS | Performed by: FAMILY MEDICINE

## 2022-12-12 PROCEDURE — 3051F HG A1C>EQUAL 7.0%<8.0%: CPT | Performed by: FAMILY MEDICINE

## 2022-12-12 PROCEDURE — 3074F SYST BP LT 130 MM HG: CPT | Performed by: FAMILY MEDICINE

## 2022-12-12 NOTE — PROGRESS NOTES
[facility]    History of Present Illness:   Any Alanis is a [de-identified] y.o. female with history of HTN, GERD, Type 2 diabetes, Allergies, Osteoarthritis, HLD,   CC: Follow up, Left rib Pain  History provided by patient and Records    HPI:  Left Rib Pain: Patient noting to have left rib wall pain after a fall 1 week ago. Noting bruising on the knee without pain now, but having pain in the left chest wall with beating nad activity. No bruising on the chest wall. Aleve helps the pain. Diabetes Follow up: Overall the patient feels well with good energy level. Current Medications:   Key Antihyperglycemic Medications               pioglitazone (ACTOS) 30 mg tablet (Taking) Take 1 Tablet by mouth daily. dulaglutide (Trulicity) 3 DP/2.6 mL pnij (Taking) 3 mg by SubCUTAneous route every seven (7) days. Stop 1.5 mg        . Insulin dependence: NO   Frequency of home glucose testing: prn   Blood Sugar range at home: <200    Last eye exam: In past 12 months. Last foot exam: This year.    Polyuria, polyphagia or polydipsia: NO   Retinopathy: NO   Neuropathy SX: NO   Low blood sugar symptoms: NO   Dietary compliance: compliant most of the time   Medication compliance: compliant most of the time   On ASA: NO   Tobacco Use: NO   Depression: NO     Wt Readings from Last 3 Encounters:   12/12/22 190 lb 12.8 oz (86.5 kg)   09/13/22 189 lb (85.7 kg)   06/14/22 189 lb (85.7 kg)      Lab Results   Component Value Date/Time    Hemoglobin A1c 7.8 (H) 09/06/2022 09:25 AM    Hemoglobin A1c (POC) 6.9 08/23/2021 02:44 PM      Lab Results   Component Value Date/Time    Microalbumin/Creat ratio (mg/g creat) 193 (H) 09/06/2022 09:25 AM    Microalbumin,urine random 54.30 09/06/2022 09:25 AM       Lab Results   Component Value Date/Time    Creatinine 1.14 (H) 09/06/2022 09:25 AM      Hypertension Follow up:  Currently Taking:   Key CAD CHF Meds               fish oil-omega-3 fatty acids 340-1,000 mg capsule (Taking) Take 1 Capsule by mouth daily. dilTIAZem ER (CARDIZEM CD) 180 mg capsule (Taking) TAKE 1 CAPSULE DAILY    losartan-hydroCHLOROthiazide (HYZAAR) 100-25 mg per tablet (Taking) TAKE 1 TABLET DAILY    fenofibrate nanocrystallized (TRICOR) 145 mg tablet (Taking) TAKE 1 TABLET DAILY    cloNIDine HCL (CATAPRES) 0.1 mg tablet (Taking) TAKE 1 TABLET DAILY AT NOON WITH LUNCH    ezetimibe (ZETIA) 10 mg tablet (Taking) Take 1 Tablet by mouth nightly. TAKE 1 TABLET DAILY    aspirin delayed-release 81 mg tablet (Taking) Take 81 mg by mouth. omega-3 fatty acids-vitamin e 1,000 mg cap (Taking) Take 2 Caps by mouth two (2) times a day. The patient reports:  taking medications as instructed, no medication side effects noted, no TIA's, no chest pain on exertion, no dyspnea on exertion, no swelling of ankles, no orthostatic dizziness or lightheadedness, no orthopnea or paroxysmal nocturnal dyspnea. BP Readings from Last 3 Encounters:   12/12/22 (!) 171/75   09/13/22 (!) 150/75   06/14/22 137/74      Hypertriglyceridemia Follow up:   Cardiovascular risks for her are: diabetic  hypertension  hyperlipidemia. Current Medications:  Key Antihyperlipidemia Meds               fish oil-omega-3 fatty acids 340-1,000 mg capsule (Taking) Take 1 Capsule by mouth daily. fenofibrate nanocrystallized (TRICOR) 145 mg tablet (Taking) TAKE 1 TABLET DAILY    ezetimibe (ZETIA) 10 mg tablet (Taking) Take 1 Tablet by mouth nightly. TAKE 1 TABLET DAILY    omega-3 fatty acids-vitamin e 1,000 mg cap (Taking) Take 2 Caps by mouth two (2) times a day.             Compliance: YES   Myalgias: NO   Fatigue: NO   Other side effects: NO     Wt Readings from Last 3 Encounters:   12/12/22 190 lb 12.8 oz (86.5 kg)   09/13/22 189 lb (85.7 kg)   06/14/22 189 lb (85.7 kg)     Lab Results   Component Value Date/Time    Cholesterol, total 141 09/06/2022 09:25 AM    HDL Cholesterol 43 09/06/2022 09:25 AM    LDL, calculated 52.2 09/06/2022 09:25 AM    VLDL, calculated 45.8 09/06/2022 09:25 AM    Triglyceride 229 (H) 09/06/2022 09:25 AM    CHOL/HDL Ratio 3.3 09/06/2022 09:25 AM      Lab Results   Component Value Date/Time    ALT (SGPT) 37 09/06/2022 09:25 AM    AST (SGOT) 31 09/06/2022 09:25 AM    Alk. phosphatase 58 09/06/2022 09:25 AM    Bilirubin, total 0.6 09/06/2022 09:25 AM      GERD: Controlled at this time     Health Maintenance  Health Maintenance Due   Topic Date Due    Shingrix Vaccine Age 49> (2 of 2) 01/12/2021    COVID-19 Vaccine (3 - Booster for Moderna series) 05/21/2021    DTaP/Tdap/Td series (2 - Td or Tdap) 12/14/2021    Flu Vaccine (1) 08/01/2022    Foot Exam Q1  12/07/2022    Eye Exam Retinal or Dilated  11/19/2022    Medicare Yearly Exam  12/14/2022       Past Medical, Family, and Social History:     Current Outpatient Medications on File Prior to Visit   Medication Sig Dispense Refill    fish oil-omega-3 fatty acids 340-1,000 mg capsule Take 1 Capsule by mouth daily. pioglitazone (ACTOS) 30 mg tablet Take 1 Tablet by mouth daily. 90 Tablet 3    dilTIAZem ER (CARDIZEM CD) 180 mg capsule TAKE 1 CAPSULE DAILY 90 Capsule 1    losartan-hydroCHLOROthiazide (HYZAAR) 100-25 mg per tablet TAKE 1 TABLET DAILY 90 Tablet 3    fenofibrate nanocrystallized (TRICOR) 145 mg tablet TAKE 1 TABLET DAILY 90 Tablet 3    cloNIDine HCL (CATAPRES) 0.1 mg tablet TAKE 1 TABLET DAILY AT NOON WITH LUNCH 90 Tablet 3    dulaglutide (Trulicity) 3 PI/9.8 mL pnij 3 mg by SubCUTAneous route every seven (7) days. Stop 1.5 mg 12 Each 3    esomeprazole (NEXIUM) 40 mg capsule TAKE 1 CAPSULE DAILY 90 Capsule 3    loratadine (CLARITIN) 10 mg tablet TAKE 1 TABLET DAILY 90 Tablet 3    ezetimibe (ZETIA) 10 mg tablet Take 1 Tablet by mouth nightly. TAKE 1 TABLET DAILY 90 Tablet 3    aspirin delayed-release 81 mg tablet Take 81 mg by mouth. Lactobacillus acidophilus (PROBIOTIC PO) Take 1 Tablet by mouth two (2) times a day.       glucose blood VI test strips (FreeStyle Lite Strips) strip TEST ONCE A DAY DX CODE: E11.65 100 Strip 3    lancets (FREESTYLE LANCETS) 28 gauge misc TEST ONCE DAILY DX: E11.65 100 Lancet 3    omega-3 fatty acids-vitamin e 1,000 mg cap Take 2 Caps by mouth two (2) times a day. cholecalciferol (VITAMIN D3) (1000 Units /25 mcg) tablet Take 2,000 Units by mouth nightly. nystatin (MYCOSTATIN) 100,000 unit/mL suspension Take 2 mL by mouth four (4) times daily. swish and spit (Patient not taking: No sig reported) 473 mL 0    naproxen sodium (NAPROSYN) 220 mg tablet Take 440 mg by mouth two (2) times daily (with meals). As needed (Patient not taking: No sig reported)      diclofenac (VOLTAREN) 1 % gel Apply 1 g to affected area four (4) times daily. Apply to the right knee (Patient not taking: No sig reported) 150 g 1     No current facility-administered medications on file prior to visit.        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    Gastroesophageal reflux disease without esophagitis K21.9    Primary localized osteoarthritis of right knee M17.11       Social History     Socioeconomic History    Marital status:    Tobacco Use    Smoking status: Former     Packs/day: 1.00     Years: 20.00     Pack years: 20.00     Types: Cigarettes     Quit date: 80     Years since quittin.9    Smokeless tobacco: Never   Vaping Use    Vaping Use: Never used   Substance and Sexual Activity    Alcohol use: Yes     Comment: rarely - 1 glass of wine every 3 months    Drug use: No    Sexual activity: Yes        Review of Systems   Review of Systems   Constitutional: Negative for chills and fever. Cardiovascular:  Negative for chest pain, palpitations and orthopnea. Gastrointestinal:  Negative for abdominal pain, nausea and vomiting. Neurological:  Negative for dizziness and headaches. Objective:   Visit Vitals  BP (!) 171/75 (BP 1 Location: Right arm, BP Patient Position: Sitting, BP Cuff Size: Large adult)   Pulse 69   Temp 97.4 °F (36.3 °C) (Oral)   Resp 16   Ht 5' 1\" (1.549 m)   Wt 190 lb 12.8 oz (86.5 kg)   SpO2 97%   BMI 36.05 kg/m²        Physical Exam  Vitals and nursing note reviewed. Constitutional:       Appearance: Normal appearance. HENT:      Head: Normocephalic and atraumatic. Cardiovascular:      Rate and Rhythm: Normal rate and regular rhythm. Pulses: Normal pulses. Heart sounds: Normal heart sounds. No murmur heard. No friction rub. No gallop. Pulmonary:      Effort: Pulmonary effort is normal.      Breath sounds: Normal breath sounds. Abdominal:      General: Abdomen is flat. Bowel sounds are normal.      Palpations: Abdomen is soft. Musculoskeletal:      Cervical back: Normal range of motion and neck supple. Comments: Mild tenderness on the left lower chest wall/ribs. Skin:     General: Skin is warm and dry. Neurological:      Mental Status: She is alert. Pertinent Labs/Studies:      Assessment and orders:       ICD-10-CM ICD-9-CM    1. Rib pain on left side  R07.81 786.50 XR RIBS LT UNI 2 V      CANCELED: XR RIBS BI 3 V      2. Primary hypertension  I10 401.9 CBC W/O DIFF      METABOLIC PANEL, COMPREHENSIVE      3. Gastroesophageal reflux disease without esophagitis  K21.9 530.81       4. Type 2 diabetes mellitus with hyperglycemia, without long-term current use of insulin ([facility])  E11.65 250.00 HEMOGLOBIN A1C WITH EAG     790.29       5. Hypercholesterolemia  E78.00 272.0 LIPID PANEL        Diagnoses and all orders for this visit:    1. Rib pain on left side: No obvious fracture, likely contusion.   -     XR RIBS LT UNI 2 V; Future    2. Primary hypertension: Our goal is to normalize the blood pressure to decrease the risks of strokes and heart attacks. The patient is in agreement with the plan. -     CBC W/O DIFF; Future  -     METABOLIC PANEL, COMPREHENSIVE; Future    3. Gastroesophageal reflux disease without esophagitis    4. Type 2 diabetes mellitus with hyperglycemia, without long-term current use of insulin [facility]): Discussed with patient today that the goal for their diabetes is to have a HgA1C<7 and ideally as close to 6.5 as possible. We discussed diet and medications. The goal for the cholesterol LDL is less than 70 and HDL>40. Patient is aware of the need for yearly eye exams and to take care of their feet daily. Discussed with patient that blood pressure should be less than 130/80 and watching salt intake is very important.    -     HEMOGLOBIN A1C WITH EAG; Future    5. Hypercholesterolemia: The patient is aware of our goal to reduce or eliminate the long term problems (such as strokes and heart attacks) related to poorly controlled Triglycerides, LDL, Cholesterol.   -     LIPID PANEL; Future    Follow-up and Dispositions    Return in about 6 months (around 6/12/2023). I have discussed the diagnosis with the patient and the intended plan as seen in the above orders. Social history, medical history, and labs were reviewed. The patient has received an after-visit summary and questions were answered concerning future plans. I have discussed medication side effects and warnings with the patient as well.     MD INA Ryan & SONYA BUSCH [facility]  12/12/22

## 2022-12-12 NOTE — PROGRESS NOTES
1. Have you been to the ER, urgent care clinic since your last visit? Hospitalized since your last visit? No    2. Have you seen or consulted any other health care providers outside of the [address] [place] [address] since your last visit? Including any pap smears or colon screening.  No      Health Maintenance Due   Topic Date Due    Shingrix Vaccine Age 49> (2 of 2) 01/12/2021    COVID-19 Vaccine (3 - Booster for Moderna series) 05/21/2021    DTaP/Tdap/Td series (2 - Td or Tdap) 12/14/2021    Flu Vaccine (1) 08/01/2022    Foot Exam Q1  12/07/2022    Eye Exam Retinal or Dilated  11/19/2022    Medicare Yearly Exam  12/14/2022

## 2022-12-12 NOTE — PATIENT INSTRUCTIONS
Your doctor has recommended that you have an eye exam done. You can contact one of the below offices to schedule your own appointment. Once you have the visit, please ask their office to send us a copy for your record here. Fransisco Ty                     [phone]  Dr. Angelic Ruiz                          [phone]  Dr. Flakito Tesfaye                           [phone]  [place]  [address] [place] [address]             [phone]

## 2023-01-05 ENCOUNTER — TELEPHONE (OUTPATIENT)
Dept: ENDOCRINOLOGY | Age: 83
End: 2023-01-05

## 2023-01-05 NOTE — TELEPHONE ENCOUNTER
Attempted to call. Unsuccessful. Left msg for Rosi Srinivasan to give us a call back at the office. A callback number was left.

## 2023-01-09 NOTE — TELEPHONE ENCOUNTER
Called and spoke with pt and made her aware unfortunately this is a medication that is on back order right now. I advised her she can go 3 weeks without it but if it will be longer she needs to let us know so provider can send in alternative medication . Pt will call us back in about 2 weeks if she still cannot fill this RX . She would like to know if she should keep taking 1/2 tab of her actos or do take 1 full tab she said her sugars have been good .

## 2023-01-09 NOTE — TELEPHONE ENCOUNTER
Pt verbalized understanding with no further questions or concerns at this time.   Updated dosage in med list .

## 2023-01-31 NOTE — PERIOP NOTES
Friend updated by PEREZ Borrego RN--informed of surgery start time and patient's well-being. Detail Level: Zone

## 2023-02-08 DIAGNOSIS — I10 PRIMARY HYPERTENSION: Primary | ICD-10-CM

## 2023-02-09 RX ORDER — DILTIAZEM HYDROCHLORIDE 180 MG/1
CAPSULE, COATED, EXTENDED RELEASE ORAL
Qty: 90 CAPSULE | Refills: 0 | Status: SHIPPED | OUTPATIENT
Start: 2023-02-09

## 2023-02-22 RX ORDER — LORATADINE 10 MG/1
TABLET ORAL
Qty: 90 TABLET | Refills: 3 | Status: SHIPPED | OUTPATIENT
Start: 2023-02-22

## 2023-02-26 RX ORDER — EZETIMIBE 10 MG/1
TABLET ORAL
Qty: 90 TABLET | Refills: 3 | Status: SHIPPED | OUTPATIENT
Start: 2023-02-26

## 2023-03-15 DIAGNOSIS — K21.9 GASTROESOPHAGEAL REFLUX DISEASE WITHOUT ESOPHAGITIS: ICD-10-CM

## 2023-03-16 RX ORDER — ESOMEPRAZOLE MAGNESIUM 40 MG/1
CAPSULE, DELAYED RELEASE ORAL
Qty: 90 CAPSULE | Refills: 3 | Status: SHIPPED | OUTPATIENT
Start: 2023-03-16

## 2023-04-21 ENCOUNTER — OFFICE VISIT (OUTPATIENT)
Dept: ENDOCRINOLOGY | Age: 83
End: 2023-04-21

## 2023-04-21 VITALS
WEIGHT: 195.8 LBS | HEIGHT: 61 IN | HEART RATE: 64 BPM | DIASTOLIC BLOOD PRESSURE: 72 MMHG | BODY MASS INDEX: 36.97 KG/M2 | SYSTOLIC BLOOD PRESSURE: 150 MMHG | RESPIRATION RATE: 18 BRPM | OXYGEN SATURATION: 94 % | TEMPERATURE: 98.3 F

## 2023-04-21 DIAGNOSIS — E78.2 MIXED HYPERLIPIDEMIA: ICD-10-CM

## 2023-04-21 DIAGNOSIS — I10 ESSENTIAL HYPERTENSION: ICD-10-CM

## 2023-04-21 DIAGNOSIS — E11.65 TYPE 2 DIABETES MELLITUS WITH HYPERGLYCEMIA, UNSPECIFIED WHETHER LONG TERM INSULIN USE (HCC): Primary | ICD-10-CM

## 2023-04-21 NOTE — PROGRESS NOTES
Librado Hogue MD        Patient Information  Date:4/21/2023  Name : Severiano Rattler 81 y.o.     YOB: 1940         Referred by: Macario Rivas MD     Chief Complaint   Patient presents with    Diabetes     History of Present Illness: Severiano Rattler is a 81 y.o. female here for fu of Type 2 Diabetes Mellitus. 4/21/23  Checking BG  Trulicity   Actos 1/2 tab daily - increased to full tab while Trulicity was on back order  Working out 3 times a week  No chest pain  Eye exam - recent, no diabetes in eyes, some macular degeneration  No lump in throat/neck    History  Type 2 Diabetes was diagnosed in 10/2014 . End organ effects of diabetes: none. Cardiovascular risk factors: family history, dyslipidemia, diabetes mellitus   Monitoring frequency: 3 times a week. Blood glucose fasting 130-170, rest of the daytime blood glucose are better  Had knee surgery recently  Lost her  in 2020, she goes to aerobic classes  Added Actos October 2019, decreased to 15 mg, discontinued December 2020  since discontinuing Actos, blood glucose have been little bit higher    Could not tolerate Metformin IR, XR formualtion.     Hyperlipidemia - was on tricor, zetia, colestipol, Niacin,    Wt Readings from Last 3 Encounters:   04/21/23 195 lb 12.8 oz (88.8 kg)   12/12/22 190 lb 12.8 oz (86.5 kg)   09/13/22 189 lb (85.7 kg)       BP Readings from Last 3 Encounters:   04/21/23 (!) 150/72   12/12/22 (!) 171/75   09/13/22 (!) 150/75       Past Medical History:   Diagnosis Date    Arthritis 2/28/2011    OSTEO    Chronic pain     RIGHT KNEE    Diabetes ([address]     GERD (gastroesophageal reflux disease)     Hot flashes, menopausal     Hypercholesterolemia     Hypertension     Ill-defined condition     Elevated liver enzymes (has family history)    Nausea & vomiting     Seizures ([address] 1980'S    AFTER DRINKING 2 MARGARITAS    Vitamin D deficiency 6/28/2010     Current Outpatient Medications   Medication Sig    vit C/E/cuperic/zinc/lutein (PRESERVISION LUTEIN PO) Take 1 Tablet by mouth two (2) times a day. esomeprazole (NEXIUM) 40 mg capsule TAKE 1 CAPSULE DAILY    ezetimibe (ZETIA) 10 mg tablet TAKE 1 TABLET NIGHTLY    loratadine (CLARITIN) 10 mg tablet TAKE 1 TABLET DAILY    dilTIAZem ER (CARDIZEM CD) 180 mg capsule TAKE 1 CAPSULE DAILY    fish oil-omega-3 fatty acids 340-1,000 mg capsule Take 1 Capsule by mouth daily. pioglitazone (ACTOS) 30 mg tablet Take 1 Tablet by mouth daily. (Patient taking differently: Take 0.5 Tablets by mouth daily.)    losartan-hydroCHLOROthiazide (HYZAAR) 100-25 mg per tablet TAKE 1 TABLET DAILY    fenofibrate nanocrystallized (TRICOR) 145 mg tablet TAKE 1 TABLET DAILY    cloNIDine HCL (CATAPRES) 0.1 mg tablet TAKE 1 TABLET DAILY AT NOON WITH LUNCH    dulaglutide (Trulicity) 3 GW/2.4 mL pnij 3 mg by SubCUTAneous route every seven (7) days. Stop 1.5 mg    aspirin delayed-release 81 mg tablet Take 1 Tablet by mouth. Lactobacillus acidophilus (PROBIOTIC PO) Take 1 Tablet by mouth two (2) times a day. glucose blood VI test strips (FreeStyle Lite Strips) strip TEST ONCE A DAY DX CODE: E11.65    lancets (FREESTYLE LANCETS) 28 gauge misc TEST ONCE DAILY DX: E11.65    omega-3 fatty acids-vitamin e 1,000 mg cap Take 2 Capsules by mouth two (2) times a day. cholecalciferol (VITAMIN D3) (1000 Units /25 mcg) tablet Take 2 Tablets by mouth nightly. nystatin (MYCOSTATIN) 100,000 unit/mL suspension Take 2 mL by mouth four (4) times daily. swish and spit (Patient not taking: Reported on 4/11/2022)    naproxen sodium (NAPROSYN) 220 mg tablet Take 440 mg by mouth two (2) times daily (with meals). As needed (Patient not taking: Reported on 4/11/2022)    diclofenac (VOLTAREN) 1 % gel Apply 1 g to affected area four (4) times daily.  Apply to the right knee (Patient not taking: Reported on 12/7/2021)     No current facility-administered medications for this visit. Allergies   Allergen Reactions    Other Food Nausea and Vomiting     Artificial sweeteners - gas and beldging    Ciprofloxacin Nausea Only    Erythromycin Nausea Only    Metformin Other (comments) and Nausea Only     Felt bad  Felt bad      Pcn [Penicillins] Swelling     Caused lips to swell  Tolerates Amoxicillin         Review of Systems:        Physical Examination:   Blood pressure (!) 150/72, pulse 64, temperature 98.3 °F (36.8 °C), temperature source Temporal, resp. rate 18, height 5' 1\" (1.549 m), weight 195 lb 12.8 oz (88.8 kg), SpO2 94 %. Estimated body mass index is 37 kg/m² as calculated from the following:    Height as of this encounter: 5' 1\" (1.549 m). Weight as of this encounter: 195 lb 12.8 oz (88.8 kg).   General: pleasant, no distress, good eye contact  HEENT: no pallor, no periorbital edema, EOMI  Neck: supple,  Cardiovascular: regular, normal rate, normal S1 and S2  Respiratory: clear to auscultation bilaterally    Musculoskeletal: no proximal muscle weakness in upper or lower extremities  Integumentary: alert and oriented ,  Psychiatric: normal mood and affect  Skin: color, texture, turgor normal.     Diabetic foot exam ; April 2023    H/o partial or complete amputation of foot : No  H/o previous foot ulceration : No  H/o pre - ulcerative callus : No  H/o peripheral neuropathy and callus : No  H/o poor circulation  : No  Foot deformity : None      Data Reviewed:     Lab Results   Component Value Date/Time    Hemoglobin A1c 6.8 (H) 04/12/2023 11:00 AM    Hemoglobin A1c 7.8 (H) 09/06/2022 09:25 AM    Hemoglobin A1c 7.7 (H) 06/14/2022 10:38 AM    Glucose 118 (H) 04/12/2023 11:00 AM    Glucose (POC) 175 (H) 10/08/2021 01:42 PM    Microalbumin/Creat ratio (mg/g creat) 193 (H) 09/06/2022 09:25 AM    Microalbumin,urine random 54.30 09/06/2022 09:25 AM    LDL, calculated 50.6 04/12/2023 11:00 AM    Creatinine 0.95 04/12/2023 11:00 AM      Lab Results   Component Value Date/Time Cholesterol, total 140 04/12/2023 11:00 AM    HDL Cholesterol 56 04/12/2023 11:00 AM    LDL, calculated 50.6 04/12/2023 11:00 AM    Triglyceride 167 (H) 04/12/2023 11:00 AM    CHOL/HDL Ratio 2.5 04/12/2023 11:00 AM     Lab Results   Component Value Date/Time    ALT (SGPT) 19 04/12/2023 11:00 AM    Alk. phosphatase 49 04/12/2023 11:00 AM    Bilirubin, total 0.4 04/12/2023 11:00 AM     Lab Results   Component Value Date/Time    TSH 1.220 01/29/2018 02:47 PM    T4, Free 1.55 09/09/2015 02:16 PM      Lab Results   Component Value Date/Time    Glucose 118 (H) 04/12/2023 11:00 AM    Glucose (POC) 175 (H) 10/08/2021 01:42 PM        Assessment/Plan:     No diagnosis found. 1. Type 2 Diabetes Mellitus   Lab Results   Component Value Date/Time    Hemoglobin A1c 6.8 (H) 04/12/2023 11:00 AM    Hemoglobin A1c (POC) 6.9 08/23/2021 02:44 PM   Controlled  Actos 15 mg: Discontinued December 2020, resume Actos  Trulicity 3 mg  FLU annually ,Pneumovax ,aspirin daily,annual eye exam,microalbumin    2. HTN : Continue current therapy     3. Mixed Hyperlipidemia : low carb diet. Statin intolerance hx,   Continue Zetia, Tricor    4. Obesity:Body mass index is 37 kg/m². Discussed about the importance of exercise and carbohydrate portion control. 5.  GI symptoms: work up was negative  It was due to artificial sweetners according to the patient  Follow-up and Dispositions    Return for follow up in 4-5 months. Thank you for allowing me to participate in the care of this patient.     Gemini Anderson MD    Patient verbalized understanding

## 2023-04-21 NOTE — PROGRESS NOTES
Renaldo Yanes is a 81 y.o. female here for   Chief Complaint   Patient presents with    Diabetes       1. Have you been to the ER or an urgent care clinic since your last visit?  - no    2. Have you been hospitalized since your last visit? - no    3. Have you seen or consulted any other health care providers outside of the [address] [place] [address] since your last visit?   Include any pap smears or colon screening.- [address] [place] [address]

## 2023-05-09 RX ORDER — DILTIAZEM HYDROCHLORIDE 180 MG/1
CAPSULE, COATED, EXTENDED RELEASE ORAL
Qty: 90 CAPSULE | Refills: 0 | Status: SHIPPED | OUTPATIENT
Start: 2023-05-09

## 2023-05-12 DIAGNOSIS — E11.65 TYPE 2 DIABETES MELLITUS WITH HYPERGLYCEMIA, WITHOUT LONG-TERM CURRENT USE OF INSULIN (HCC): Primary | ICD-10-CM

## 2023-05-12 RX ORDER — DULAGLUTIDE 3 MG/.5ML
INJECTION, SOLUTION SUBCUTANEOUS
Qty: 6 ML | Refills: 3 | Status: SHIPPED | OUTPATIENT
Start: 2023-05-12

## 2023-05-23 RX ORDER — CLONIDINE HYDROCHLORIDE 0.1 MG/1
TABLET ORAL
Qty: 90 TABLET | Refills: 0 | Status: SHIPPED | OUTPATIENT
Start: 2023-05-23

## 2023-06-12 PROBLEM — N18.30 STAGE 3 CHRONIC KIDNEY DISEASE, UNSPECIFIED WHETHER STAGE 3A OR 3B CKD (HCC): Status: ACTIVE | Noted: 2023-06-12

## 2023-06-12 PROBLEM — E66.01 SEVERE OBESITY (BMI 35.0-39.9) WITH COMORBIDITY (HCC): Status: ACTIVE | Noted: 2023-06-12

## 2023-06-30 RX ORDER — FENOFIBRATE 145 MG/1
TABLET, COATED ORAL
Qty: 90 TABLET | Refills: 3 | Status: SHIPPED | OUTPATIENT
Start: 2023-06-30

## 2023-07-10 RX ORDER — LOSARTAN POTASSIUM AND HYDROCHLOROTHIAZIDE 25; 100 MG/1; MG/1
TABLET ORAL
Qty: 90 TABLET | Refills: 3 | Status: SHIPPED | OUTPATIENT
Start: 2023-07-10

## 2023-07-31 DIAGNOSIS — E11.65 TYPE 2 DIABETES MELLITUS WITH HYPERGLYCEMIA, WITHOUT LONG-TERM CURRENT USE OF INSULIN (HCC): Primary | ICD-10-CM

## 2023-07-31 RX ORDER — PIOGLITAZONEHYDROCHLORIDE 30 MG/1
TABLET ORAL
Qty: 90 TABLET | Refills: 3 | Status: SHIPPED | OUTPATIENT
Start: 2023-07-31

## 2023-08-07 RX ORDER — DILTIAZEM HYDROCHLORIDE 180 MG/1
CAPSULE, COATED, EXTENDED RELEASE ORAL
Qty: 90 CAPSULE | Refills: 3 | Status: SHIPPED | OUTPATIENT
Start: 2023-08-07

## 2023-08-21 RX ORDER — CLONIDINE HYDROCHLORIDE 0.1 MG/1
TABLET ORAL
Qty: 90 TABLET | Refills: 3 | Status: SHIPPED | OUTPATIENT
Start: 2023-08-21

## 2023-09-05 ENCOUNTER — TELEPHONE (OUTPATIENT)
Facility: CLINIC | Age: 83
End: 2023-09-05

## 2023-09-05 NOTE — TELEPHONE ENCOUNTER
Pt called, was directed to the [facility] ([facility]), pt is requesting a order for a mammogram at [facility].

## 2023-09-20 ENCOUNTER — NURSE ONLY (OUTPATIENT)
Facility: CLINIC | Age: 83
End: 2023-09-20

## 2023-09-20 DIAGNOSIS — E11.65 TYPE 2 DIABETES MELLITUS WITH HYPERGLYCEMIA, WITHOUT LONG-TERM CURRENT USE OF INSULIN (HCC): ICD-10-CM

## 2023-09-20 DIAGNOSIS — E78.00 HYPERCHOLESTEROLEMIA: ICD-10-CM

## 2023-09-20 DIAGNOSIS — E55.9 VITAMIN D DEFICIENCY: ICD-10-CM

## 2023-09-21 LAB
25(OH)D3 SERPL-MCNC: 43.5 NG/ML (ref 30–100)
ALBUMIN SERPL-MCNC: 3.9 G/DL (ref 3.5–5)
ALBUMIN/GLOB SERPL: 1.1 (ref 1.1–2.2)
ALP SERPL-CCNC: 54 U/L (ref 45–117)
ALT SERPL-CCNC: 18 U/L (ref 12–78)
ANION GAP SERPL CALC-SCNC: 7 MMOL/L (ref 5–15)
AST SERPL-CCNC: 14 U/L (ref 15–37)
BILIRUB SERPL-MCNC: 0.5 MG/DL (ref 0.2–1)
BUN SERPL-MCNC: 20 MG/DL (ref 6–20)
BUN/CREAT SERPL: 19 (ref 12–20)
CALCIUM SERPL-MCNC: 9.5 MG/DL (ref 8.5–10.1)
CHLORIDE SERPL-SCNC: 103 MMOL/L (ref 97–108)
CHOLEST SERPL-MCNC: 129 MG/DL
CO2 SERPL-SCNC: 29 MMOL/L (ref 21–32)
CREAT SERPL-MCNC: 1.06 MG/DL (ref 0.55–1.02)
ERYTHROCYTE [DISTWIDTH] IN BLOOD BY AUTOMATED COUNT: 15.2 % (ref 11.5–14.5)
EST. AVERAGE GLUCOSE BLD GHB EST-MCNC: 128 MG/DL
GLOBULIN SER CALC-MCNC: 3.4 G/DL (ref 2–4)
GLUCOSE SERPL-MCNC: 119 MG/DL (ref 65–100)
HBA1C MFR BLD: 6.1 % (ref 4–5.6)
HCT VFR BLD AUTO: 44.2 % (ref 35–47)
HDLC SERPL-MCNC: 53 MG/DL
HDLC SERPL: 2.4 (ref 0–5)
HGB BLD-MCNC: 13.5 G/DL (ref 11.5–16)
LDLC SERPL CALC-MCNC: 39.6 MG/DL (ref 0–100)
MCH RBC QN AUTO: 27.8 PG (ref 26–34)
MCHC RBC AUTO-ENTMCNC: 30.5 G/DL (ref 30–36.5)
MCV RBC AUTO: 90.9 FL (ref 80–99)
NRBC # BLD: 0 K/UL (ref 0–0.01)
NRBC BLD-RTO: 0 PER 100 WBC
PLATELET # BLD AUTO: 309 K/UL (ref 150–400)
PMV BLD AUTO: 10 FL (ref 8.9–12.9)
POTASSIUM SERPL-SCNC: 3.9 MMOL/L (ref 3.5–5.1)
PROT SERPL-MCNC: 7.3 G/DL (ref 6.4–8.2)
RBC # BLD AUTO: 4.86 M/UL (ref 3.8–5.2)
SODIUM SERPL-SCNC: 139 MMOL/L (ref 136–145)
TRIGL SERPL-MCNC: 182 MG/DL
VLDLC SERPL CALC-MCNC: 36.4 MG/DL
WBC # BLD AUTO: 7 K/UL (ref 3.6–11)

## 2023-09-29 ENCOUNTER — OFFICE VISIT (OUTPATIENT)
Age: 83
End: 2023-09-29
Payer: MEDICARE

## 2023-09-29 VITALS
WEIGHT: 191 LBS | OXYGEN SATURATION: 94 % | HEART RATE: 71 BPM | TEMPERATURE: 97.6 F | RESPIRATION RATE: 18 BRPM | HEIGHT: 61 IN | SYSTOLIC BLOOD PRESSURE: 171 MMHG | BODY MASS INDEX: 36.06 KG/M2 | DIASTOLIC BLOOD PRESSURE: 72 MMHG

## 2023-09-29 DIAGNOSIS — I10 ESSENTIAL HYPERTENSION: ICD-10-CM

## 2023-09-29 DIAGNOSIS — E78.2 MIXED HYPERLIPIDEMIA: ICD-10-CM

## 2023-09-29 DIAGNOSIS — E11.65 TYPE 2 DIABETES MELLITUS WITH HYPERGLYCEMIA, UNSPECIFIED WHETHER LONG TERM INSULIN USE (HCC): Primary | ICD-10-CM

## 2023-09-29 PROCEDURE — G8417 CALC BMI ABV UP PARAM F/U: HCPCS | Performed by: INTERNAL MEDICINE

## 2023-09-29 PROCEDURE — G8427 DOCREV CUR MEDS BY ELIG CLIN: HCPCS | Performed by: INTERNAL MEDICINE

## 2023-09-29 PROCEDURE — 3078F DIAST BP <80 MM HG: CPT | Performed by: INTERNAL MEDICINE

## 2023-09-29 PROCEDURE — 1090F PRES/ABSN URINE INCON ASSESS: CPT | Performed by: INTERNAL MEDICINE

## 2023-09-29 PROCEDURE — 3044F HG A1C LEVEL LT 7.0%: CPT | Performed by: INTERNAL MEDICINE

## 2023-09-29 PROCEDURE — G8400 PT W/DXA NO RESULTS DOC: HCPCS | Performed by: INTERNAL MEDICINE

## 2023-09-29 PROCEDURE — 99214 OFFICE O/P EST MOD 30 MIN: CPT | Performed by: INTERNAL MEDICINE

## 2023-09-29 PROCEDURE — 1123F ACP DISCUSS/DSCN MKR DOCD: CPT | Performed by: INTERNAL MEDICINE

## 2023-09-29 PROCEDURE — 1036F TOBACCO NON-USER: CPT | Performed by: INTERNAL MEDICINE

## 2023-09-29 PROCEDURE — 3077F SYST BP >= 140 MM HG: CPT | Performed by: INTERNAL MEDICINE

## 2023-09-29 NOTE — PROGRESS NOTES
Lillie Angulo is a 81 y.o. female here for   Chief Complaint   Patient presents with    Diabetes       1. Have you been to the ER, urgent care clinic since your last visit? Hospitalized since your last visit? - no     2. Have you seen or consulted any other health care providers outside of the [address] [place] [address] Avenue since your last visit?   Include any pap smears or colon screening.- no

## 2023-09-29 NOTE — PROGRESS NOTES
Gini Couch MD            Patient Information   Date:4/21/2023   Name : Esteban Whitten 81 y.o.       YOB: 1940           Referred by: Dianne Juan MD              History of Present Illness: Esteban Whitten is a 81 y.o. female here  for fu of Type 2 Diabetes Mellitus . She is checking the blood glucose intermittently, on Trulicity, Actos 15 mg,  Active, some swelling in the left ankle, not much  Tolerating Trulicity      History   Type 2 Diabetes was diagnosed in 10/2014 . End organ effects of diabetes:  none. Cardiovascular risk factors: family history, dyslipidemia, diabetes mellitus    Monitoring frequency: 3 times a week. Blood glucose fasting 130-170, rest of the daytime blood glucose are better   Had knee surgery recently   Lost her  in 2020, she goes to aerobic classes   Added Actos October 2019, decreased to 15 mg, discontinued December 2020   since discontinuing Actos, blood glucose have been little bit higher      Could not tolerate Metformin IR, XR formualtion.       Hyperlipidemia - was on tricor, zetia, colestipol, Niacin,                    Physical Examination:        General: pleasant, no distress, good eye contact    HEENT: no pallor, no periorbital edema, EOMI    Neck: supple,    Cardiovascular: regular, normal rate, normal S1 and S2    Respiratory: clear to auscultation bilaterally        Musculoskeletal: no proximal muscle weakness in upper or lower extremities    Integumentary: alert and oriented ,    Psychiatric: normal mood and affect    Skin: color, texture, turgor normal.       Diabetic foot exam ; April 2023      H/o partial or complete amputation of foot : No   H/o previous foot ulceration : No   H/o pre - ulcerative callus : No   H/o peripheral neuropathy and callus : No   H/o poor circulation  : No   Foot deformity : None         Data Reviewed:            Lab Results

## 2023-12-12 ENCOUNTER — OFFICE VISIT (OUTPATIENT)
Facility: CLINIC | Age: 83
End: 2023-12-12
Payer: MEDICARE

## 2023-12-12 VITALS
OXYGEN SATURATION: 96 % | HEIGHT: 61 IN | RESPIRATION RATE: 14 BRPM | DIASTOLIC BLOOD PRESSURE: 69 MMHG | HEART RATE: 78 BPM | TEMPERATURE: 97.5 F | WEIGHT: 190.8 LBS | SYSTOLIC BLOOD PRESSURE: 125 MMHG | BODY MASS INDEX: 36.02 KG/M2

## 2023-12-12 DIAGNOSIS — I10 PRIMARY HYPERTENSION: ICD-10-CM

## 2023-12-12 DIAGNOSIS — Z00.00 MEDICARE ANNUAL WELLNESS VISIT, SUBSEQUENT: Primary | ICD-10-CM

## 2023-12-12 DIAGNOSIS — K21.9 GASTROESOPHAGEAL REFLUX DISEASE WITHOUT ESOPHAGITIS: ICD-10-CM

## 2023-12-12 DIAGNOSIS — Z23 IMMUNIZATION DUE: ICD-10-CM

## 2023-12-12 DIAGNOSIS — E78.00 HYPERCHOLESTEROLEMIA: ICD-10-CM

## 2023-12-12 DIAGNOSIS — E55.9 VITAMIN D DEFICIENCY: ICD-10-CM

## 2023-12-12 DIAGNOSIS — E11.65 TYPE 2 DIABETES MELLITUS WITH HYPERGLYCEMIA, WITHOUT LONG-TERM CURRENT USE OF INSULIN (HCC): ICD-10-CM

## 2023-12-12 PROCEDURE — G8400 PT W/DXA NO RESULTS DOC: HCPCS | Performed by: FAMILY MEDICINE

## 2023-12-12 PROCEDURE — 3044F HG A1C LEVEL LT 7.0%: CPT | Performed by: FAMILY MEDICINE

## 2023-12-12 PROCEDURE — G8484 FLU IMMUNIZE NO ADMIN: HCPCS | Performed by: FAMILY MEDICINE

## 2023-12-12 PROCEDURE — 1090F PRES/ABSN URINE INCON ASSESS: CPT | Performed by: FAMILY MEDICINE

## 2023-12-12 PROCEDURE — G8417 CALC BMI ABV UP PARAM F/U: HCPCS | Performed by: FAMILY MEDICINE

## 2023-12-12 PROCEDURE — 1123F ACP DISCUSS/DSCN MKR DOCD: CPT | Performed by: FAMILY MEDICINE

## 2023-12-12 PROCEDURE — 1036F TOBACCO NON-USER: CPT | Performed by: FAMILY MEDICINE

## 2023-12-12 PROCEDURE — 3078F DIAST BP <80 MM HG: CPT | Performed by: FAMILY MEDICINE

## 2023-12-12 PROCEDURE — G0439 PPPS, SUBSEQ VISIT: HCPCS | Performed by: FAMILY MEDICINE

## 2023-12-12 PROCEDURE — 3074F SYST BP LT 130 MM HG: CPT | Performed by: FAMILY MEDICINE

## 2023-12-12 PROCEDURE — G8427 DOCREV CUR MEDS BY ELIG CLIN: HCPCS | Performed by: FAMILY MEDICINE

## 2023-12-12 PROCEDURE — 99214 OFFICE O/P EST MOD 30 MIN: CPT | Performed by: FAMILY MEDICINE

## 2023-12-12 RX ORDER — ZOSTER VACCINE RECOMBINANT, ADJUVANTED 50 MCG/0.5
0.5 KIT INTRAMUSCULAR SEE ADMIN INSTRUCTIONS
Qty: 0.5 ML | Refills: 0 | Status: SHIPPED | OUTPATIENT
Start: 2023-12-12 | End: 2024-06-09

## 2023-12-12 ASSESSMENT — PATIENT HEALTH QUESTIONNAIRE - PHQ9
SUM OF ALL RESPONSES TO PHQ QUESTIONS 1-9: 0
SUM OF ALL RESPONSES TO PHQ QUESTIONS 1-9: 0
1. LITTLE INTEREST OR PLEASURE IN DOING THINGS: 0
SUM OF ALL RESPONSES TO PHQ QUESTIONS 1-9: 0
SUM OF ALL RESPONSES TO PHQ9 QUESTIONS 1 & 2: 0
SUM OF ALL RESPONSES TO PHQ QUESTIONS 1-9: 0
2. FEELING DOWN, DEPRESSED OR HOPELESS: 0

## 2023-12-12 ASSESSMENT — ENCOUNTER SYMPTOMS
ABDOMINAL PAIN: 0
CHEST TIGHTNESS: 0

## 2023-12-12 ASSESSMENT — LIFESTYLE VARIABLES
HOW MANY STANDARD DRINKS CONTAINING ALCOHOL DO YOU HAVE ON A TYPICAL DAY: 1 OR 2
HOW OFTEN DO YOU HAVE A DRINK CONTAINING ALCOHOL: MONTHLY OR LESS

## 2023-12-12 NOTE — PROGRESS NOTES
[facility]    History of Present Illness:   Antoni Snow is a 81 y.o. female with history of HTN, GERD, Type 2 diabetes, Allergies, Osteoarthritis, HLD   CC: Medicare wellness, Follow up  History provided by patient and Records    HPI:  Red Foot: Noting a couple months of the left foot beeing red on the dorsal aspect. No pain, itching, or pain. Notes a little persistent swelling. Normal pulses, sensation, and no other issues. No trauma. Vitamin D: Is taking Supplement     GERD: Stable    Diabetes Follow up: Overall the patient feels well with good energy level. Current Medications: pioglitazone - 30 MG. Insulin dependence: No              Frequency of home glucose testing: Daily              Blood Sugar range at home: <200's                         Last eye exam: In past 12 months. Last foot exam: This year. Polyuria, polyphagia or polydipsia: No              Retinopathy: No              Neuropathy SX: No              Low blood sugar symptoms: No              Dietary compliance: compliant most of the time              Medication compliance: compliant most of the time              On ASA: Yes              Tobacco Use: No              Depression: No     Wt Readings from Last 3 Encounters:   12/12/23 86.5 kg (190 lb 12.8 oz)   09/29/23 86.6 kg (191 lb)   06/12/23 88.5 kg (195 lb 3.2 oz)      Lab Results   Component Value Date/Time    [id number] 6.9 08/23/2021 02:44 PM      No results found for: \"MCA2\", \"MCAU\", \"MCAU2\"    No results found for: \"NANCY\", \"CREAPOC\", \"CREA\"      Hypertension Follow up: The patient reports:  taking medications as instructed, no medication side effects noted, no TIA's, no chest pain on exertion, no dyspnea on exertion, no swelling of ankles, no orthostatic dizziness or lightheadedness, no orthopnea or paroxysmal nocturnal dyspnea.      BP Readings from Last 3 Encounters:   12/12/23 125/69   09/29/23 (!) 171/72   06/12/23 137/68

## 2024-01-02 ENCOUNTER — OFFICE VISIT (OUTPATIENT)
Facility: CLINIC | Age: 84
End: 2024-01-02
Payer: MEDICARE

## 2024-01-02 VITALS
SYSTOLIC BLOOD PRESSURE: 136 MMHG | BODY MASS INDEX: 35.87 KG/M2 | HEART RATE: 73 BPM | TEMPERATURE: 98.1 F | RESPIRATION RATE: 17 BRPM | HEIGHT: 61 IN | OXYGEN SATURATION: 95 % | WEIGHT: 190 LBS | DIASTOLIC BLOOD PRESSURE: 81 MMHG

## 2024-01-02 DIAGNOSIS — H00.015 HORDEOLUM EXTERNUM OF LEFT LOWER EYELID: Primary | ICD-10-CM

## 2024-01-02 DIAGNOSIS — I10 PRIMARY HYPERTENSION: ICD-10-CM

## 2024-01-02 PROCEDURE — 99213 OFFICE O/P EST LOW 20 MIN: CPT | Performed by: STUDENT IN AN ORGANIZED HEALTH CARE EDUCATION/TRAINING PROGRAM

## 2024-01-02 PROCEDURE — 3075F SYST BP GE 130 - 139MM HG: CPT | Performed by: STUDENT IN AN ORGANIZED HEALTH CARE EDUCATION/TRAINING PROGRAM

## 2024-01-02 PROCEDURE — 3078F DIAST BP <80 MM HG: CPT | Performed by: STUDENT IN AN ORGANIZED HEALTH CARE EDUCATION/TRAINING PROGRAM

## 2024-01-02 PROCEDURE — 1090F PRES/ABSN URINE INCON ASSESS: CPT | Performed by: STUDENT IN AN ORGANIZED HEALTH CARE EDUCATION/TRAINING PROGRAM

## 2024-01-02 PROCEDURE — G8428 CUR MEDS NOT DOCUMENT: HCPCS | Performed by: STUDENT IN AN ORGANIZED HEALTH CARE EDUCATION/TRAINING PROGRAM

## 2024-01-02 PROCEDURE — G8484 FLU IMMUNIZE NO ADMIN: HCPCS | Performed by: STUDENT IN AN ORGANIZED HEALTH CARE EDUCATION/TRAINING PROGRAM

## 2024-01-02 PROCEDURE — 1036F TOBACCO NON-USER: CPT | Performed by: STUDENT IN AN ORGANIZED HEALTH CARE EDUCATION/TRAINING PROGRAM

## 2024-01-02 PROCEDURE — G8417 CALC BMI ABV UP PARAM F/U: HCPCS | Performed by: STUDENT IN AN ORGANIZED HEALTH CARE EDUCATION/TRAINING PROGRAM

## 2024-01-02 PROCEDURE — G8400 PT W/DXA NO RESULTS DOC: HCPCS | Performed by: STUDENT IN AN ORGANIZED HEALTH CARE EDUCATION/TRAINING PROGRAM

## 2024-01-02 PROCEDURE — 1123F ACP DISCUSS/DSCN MKR DOCD: CPT | Performed by: STUDENT IN AN ORGANIZED HEALTH CARE EDUCATION/TRAINING PROGRAM

## 2024-01-02 ASSESSMENT — PATIENT HEALTH QUESTIONNAIRE - PHQ9
SUM OF ALL RESPONSES TO PHQ9 QUESTIONS 1 & 2: 0
SUM OF ALL RESPONSES TO PHQ QUESTIONS 1-9: 0
1. LITTLE INTEREST OR PLEASURE IN DOING THINGS: 0
SUM OF ALL RESPONSES TO PHQ QUESTIONS 1-9: 0
2. FEELING DOWN, DEPRESSED OR HOPELESS: 0

## 2024-01-02 NOTE — PROGRESS NOTES
I saw and evaluated the patient, performing the key elements of the service.  I discussed the findings, assessment and plan with the resident and agree with the resident's findings and plan as documented in the resident's note.    HTN follow up. Increasing medication, follow up 1 month for BP check and labs.

## 2024-01-02 NOTE — PROGRESS NOTES
Chief Complaint   Patient presents with    Eye Problem       \"Have you been to the ER, urgent care clinic since your last visit?  Hospitalized since your last visit?\"    NO    “Have you seen or consulted any other health care providers outside of [facility] since your last visit?”    NO               Health Maintenance Due   Topic Date Due    DEXA (modify frequency per FRAX score)  Never done    Shingles vaccine (3 of 3) 01/12/2021    Flu vaccine (1) 08/01/2023

## 2024-01-02 NOTE — PROGRESS NOTES
Chief Complaint   Patient presents with    Eye Problem     Left eye wattery and blurry vision. Reports believes she has stye. X2 days duration.       History of Present Illness:  Nayeli Jimenez is a 83 y.o. female w/ PMHx of DM2, HTN, HLD, GERD who presents to clinic for evaluation of swelling on lower eyelid.    - Symptoms started 2 days ago.  - Noted watering of eye and swelling of medial aspect of L lower eyelid.  - Using warm compresses.  - No pain.  - No fevers.  - Denies vision changes currently.        Past Medical History:   Diagnosis Date    Arthritis 2/28/2011    OSTEO    Chronic pain     RIGHT KNEE    Diabetes (HCC)     GERD (gastroesophageal reflux disease)     Hot flashes, menopausal     Hypercholesterolemia     Hypertension     Ill-defined condition     Elevated liver enzymes (has family history)    Nausea & vomiting     Seizures (HCC) 1980'S    AFTER DRINKING 2 MARGARITAS    Vitamin D deficiency 6/28/2010       Current Outpatient Medications   Medication Sig Dispense Refill    NONFORMULARY Take 1 tablet by mouth daily Preserve Vision prescribed by eye doctor      blood glucose test strips (ASCENSIA AUTODISC VI;ONE TOUCH ULTRA TEST VI) strip TEST ONCE A DAY DX CODE: E11.65 100 each 3    cloNIDine (CATAPRES) 0.1 MG tablet TAKE 1 TABLET DAILY AT NOON WITH LUNCH 90 tablet 3    dilTIAZem (CARDIZEM CD) 180 MG extended release capsule TAKE 1 CAPSULE DAILY 90 capsule 3    pioglitazone (ACTOS) 30 MG tablet TAKE 1 TABLET DAILY (Patient taking differently: daily 1/2 tablet daily) 90 tablet 3    losartan-hydroCHLOROthiazide (HYZAAR) 100-25 MG per tablet TAKE 1 TABLET DAILY 90 tablet 3    fenofibrate (TRICOR) 145 MG tablet TAKE 1 TABLET DAILY 90 tablet 3    Omega-3 Fatty Acids (FISH OIL PO) Take 1 capsule by mouth daily      Probiotic Product (PROBIOTIC PO) Take 1 tablet by mouth 2 times daily      TRULICITY 3 MG/0.5ML SOPN INJECT 3 MG UNDER THE SKIN EVERY 7 DAYS (STOP 1.5 MG) 6 mL 3    aspirin 81 MG EC tablet

## 2024-01-17 RX ORDER — ESOMEPRAZOLE MAGNESIUM 40 MG/1
40 CAPSULE, DELAYED RELEASE ORAL DAILY
Qty: 90 CAPSULE | Refills: 3 | Status: SHIPPED | OUTPATIENT
Start: 2024-01-17

## 2024-01-24 DIAGNOSIS — I10 PRIMARY HYPERTENSION: ICD-10-CM

## 2024-01-24 DIAGNOSIS — E55.9 VITAMIN D DEFICIENCY: ICD-10-CM

## 2024-01-24 DIAGNOSIS — E78.00 HYPERCHOLESTEROLEMIA: ICD-10-CM

## 2024-01-24 DIAGNOSIS — E11.65 TYPE 2 DIABETES MELLITUS WITH HYPERGLYCEMIA, WITHOUT LONG-TERM CURRENT USE OF INSULIN (HCC): ICD-10-CM

## 2024-01-25 LAB
25(OH)D3 SERPL-MCNC: 44 NG/ML (ref 30–100)
ALBUMIN SERPL-MCNC: 4.1 G/DL (ref 3.5–5)
ALBUMIN/GLOB SERPL: 1.2 (ref 1.1–2.2)
ALP SERPL-CCNC: 49 U/L (ref 45–117)
ALT SERPL-CCNC: 16 U/L (ref 12–78)
ANION GAP SERPL CALC-SCNC: 6 MMOL/L (ref 5–15)
AST SERPL-CCNC: 21 U/L (ref 15–37)
BILIRUB SERPL-MCNC: 0.6 MG/DL (ref 0.2–1)
BUN SERPL-MCNC: 23 MG/DL (ref 6–20)
BUN/CREAT SERPL: 22 (ref 12–20)
CALCIUM SERPL-MCNC: 9.9 MG/DL (ref 8.5–10.1)
CHLORIDE SERPL-SCNC: 103 MMOL/L (ref 97–108)
CHOLEST SERPL-MCNC: 139 MG/DL
CO2 SERPL-SCNC: 30 MMOL/L (ref 21–32)
CREAT SERPL-MCNC: 1.05 MG/DL (ref 0.55–1.02)
CREAT UR-MCNC: 211 MG/DL
ERYTHROCYTE [DISTWIDTH] IN BLOOD BY AUTOMATED COUNT: 15.3 % (ref 11.5–14.5)
EST. AVERAGE GLUCOSE BLD GHB EST-MCNC: 126 MG/DL
GLOBULIN SER CALC-MCNC: 3.3 G/DL (ref 2–4)
GLUCOSE SERPL-MCNC: 119 MG/DL (ref 65–100)
HBA1C MFR BLD: 6 % (ref 4–5.6)
HCT VFR BLD AUTO: 42.1 % (ref 35–47)
HDLC SERPL-MCNC: 57 MG/DL
HDLC SERPL: 2.4 (ref 0–5)
HGB BLD-MCNC: 13.6 G/DL (ref 11.5–16)
LDLC SERPL CALC-MCNC: 51.2 MG/DL (ref 0–100)
MCH RBC QN AUTO: 28.5 PG (ref 26–34)
MCHC RBC AUTO-ENTMCNC: 32.3 G/DL (ref 30–36.5)
MCV RBC AUTO: 88.3 FL (ref 80–99)
MICROALBUMIN UR-MCNC: 29.9 MG/DL
MICROALBUMIN/CREAT UR-RTO: 142 MG/G (ref 0–30)
NRBC # BLD: 0 K/UL (ref 0–0.01)
NRBC BLD-RTO: 0 PER 100 WBC
PLATELET # BLD AUTO: 178 K/UL (ref 150–400)
PMV BLD AUTO: 11.8 FL (ref 8.9–12.9)
POTASSIUM SERPL-SCNC: 3.6 MMOL/L (ref 3.5–5.1)
PROT SERPL-MCNC: 7.4 G/DL (ref 6.4–8.2)
RBC # BLD AUTO: 4.77 M/UL (ref 3.8–5.2)
SODIUM SERPL-SCNC: 139 MMOL/L (ref 136–145)
TRIGL SERPL-MCNC: 154 MG/DL
VLDLC SERPL CALC-MCNC: 30.8 MG/DL
WBC # BLD AUTO: 6.2 K/UL (ref 3.6–11)

## 2024-01-30 ENCOUNTER — OFFICE VISIT (OUTPATIENT)
Age: 84
End: 2024-01-30
Payer: MEDICARE

## 2024-01-30 VITALS
SYSTOLIC BLOOD PRESSURE: 152 MMHG | DIASTOLIC BLOOD PRESSURE: 68 MMHG | OXYGEN SATURATION: 96 % | HEIGHT: 61 IN | TEMPERATURE: 97.6 F | RESPIRATION RATE: 20 BRPM | BODY MASS INDEX: 36.12 KG/M2 | WEIGHT: 191.3 LBS | HEART RATE: 70 BPM

## 2024-01-30 DIAGNOSIS — E11.21 TYPE 2 DIABETES MELLITUS WITH DIABETIC NEPHROPATHY, WITHOUT LONG-TERM CURRENT USE OF INSULIN (HCC): ICD-10-CM

## 2024-01-30 DIAGNOSIS — L03.116 CELLULITIS OF LEFT LOWER EXTREMITY: Primary | ICD-10-CM

## 2024-01-30 DIAGNOSIS — E66.01 SEVERE OBESITY (BMI 35.0-39.9) WITH COMORBIDITY (HCC): ICD-10-CM

## 2024-01-30 DIAGNOSIS — N18.31 STAGE 3A CHRONIC KIDNEY DISEASE (HCC): ICD-10-CM

## 2024-01-30 PROBLEM — N18.30 CHRONIC RENAL DISEASE, STAGE III (HCC): Status: ACTIVE | Noted: 2024-01-30

## 2024-01-30 PROCEDURE — G8484 FLU IMMUNIZE NO ADMIN: HCPCS | Performed by: INTERNAL MEDICINE

## 2024-01-30 PROCEDURE — 1123F ACP DISCUSS/DSCN MKR DOCD: CPT | Performed by: INTERNAL MEDICINE

## 2024-01-30 PROCEDURE — 3077F SYST BP >= 140 MM HG: CPT | Performed by: INTERNAL MEDICINE

## 2024-01-30 PROCEDURE — 3044F HG A1C LEVEL LT 7.0%: CPT | Performed by: INTERNAL MEDICINE

## 2024-01-30 PROCEDURE — 1036F TOBACCO NON-USER: CPT | Performed by: INTERNAL MEDICINE

## 2024-01-30 PROCEDURE — 3078F DIAST BP <80 MM HG: CPT | Performed by: INTERNAL MEDICINE

## 2024-01-30 PROCEDURE — 99214 OFFICE O/P EST MOD 30 MIN: CPT | Performed by: INTERNAL MEDICINE

## 2024-01-30 PROCEDURE — G8427 DOCREV CUR MEDS BY ELIG CLIN: HCPCS | Performed by: INTERNAL MEDICINE

## 2024-01-30 PROCEDURE — 1090F PRES/ABSN URINE INCON ASSESS: CPT | Performed by: INTERNAL MEDICINE

## 2024-01-30 PROCEDURE — G8400 PT W/DXA NO RESULTS DOC: HCPCS | Performed by: INTERNAL MEDICINE

## 2024-01-30 PROCEDURE — G8417 CALC BMI ABV UP PARAM F/U: HCPCS | Performed by: INTERNAL MEDICINE

## 2024-01-30 PROCEDURE — G2211 COMPLEX E/M VISIT ADD ON: HCPCS | Performed by: INTERNAL MEDICINE

## 2024-01-30 RX ORDER — CEPHALEXIN 500 MG/1
500 CAPSULE ORAL 2 TIMES DAILY
Qty: 14 CAPSULE | Refills: 0 | Status: SHIPPED | OUTPATIENT
Start: 2024-01-30 | End: 2024-02-06

## 2024-01-30 NOTE — PROGRESS NOTES
[facility] DIABETES AND ENDOCRINOLOGY                 Ilda Pemberton MD            Patient Information   Date:4/21/2023   Name : Nayeli Jimenez 82 y.o.       YOB: 1940           Referred by: Mu Gray MD              History of Present Illness: Nayeli Jimenez is  here  for fu of Type 2 Diabetes Mellitus .      She is on Trulicity, Actos 15 mg  Not able to get Trulicity  Blood glucose is well-controlled  Left lower extremity edema, redness on the foot  Tolerating Trulicity      History   Type 2 Diabetes was diagnosed in 10/2014 . End organ effects of diabetes:  none.     Cardiovascular risk factors: family history, dyslipidemia, diabetes mellitus    Monitoring frequency: 3 times a week.   Blood glucose fasting 130-170, rest of the daytime blood glucose are better   Had knee surgery recently   Lost her  in 2020, she goes to aerobic classes   Added Actos October 2019, decreased to 15 mg, discontinued December 2020   since discontinuing Actos, blood glucose have been little bit higher      Could not tolerate Metformin IR, XR formualtion.      Hyperlipidemia - was on tricor, zetia, colestipol, Niacin,                    Physical Examination:        General: pleasant, no distress, good eye contact    HEENT: no pallor, no periorbital edema, EOMI    Neck: supple,    Cardiovascular: regular, normal rate, normal S1 and S2    Respiratory: clear to auscultation bilaterally        Musculoskeletal: no proximal muscle weakness in upper or lower extremities    Integumentary: alert and oriented ,    Psychiatric: normal mood and affect    Skin: color, texture, turgor normal.       Diabetic foot exam ; April 2023      H/o partial or complete amputation of foot : No   H/o previous foot ulceration : No   H/o pre - ulcerative callus : No   H/o peripheral neuropathy and callus : No   H/o poor circulation  : No   Foot deformity : None         Data Reviewed:            Lab Results   Component

## 2024-01-30 NOTE — PROGRESS NOTES
Nayeli Jimenez is a 83 y.o. female here for   Chief Complaint   Patient presents with    Diabetes       1. Have you been to the ER, urgent care clinic since your last visit?  Hospitalized since your last visit? - No    2. Have you seen or consulted any other health care providers outside of the [facility] since your last visit?  Include any pap smears or colon screening.- No

## 2024-02-13 RX ORDER — EZETIMIBE 10 MG/1
10 TABLET ORAL NIGHTLY
Qty: 90 TABLET | Refills: 3 | Status: SHIPPED | OUTPATIENT
Start: 2024-02-13

## 2024-02-19 RX ORDER — LORATADINE 10 MG/1
10 TABLET ORAL DAILY
Qty: 90 TABLET | Refills: 3 | Status: SHIPPED | OUTPATIENT
Start: 2024-02-19

## 2024-03-12 ENCOUNTER — OFFICE VISIT (OUTPATIENT)
Facility: CLINIC | Age: 84
End: 2024-03-12
Payer: MEDICARE

## 2024-03-12 VITALS
WEIGHT: 195.2 LBS | DIASTOLIC BLOOD PRESSURE: 74 MMHG | TEMPERATURE: 97.5 F | RESPIRATION RATE: 14 BRPM | OXYGEN SATURATION: 95 % | HEART RATE: 63 BPM | SYSTOLIC BLOOD PRESSURE: 158 MMHG | BODY MASS INDEX: 36.85 KG/M2 | HEIGHT: 61 IN

## 2024-03-12 DIAGNOSIS — E78.00 HYPERCHOLESTEROLEMIA: ICD-10-CM

## 2024-03-12 DIAGNOSIS — K21.9 GASTROESOPHAGEAL REFLUX DISEASE WITHOUT ESOPHAGITIS: ICD-10-CM

## 2024-03-12 DIAGNOSIS — I10 PRIMARY HYPERTENSION: ICD-10-CM

## 2024-03-12 DIAGNOSIS — E11.65 TYPE 2 DIABETES MELLITUS WITH HYPERGLYCEMIA, WITHOUT LONG-TERM CURRENT USE OF INSULIN (HCC): Primary | ICD-10-CM

## 2024-03-12 DIAGNOSIS — E11.21 TYPE 2 DIABETES MELLITUS WITH NEPHROPATHY (HCC): ICD-10-CM

## 2024-03-12 DIAGNOSIS — E66.01 MORBID OBESITY (HCC): ICD-10-CM

## 2024-03-12 DIAGNOSIS — E66.01 SEVERE OBESITY (BMI 35.0-39.9) WITH COMORBIDITY (HCC): ICD-10-CM

## 2024-03-12 DIAGNOSIS — E55.9 VITAMIN D DEFICIENCY: ICD-10-CM

## 2024-03-12 PROCEDURE — G8484 FLU IMMUNIZE NO ADMIN: HCPCS | Performed by: FAMILY MEDICINE

## 2024-03-12 PROCEDURE — G8417 CALC BMI ABV UP PARAM F/U: HCPCS | Performed by: FAMILY MEDICINE

## 2024-03-12 PROCEDURE — 1090F PRES/ABSN URINE INCON ASSESS: CPT | Performed by: FAMILY MEDICINE

## 2024-03-12 PROCEDURE — 1036F TOBACCO NON-USER: CPT | Performed by: FAMILY MEDICINE

## 2024-03-12 PROCEDURE — 3077F SYST BP >= 140 MM HG: CPT | Performed by: FAMILY MEDICINE

## 2024-03-12 PROCEDURE — G8400 PT W/DXA NO RESULTS DOC: HCPCS | Performed by: FAMILY MEDICINE

## 2024-03-12 PROCEDURE — 3044F HG A1C LEVEL LT 7.0%: CPT | Performed by: FAMILY MEDICINE

## 2024-03-12 PROCEDURE — G8427 DOCREV CUR MEDS BY ELIG CLIN: HCPCS | Performed by: FAMILY MEDICINE

## 2024-03-12 PROCEDURE — 1123F ACP DISCUSS/DSCN MKR DOCD: CPT | Performed by: FAMILY MEDICINE

## 2024-03-12 PROCEDURE — 99214 OFFICE O/P EST MOD 30 MIN: CPT | Performed by: FAMILY MEDICINE

## 2024-03-12 PROCEDURE — 3078F DIAST BP <80 MM HG: CPT | Performed by: FAMILY MEDICINE

## 2024-03-12 RX ORDER — COVID-19 ANTIGEN TEST
KIT MISCELLANEOUS
COMMUNITY

## 2024-03-12 ASSESSMENT — ENCOUNTER SYMPTOMS
CHEST TIGHTNESS: 0
ABDOMINAL PAIN: 0
ABDOMINAL DISTENTION: 0
APNEA: 0
BACK PAIN: 0

## 2024-03-12 NOTE — PROGRESS NOTES
Chief Complaint   Patient presents with    3 Month Follow-Up         \"Have you been to the ER, urgent care clinic since your last visit?  Hospitalized since your last visit?\"    NO    “Have you seen or consulted any other health care providers outside of [facility] since your last visit?”    NO           Health Maintenance Due   Topic Date Due    DEXA (modify frequency per FRAX score)  Never done    Flu vaccine (1) 08/01/2023

## 2024-03-12 NOTE — PROGRESS NOTES
[facility]    History of Present Illness:   Nayeli Jimenez is a 83 y.o. female with history of HTN, GERD, Type 2 diabetes, Allergies, Osteoarthritis, HLD    CC: Follow up   History provided by patient and Records    HPI:    Vitamin D: Is taking Supplement     GERD: Stable    Hypertension Follow up:  The patient reports:  taking medications as instructed, no medication side effects noted, no TIA's, no chest pain on exertion, no dyspnea on exertion, no swelling of ankles, no orthostatic dizziness or lightheadedness, no orthopnea or paroxysmal nocturnal dyspnea.     BP Readings from Last 3 Encounters:   03/12/24 (!) 158/74   01/30/24 (!) 152/68   01/02/24 136/81        Diabetes Follow up: Overall the patient feels well with good energy level.     Current Medications: pioglitazone - 30 MG.   Insulin dependence: No              Frequency of home glucose testing: Daily              Blood Sugar range at home: <200's                         Last eye exam: In past 12 months.              Last foot exam: This year.              Polyuria, polyphagia or polydipsia: No              Retinopathy: No              Neuropathy SX: No              Low blood sugar symptoms: No              Dietary compliance: compliant most of the time              Medication compliance: compliant most of the time              On ASA: Yes              Tobacco Use: No              Depression: No     Wt Readings from Last 3 Encounters:   03/12/24 88.5 kg (195 lb 3.2 oz)   01/30/24 86.8 kg (191 lb 4.8 oz)   01/02/24 86.2 kg (190 lb)        Hemoglobin A1C   Date Value Ref Range Status   01/24/2024 6.0 (H) 4.0 - 5.6 % Final     Comment:     (NOTE)  HbA1C Interpretive Ranges  <5.7              Normal  5.7 - 6.4         Consider Prediabetes  >6.5              Consider Diabetes          Lab Results   Component Value Date    WBC 6.2 01/24/2024    HGB 13.6 01/24/2024    HCT 42.1 01/24/2024    MCV 88.3 01/24/2024     01/24/2024

## 2024-04-23 ENCOUNTER — TELEPHONE (OUTPATIENT)
Facility: CLINIC | Age: 84
End: 2024-04-23

## 2024-04-23 DIAGNOSIS — E11.65 TYPE 2 DIABETES MELLITUS WITH HYPERGLYCEMIA, WITHOUT LONG-TERM CURRENT USE OF INSULIN (HCC): ICD-10-CM

## 2024-04-23 DIAGNOSIS — I10 PRIMARY HYPERTENSION: ICD-10-CM

## 2024-04-23 DIAGNOSIS — E55.9 VITAMIN D DEFICIENCY: ICD-10-CM

## 2024-04-23 DIAGNOSIS — E78.00 HYPERCHOLESTEROLEMIA: ICD-10-CM

## 2024-04-24 LAB
25(OH)D3 SERPL-MCNC: 50.6 NG/ML (ref 30–100)
ALBUMIN SERPL-MCNC: 3.8 G/DL (ref 3.5–5)
ALBUMIN/GLOB SERPL: 1.1 (ref 1.1–2.2)
ALP SERPL-CCNC: 54 U/L (ref 45–117)
ALT SERPL-CCNC: 17 U/L (ref 12–78)
ANION GAP SERPL CALC-SCNC: 7 MMOL/L (ref 5–15)
AST SERPL-CCNC: 12 U/L (ref 15–37)
BILIRUB SERPL-MCNC: 0.5 MG/DL (ref 0.2–1)
BUN SERPL-MCNC: 25 MG/DL (ref 6–20)
BUN/CREAT SERPL: 26 (ref 12–20)
CALCIUM SERPL-MCNC: 9.8 MG/DL (ref 8.5–10.1)
CHLORIDE SERPL-SCNC: 104 MMOL/L (ref 97–108)
CHOLEST SERPL-MCNC: 123 MG/DL
CO2 SERPL-SCNC: 32 MMOL/L (ref 21–32)
CREAT SERPL-MCNC: 0.96 MG/DL (ref 0.55–1.02)
CREAT UR-MCNC: 115 MG/DL
ERYTHROCYTE [DISTWIDTH] IN BLOOD BY AUTOMATED COUNT: 15.2 % (ref 11.5–14.5)
EST. AVERAGE GLUCOSE BLD GHB EST-MCNC: 137 MG/DL
GLOBULIN SER CALC-MCNC: 3.5 G/DL (ref 2–4)
GLUCOSE SERPL-MCNC: 131 MG/DL (ref 65–100)
HBA1C MFR BLD: 6.4 % (ref 4–5.6)
HCT VFR BLD AUTO: 41.4 % (ref 35–47)
HDLC SERPL-MCNC: 63 MG/DL
HDLC SERPL: 2 (ref 0–5)
HGB BLD-MCNC: 12.6 G/DL (ref 11.5–16)
LDLC SERPL CALC-MCNC: 39.6 MG/DL (ref 0–100)
MCH RBC QN AUTO: 27.5 PG (ref 26–34)
MCHC RBC AUTO-ENTMCNC: 30.4 G/DL (ref 30–36.5)
MCV RBC AUTO: 90.4 FL (ref 80–99)
MICROALBUMIN UR-MCNC: 81.9 MG/DL
MICROALBUMIN/CREAT UR-RTO: 712 MG/G (ref 0–30)
NRBC # BLD: 0 K/UL (ref 0–0.01)
NRBC BLD-RTO: 0 PER 100 WBC
PLATELET # BLD AUTO: 283 K/UL (ref 150–400)
PMV BLD AUTO: 10.2 FL (ref 8.9–12.9)
POTASSIUM SERPL-SCNC: 3.8 MMOL/L (ref 3.5–5.1)
PROT SERPL-MCNC: 7.3 G/DL (ref 6.4–8.2)
RBC # BLD AUTO: 4.58 M/UL (ref 3.8–5.2)
SODIUM SERPL-SCNC: 143 MMOL/L (ref 136–145)
TRIGL SERPL-MCNC: 102 MG/DL
VLDLC SERPL CALC-MCNC: 20.4 MG/DL
WBC # BLD AUTO: 5.2 K/UL (ref 3.6–11)

## 2024-05-01 ENCOUNTER — OFFICE VISIT (OUTPATIENT)
Age: 84
End: 2024-05-01
Payer: MEDICARE

## 2024-05-01 VITALS
SYSTOLIC BLOOD PRESSURE: 160 MMHG | BODY MASS INDEX: 37.31 KG/M2 | TEMPERATURE: 98.2 F | OXYGEN SATURATION: 96 % | HEART RATE: 67 BPM | DIASTOLIC BLOOD PRESSURE: 75 MMHG | HEIGHT: 61 IN | WEIGHT: 197.6 LBS | RESPIRATION RATE: 18 BRPM

## 2024-05-01 DIAGNOSIS — L03.116 CELLULITIS OF LEFT LOWER EXTREMITY: ICD-10-CM

## 2024-05-01 DIAGNOSIS — E11.65 TYPE 2 DIABETES MELLITUS WITH HYPERGLYCEMIA, WITHOUT LONG-TERM CURRENT USE OF INSULIN (HCC): Primary | ICD-10-CM

## 2024-05-01 DIAGNOSIS — I10 PRIMARY HYPERTENSION: ICD-10-CM

## 2024-05-01 DIAGNOSIS — E78.00 HYPERCHOLESTEROLEMIA: ICD-10-CM

## 2024-05-01 PROCEDURE — 3077F SYST BP >= 140 MM HG: CPT | Performed by: INTERNAL MEDICINE

## 2024-05-01 PROCEDURE — G8400 PT W/DXA NO RESULTS DOC: HCPCS | Performed by: INTERNAL MEDICINE

## 2024-05-01 PROCEDURE — 1036F TOBACCO NON-USER: CPT | Performed by: INTERNAL MEDICINE

## 2024-05-01 PROCEDURE — 3044F HG A1C LEVEL LT 7.0%: CPT | Performed by: INTERNAL MEDICINE

## 2024-05-01 PROCEDURE — 1123F ACP DISCUSS/DSCN MKR DOCD: CPT | Performed by: INTERNAL MEDICINE

## 2024-05-01 PROCEDURE — G8427 DOCREV CUR MEDS BY ELIG CLIN: HCPCS | Performed by: INTERNAL MEDICINE

## 2024-05-01 PROCEDURE — 3078F DIAST BP <80 MM HG: CPT | Performed by: INTERNAL MEDICINE

## 2024-05-01 PROCEDURE — 1090F PRES/ABSN URINE INCON ASSESS: CPT | Performed by: INTERNAL MEDICINE

## 2024-05-01 PROCEDURE — G8417 CALC BMI ABV UP PARAM F/U: HCPCS | Performed by: INTERNAL MEDICINE

## 2024-05-01 PROCEDURE — G2211 COMPLEX E/M VISIT ADD ON: HCPCS | Performed by: INTERNAL MEDICINE

## 2024-05-01 PROCEDURE — 99214 OFFICE O/P EST MOD 30 MIN: CPT | Performed by: INTERNAL MEDICINE

## 2024-05-01 RX ORDER — CEPHALEXIN 500 MG/1
500 CAPSULE ORAL 3 TIMES DAILY
Qty: 21 CAPSULE | Refills: 0 | Status: SHIPPED | OUTPATIENT
Start: 2024-05-01 | End: 2024-05-08

## 2024-05-01 NOTE — PROGRESS NOTES
[facility] DIABETES AND ENDOCRINOLOGY                 Ilda Pemberton MD            Patient Information   Date:4/21/2023   Name : Nayeli Jimenez 82 y.o.       YOB: 1940           Referred by: Mu Gray MD              History of Present Illness: Nayeli Jimenez is  here  for fu of Type 2 Diabetes Mellitus .      She is on Trulicity, Actos 15 mg  Not able to get Trulicity due to backorder  With the Actos she is controlling the blood glucose.  Left foot swelling intermittently, not so much in the right foot  No trauma    Tolerating Trulicity      History   Type 2 Diabetes was diagnosed in 10/2014 . End organ effects of diabetes:  none.     Cardiovascular risk factors: family history, dyslipidemia, diabetes mellitus    Monitoring frequency: 3 times a week.   Blood glucose fasting 130-170, rest of the daytime blood glucose are better   Had knee surgery recently   Lost her  in 2020, she goes to aerobic classes   Added Actos October 2019, decreased to 15 mg, discontinued December 2020   since discontinuing Actos, blood glucose have been little bit higher      Could not tolerate Metformin IR, XR formualtion.      Hyperlipidemia - was on tricor, zetia, colestipol, Niacin,                    Physical Examination:        General: pleasant, no distress, good eye contact    HEENT: no pallor, no periorbital edema, EOMI    Neck: supple,    Cardiovascular: regular, normal rate, normal S1 and S2    Respiratory: clear to auscultation bilaterally        Musculoskeletal: no proximal muscle weakness in upper or lower extremities    Integumentary: alert and oriented ,    Psychiatric: normal mood and affect        Diabetic foot exam ; April 2023      H/o partial or complete amputation of foot : No   H/o previous foot ulceration : No   H/o pre - ulcerative callus : No   H/o peripheral neuropathy and callus : No   H/o poor circulation  : No   Foot deformity : None     Left foot: Mild

## 2024-05-01 NOTE — PROGRESS NOTES
Nayeli Jimenez is a 83 y.o. female here for   Chief Complaint   Patient presents with    Diabetes       All medication reviewed.     Vitals:    05/01/24 1116   BP: (!) 160/75   Site: Right Upper Arm   Position: Sitting   Cuff Size: Large Adult   Pulse: 67   Resp: 18   Temp: 98.2 °F (36.8 °C)   TempSrc: Temporal   SpO2: 96%   Weight: 89.6 kg (197 lb 9.6 oz)   Height: 1.549 m (5' 1\")        1. Have you been to the ER, urgent care clinic since your last visit?  Hospitalized since your last visit? -No    2. Have you seen or consulted any other health care providers outside of the [facility] since your last visit?  Include any pap smears or colon screening.-No

## 2024-05-03 RX ORDER — FENOFIBRATE 145 MG/1
TABLET, COATED ORAL
Qty: 90 TABLET | Refills: 3 | Status: SHIPPED | OUTPATIENT
Start: 2024-05-03

## 2024-05-13 ENCOUNTER — OFFICE VISIT (OUTPATIENT)
Facility: CLINIC | Age: 84
End: 2024-05-13
Payer: MEDICARE

## 2024-05-13 VITALS
RESPIRATION RATE: 18 BRPM | DIASTOLIC BLOOD PRESSURE: 72 MMHG | OXYGEN SATURATION: 94 % | HEART RATE: 76 BPM | TEMPERATURE: 97.4 F | HEIGHT: 61 IN | BODY MASS INDEX: 36.68 KG/M2 | WEIGHT: 194.3 LBS | SYSTOLIC BLOOD PRESSURE: 140 MMHG

## 2024-05-13 DIAGNOSIS — B96.89 ACUTE BACTERIAL SINUSITIS: Primary | ICD-10-CM

## 2024-05-13 DIAGNOSIS — R05.1 ACUTE COUGH: ICD-10-CM

## 2024-05-13 DIAGNOSIS — J30.1 SEASONAL ALLERGIC RHINITIS DUE TO POLLEN: ICD-10-CM

## 2024-05-13 DIAGNOSIS — J01.90 ACUTE BACTERIAL SINUSITIS: Primary | ICD-10-CM

## 2024-05-13 PROCEDURE — G8417 CALC BMI ABV UP PARAM F/U: HCPCS | Performed by: FAMILY MEDICINE

## 2024-05-13 PROCEDURE — G8400 PT W/DXA NO RESULTS DOC: HCPCS | Performed by: FAMILY MEDICINE

## 2024-05-13 PROCEDURE — 1123F ACP DISCUSS/DSCN MKR DOCD: CPT | Performed by: FAMILY MEDICINE

## 2024-05-13 PROCEDURE — 3078F DIAST BP <80 MM HG: CPT | Performed by: FAMILY MEDICINE

## 2024-05-13 PROCEDURE — 99214 OFFICE O/P EST MOD 30 MIN: CPT | Performed by: FAMILY MEDICINE

## 2024-05-13 PROCEDURE — 1036F TOBACCO NON-USER: CPT | Performed by: FAMILY MEDICINE

## 2024-05-13 PROCEDURE — 1090F PRES/ABSN URINE INCON ASSESS: CPT | Performed by: FAMILY MEDICINE

## 2024-05-13 PROCEDURE — 3077F SYST BP >= 140 MM HG: CPT | Performed by: FAMILY MEDICINE

## 2024-05-13 PROCEDURE — G8427 DOCREV CUR MEDS BY ELIG CLIN: HCPCS | Performed by: FAMILY MEDICINE

## 2024-05-13 RX ORDER — CEFDINIR 300 MG/1
300 CAPSULE ORAL 2 TIMES DAILY
Qty: 14 CAPSULE | Refills: 0 | Status: SHIPPED | OUTPATIENT
Start: 2024-05-13 | End: 2024-05-20

## 2024-05-13 RX ORDER — BENZONATATE 200 MG/1
200 CAPSULE ORAL 3 TIMES DAILY PRN
Qty: 30 CAPSULE | Refills: 0 | Status: SHIPPED | OUTPATIENT
Start: 2024-05-13 | End: 2024-05-23

## 2024-05-13 ASSESSMENT — ENCOUNTER SYMPTOMS
SINUS PAIN: 0
COUGH: 1
DIARRHEA: 0
RHINORRHEA: 1
NAUSEA: 0

## 2024-05-13 NOTE — PROGRESS NOTES
[facility]    History of Present Illness:   Nayeli Jimenez is a 83 y.o. female with history of HTN, GERD, Type 2 diabetes, Allergies, Osteoarthritis, HLD   CC: \"I have cold\"  History provided by patient and Records    HPI:  Sinusitis: Patient presents with sinusitis symptoms over the last 8 days.  Patient reports symptoms including nasal congestion, purulent rhinorrhea, cough, sniffing, sore throats, frequent clearing of the throat and denies fevers, headaches, itchy eyes, itchy nose, epistaxis.  Patient describes pain of the Neither sinus(es).  Patient does report Viral URI prior to developing these symptoms.  Previous OTC treatments include Claritin which have been minimally effective in treating symptoms.  she reports recent sick contacts.  she does not have history of recurrent sinusitis.  - Last antibiotic use: Keflex for Cellulitis     Health Maintenance  Health Maintenance Due   Topic Date Due    DEXA (modify frequency per FRAX score)  Never done    Diabetic foot exam  06/12/2024       Past Medical, Family, and Social History:     Current Outpatient Medications on File Prior to Visit   Medication Sig Dispense Refill    fenofibrate (TRICOR) 145 MG tablet TAKE 1 TABLET DAILY 90 tablet 3    Naproxen Sodium (ALEVE) 220 MG CAPS Take by mouth      loratadine (CLARITIN) 10 MG tablet TAKE 1 TABLET DAILY 90 tablet 3    ezetimibe (ZETIA) 10 MG tablet TAKE 1 TABLET NIGHTLY 90 tablet 3    NONFORMULARY Take 1 tablet by mouth daily Preserve Vision prescribed by eye doctor      blood glucose test strips (ASCENSIA AUTODISC VI;ONE TOUCH ULTRA TEST VI) strip TEST ONCE A DAY DX CODE: E11.65 100 each 3    pioglitazone (ACTOS) 30 MG tablet TAKE 1 TABLET DAILY (Patient taking differently: 1 tablet daily Full tablet until patient can receive Trulicity) 90 tablet 3    losartan-hydroCHLOROthiazide (HYZAAR) 100-25 MG per tablet TAKE 1 TABLET DAILY 90 tablet 3    Omega-3 Fatty Acids (FISH OIL PO) Take 1 capsule

## 2024-05-13 NOTE — PROGRESS NOTES
1. \"Have you been to the ER, urgent care clinic since your last visit?  Hospitalized since your last visit?\" no    2. \"Have you seen or consulted any other health care providers outside of the [facility] since your last visit?\" no         Health Maintenance Due   Topic Date Due    DEXA (modify frequency per FRAX score)  Never done    Diabetic foot exam  06/12/2024

## 2024-05-13 NOTE — PROGRESS NOTES
Date: 5/13/24  Patient: Nayeli Jimenez  Source: self, reliable historian  CC: \"cold\"    HPI:    Acute  - coughing yellow sputum and wheezing, especially at night  - since last Monday. Got better last Thursday and got worse since then.  - Cough and sputum has gotten better since onset but the wheezing and hearing has gotten worse  - muffled hearing on both sides/ ears are full  - a little stuffy nose, but much worse than usual allergic reaction  - no fever, a little headache from the pressure/stuffiness. No muscle pain, no chest pain, a little shortness of breath/dyspnea (more like reduced maximal volume)  - sick contacts of everyone in [place]  - sinus pain, makes me want to squint  - Sudafed   - no smoking        Chronic  - Hypertension      At home 132/78. Well controlled with medications    - Diabetes      Out of Trulicity      At home, controlled.      BP (!) 140/72 (Site: Right Upper Arm, Position: Sitting, Cuff Size: Large Adult)   Pulse 76   Temp 97.4 °F (36.3 °C) (Temporal)   Resp 18   Ht 1.549 m (5' 1\")   Wt 88.1 kg (194 lb 4.8 oz)   SpO2 94%   BMI 36.71 kg/m²     Physical Exam  Constitutional:       Appearance: She is obese. She is ill-appearing.   HENT:      Right Ear: Tympanic membrane, ear canal and external ear normal.      Left Ear: Tympanic membrane, ear canal and external ear normal.      Nose:      Comments: Some erythema on the left side and swelling of the turbinates bilaterally, with the left side being larger.     Mouth/Throat:      Pharynx: Posterior oropharyngeal erythema present.   Cardiovascular:      Rate and Rhythm: Normal rate and regular rhythm.      Pulses: Normal pulses.   Pulmonary:      Breath sounds: Wheezing present.   Skin:     Findings: No rash.   Neurological:      Mental Status: She is alert.          Assessment:  Nayeli was seen today for cough and wheezing.    Diagnoses and all orders for this visit:    Acute bacterial sinusitis (based on fluctuating presentation where

## 2024-05-17 DIAGNOSIS — E11.65 TYPE 2 DIABETES MELLITUS WITH HYPERGLYCEMIA, WITHOUT LONG-TERM CURRENT USE OF INSULIN (HCC): ICD-10-CM

## 2024-05-17 RX ORDER — DULAGLUTIDE 3 MG/.5ML
INJECTION, SOLUTION SUBCUTANEOUS
Qty: 6 ML | Refills: 3 | Status: SHIPPED | OUTPATIENT
Start: 2024-05-17

## 2024-06-12 ENCOUNTER — OFFICE VISIT (OUTPATIENT)
Facility: CLINIC | Age: 84
End: 2024-06-12
Payer: MEDICARE

## 2024-06-12 VITALS
HEART RATE: 70 BPM | TEMPERATURE: 97.2 F | BODY MASS INDEX: 37.12 KG/M2 | SYSTOLIC BLOOD PRESSURE: 157 MMHG | WEIGHT: 196.6 LBS | HEIGHT: 61 IN | DIASTOLIC BLOOD PRESSURE: 79 MMHG | RESPIRATION RATE: 18 BRPM | OXYGEN SATURATION: 97 %

## 2024-06-12 DIAGNOSIS — J30.1 SEASONAL ALLERGIC RHINITIS DUE TO POLLEN: ICD-10-CM

## 2024-06-12 DIAGNOSIS — K21.9 GASTROESOPHAGEAL REFLUX DISEASE WITHOUT ESOPHAGITIS: ICD-10-CM

## 2024-06-12 DIAGNOSIS — E78.00 HYPERCHOLESTEROLEMIA: ICD-10-CM

## 2024-06-12 DIAGNOSIS — E11.65 TYPE 2 DIABETES MELLITUS WITH HYPERGLYCEMIA, WITHOUT LONG-TERM CURRENT USE OF INSULIN (HCC): ICD-10-CM

## 2024-06-12 DIAGNOSIS — E55.9 VITAMIN D DEFICIENCY: ICD-10-CM

## 2024-06-12 DIAGNOSIS — I10 PRIMARY HYPERTENSION: Primary | ICD-10-CM

## 2024-06-12 PROCEDURE — G8427 DOCREV CUR MEDS BY ELIG CLIN: HCPCS | Performed by: FAMILY MEDICINE

## 2024-06-12 PROCEDURE — 99214 OFFICE O/P EST MOD 30 MIN: CPT | Performed by: FAMILY MEDICINE

## 2024-06-12 PROCEDURE — 3044F HG A1C LEVEL LT 7.0%: CPT | Performed by: FAMILY MEDICINE

## 2024-06-12 PROCEDURE — 3077F SYST BP >= 140 MM HG: CPT | Performed by: FAMILY MEDICINE

## 2024-06-12 PROCEDURE — 3078F DIAST BP <80 MM HG: CPT | Performed by: FAMILY MEDICINE

## 2024-06-12 PROCEDURE — G8417 CALC BMI ABV UP PARAM F/U: HCPCS | Performed by: FAMILY MEDICINE

## 2024-06-12 PROCEDURE — 1036F TOBACCO NON-USER: CPT | Performed by: FAMILY MEDICINE

## 2024-06-12 PROCEDURE — 1123F ACP DISCUSS/DSCN MKR DOCD: CPT | Performed by: FAMILY MEDICINE

## 2024-06-12 PROCEDURE — 1090F PRES/ABSN URINE INCON ASSESS: CPT | Performed by: FAMILY MEDICINE

## 2024-06-12 PROCEDURE — G8400 PT W/DXA NO RESULTS DOC: HCPCS | Performed by: FAMILY MEDICINE

## 2024-06-12 SDOH — ECONOMIC STABILITY: HOUSING INSECURITY
IN THE LAST 12 MONTHS, WAS THERE A TIME WHEN YOU DID NOT HAVE A STEADY PLACE TO SLEEP OR SLEPT IN A SHELTER (INCLUDING NOW)?: NO

## 2024-06-12 SDOH — ECONOMIC STABILITY: INCOME INSECURITY: HOW HARD IS IT FOR YOU TO PAY FOR THE VERY BASICS LIKE FOOD, HOUSING, MEDICAL CARE, AND HEATING?: NOT VERY HARD

## 2024-06-12 SDOH — ECONOMIC STABILITY: FOOD INSECURITY: WITHIN THE PAST 12 MONTHS, THE FOOD YOU BOUGHT JUST DIDN'T LAST AND YOU DIDN'T HAVE MONEY TO GET MORE.: NEVER TRUE

## 2024-06-12 SDOH — ECONOMIC STABILITY: FOOD INSECURITY: WITHIN THE PAST 12 MONTHS, YOU WORRIED THAT YOUR FOOD WOULD RUN OUT BEFORE YOU GOT MONEY TO BUY MORE.: NEVER TRUE

## 2024-06-12 ASSESSMENT — ENCOUNTER SYMPTOMS
ABDOMINAL PAIN: 0
CHOKING: 0
CHEST TIGHTNESS: 0

## 2024-06-12 NOTE — PROGRESS NOTES
\"Have you been to the ER, urgent care clinic since your last visit?  Hospitalized since your last visit?\"    NO    “Have you seen or consulted any other health care providers outside of [facility] since your last visit?”    NO            Click Here for Release of Records Request   
       Lab Results   Component Value Date    WBC 5.2 04/23/2024    HGB 12.6 04/23/2024    HCT 41.4 04/23/2024    MCV 90.4 04/23/2024     04/23/2024         Lab Results   Component Value Date     04/23/2024    K 3.8 04/23/2024     04/23/2024    CO2 32 04/23/2024    BUN 25 (H) 04/23/2024    CREATININE 0.96 04/23/2024    GLUCOSE 131 (H) 04/23/2024    CALCIUM 9.8 04/23/2024    BILITOT 0.5 04/23/2024    ALKPHOS 54 04/23/2024    AST 12 (L) 04/23/2024    ALT 17 04/23/2024    LABGLOM 59 (L) 04/23/2024    GFRAA 56 (L) 09/06/2022    AGRATIO 1.2 04/12/2023    GLOB 3.5 04/23/2024        Lab Results   Component Value Date    CHOL 123 04/23/2024    TRIG 102 04/23/2024    HDL 63 04/23/2024    VLDL 20.4 04/23/2024    CHOLHDLRATIO 2.0 04/23/2024         Health Maintenance  There are no preventive care reminders to display for this patient.      Past Medical, Family, and Social History:     Current Outpatient Medications on File Prior to Visit   Medication Sig Dispense Refill    TRULICITY 3 MG/0.5ML SOPN INJECT 3 MG UNDER THE SKIN EVERY 7 DAYS (STOP 1.5 MG) 6 mL 3    fenofibrate (TRICOR) 145 MG tablet TAKE 1 TABLET DAILY 90 tablet 3    Naproxen Sodium (ALEVE) 220 MG CAPS Take by mouth      loratadine (CLARITIN) 10 MG tablet TAKE 1 TABLET DAILY 90 tablet 3    ezetimibe (ZETIA) 10 MG tablet TAKE 1 TABLET NIGHTLY 90 tablet 3    esomeprazole (NEXIUM) 40 MG delayed release capsule TAKE 1 CAPSULE DAILY 90 capsule 3    NONFORMULARY Take 1 tablet by mouth daily Preserve Vision prescribed by eye doctor      blood glucose test strips (ASCENSIA AUTODISC VI;ONE TOUCH ULTRA TEST VI) strip TEST ONCE A DAY DX CODE: E11.65 100 each 3    cloNIDine (CATAPRES) 0.1 MG tablet TAKE 1 TABLET DAILY AT NOON WITH LUNCH 90 tablet 3    dilTIAZem (CARDIZEM CD) 180 MG extended release capsule TAKE 1 CAPSULE DAILY 90 capsule 3    pioglitazone (ACTOS) 30 MG tablet TAKE 1 TABLET DAILY (Patient taking differently: 1 tablet daily Full tablet until

## 2024-06-27 ENCOUNTER — TELEPHONE (OUTPATIENT)
Facility: CLINIC | Age: 84
End: 2024-06-27

## 2024-06-27 NOTE — TELEPHONE ENCOUNTER
Spoke with pt   Her hand is swollen no color change and tempeture is normal  She will keep elevated and if condition worsens she will notify office for further instructions. Has an ortho appt tomorrow

## 2024-06-27 NOTE — TELEPHONE ENCOUNTER
Pt called stating she fell yesterday and broke her wrist, pt was taken to [facility] and a soft cast was put on. Pt stated she has a appointment with [facility] on Friday but her wrist is very very swollen. Pt is inquiring if that much swelling is to be expected. Pt is requesting a call back.    Please advise..

## 2024-07-02 ENCOUNTER — OFFICE VISIT (OUTPATIENT)
Facility: CLINIC | Age: 84
End: 2024-07-02
Payer: MEDICARE

## 2024-07-02 VITALS
OXYGEN SATURATION: 95 % | SYSTOLIC BLOOD PRESSURE: 135 MMHG | HEART RATE: 70 BPM | RESPIRATION RATE: 18 BRPM | BODY MASS INDEX: 38.74 KG/M2 | WEIGHT: 205.2 LBS | DIASTOLIC BLOOD PRESSURE: 74 MMHG | HEIGHT: 61 IN | TEMPERATURE: 98.3 F

## 2024-07-02 DIAGNOSIS — I10 PRIMARY HYPERTENSION: ICD-10-CM

## 2024-07-02 DIAGNOSIS — E11.21 TYPE 2 DIABETES MELLITUS WITH NEPHROPATHY (HCC): ICD-10-CM

## 2024-07-02 DIAGNOSIS — Z01.818 PRE-OP EVALUATION: ICD-10-CM

## 2024-07-02 DIAGNOSIS — S52.502A DISPLACED FRACTURE OF DISTAL END OF LEFT RADIUS: Primary | ICD-10-CM

## 2024-07-02 LAB
INR PPP: 1.1 (ref 0.9–1.1)
PROTHROMBIN TIME: 11.5 SEC (ref 9–11.1)

## 2024-07-02 PROCEDURE — G8400 PT W/DXA NO RESULTS DOC: HCPCS

## 2024-07-02 PROCEDURE — 1036F TOBACCO NON-USER: CPT

## 2024-07-02 PROCEDURE — G8428 CUR MEDS NOT DOCUMENT: HCPCS

## 2024-07-02 PROCEDURE — 99213 OFFICE O/P EST LOW 20 MIN: CPT

## 2024-07-02 PROCEDURE — 3044F HG A1C LEVEL LT 7.0%: CPT

## 2024-07-02 PROCEDURE — 93000 ELECTROCARDIOGRAM COMPLETE: CPT

## 2024-07-02 PROCEDURE — G8417 CALC BMI ABV UP PARAM F/U: HCPCS

## 2024-07-02 PROCEDURE — 3078F DIAST BP <80 MM HG: CPT

## 2024-07-02 PROCEDURE — 1123F ACP DISCUSS/DSCN MKR DOCD: CPT

## 2024-07-02 PROCEDURE — 1090F PRES/ABSN URINE INCON ASSESS: CPT

## 2024-07-02 PROCEDURE — 3075F SYST BP GE 130 - 139MM HG: CPT

## 2024-07-02 RX ORDER — HYDROCODONE BITARTRATE AND ACETAMINOPHEN 5; 325 MG/1; MG/1
TABLET ORAL
COMMUNITY
Start: 2024-06-26

## 2024-07-02 RX ORDER — LOSARTAN POTASSIUM AND HYDROCHLOROTHIAZIDE 25; 100 MG/1; MG/1
TABLET ORAL
Qty: 90 TABLET | Refills: 3 | Status: SHIPPED | OUTPATIENT
Start: 2024-07-02

## 2024-07-02 RX ORDER — CLOBETASOL PROPIONATE 0.5 MG/G
CREAM TOPICAL
COMMUNITY
Start: 2024-06-04

## 2024-07-02 NOTE — PROGRESS NOTES
\"Have you been to the ER, urgent care clinic since your last visit?  Hospitalized since your last visit?\"    NO    “Have you seen or consulted any other health care providers outside of [facility] since your last visit?”    NO            Click Here for Release of Records Request

## 2024-07-02 NOTE — PROGRESS NOTES
[facility] Medicine [facility]   [facility]    Subjective:  CC:   Chief Complaint   Patient presents with    EKG Only     EKG and blood work       HPI:  Nayeli Jimenez is 83 y.o. female who presents today for pre-op evaluation.     She is having surgical repair of distal left radial fracture.     Objective:    Vitals:    07/02/24 1308 07/02/24 1337   BP: (!) 147/74 135/74   Site: Right Upper Arm    Position: Sitting    Cuff Size: Large Adult    Pulse: 70    Resp: 18    Temp: 98.3 °F (36.8 °C)    TempSrc: Oral    SpO2: 95%    Weight: 93.1 kg (205 lb 3.2 oz)    Height: 1.549 m (5' 1\")        Physical Exam  Vitals and nursing note reviewed.   Constitutional:       General: She is not in acute distress.     Appearance: She is not ill-appearing.   Cardiovascular:      Rate and Rhythm: Normal rate and regular rhythm.      Heart sounds: Normal heart sounds.   Pulmonary:      Effort: Pulmonary effort is normal.      Breath sounds: Normal breath sounds.   Musculoskeletal:      Right lower leg: No edema.      Left lower leg: No edema.   Skin:     General: Skin is warm and dry.   Neurological:      Mental Status: She is alert.   Psychiatric:         Mood and Affect: Mood normal.         Behavior: Behavior normal.         No results found for this or any previous visit (from the past 12 hour(s)).  All imaging and labs ordered in clinic personally reviewed by me.     Assessment and Plan:    EKG obtained today in office. NSR, rate 70, first degree AV block.   Attempted to call [facility] to verify which labs they would need, but unable to reach them.   Will obtain CBC, CMP, A1c, Lipids previously ordered by PCP.   Patient is acceptable risk for this surgery.     Follow up: Return if symptoms worsen or fail to improve.      Diagnosis Orders   1. Displaced fracture of distal end of left radius        2. Pre-op evaluation  AMB POC EKG ROUTINE      3. Type 2 diabetes mellitus with nephropathy (HCC)        4.

## 2024-07-03 LAB
ANION GAP SERPL CALC-SCNC: 6 MMOL/L (ref 5–15)
BASOPHILS # BLD: 0 K/UL (ref 0–0.1)
BASOPHILS NFR BLD: 0 % (ref 0–1)
BUN SERPL-MCNC: 27 MG/DL (ref 6–20)
BUN/CREAT SERPL: 26 (ref 12–20)
CALCIUM SERPL-MCNC: 9.9 MG/DL (ref 8.5–10.1)
CHLORIDE SERPL-SCNC: 102 MMOL/L (ref 97–108)
CO2 SERPL-SCNC: 31 MMOL/L (ref 21–32)
CREAT SERPL-MCNC: 1.04 MG/DL (ref 0.55–1.02)
DIFFERENTIAL METHOD BLD: ABNORMAL
EOSINOPHIL # BLD: 0.2 K/UL (ref 0–0.4)
EOSINOPHIL NFR BLD: 4 % (ref 0–7)
ERYTHROCYTE [DISTWIDTH] IN BLOOD BY AUTOMATED COUNT: 16 % (ref 11.5–14.5)
GLUCOSE SERPL-MCNC: 143 MG/DL (ref 65–100)
HCT VFR BLD AUTO: 40.3 % (ref 35–47)
HGB BLD-MCNC: 12.7 G/DL (ref 11.5–16)
IMM GRANULOCYTES # BLD AUTO: 0 K/UL (ref 0–0.04)
IMM GRANULOCYTES NFR BLD AUTO: 0 % (ref 0–0.5)
LYMPHOCYTES # BLD: 1.3 K/UL (ref 0.8–3.5)
LYMPHOCYTES NFR BLD: 21 % (ref 12–49)
MCH RBC QN AUTO: 27.5 PG (ref 26–34)
MCHC RBC AUTO-ENTMCNC: 31.5 G/DL (ref 30–36.5)
MCV RBC AUTO: 87.4 FL (ref 80–99)
MONOCYTES # BLD: 0.6 K/UL (ref 0–1)
MONOCYTES NFR BLD: 10 % (ref 5–13)
NEUTS SEG # BLD: 4 K/UL (ref 1.8–8)
NEUTS SEG NFR BLD: 65 % (ref 32–75)
NRBC # BLD: 0 K/UL (ref 0–0.01)
NRBC BLD-RTO: 0 PER 100 WBC
PLATELET # BLD AUTO: 320 K/UL (ref 150–400)
PMV BLD AUTO: 9.9 FL (ref 8.9–12.9)
POTASSIUM SERPL-SCNC: 4 MMOL/L (ref 3.5–5.1)
RBC # BLD AUTO: 4.61 M/UL (ref 3.8–5.2)
SODIUM SERPL-SCNC: 139 MMOL/L (ref 136–145)
WBC # BLD AUTO: 6.1 K/UL (ref 3.6–11)

## 2024-07-15 PROBLEM — L03.119 CELLULITIS OF LOWER EXTREMITY: Status: ACTIVE | Noted: 2024-07-15

## 2024-07-23 DIAGNOSIS — E11.65 TYPE 2 DIABETES MELLITUS WITH HYPERGLYCEMIA, WITHOUT LONG-TERM CURRENT USE OF INSULIN (HCC): ICD-10-CM

## 2024-07-23 RX ORDER — PIOGLITAZONEHYDROCHLORIDE 30 MG/1
30 TABLET ORAL DAILY
Qty: 90 TABLET | Refills: 3 | Status: SHIPPED | OUTPATIENT
Start: 2024-07-23

## 2024-08-01 ENCOUNTER — OFFICE VISIT (OUTPATIENT)
Age: 84
End: 2024-08-01

## 2024-08-01 VITALS
SYSTOLIC BLOOD PRESSURE: 146 MMHG | OXYGEN SATURATION: 96 % | HEIGHT: 61 IN | DIASTOLIC BLOOD PRESSURE: 79 MMHG | HEART RATE: 75 BPM | WEIGHT: 193.6 LBS | RESPIRATION RATE: 17 BRPM | TEMPERATURE: 98.1 F | BODY MASS INDEX: 36.55 KG/M2

## 2024-08-01 DIAGNOSIS — I10 PRIMARY HYPERTENSION: ICD-10-CM

## 2024-08-01 DIAGNOSIS — E11.65 TYPE 2 DIABETES MELLITUS WITH HYPERGLYCEMIA, WITHOUT LONG-TERM CURRENT USE OF INSULIN (HCC): Primary | ICD-10-CM

## 2024-08-01 DIAGNOSIS — L03.119 CELLULITIS OF LOWER EXTREMITY, UNSPECIFIED LATERALITY: ICD-10-CM

## 2024-08-01 DIAGNOSIS — E78.00 HYPERCHOLESTEROLEMIA: ICD-10-CM

## 2024-08-01 LAB — HBA1C MFR BLD: 6.3 %

## 2024-08-01 NOTE — PROGRESS NOTES
Nayeli Jimenez is a 83 y.o. female here for   Chief Complaint   Patient presents with    Diabetes       All medication reviewed.     Vitals:    08/01/24 1137   BP: (!) 146/79   Site: Right Upper Arm   Position: Sitting   Cuff Size: Medium Adult   Pulse: 75   Resp: 17   Temp: 98.1 °F (36.7 °C)   TempSrc: Temporal   SpO2: 96%   Weight: 87.8 kg (193 lb 9.6 oz)   Height: 1.549 m (5' 1\")        1. Have you been to the ER, urgent care clinic since your last visit?  Hospitalized since your last visit? -Seen in the [facility] after a fall for a broken left wrist.    2. Have you seen or consulted any other health care providers outside of the [facility] since your last visit?  Include any pap smears or colon screening.-Seen by Bre Curtis by Wesly Jefferson DO for surgical follow up.

## 2024-08-01 NOTE — PROGRESS NOTES
[facility] DIABETES AND ENDOCRINOLOGY                 Ilda Pemberton MD                Referred by: Mu Gray MD              History of Present Illness: Nayeli Jimenez is  here  for fu of Type 2 Diabetes Mellitus .      She is Actos 30 mg  Not able to get Trulicity due to backorder  With the Actos she is controlling the blood glucose.   No foot swelling         History   Type 2 Diabetes was diagnosed in 10/2014 . End organ effects of diabetes:  none.     Cardiovascular risk factors: family history, dyslipidemia, diabetes mellitus    Monitoring frequency: 3 times a week.   Blood glucose fasting 130-170, rest of the daytime blood glucose are better   Had knee surgery recently   Lost her  in 2020, she goes to aerobic classes   Added Actos October 2019, decreased to 15 mg, discontinued December 2020   since discontinuing Actos, blood glucose have been little bit higher      Could not tolerate Metformin IR, XR formualtion.      Hyperlipidemia - was on tricor, zetia, colestipol, Niacin,                    Physical Examination:        General: pleasant, no distress, good eye contact    HEENT: no pallor, no periorbital edema, EOMI    Neck: supple,    Cardiovascular: regular, normal rate, normal S1 and S2    Respiratory: clear to auscultation bilaterally        Musculoskeletal: no proximal muscle weakness in upper or lower extremities    Integumentary: alert and oriented ,    Psychiatric: normal mood and affect        Diabetic foot exam ; April 2023      H/o partial or complete amputation of foot : No   H/o previous foot ulceration : No   H/o pre - ulcerative callus : No   H/o peripheral neuropathy and callus : No   H/o poor circulation  : No   Foot deformity : None     Left foot: Mild cellulitis, intermittent edema, she does not think it worsened after being on pioglitazone  Limited activity,   Data Reviewed:            Lab Results   Component Value Date    GFRAA 56 (L) 09/06/2022

## 2024-08-16 RX ORDER — CLONIDINE HYDROCHLORIDE 0.1 MG/1
TABLET ORAL
Qty: 90 TABLET | Refills: 3 | Status: SHIPPED | OUTPATIENT
Start: 2024-08-16

## 2024-09-12 ENCOUNTER — OFFICE VISIT (OUTPATIENT)
Facility: CLINIC | Age: 84
End: 2024-09-12

## 2024-09-12 VITALS
HEIGHT: 61 IN | TEMPERATURE: 97.6 F | HEART RATE: 74 BPM | SYSTOLIC BLOOD PRESSURE: 141 MMHG | WEIGHT: 196 LBS | OXYGEN SATURATION: 95 % | RESPIRATION RATE: 18 BRPM | BODY MASS INDEX: 37 KG/M2 | DIASTOLIC BLOOD PRESSURE: 82 MMHG

## 2024-09-12 DIAGNOSIS — E78.00 HYPERCHOLESTEROLEMIA: ICD-10-CM

## 2024-09-12 DIAGNOSIS — K21.9 GASTROESOPHAGEAL REFLUX DISEASE WITHOUT ESOPHAGITIS: ICD-10-CM

## 2024-09-12 DIAGNOSIS — Z23 IMMUNIZATION DUE: ICD-10-CM

## 2024-09-12 DIAGNOSIS — I10 PRIMARY HYPERTENSION: Primary | ICD-10-CM

## 2024-09-12 DIAGNOSIS — S52.502A DISPLACED FRACTURE OF DISTAL END OF LEFT RADIUS: ICD-10-CM

## 2024-09-12 DIAGNOSIS — E66.01 MORBID OBESITY (HCC): ICD-10-CM

## 2024-09-12 DIAGNOSIS — E55.9 VITAMIN D DEFICIENCY: ICD-10-CM

## 2024-09-12 DIAGNOSIS — E11.65 TYPE 2 DIABETES MELLITUS WITH HYPERGLYCEMIA, WITHOUT LONG-TERM CURRENT USE OF INSULIN (HCC): ICD-10-CM

## 2024-09-12 DIAGNOSIS — J30.1 SEASONAL ALLERGIC RHINITIS DUE TO POLLEN: ICD-10-CM

## 2024-09-12 ASSESSMENT — ENCOUNTER SYMPTOMS
ABDOMINAL DISTENTION: 0
APNEA: 0
CHEST TIGHTNESS: 0

## 2024-10-03 RX ORDER — DILTIAZEM HYDROCHLORIDE 180 MG/1
CAPSULE, COATED, EXTENDED RELEASE ORAL
Qty: 90 CAPSULE | Refills: 3 | Status: SHIPPED | OUTPATIENT
Start: 2024-10-03

## 2024-10-07 ENCOUNTER — HOSPITAL ENCOUNTER (OUTPATIENT)
Facility: HOSPITAL | Age: 84
Discharge: HOME | End: 2024-10-10
Attending: FAMILY MEDICINE
Payer: MEDICARE

## 2024-10-07 DIAGNOSIS — Z12.31 SCREENING MAMMOGRAM FOR BREAST CANCER: ICD-10-CM

## 2024-10-07 PROCEDURE — 77063 BREAST TOMOSYNTHESIS BI: CPT

## 2024-11-07 RX ORDER — ESOMEPRAZOLE MAGNESIUM 40 MG/1
40 CAPSULE, DELAYED RELEASE ORAL DAILY
Qty: 90 CAPSULE | Refills: 3 | Status: SHIPPED | OUTPATIENT
Start: 2024-11-07

## 2024-11-29 DIAGNOSIS — I10 PRIMARY HYPERTENSION: ICD-10-CM

## 2024-11-29 DIAGNOSIS — E11.65 TYPE 2 DIABETES MELLITUS WITH HYPERGLYCEMIA, WITHOUT LONG-TERM CURRENT USE OF INSULIN (HCC): Primary | ICD-10-CM

## 2024-11-29 DIAGNOSIS — E11.65 TYPE 2 DIABETES MELLITUS WITH HYPERGLYCEMIA, WITHOUT LONG-TERM CURRENT USE OF INSULIN (HCC): ICD-10-CM

## 2024-11-29 DIAGNOSIS — E55.9 VITAMIN D DEFICIENCY: ICD-10-CM

## 2024-11-29 DIAGNOSIS — E78.00 HYPERCHOLESTEROLEMIA: ICD-10-CM

## 2024-12-02 LAB
25(OH)D3 SERPL-MCNC: 48.1 NG/ML (ref 30–100)
ALBUMIN SERPL-MCNC: 3.7 G/DL (ref 3.5–5)
ALBUMIN/GLOB SERPL: 1.2 (ref 1.1–2.2)
ALP SERPL-CCNC: 53 U/L (ref 45–117)
ALT SERPL-CCNC: 18 U/L (ref 12–78)
ANION GAP SERPL CALC-SCNC: 7 MMOL/L (ref 2–12)
AST SERPL-CCNC: 14 U/L (ref 15–37)
BILIRUB SERPL-MCNC: 0.6 MG/DL (ref 0.2–1)
BUN SERPL-MCNC: 26 MG/DL (ref 6–20)
BUN/CREAT SERPL: 25 (ref 12–20)
CALCIUM SERPL-MCNC: 9.4 MG/DL (ref 8.5–10.1)
CHLORIDE SERPL-SCNC: 106 MMOL/L (ref 97–108)
CHOLEST SERPL-MCNC: 149 MG/DL
CO2 SERPL-SCNC: 29 MMOL/L (ref 21–32)
CREAT SERPL-MCNC: 1.03 MG/DL (ref 0.55–1.02)
ERYTHROCYTE [DISTWIDTH] IN BLOOD BY AUTOMATED COUNT: 15 % (ref 11.5–14.5)
EST. AVERAGE GLUCOSE BLD GHB EST-MCNC: 151 MG/DL
GLOBULIN SER CALC-MCNC: 3.1 G/DL (ref 2–4)
GLUCOSE SERPL-MCNC: 136 MG/DL (ref 65–100)
HBA1C MFR BLD: 6.9 % (ref 4–5.6)
HCT VFR BLD AUTO: 41.2 % (ref 35–47)
HDLC SERPL-MCNC: 64 MG/DL
HDLC SERPL: 2.3 (ref 0–5)
HGB BLD-MCNC: 13 G/DL (ref 11.5–16)
LDLC SERPL CALC-MCNC: 59.8 MG/DL (ref 0–100)
MCH RBC QN AUTO: 28 PG (ref 26–34)
MCHC RBC AUTO-ENTMCNC: 31.6 G/DL (ref 30–36.5)
MCV RBC AUTO: 88.6 FL (ref 80–99)
NRBC # BLD: 0 K/UL (ref 0–0.01)
NRBC BLD-RTO: 0 PER 100 WBC
PLATELET # BLD AUTO: 247 K/UL (ref 150–400)
PMV BLD AUTO: 10 FL (ref 8.9–12.9)
POTASSIUM SERPL-SCNC: 3.8 MMOL/L (ref 3.5–5.1)
PROT SERPL-MCNC: 6.8 G/DL (ref 6.4–8.2)
RBC # BLD AUTO: 4.65 M/UL (ref 3.8–5.2)
SODIUM SERPL-SCNC: 142 MMOL/L (ref 136–145)
TRIGL SERPL-MCNC: 126 MG/DL
VLDLC SERPL CALC-MCNC: 25.2 MG/DL
WBC # BLD AUTO: 5.8 K/UL (ref 3.6–11)

## 2024-12-04 ENCOUNTER — OFFICE VISIT (OUTPATIENT)
Age: 84
End: 2024-12-04

## 2024-12-04 VITALS
BODY MASS INDEX: 38.08 KG/M2 | OXYGEN SATURATION: 96 % | HEART RATE: 78 BPM | SYSTOLIC BLOOD PRESSURE: 164 MMHG | WEIGHT: 201.7 LBS | TEMPERATURE: 97.3 F | HEIGHT: 61 IN | DIASTOLIC BLOOD PRESSURE: 77 MMHG

## 2024-12-04 DIAGNOSIS — E11.65 TYPE 2 DIABETES MELLITUS WITH HYPERGLYCEMIA, WITHOUT LONG-TERM CURRENT USE OF INSULIN (HCC): Primary | ICD-10-CM

## 2024-12-04 DIAGNOSIS — I10 PRIMARY HYPERTENSION: ICD-10-CM

## 2024-12-04 DIAGNOSIS — E78.00 HYPERCHOLESTEROLEMIA: ICD-10-CM

## 2024-12-04 DIAGNOSIS — N18.31 STAGE 3A CHRONIC KIDNEY DISEASE (HCC): ICD-10-CM

## 2024-12-04 RX ORDER — DULAGLUTIDE 1.5 MG/.5ML
1.5 INJECTION, SOLUTION SUBCUTANEOUS WEEKLY
Qty: 12 ADJUSTABLE DOSE PRE-FILLED PEN SYRINGE | Refills: 1 | Status: SHIPPED | OUTPATIENT
Start: 2024-12-04

## 2024-12-04 RX ORDER — GLUCOSAMINE HCL/CHONDROITIN SU 500-400 MG
CAPSULE ORAL
Qty: 300 STRIP | Refills: 3 | Status: SHIPPED | OUTPATIENT
Start: 2024-12-04

## 2024-12-04 NOTE — PROGRESS NOTES
[facility] DIABETES AND ENDOCRINOLOGY                 Ilda Pemberton MD                Referred by: Mu Gray MD             Nayeli Jimenez is  here  for fu of Type 2 Diabetes Mellitus diagnosed in 2014.   History of Present Illness    She reports an increase in A1c levels due to inability to obtain Trulicity for over a year. Last delivery was in 10/2023. She has been managing diabetes with Actos She wishes to resume Trulicity,                 Could not tolerate Metformin IR, XR formualtion.                    Physical Examination:        General: pleasant, no distress, good eye contact    HEENT: no pallor, no periorbital edema, EOMI    Neck: supple,    Cardiovascular: regular, normal rate, normal S1 and S2    Respiratory: clear to auscultation bilaterally        Musculoskeletal: no proximal muscle weakness in upper or lower extremities    Integumentary: alert and oriented ,    Psychiatric: normal mood and affect        Diabetic foot exam ; April 2023      H/o partial or complete amputation of foot : No   H/o previous foot ulceration : No   H/o pre - ulcerative callus : No   H/o peripheral neuropathy and callus : No   H/o poor circulation  : No   Foot deformity : None     Left foot: Mild cellulitis, intermittent edema, she does not think it worsened after being on pioglitazone  Limited activity,   Data Reviewed:            Lab Results   Component Value Date    GFRAA 56 (L) 09/06/2022        Lab Results   Component Value Date    LABA1C 6.9 (H) 11/29/2024    LABA1C 6.4 (H) 04/23/2024    LABA1C 6.0 (H) 01/24/2024         Lab Results   Component Value Date    TRIG 126 11/29/2024    HDL 64 11/29/2024     11/29/2024    K 3.8 11/29/2024     11/29/2024    CO2 29 11/29/2024    BUN 26 (H) 11/29/2024    CREATININE 1.03 (H) 11/29/2024    GFRAA 56 (L) 09/06/2022    GLUCOSE 136 (H) 11/29/2024    CALCIUM 9.4 11/29/2024    MALBCR 712 (H) 04/23/2024      Lab Results   Component Value Date

## 2024-12-04 NOTE — PROGRESS NOTES
Nayeli Jimenez is a 83 y.o. female here for No chief complaint on file.      1. Have you been to the ER, urgent care clinic since your last visit?  Hospitalized since your last visit? -No    2. Have you seen or consulted any other health care providers outside of the [facility] since your last visit?  Include any pap smears or colon screening.-No

## 2024-12-12 ENCOUNTER — OFFICE VISIT (OUTPATIENT)
Facility: CLINIC | Age: 84
End: 2024-12-12

## 2024-12-12 VITALS
DIASTOLIC BLOOD PRESSURE: 66 MMHG | RESPIRATION RATE: 18 BRPM | SYSTOLIC BLOOD PRESSURE: 145 MMHG | BODY MASS INDEX: 37.53 KG/M2 | WEIGHT: 198.8 LBS | HEIGHT: 61 IN | HEART RATE: 90 BPM | TEMPERATURE: 97.5 F

## 2024-12-12 DIAGNOSIS — I10 PRIMARY HYPERTENSION: Primary | ICD-10-CM

## 2024-12-12 DIAGNOSIS — K21.9 GASTROESOPHAGEAL REFLUX DISEASE WITHOUT ESOPHAGITIS: ICD-10-CM

## 2024-12-12 DIAGNOSIS — E55.9 VITAMIN D DEFICIENCY: ICD-10-CM

## 2024-12-12 DIAGNOSIS — E78.00 HYPERCHOLESTEROLEMIA: ICD-10-CM

## 2024-12-12 DIAGNOSIS — G89.29 CHRONIC PAIN OF LEFT KNEE: ICD-10-CM

## 2024-12-12 DIAGNOSIS — E11.21 TYPE 2 DIABETES MELLITUS WITH NEPHROPATHY (HCC): ICD-10-CM

## 2024-12-12 DIAGNOSIS — M25.562 CHRONIC PAIN OF LEFT KNEE: ICD-10-CM

## 2024-12-12 DIAGNOSIS — N18.31 STAGE 3A CHRONIC KIDNEY DISEASE (HCC): ICD-10-CM

## 2024-12-12 DIAGNOSIS — E11.65 TYPE 2 DIABETES MELLITUS WITH HYPERGLYCEMIA, WITHOUT LONG-TERM CURRENT USE OF INSULIN (HCC): ICD-10-CM

## 2024-12-12 DIAGNOSIS — E66.01 SEVERE OBESITY (BMI 35.0-39.9) WITH COMORBIDITY: ICD-10-CM

## 2024-12-12 ASSESSMENT — ENCOUNTER SYMPTOMS
APNEA: 0
ABDOMINAL DISTENTION: 0
ABDOMINAL PAIN: 0
CHEST TIGHTNESS: 0

## 2024-12-12 NOTE — PROGRESS NOTES
\"Have you been to the ER, urgent care clinic since your last visit?  Hospitalized since your last visit?\"    NO    “Have you seen or consulted any other health care providers outside of [facility] since your last visit?”    NO            Click Here for Release of Records Request   
Pulses: Normal pulses.      Heart sounds: Normal heart sounds. No murmur heard.     No friction rub. No gallop.   Pulmonary:      Effort: Pulmonary effort is normal.      Breath sounds: Normal breath sounds.   Abdominal:      General: Abdomen is flat. Bowel sounds are normal.      Palpations: Abdomen is soft.   Musculoskeletal:      Cervical back: Normal range of motion and neck supple.      Left knee: Tenderness present over the patellar tendon.   Skin:     General: Skin is warm and dry.   Neurological:      General: No focal deficit present.      Mental Status: She is alert and oriented to person, place, and time.          Pertinent Labs/Studies:      Assessment and orders:       ICD-10-CM    1. Primary hypertension  I10 Comprehensive Metabolic Panel     CBC      2. Type 2 diabetes mellitus with hyperglycemia, without long-term current use of insulin ([facility])  E11.65 Hemoglobin A1C      3. Type 2 diabetes mellitus with nephropathy ([facility])  E11.21       4. Gastroesophageal reflux disease without esophagitis  K21.9       5. Stage 3a chronic kidney disease (HCC)  N18.31 Vitamin D 25 Hydroxy      6. Hypercholesterolemia  E78.00 Lipid Panel      7. Severe obesity (BMI 35.0-39.9) with comorbidity  E66.01       8. Vitamin D deficiency  E55.9 Vitamin D 25 Hydroxy          1. Primary hypertension  Continue Clonidine, Diltiazem, Losartan-HCTZ,     2. Type 2 diabetes mellitus with hyperglycemia, without long-term current use of insulin ([facility])  Discussed with patient today that the goal for their diabetes is to have a HgA1C<7 and ideally as close to 6.5 as possible.  We discussed diet and medications.  The goal for the cholesterol LDL is less than 70 and HDL>40.  Patient is aware of the need for yearly eye exams and to take care of their feet daily.  Discussed with patient that blood pressure should be less than 130/80 and watching salt intake is very important.      3. Type 2 diabetes mellitus with nephropathy (HCC)    4.

## 2024-12-31 DIAGNOSIS — E78.00 HYPERCHOLESTEROLEMIA: Primary | ICD-10-CM

## 2024-12-31 RX ORDER — FENOFIBRATE 145 MG/1
145 TABLET, COATED ORAL DAILY
Qty: 90 TABLET | Refills: 3 | Status: SHIPPED | OUTPATIENT
Start: 2024-12-31

## 2025-01-22 ENCOUNTER — OFFICE VISIT (OUTPATIENT)
Facility: CLINIC | Age: 85
End: 2025-01-22

## 2025-01-22 VITALS
HEIGHT: 61 IN | WEIGHT: 199 LBS | OXYGEN SATURATION: 96 % | HEART RATE: 66 BPM | SYSTOLIC BLOOD PRESSURE: 150 MMHG | RESPIRATION RATE: 18 BRPM | BODY MASS INDEX: 37.57 KG/M2 | DIASTOLIC BLOOD PRESSURE: 72 MMHG | TEMPERATURE: 97.2 F

## 2025-01-22 DIAGNOSIS — I10 PRIMARY HYPERTENSION: ICD-10-CM

## 2025-01-22 DIAGNOSIS — M70.72 ISCHIOGLUTEAL BURSITIS OF LEFT SIDE: Primary | ICD-10-CM

## 2025-01-22 DIAGNOSIS — E78.00 HYPERCHOLESTEROLEMIA: ICD-10-CM

## 2025-01-22 RX ORDER — PREDNISONE 20 MG/1
40 TABLET ORAL DAILY
Qty: 10 TABLET | Refills: 0 | Status: SHIPPED | OUTPATIENT
Start: 2025-01-22 | End: 2025-01-27

## 2025-01-22 RX ORDER — EZETIMIBE 10 MG/1
10 TABLET ORAL NIGHTLY
Qty: 90 TABLET | Refills: 3 | Status: SHIPPED | OUTPATIENT
Start: 2025-01-22

## 2025-01-22 SDOH — ECONOMIC STABILITY: FOOD INSECURITY: WITHIN THE PAST 12 MONTHS, THE FOOD YOU BOUGHT JUST DIDN'T LAST AND YOU DIDN'T HAVE MONEY TO GET MORE.: NEVER TRUE

## 2025-01-22 SDOH — ECONOMIC STABILITY: FOOD INSECURITY: WITHIN THE PAST 12 MONTHS, YOU WORRIED THAT YOUR FOOD WOULD RUN OUT BEFORE YOU GOT MONEY TO BUY MORE.: NEVER TRUE

## 2025-01-22 ASSESSMENT — PATIENT HEALTH QUESTIONNAIRE - PHQ9
2. FEELING DOWN, DEPRESSED OR HOPELESS: NOT AT ALL
SUM OF ALL RESPONSES TO PHQ QUESTIONS 1-9: 0
SUM OF ALL RESPONSES TO PHQ9 QUESTIONS 1 & 2: 0
1. LITTLE INTEREST OR PLEASURE IN DOING THINGS: NOT AT ALL

## 2025-01-22 ASSESSMENT — ENCOUNTER SYMPTOMS
CHEST TIGHTNESS: 0
APNEA: 0

## 2025-01-22 NOTE — PROGRESS NOTES
[facility]    History of Present Illness:   Nayeli Jimenez is a 84 y.o. female with history of HTN, GERD, Type 2 diabetes, Allergies, Osteoarthritis, HLD   CC: Follow up  History provided by patient and Records    HPI:  Hip Pain:  Reports pain of the Left Lateral and Posterior.  Pain secondary to unknown that occurred during no specific injury. Overall symptoms are show no change.  Location: Left Posterior and lateral position  Duration: Pain/Injury started 1 weeks  ago.  Pain lasts Comes.  Quality: aching in characteristic.  Severity: pain is perceived as moderate (4-6 pain scale)  Onset: insidious  Radiation: Down into he back of the leg  Other Symptoms: Noting  None .  Denies locking, redness, and swelling.  Patient does not endorse weakness/tingling sensation.  Modifying Factors: Pain is worsened by is aggravated by walking and on waking up .  Previous remedies tried include OTC analgesics which are somewhat effective. Pain is improved by OTC NSAIDs.  Also chavez heat as well.  Previous Injuries/Surgeries: None  Previous Imaging: None     Health Maintenance  Health Maintenance Due   Topic Date Due    Respiratory Syncytial Virus (RSV) Pregnant or age 60 yrs+ (1 - 1-dose 75+ series) Never done    DTaP/Tdap/Td vaccine (2 - Td or Tdap) 12/14/2021    COVID-19 Vaccine (3 - 2023-24 season) 09/01/2024    Diabetic retinal exam  10/26/2024    Annual Wellness Visit (Medicare)  12/12/2024    Depression Screen  01/02/2025       Past Medical, Family, and Social History:     Current Outpatient Medications on File Prior to Visit   Medication Sig Dispense Refill    fenofibrate (TRICOR) 145 MG tablet Take 1 tablet by mouth daily 90 tablet 3    dulaglutide (TRULICITY) 1.5 MG/0.5ML SC injection Inject 0.5 mLs into the skin once a week 12 Adjustable Dose Pre-filled Pen Syringe 1    blood glucose monitor kit and supplies Use as directed to check BG daily. Dx code: E11.65 1 kit 0    blood glucose monitor strips Use

## 2025-02-14 ENCOUNTER — APPOINTMENT (OUTPATIENT)
Facility: HOSPITAL | Age: 85
End: 2025-02-14
Payer: MEDICARE

## 2025-02-14 ENCOUNTER — HOSPITAL ENCOUNTER (OUTPATIENT)
Facility: HOSPITAL | Age: 85
Setting detail: OBSERVATION
Discharge: HOME | End: 2025-02-15
Attending: EMERGENCY MEDICINE | Admitting: FAMILY MEDICINE
Payer: MEDICARE

## 2025-02-14 DIAGNOSIS — E11.65 TYPE 2 DIABETES MELLITUS WITH HYPERGLYCEMIA, WITHOUT LONG-TERM CURRENT USE OF INSULIN (HCC): ICD-10-CM

## 2025-02-14 DIAGNOSIS — M25.511 ACUTE PAIN OF RIGHT SHOULDER: ICD-10-CM
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
## 2025-02-14 PROBLEM — R79.89 ELEVATED TROPONIN: Status: ACTIVE | Noted: 2025-02-14

## 2025-02-14 LAB
ALBUMIN SERPL-MCNC: 3.6 G/DL (ref 3.5–5)
ALBUMIN/GLOB SERPL: 1 (ref 1.1–2.2)
ALP SERPL-CCNC: 66 U/L (ref 45–117)
ALT SERPL-CCNC: 19 U/L (ref 12–78)
ANION GAP SERPL CALC-SCNC: 7 MMOL/L (ref 2–12)
APPEARANCE UR: CLEAR
AST SERPL-CCNC: 18 U/L (ref 15–37)
BACTERIA URNS QL MICRO: NEGATIVE /HPF
BASOPHILS # BLD: 0.03 K/UL (ref 0–0.1)
BASOPHILS NFR BLD: 0.2 % (ref 0–1)
BILIRUB SERPL-MCNC: 0.4 MG/DL (ref 0.2–1)
BILIRUB UR QL: NEGATIVE
BUN SERPL-MCNC: 34 MG/DL (ref 6–20)
BUN/CREAT SERPL: 27 (ref 12–20)
CALCIUM SERPL-MCNC: 10 MG/DL (ref 8.5–10.1)
CHLORIDE SERPL-SCNC: 101 MMOL/L (ref 97–108)
CHOLEST SERPL-MCNC: 139 MG/DL
CO2 SERPL-SCNC: 31 MMOL/L (ref 21–32)
COLOR UR: ABNORMAL
CREAT SERPL-MCNC: 1.27 MG/DL (ref 0.55–1.02)
DIFFERENTIAL METHOD BLD: ABNORMAL
EOSINOPHIL # BLD: 0.2 K/UL (ref 0–0.4)
EOSINOPHIL NFR BLD: 1.6 % (ref 0–7)
EPITH CASTS URNS QL MICRO: ABNORMAL /LPF
ERYTHROCYTE [DISTWIDTH] IN BLOOD BY AUTOMATED COUNT: 14.6 % (ref 11.5–14.5)
FOLATE SERPL-MCNC: 15.4 NG/ML (ref 5–21)
GLOBULIN SER CALC-MCNC: 3.6 G/DL (ref 2–4)
GLUCOSE SERPL-MCNC: 142 MG/DL (ref 65–100)
GLUCOSE UR STRIP.AUTO-MCNC: NEGATIVE MG/DL
HCT VFR BLD AUTO: 45.7 % (ref 35–47)
HDLC SERPL-MCNC: 64 MG/DL
HDLC SERPL: 2.2 (ref 0–5)
HGB BLD-MCNC: 15.1 G/DL (ref 11.5–16)
HGB UR QL STRIP: NEGATIVE
IMM GRANULOCYTES # BLD AUTO: 0.08 K/UL (ref 0–0.04)
IMM GRANULOCYTES NFR BLD AUTO: 0.7 % (ref 0–0.5)
KETONES UR QL STRIP.AUTO: ABNORMAL MG/DL
LDLC SERPL CALC-MCNC: 44.2 MG/DL (ref 0–100)
LEUKOCYTE ESTERASE UR QL STRIP.AUTO: ABNORMAL
LYMPHOCYTES # BLD: 1.82 K/UL (ref 0.8–3.5)
LYMPHOCYTES NFR BLD: 14.9 % (ref 12–49)
MAGNESIUM SERPL-MCNC: 1.8 MG/DL (ref 1.6–2.4)
MCH RBC QN AUTO: 28.1 PG (ref 26–34)
MCHC RBC AUTO-ENTMCNC: 33 G/DL (ref 30–36.5)
MCV RBC AUTO: 84.9 FL (ref 80–99)
MONOCYTES # BLD: 1.13 K/UL (ref 0–1)
MONOCYTES NFR BLD: 9.2 % (ref 5–13)
MUCOUS THREADS URNS QL MICRO: ABNORMAL /LPF
NEUTS SEG # BLD: 8.97 K/UL (ref 1.8–8)
NEUTS SEG NFR BLD: 73.4 % (ref 32–75)
NITRITE UR QL STRIP.AUTO: NEGATIVE
NRBC # BLD: 0 K/UL (ref 0–0.01)
NRBC BLD-RTO: 0 PER 100 WBC
PH UR STRIP: 5.5 (ref 5–8)
PLATELET # BLD AUTO: 356 K/UL (ref 150–400)
PMV BLD AUTO: 9.3 FL (ref 8.9–12.9)
POTASSIUM SERPL-SCNC: 3.9 MMOL/L (ref 3.5–5.1)
PROT SERPL-MCNC: 7.2 G/DL (ref 6.4–8.2)
PROT UR STRIP-MCNC: 100 MG/DL
RBC # BLD AUTO: 5.38 M/UL (ref 3.8–5.2)
RBC #/AREA URNS HPF: ABNORMAL /HPF (ref 0–5)
SODIUM SERPL-SCNC: 139 MMOL/L (ref 136–145)
SP GR UR REFRACTOMETRY: 1.02 (ref 1–1.03)
TRIGL SERPL-MCNC: 154 MG/DL
TROPONIN I SERPL HS-MCNC: 57 NG/L (ref 0–51)
TROPONIN I SERPL HS-MCNC: 60 NG/L (ref 0–51)
TSH SERPL DL<=0.05 MIU/L-ACNC: 1.4 UIU/ML (ref 0.36–3.74)
URINE CULTURE IF INDICATED: ABNORMAL
UROBILINOGEN UR QL STRIP.AUTO: 1 EU/DL (ref 0.2–1)
VIT B12 SERPL-MCNC: 231 PG/ML (ref 193–986)
VLDLC SERPL CALC-MCNC: 30.8 MG/DL
WBC # BLD AUTO: 12.2 K/UL (ref 3.6–11)
WBC URNS QL MICRO: ABNORMAL /HPF (ref 0–4)

## 2025-02-14 PROCEDURE — 83735 ASSAY OF MAGNESIUM: CPT

## 2025-02-14 PROCEDURE — 71275 CT ANGIOGRAPHY CHEST: CPT

## 2025-02-14 PROCEDURE — 82746 ASSAY OF FOLIC ACID SERUM: CPT

## 2025-02-14 PROCEDURE — 81001 URINALYSIS AUTO W/SCOPE: CPT

## 2025-02-14 PROCEDURE — 96360 HYDRATION IV INFUSION INIT: CPT

## 2025-02-14 PROCEDURE — 80053 COMPREHEN METABOLIC PANEL: CPT

## 2025-02-14 PROCEDURE — 84443 ASSAY THYROID STIM HORMONE: CPT

## 2025-02-14 PROCEDURE — 85025 COMPLETE CBC W/AUTO DIFF WBC: CPT

## 2025-02-14 PROCEDURE — 84484 ASSAY OF TROPONIN QUANT: CPT

## 2025-02-14 PROCEDURE — 2580000003 HC RX 258: Performed by: EMERGENCY MEDICINE

## 2025-02-14 PROCEDURE — 82607 VITAMIN B-12: CPT

## 2025-02-14 PROCEDURE — G0378 HOSPITAL OBSERVATION PER HR: HCPCS

## 2025-02-14 PROCEDURE — 80061 LIPID PANEL: CPT

## 2025-02-14 PROCEDURE — 73030 X-RAY EXAM OF SHOULDER: CPT

## 2025-02-14 PROCEDURE — 6360000004 HC RX CONTRAST MEDICATION: Performed by: RADIOLOGY

## 2025-02-14 PROCEDURE — 36415 COLL VENOUS BLD VENIPUNCTURE: CPT

## 2025-02-14 PROCEDURE — 2500000003 HC RX 250 WO HCPCS

## 2025-02-14 PROCEDURE — 6370000000 HC RX 637 (ALT 250 FOR IP)

## 2025-02-14 PROCEDURE — 93005 ELECTROCARDIOGRAM TRACING: CPT | Performed by: EMERGENCY MEDICINE

## 2025-02-14 PROCEDURE — 83036 HEMOGLOBIN GLYCOSYLATED A1C: CPT

## 2025-02-14 PROCEDURE — 99285 EMERGENCY DEPT VISIT HI MDM: CPT

## 2025-02-14 RX ORDER — CLONIDINE HYDROCHLORIDE 0.1 MG/1
0.1 TABLET ORAL
Status: DISCONTINUED | OUTPATIENT
Start: 2025-02-15 | End: 2025-02-15 | Stop reason: HOSPADM

## 2025-02-14 RX ORDER — ACETAMINOPHEN 325 MG/1
650 TABLET ORAL EVERY 6 HOURS PRN
Status: DISCONTINUED | OUTPATIENT
Start: 2025-02-14 | End: 2025-02-15 | Stop reason: HOSPADM

## 2025-02-14 RX ORDER — ENOXAPARIN SODIUM 100 MG/ML
40 INJECTION SUBCUTANEOUS EVERY 24 HOURS
Status: DISCONTINUED | OUTPATIENT
Start: 2025-02-14 | End: 2025-02-15 | Stop reason: HOSPADM

## 2025-02-14 RX ORDER — FENOFIBRATE 160 MG/1
160 TABLET ORAL DAILY
Status: DISCONTINUED | OUTPATIENT
Start: 2025-02-15 | End: 2025-02-15 | Stop reason: HOSPADM

## 2025-02-14 RX ORDER — ONDANSETRON 2 MG/ML
4 INJECTION INTRAMUSCULAR; INTRAVENOUS EVERY 6 HOURS PRN
Status: DISCONTINUED | OUTPATIENT
Start: 2025-02-14 | End: 2025-02-15 | Stop reason: HOSPADM

## 2025-02-14 RX ORDER — PANTOPRAZOLE SODIUM 40 MG/1
40 TABLET, DELAYED RELEASE ORAL
Status: DISCONTINUED | OUTPATIENT
Start: 2025-02-15 | End: 2025-02-15 | Stop reason: HOSPADM

## 2025-02-14 RX ORDER — POLYETHYLENE GLYCOL 3350 17 G/17G
17 POWDER, FOR SOLUTION ORAL DAILY PRN
Status: DISCONTINUED | OUTPATIENT
Start: 2025-02-14 | End: 2025-02-15 | Stop reason: HOSPADM

## 2025-02-14 RX ORDER — DILTIAZEM HYDROCHLORIDE 180 MG/1
180 CAPSULE, COATED, EXTENDED RELEASE ORAL DAILY
Status: DISCONTINUED | OUTPATIENT
Start: 2025-02-15 | End: 2025-02-15 | Stop reason: HOSPADM

## 2025-02-14 RX ORDER — HYDROCHLOROTHIAZIDE 25 MG/1
25 TABLET ORAL DAILY
Status: DISCONTINUED | OUTPATIENT
Start: 2025-02-15 | End: 2025-02-15 | Stop reason: HOSPADM

## 2025-02-14 RX ORDER — LOSARTAN POTASSIUM AND HYDROCHLOROTHIAZIDE 25; 100 MG/1; MG/1
1 TABLET ORAL DAILY
Status: DISCONTINUED | OUTPATIENT
Start: 2025-02-15 | End: 2025-02-14

## 2025-02-14 RX ORDER — ACETAMINOPHEN 650 MG/1
650 SUPPOSITORY RECTAL EVERY 6 HOURS PRN
Status: DISCONTINUED | OUTPATIENT
Start: 2025-02-14 | End: 2025-02-15 | Stop reason: HOSPADM

## 2025-02-14 RX ORDER — LOSARTAN POTASSIUM 50 MG/1
100 TABLET ORAL DAILY
Status: DISCONTINUED | OUTPATIENT
Start: 2025-02-15 | End: 2025-02-15 | Stop reason: HOSPADM

## 2025-02-14 RX ORDER — MAGNESIUM SULFATE IN WATER 40 MG/ML
2000 INJECTION, SOLUTION INTRAVENOUS PRN
Status: CANCELLED | OUTPATIENT
Start: 2025-02-14

## 2025-02-14 RX ORDER — ACETAMINOPHEN 325 MG/1
650 TABLET ORAL EVERY 6 HOURS SCHEDULED
Status: CANCELLED | OUTPATIENT
Start: 2025-02-14

## 2025-02-14 RX ORDER — SODIUM CHLORIDE 0.9 % (FLUSH) 0.9 %
5-40 SYRINGE (ML) INJECTION PRN
Status: DISCONTINUED | OUTPATIENT
Start: 2025-02-14 | End: 2025-02-15 | Stop reason: HOSPADM

## 2025-02-14 RX ORDER — SODIUM CHLORIDE 9 MG/ML
INJECTION, SOLUTION INTRAVENOUS PRN
Status: DISCONTINUED | OUTPATIENT
Start: 2025-02-14 | End: 2025-02-15 | Stop reason: HOSPADM

## 2025-02-14 RX ORDER — ASPIRIN 81 MG/1
81 TABLET ORAL DAILY
Status: DISCONTINUED | OUTPATIENT
Start: 2025-02-15 | End: 2025-02-15 | Stop reason: HOSPADM

## 2025-02-14 RX ORDER — POTASSIUM CHLORIDE 7.45 MG/ML
10 INJECTION INTRAVENOUS PRN
Status: CANCELLED | OUTPATIENT
Start: 2025-02-14

## 2025-02-14 RX ORDER — 0.9 % SODIUM CHLORIDE 0.9 %
1000 INTRAVENOUS SOLUTION INTRAVENOUS ONCE
Status: COMPLETED | OUTPATIENT
Start: 2025-02-14 | End: 2025-02-14

## 2025-02-14 RX ORDER — EZETIMIBE 10 MG/1
10 TABLET ORAL NIGHTLY
Status: DISCONTINUED | OUTPATIENT
Start: 2025-02-14 | End: 2025-02-15 | Stop reason: HOSPADM

## 2025-02-14 RX ORDER — POTASSIUM CHLORIDE 750 MG/1
40 TABLET, EXTENDED RELEASE ORAL PRN
Status: CANCELLED | OUTPATIENT
Start: 2025-02-14

## 2025-02-14 RX ORDER — LIDOCAINE 4 G/G
1 PATCH TOPICAL EVERY 24 HOURS
Status: DISCONTINUED | OUTPATIENT
Start: 2025-02-14 | End: 2025-02-15 | Stop reason: HOSPADM

## 2025-02-14 RX ORDER — SODIUM CHLORIDE 0.9 % (FLUSH) 0.9 %
5-40 SYRINGE (ML) INJECTION EVERY 12 HOURS SCHEDULED
Status: DISCONTINUED | OUTPATIENT
Start: 2025-02-14 | End: 2025-02-15 | Stop reason: HOSPADM

## 2025-02-14 RX ORDER — ONDANSETRON 4 MG/1
4 TABLET, ORALLY DISINTEGRATING ORAL EVERY 8 HOURS PRN
Status: DISCONTINUED | OUTPATIENT
Start: 2025-02-14 | End: 2025-02-15 | Stop reason: HOSPADM

## 2025-02-14 RX ORDER — IOPAMIDOL 755 MG/ML
100 INJECTION, SOLUTION INTRAVASCULAR
Status: COMPLETED | OUTPATIENT
Start: 2025-02-14 | End: 2025-02-14

## 2025-02-14 RX ORDER — VITAMIN B COMPLEX
2000 TABLET ORAL DAILY
Status: DISCONTINUED | OUTPATIENT
Start: 2025-02-15 | End: 2025-02-15 | Stop reason: HOSPADM

## 2025-02-14 RX ADMIN — IOPAMIDOL 100 ML: 755 INJECTION, SOLUTION INTRAVENOUS at 13:56

## 2025-02-14 RX ADMIN — SODIUM CHLORIDE, PRESERVATIVE FREE 10 ML: 5 INJECTION INTRAVENOUS at 21:23

## 2025-02-14 RX ADMIN — EZETIMIBE 10 MG: 10 TABLET ORAL at 21:23

## 2025-02-14 RX ADMIN — SODIUM CHLORIDE 1000 ML: 9 INJECTION, SOLUTION INTRAVENOUS at 14:10

## 2025-02-14 ASSESSMENT — ENCOUNTER SYMPTOMS
CHEST TIGHTNESS: 0
EYE PAIN: 0
SHORTNESS OF BREATH: 0
SORE THROAT: 0
VOMITING: 0
ABDOMINAL PAIN: 0
BACK PAIN: 0

## 2025-02-14 NOTE — ED PROVIDER NOTES
[facility]  EMERGENCY DEPARTMENT ENCOUNTER      Pt Name: Nayeli Jimenez  MRN: [id number]  Birthdate 1940  Date of evaluation: 2/14/2025  Provider: Gab Sweet MD      HISTORY OF PRESENT ILLNESS      84-year-old female with history of diabetes, hypertension presenting to the ER for right shoulder pain.  Patient reports she had a fall about 2 weeks ago and landed on the back of her right shoulder.  States she has been having ongoing stiffness and pain in her right shoulder since the fall and wants to get checked out.  Also states she has had some intermittent lightheadedness this morning.  No chest pain or dyspnea.              Nursing Notes were reviewed.    REVIEW OF SYSTEMS         Review of Systems   Constitutional:  Negative for chills and fever.   HENT:  Negative for congestion and sore throat.    Eyes:  Negative for pain and visual disturbance.   Respiratory:  Negative for chest tightness and shortness of breath.    Cardiovascular:  Negative for chest pain and leg swelling.   Gastrointestinal:  Negative for abdominal pain and vomiting.   Genitourinary:  Negative for dysuria.   Musculoskeletal:  Positive for arthralgias and myalgias. Negative for back pain.   Skin:  Negative for rash.   Neurological:  Positive for light-headedness. Negative for weakness and headaches.   All other systems reviewed and are negative.          PAST MEDICAL HISTORY     Past Medical History:   Diagnosis Date    Arthritis 2/28/2011    OSTEO    Chronic pain     RIGHT KNEE    Diabetes (HCC)     GERD (gastroesophageal reflux disease)     Hot flashes, menopausal     Hypercholesterolemia     Hypertension     Ill-defined condition     Elevated liver enzymes (has family history)    Nausea & vomiting     Seizures (HCC) 1980'S    AFTER DRINKING 2 MARGARITAS    Vitamin D deficiency 6/28/2010         SURGICAL HISTORY       Past Surgical History:   Procedure Laterality Date    APPENDECTOMY  2001    CATARACT  UT) TABS TABLET    Take 2 tablets by mouth       ALLERGIES     Other, Ciprofloxacin, Erythromycin, Metformin, and Penicillins    FAMILY HISTORY       Family History   Problem Relation Age of Onset    Rheum Arthritis Paternal Grandmother     Alzheimer's Disease Mother     Rheum Arthritis Sister     Emphysema Brother     Rheum Arthritis Sister     Anesth Problems Neg Hx     Pulmonary Embolism Father           SOCIAL HISTORY       Social History     Socioeconomic History    Marital status:    Tobacco Use    Smoking status: Former     Current packs/day: 0.00     Average packs/day: 1 pack/day for 15.0 years (15.0 ttl pk-yrs)     Types: Cigarettes     Start date: 1973     Quit date: 1988     Years since quittin.1    Smokeless tobacco: Never   Vaping Use    Vaping status: Never Used   Substance and Sexual Activity    Alcohol use: Yes    Drug use: No     Social Determinants of Health     Financial Resource Strain: Low Risk  (2024)    Overall Financial Resource Strain (CARDIA)     Difficulty of Paying Living Expenses: Not very hard   Food Insecurity: No Food Insecurity (2025)    Hunger Vital Sign     Worried About Running Out of Food in the Last Year: Never true     Ran Out of Food in the Last Year: Never true   Transportation Needs: No Transportation Needs (2025)    PRAPARE - Transportation     Lack of Transportation (Medical): No     Lack of Transportation (Non-Medical): No   Physical Activity: Sufficiently Active (2023)    Exercise Vital Sign     Days of Exercise per Week: 3 days     Minutes of Exercise per Session: 60 min   Housing Stability: Low Risk  (2025)    Housing Stability Vital Sign     Unable to Pay for Housing in the Last Year: No     Number of Times Moved in the Last Year: 0     Homeless in the Last Year: No         PHYSICAL EXAM       ED Triage Vitals [25 1152]   BP Systolic BP Percentile Diastolic BP Percentile Temp Temp Source Pulse Respirations SpO2   (!)

## 2025-02-14 NOTE — ED TRIAGE NOTES
Pt arrives to the ED with complaints of R shoulder pain after a fall two weeks ago, pt reports she lost her footing trying to move a scale with her foot and fell backwards catching herself with her R arm, pt reports at the time her shoulder didn't hurt but now has pain that has gotten worse since yesterday. Pt states the shoulder does not hurt at the moment since she has not been moving it but when she starts moving it the pain gets worse.

## 2025-02-14 NOTE — H&P
Value Ref Range    Color, UA YELLOW/STRAW      Appearance CLEAR CLEAR      Specific Gravity, UA 1.024 1.003 - 1.030      pH, Urine 5.5 5.0 - 8.0      Protein,  (A) NEG mg/dL    Glucose, Ur Negative NEG mg/dL    Ketones, Urine TRACE (A) NEG mg/dL    Bilirubin, Urine Negative NEG      Blood, Urine Negative NEG      Urobilinogen, Urine 1.0 0.2 - 1.0 EU/dL    Nitrite, Urine Negative NEG      Leukocyte Esterase, Urine SMALL (A) NEG      WBC, UA 5-10 0 - 4 /hpf    RBC, UA 0-5 0 - 5 /hpf    Epithelial Cells, UA MANY (A) FEW /lpf    BACTERIA, URINE Negative NEG /hpf    Urine Culture if Indicated CULTURE NOT INDICATED BY UA RESULT CNI      Mucus, UA 1+ (A) NEG /lpf       Imaging  No new      Assessment and Plan     Nayeli Jimenez is a 84 y.o. female with a PMHx of HTN, HLD, T2DM, OA, GERD who is admitted for elevated troponin.    Elevated troponin: Came in to ER with R shoulder pain after a fall 3 weeks ago. Found to have initial Trop 60. No CP, SOB, radiation of shoulder pain, n/v, or other systemic sx. Very slightly tachycardic 103. CTA chest with no acute process. Has hx of HTN, HLD, T2DM. Overall very active and no cardiac hx of note. On ASA 81mg, though unclear reason. However, it is possible that ACS could present with R shoulder pain with elevated troponin, so will admit for observation and trend troponin.  - Admit to Observation  - trend trop   - continue home ASA  - TSH, lipid panel, A1c  - Diabetic diet    R shoulder pain  GLF: in setting of recent fall. Has had 3 falls in last year, one resulting in wrist fracture. XR R shoulder without fracture, evidence of DJD likely contributing to continued pain.   - pain regimen:  Tylenol prn, lidocaine patch  - Vit B12, folate to rule out deficiency leading to falls  - PT/OT consulted given fall and injury    Elevated Cr: POA Cr 1.27 (bl: 0.9), BUN/Cr: 25; likely 2/2 IVVD. S/p 1L NS.   - monitor on daily labs  - If does not improve, consider urine lytes  - Avoid

## 2025-02-15 VITALS
HEIGHT: 61 IN | OXYGEN SATURATION: 94 % | WEIGHT: 190 LBS | BODY MASS INDEX: 35.87 KG/M2 | HEART RATE: 78 BPM | TEMPERATURE: 97.7 F | DIASTOLIC BLOOD PRESSURE: 71 MMHG | RESPIRATION RATE: 17 BRPM | SYSTOLIC BLOOD PRESSURE: 120 MMHG

## 2025-02-15 PROBLEM — M25.511 ACUTE PAIN OF RIGHT SHOULDER: Status: ACTIVE | Noted: 2025-02-15
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
## 2025-02-15 LAB
ANION GAP SERPL CALC-SCNC: 7 MMOL/L (ref 2–12)
BASOPHILS # BLD: 0.03 K/UL (ref 0–0.1)
BASOPHILS NFR BLD: 0.3 % (ref 0–1)
BUN SERPL-MCNC: 30 MG/DL (ref 6–20)
BUN/CREAT SERPL: 29 (ref 12–20)
CALCIUM SERPL-MCNC: 8.8 MG/DL (ref 8.5–10.1)
CHLORIDE SERPL-SCNC: 102 MMOL/L (ref 97–108)
CO2 SERPL-SCNC: 31 MMOL/L (ref 21–32)
CREAT SERPL-MCNC: 1.03 MG/DL (ref 0.55–1.02)
DIFFERENTIAL METHOD BLD: ABNORMAL
EKG ATRIAL RATE: 90 BPM
EKG DIAGNOSIS: NORMAL
EKG P AXIS: 13 DEGREES
EKG P-R INTERVAL: 226 MS
EKG Q-T INTERVAL: 356 MS
EKG QRS DURATION: 88 MS
EKG QTC CALCULATION (BAZETT): 435 MS
EKG R AXIS: -40 DEGREES
EKG T AXIS: 117 DEGREES
EKG VENTRICULAR RATE: 90 BPM
EOSINOPHIL # BLD: 0.25 K/UL (ref 0–0.4)
EOSINOPHIL NFR BLD: 2.9 % (ref 0–7)
ERYTHROCYTE [DISTWIDTH] IN BLOOD BY AUTOMATED COUNT: 14.6 % (ref 11.5–14.5)
EST. AVERAGE GLUCOSE BLD GHB EST-MCNC: 134 MG/DL
GLUCOSE SERPL-MCNC: 116 MG/DL (ref 65–100)
HBA1C MFR BLD: 6.3 % (ref 4–5.6)
HCT VFR BLD AUTO: 38.5 % (ref 35–47)
HGB BLD-MCNC: 12.8 G/DL (ref 11.5–16)
IMM GRANULOCYTES # BLD AUTO: 0.06 K/UL (ref 0–0.04)
IMM GRANULOCYTES NFR BLD AUTO: 0.7 % (ref 0–0.5)
LYMPHOCYTES # BLD: 1.89 K/UL (ref 0.8–3.5)
LYMPHOCYTES NFR BLD: 21.6 % (ref 12–49)
MCH RBC QN AUTO: 28.5 PG (ref 26–34)
MCHC RBC AUTO-ENTMCNC: 33.2 G/DL (ref 30–36.5)
MCV RBC AUTO: 85.7 FL (ref 80–99)
MONOCYTES # BLD: 0.78 K/UL (ref 0–1)
MONOCYTES NFR BLD: 8.9 % (ref 5–13)
NEUTS SEG # BLD: 5.74 K/UL (ref 1.8–8)
NEUTS SEG NFR BLD: 65.6 % (ref 32–75)
NRBC # BLD: 0 K/UL (ref 0–0.01)
NRBC BLD-RTO: 0 PER 100 WBC
PLATELET # BLD AUTO: 256 K/UL (ref 150–400)
PMV BLD AUTO: 9.5 FL (ref 8.9–12.9)
POTASSIUM SERPL-SCNC: 3.2 MMOL/L (ref 3.5–5.1)
RBC # BLD AUTO: 4.49 M/UL (ref 3.8–5.2)
SODIUM SERPL-SCNC: 140 MMOL/L (ref 136–145)
WBC # BLD AUTO: 8.8 K/UL (ref 3.6–11)

## 2025-02-15 PROCEDURE — 97165 OT EVAL LOW COMPLEX 30 MIN: CPT

## 2025-02-15 PROCEDURE — 93010 ELECTROCARDIOGRAM REPORT: CPT | Performed by: INTERNAL MEDICINE

## 2025-02-15 PROCEDURE — 6370000000 HC RX 637 (ALT 250 FOR IP)

## 2025-02-15 PROCEDURE — 36415 COLL VENOUS BLD VENIPUNCTURE: CPT

## 2025-02-15 PROCEDURE — G0378 HOSPITAL OBSERVATION PER HR: HCPCS

## 2025-02-15 PROCEDURE — 85025 COMPLETE CBC W/AUTO DIFF WBC: CPT

## 2025-02-15 PROCEDURE — 97161 PT EVAL LOW COMPLEX 20 MIN: CPT

## 2025-02-15 PROCEDURE — 80048 BASIC METABOLIC PNL TOTAL CA: CPT

## 2025-02-15 PROCEDURE — 99234 HOSP IP/OBS SM DT SF/LOW 45: CPT | Performed by: FAMILY MEDICINE

## 2025-02-15 RX ORDER — PIOGLITAZONE 30 MG/1
15 TABLET ORAL DAILY
Qty: 30 TABLET | Refills: 0 | Status: SHIPPED
Start: 2025-02-15

## 2025-02-15 RX ORDER — POTASSIUM CHLORIDE 750 MG/1
40 TABLET, EXTENDED RELEASE ORAL ONCE
Status: COMPLETED | OUTPATIENT
Start: 2025-02-15 | End: 2025-02-15

## 2025-02-15 RX ADMIN — DILTIAZEM HYDROCHLORIDE 180 MG: 180 CAPSULE, COATED, EXTENDED RELEASE ORAL at 09:33

## 2025-02-15 RX ADMIN — PANTOPRAZOLE SODIUM 40 MG: 40 TABLET, DELAYED RELEASE ORAL at 05:32

## 2025-02-15 RX ADMIN — LOSARTAN POTASSIUM 100 MG: 50 TABLET, FILM COATED ORAL at 09:33

## 2025-02-15 RX ADMIN — ASPIRIN 81 MG: 81 TABLET, COATED ORAL at 09:33

## 2025-02-15 RX ADMIN — CLONIDINE HYDROCHLORIDE 0.1 MG: 0.1 TABLET ORAL at 11:30

## 2025-02-15 RX ADMIN — ACETAMINOPHEN 650 MG: 325 TABLET ORAL at 00:49

## 2025-02-15 RX ADMIN — Medication 2000 UNITS: at 09:33

## 2025-02-15 RX ADMIN — POTASSIUM CHLORIDE 40 MEQ: 750 TABLET, EXTENDED RELEASE ORAL at 05:32

## 2025-02-15 RX ADMIN — HYDROCHLOROTHIAZIDE 25 MG: 25 TABLET ORAL at 09:33

## 2025-02-15 RX ADMIN — FENOFIBRATE 160 MG: 160 TABLET ORAL at 09:33

## 2025-02-15 ASSESSMENT — PAIN SCALES - GENERAL
PAINLEVEL_OUTOF10: 0
PAINLEVEL_OUTOF10: 0
PAINLEVEL_OUTOF10: 3
PAINLEVEL_OUTOF10: 0

## 2025-02-15 ASSESSMENT — PAIN DESCRIPTION - ORIENTATION: ORIENTATION: RIGHT

## 2025-02-15 ASSESSMENT — PAIN DESCRIPTION - LOCATION: LOCATION: SHOULDER

## 2025-02-15 ASSESSMENT — PAIN DESCRIPTION - DESCRIPTORS: DESCRIPTORS: ACHING

## 2025-02-15 NOTE — PROGRESS NOTES
Patient  is occluded and removed. Pt asked if we could hold off on new iv till morning. NP aware.

## 2025-02-15 NOTE — PROGRESS NOTES
[address] [place] [address]   Office [phone]  Fax [phone]          Subjective / Objective     Subjective  Overnight Events: none  Patient seen and examined at bedside. Reports R shoulder pain only when she is in certain positions. Denies CP, SOB, N/V, dysuria.    Respiratory: RA  BP (!) 125/90 Comment: RN notified  Pulse 62   Temp 97.9 °F (36.6 °C) (Oral)   Resp 20   Ht 1.549 m (5' 1\")   Wt 86.2 kg (190 lb)   SpO2 91%   BMI 35.90 kg/m²    Physical Examination:   General appearance - alert, well appearing, and in no distress  Chest - clear to auscultation, no wheezes, rales or rhonchi, symmetric air entry  Heart - normal rate, regular rhythm, normal S1, S2, no murmurs, rubs, clicks or gallops,   Abdomen - soft, nontender, nondistended, no masses or organomegaly  Neurological - alert, oriented, normal speech, no focal findings  Skin - warm, dry. No notable rashes  Extremities - peripheral pulses normal, no pedal edema, no clubbing or cyanosis, point tenderness posterior lateral R shoulder   Psychiatric - normal speech and thought processes    I/O:  02/14 0701 - 02/15 0700  In: 118 [P.O.:118]  Out: -   Inpatient Medications  Current Facility-Administered Medications   Medication Dose Route Frequency    aspirin EC tablet 81 mg  81 mg Oral Daily    cloNIDine (CATAPRES) tablet 0.1 mg  0.1 mg Oral Daily before lunch    dilTIAZem (CARDIZEM CD) extended release capsule 180 mg  180 mg Oral Daily    pantoprazole (PROTONIX) tablet 40 mg  40 mg Oral QAM AC    ezetimibe (ZETIA) tablet 10 mg  10 mg Oral Nightly    fenofibrate tablet 160 mg  160 mg Oral Daily    Vitamin D (CHOLECALCIFEROL) tablet 2,000 Units  2,000 Units Oral Daily    sodium chloride flush 0.9 % injection 5-40 mL  5-40 mL IntraVENous 2 times per day    sodium chloride flush 0.9 % injection 5-40 mL  5-40 mL IntraVENous PRN    0.9 % sodium chloride infusion   IntraVENous PRN    enoxaparin (LOVENOX) injection 40 mg  40 mg  patch  - PT/OT consulted given fall and injury     Elevated Cr (improving): POA Cr 1.27 (bl: 0.9), BUN/Cr: 25; likely 2/2 IVVD. S/p 1L NS.   - monitor on daily labs  - If does not improve, consider urine lytes  - Avoid nephrotoxic meds     Hypertension: Home meds Losartan-HCTZ 100-25mg qd, diltiazem 180mg qd, and clonidine 0.1mg at lunch.  - Continuing home medications      Diabetes Mellitus T2:  Hemoglobin A1C   Date Value Ref Range Status   02/14/2025 6.3 (H) 4.0 - 5.6 % Final     Comment:     (NOTE)  HbA1C Interpretive Ranges  <5.7              Normal  5.7 - 6.4         Consider Prediabetes  >6.5              Consider Diabetes     - Holding home Pioglitazone, Trulicity  - Repeat A1c  - SSI     Hyperlipidemia:   Lab Results   Component Value Date    CHOL 139 02/14/2025    TRIG 154 (H) 02/14/2025    HDL 64 02/14/2025    LDL 44.2 02/14/2025    VLDL 30.8 02/14/2025    CHOLHDLRATIO 2.2 02/14/2025   - Continue fenofibrate 145mg qd, Zetia 10mg qhs  - repeat lipid panel     Vitamin D deficiency:   - continue home cholecalciferol 2000 UT qd     GERD:   - sub protonix 40mg qd for home Nexium      Obesity BMI Body mass index is 35.9 kg/m².  - Encouraging lifestyle modifications and further follow up outpatient.         FEN/GI - Diabetic diet  Activity - Up with assistance  DVT prophylaxis - Lovenox  GI prophylaxis - Protonix  Fall prophylaxis - Not indicated at this time.  Disposition - Admit to Observation. Plan to d/c to Home. Consulting PT/OT  Code Status - Full, discussed with patient / caregivers.  Next of Kin Name and Contact     Rose Jimenez (Step Child)  [phone] (Mobile)         Patient Nayeli Jimenez will be discussed Dr. Eveline Morales.       Lisa Scherer MD  Family Medicine Resident       For Billing    Chief Complaint   Patient presents with    Fall    Shoulder Pain

## 2025-02-15 NOTE — PROGRESS NOTES
OCCUPATIONAL THERAPY EVALUATION/DISCHARGE  Patient: Nayeli Jimenez (84 y.o. female)  Date: 2/15/2025  Primary Diagnosis: Elevated troponin [R79.89]         Precautions:                    ASSESSMENT :  Patient is a pleasant 84 y.o. female who presented with R shoulder pain after falling and hitting R elbow, back and head ~ 3 weeks ago. Shoulder X-ray negative for fracture, patient admitted as patient with elevated troponin and slightly tachycardic. At baseline, she is Independent with ADLs and lives alone. She currently is at that functional baseline, walking out on unit hallway Independently and completed lower body dressing tasks with Mod I. Patient does have limited AROM in R shoulder flexion and abduction as well as pain with those motions, however patient able to functional complete ADLs and compensate. Patient with no further OT needs at this time.    Functional Outcome Measure:  The patient scored 24/24 on the AM-PAC outcome measure which is indicative of lower likelihood of requiring post acute rehab.      Further skilled acute occupational therapy is not indicated at this time.     PLAN :  Recommend with staff: Recommend with nursing, ADLs with supervision/set-up, OOB to chair 3x/day and toileting via functional mobility to and from bathroom. Thank you for completing as able in order to maintain patient strength, endurance and independence.       Recommendation for discharge: (in order for the patient to meet his/her long term goals):   No skilled occupational therapy    Other factors to consider for discharge: no additional factors    IF patient discharges home will need the following DME: none     SUBJECTIVE:   Patient stated, “I've been up walking just fine.”    OBJECTIVE DATA SUMMARY:     Past Medical History:   Diagnosis Date    Arthritis 2/28/2011    OSTEO    Chronic pain     RIGHT KNEE    Diabetes (HCC)     GERD (gastroesophageal reflux disease)     Hot flashes, menopausal     Hypercholesterolemia      Hypertension     Ill-defined condition     Elevated liver enzymes (has family history)    Nausea & vomiting     Seizures (HCC) 1980'S    AFTER DRINKING 2 MARGARITAS    Vitamin D deficiency 6/28/2010     Past Surgical History:   Procedure Laterality Date    APPENDECTOMY  2001    CATARACT REMOVAL Bilateral     CHOLECYSTECTOMY  06/28/2018    COLONOSCOPY N/A 6/3/2019    COLONOSCOPY performed by Parrish Carranza MD at [facility] ENDOSCOPY    COLONOSCOPY N/A 12/1/2020    COLONOSCOPY performed by Parrish Carranza MD at [facility] ENDOSCOPY    COLONOSCOPY  12/01/2020    HYSTERECTOMY (CERVIX STATUS UNKNOWN)  1975    OTHER SURGICAL HISTORY      LIPOMA LEFT SIDE    OVARY REMOVAL  2001    bilateral    ROTATOR CUFF REPAIR Right 1998    TOTAL KNEE ARTHROPLASTY Left 2014    WRIST FRACTURE SURGERY Left 2024       Prior Level of Function/Environment/Context: Patient lives alone with her two cats and was Independent with ADLs/IADLs prior to admission  , Prior Level of Assist for ADLs: Independent,  ,  ,  ,  ,  ,  ,  ,  ,       Expanded or extensive additional review of patient history:   Social/Functional History  Lives With: Alone  Type of Home: House  Home Layout: One level, Laundry in basement  Home Access: Stairs to enter with rails  Entrance Stairs - Number of Steps: 3  Entrance Stairs - Rails: Both  Bathroom Shower/Tub: Tub/Shower unit  Bathroom Equipment: Shower chair, Grab bars in shower  Home Equipment: Walker - Rolling, Cane  Has the patient had two or more falls in the past year or any fall with injury in the past year?: Yes  Prior Level of Assist for ADLs: Independent  Prior Level of Assist for Ambulation: Independent community ambulator, with or without device, Independent household ambulator, with or without device      EXAMINATION OF PERFORMANCE DEFICITS:    Cognitive/Behavioral Status:    Orientation  Overall Orientation Status: Within Normal Limits  Orientation Level: Oriented X4  Cognition  Overall Cognitive Status: WNL    Skin:

## 2025-02-15 NOTE — PROGRESS NOTES
PHYSICAL THERAPY EVALUATION/DISCHARGE    Patient: Nayeli Jimenez (84 y.o. female)  Date: 2/15/2025  Primary Diagnosis: Elevated troponin [R79.89]       Precautions:                        ASSESSMENT AND RECOMMENDATIONS:  Based on the objective data described below, the patient presents with full AROM, good strength, steady dynamic balance and gait with appropriate activity tolerance generally consistent with her baseline functional mobility level s/p admission for elevated troponin and shoulder pain.  Patient endorses being up ad lula without concerns thus far.  Does still have painful ROM in R shoulder and pain limitation though x-ray negative.  Would benefit from orthopedic referral once discharged to evaluate for any rotator cuff or tendinopathies, relayed this to MD. Patient presents with no further needs in the acute setting.  Encouraged patient to be OOB 3x/day at mealtimes to prevent debility from prolonged bedrest.  Will complete orders.      Further skilled acute physical therapy is not indicated at this time.       PLAN :  Recommendation for discharge: (in order for the patient to meet his/her long term goals):   Outpatient physical therapy for R shoulder    Other factors to consider for discharge: no additional factors    IF patient discharges home will need the following DME: none       SUBJECTIVE:   Patient stated “I can move it but it's hard to lift it out and it hurts (R shoulder).”    OBJECTIVE DATA SUMMARY:     Past Medical History:   Diagnosis Date    Arthritis 2/28/2011    OSTEO    Chronic pain     RIGHT KNEE    Diabetes (HCC)     GERD (gastroesophageal reflux disease)     Hot flashes, menopausal     Hypercholesterolemia     Hypertension     Ill-defined condition     Elevated liver enzymes (has family history)    Nausea & vomiting     Seizures (HCC) 1980'S    AFTER DRINKING 2 MARGARITAS    Vitamin D deficiency 6/28/2010     Past Surgical History:   Procedure Laterality Date    APPENDECTOMY  2001     CATARACT REMOVAL Bilateral     CHOLECYSTECTOMY  06/28/2018    COLONOSCOPY N/A 6/3/2019    COLONOSCOPY performed by Parrish Carranza MD at [facility] ENDOSCOPY    COLONOSCOPY N/A 12/1/2020    COLONOSCOPY performed by Parrish Carranza MD at [facility] ENDOSCOPY    COLONOSCOPY  12/01/2020    HYSTERECTOMY (CERVIX STATUS UNKNOWN)  1975    OTHER SURGICAL HISTORY      LIPOMA LEFT SIDE    OVARY REMOVAL  2001    bilateral    ROTATOR CUFF REPAIR Right 1998    TOTAL KNEE ARTHROPLASTY Left 2014    WRIST FRACTURE SURGERY Left 2024       Home Situation and Prior Level of Function: indep without DME  Social/Functional History  Lives With: Alone  Type of Home: House  Home Layout: One level, Laundry in basement  Home Access: Stairs to enter with rails  Entrance Stairs - Number of Steps: 3  Entrance Stairs - Rails: Both  Bathroom Shower/Tub: Tub/Shower unit  Bathroom Equipment: Shower chair, Grab bars in shower  Home Equipment: Walker - Rolling, Cane  Has the patient had two or more falls in the past year or any fall with injury in the past year?: Yes  Prior Level of Assist for ADLs: Independent  Prior Level of Assist for Ambulation: Independent community ambulator, with or without device, Independent household ambulator, with or without device  Critical Behavior:  Orientation  Overall Orientation Status: Within Normal Limits  Orientation Level: Oriented X4  Cognition  Overall Cognitive Status: WNL    Hearing:   Hearing  Hearing: Within functional limits    Vision/Perceptual:          Vision  Vision: Within Functional Limits     Vision  Vision: Within Functional Limits     Strength:    Strength:  (R shoulder NT 2/2 pain)    Tone & Sensation:   Tone: Normal  Sensation: Intact    Coordination:  Coordination: Within functional limits    Range Of Motion:  AROM:  (except R shoulder elevation and abduction painful)       Functional Mobility:  Bed Mobility:     Bed Mobility Training  Bed Mobility Training: Yes  Supine to Sit: Modified independent  Scooting:  Patient requests all Lab, Cardiology, and Radiology Results on their Discharge Instructions

## 2025-02-15 NOTE — DISCHARGE SUMMARY
[address] [place] [address]   Office [phone]  Fax [phone]       Discharge / Transfer / Off-Service Note     Name: Nayeli Jimenez MRN: [id number]  Sex: Female   YOB: 1940  Age: 84 y.o.  PCP: Mu Gray MD     Date of admission: 2/14/2025  Date of discharge/transfer: 2/15/2025    Attending physician at admission: Eveline Morales.  Attending physician at discharge/transfer: Eveline Morales.  Resident physician at discharge/transfer: Lisa Scherer MD     Consultants during hospitalization  None     Admission diagnoses   Elevated troponin [R79.89]    Recommended follow-up after discharge    1. PCP-Mu Gray MD  2. Orthopedics      To follow up on with PCP:   - after hospitalization follow up   - orthostatic hypotension   - PT referral for R shoulder pain     To follow up on with Orthopedics:  - right shoulder pain management if with symptom worsening     ------------------------------------------------------------------------------------------------------------------    History of Present Illness    As per admitting provider, Dr. Arreola:   \"Nayeli Jimenez is a 84 y.o. female with known history of HTN, HLD, T2DM, OA, GERD who presents to the ER complaining of R shoulder pain. Pt reports she fell 3 weeks ago and fell backward, hitting her R elbow, upper back, and head. No LOC, bleeding, n/v, HA at that time. Says the R shoulder pain became progressively worse in following days, especially with movement. Has been taking Aleve. Endorses having some lightheadedness and legs have felt weak since the fall, but no syncope or falls since. No CP, SOB, or radiation of R shoulder pain across chest or jaw. No changes to medications recently, takes all medications as prescribed. No major cardiac hx, has never been established with a cardiologist. Has had a stress test a long time ago and that was normal.\"      Hospital course  Nayeli DEWEY Ferrellleonora was admitted into the  evaluation of pulmonary embolism to the first subsegmental arterial level. There is no pulmonary embolism to this level. MEDIASTINUM: 18 mm right upper paratracheal node image 21 DEMETRIO: No mass or lymphadenopathy. THORACIC AORTA: No aneurysm. There are vascular calcifications HEART: Normal in size. Extensive coronary artery calcifications ESOPHAGUS: No wall thickening or dilatation. TRACHEA/BRONCHI: Patent. PLEURA: No effusion or pneumothorax. LUNGS: No nodule, mass, or airspace disease. UPPER ABDOMEN: Partially imaged. No acute pathology. BONES: Bones are osteopenic. Chronic wedging of the superior endplate of T4     1. No acute PE 2. Enlarged right upper paratracheal lymph node. Cause is not identified. Short interval follow-up in 2 to 3 months is recommended Electronically signed by Sydni Garcia    XR SHOULDER RIGHT (MIN 2 VIEWS)    Result Date: 2/14/2025  EXAM: XR SHOULDER RIGHT (MIN 2 VIEWS) INDICATION: Right shoulder pain after fall 2 weeks ago. COMPARISON: None. FINDINGS: Three views of the right shoulder demonstrate no fracture, dislocation or other acute abnormality. The bones are osteopenic and there is shoulder DJD. There is an old deformity of the distal clavicle.     No acute abnormality. Electronically signed by Calvin Abbott MD      Procedures / Diagnostic Studies  NA    Chronic diagnoses   Patient Active Problem List   Diagnosis    Hypercholesterolemia    Vitamin D deficiency    Gastroesophageal reflux disease without esophagitis    Primary localized osteoarthritis of right knee    Type 2 diabetes mellitus with hyperglycemia, without long-term current use of insulin ([facility])    S/P total knee replacement using cement    Hypertension    Allergic rhinitis    Left knee DJD    Morbid obesity    Arthritis    Type 2 diabetes mellitus with nephropathy ([facility])    Severe obesity (BMI 35.0-39.9) with comorbidity    Chronic renal disease, stage III ([facility]) [315796]    Cellulitis of lower extremity    Elevated troponin

## 2025-02-15 NOTE — DISCHARGE INSTRUCTIONS
HOME DISCHARGE INSTRUCTIONS    Nayeli Jimenez / [id number] : 1940    Admission date: 2025 Discharge date: 2/15/2025     Please bring this form with you to show your care provider at your follow-up appointment.    Primary care provider:  Mu Gray    Discharging provider:  Morenita Zuluaga DO  - Family Medicine Resident  Eveline Morales MD - Attending, Family Medicine     You have been admitted to the hospital with the following diagnoses:    ACUTE DIAGNOSES:  Elevated troponin [R79.89]      . . . . . . . . . . . . . . . . . . . . . . . . . . . . . . . . . . . . . . . . . . . . . . . . . . . . . . . . . . . . . . . . . . . . . . . .   FOLLOW-UP CARE RECOMMENDATIONS:  You were admitted to the hospital for dizziness and right shoulder pain. This was not related to your heart. Your blood pressure goes down when you stand up - please use caution when standing, drink plenty of fluids, and try compression stockings. Please follow up with your primary doctor. A referral for outpatient physical therapy has been placed. Consider following up with Orthopedics for your shoulder.     Appointments  Future Appointments   Date Time Provider Department Center   3/12/2025 11:00 AM Mu Gray MD [facility]   2025 11:45 AM Ilda Pemberton MD [facility]     Adrian Jones MD  [address] [facility]  [address]  [place] [id number] [phone]    Follow up  orthopedics follow up for shoulder pain         Please follow up with your PCP regarding:  - hospital admission  - orthostatic hypotension  - outpatient PT referral  - blood pressure    Please follow up with Orthopedics regarding:  - right shoulder pain    Follow-up tests needed: none    Pending test results:   At the time of your discharge the following test results are still pending: none.   Please make sure you review these results with your outpatient follow-up provider(s).    Specific symptoms to watch for: chest

## 2025-02-15 NOTE — PROGRESS NOTES
Received report from Antonietta GTZ in ED @ 1911  Reported off to Mindy GTZ @ 2024 receiving patient to room 507

## 2025-03-12 ENCOUNTER — OFFICE VISIT (OUTPATIENT)
Facility: CLINIC | Age: 85
End: 2025-03-12
Payer: MEDICARE

## 2025-03-12 VITALS
RESPIRATION RATE: 20 BRPM | DIASTOLIC BLOOD PRESSURE: 78 MMHG | BODY MASS INDEX: 36.25 KG/M2 | SYSTOLIC BLOOD PRESSURE: 136 MMHG | HEART RATE: 76 BPM | TEMPERATURE: 98 F | WEIGHT: 192 LBS | HEIGHT: 61 IN | OXYGEN SATURATION: 97 %

## 2025-03-12 DIAGNOSIS — K21.9 GASTROESOPHAGEAL REFLUX DISEASE WITHOUT ESOPHAGITIS: ICD-10-CM

## 2025-03-12 DIAGNOSIS — E78.00 HYPERCHOLESTEROLEMIA: ICD-10-CM

## 2025-03-12 DIAGNOSIS — E11.21 TYPE 2 DIABETES MELLITUS WITH NEPHROPATHY (HCC): ICD-10-CM

## 2025-03-12 DIAGNOSIS — E55.9 VITAMIN D DEFICIENCY: ICD-10-CM

## 2025-03-12 DIAGNOSIS — I10 PRIMARY HYPERTENSION: Primary | ICD-10-CM

## 2025-03-12 DIAGNOSIS — E66.01 SEVERE OBESITY (BMI 35.0-39.9) WITH COMORBIDITY: ICD-10-CM

## 2025-03-12 DIAGNOSIS — N18.31 STAGE 3A CHRONIC KIDNEY DISEASE (HCC): ICD-10-CM
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
## 2025-03-12 PROBLEM — M25.511 ACUTE PAIN OF RIGHT SHOULDER: Status: RESOLVED | Noted: 2025-02-15 | Resolved: 2025-03-12

## 2025-03-12 PROBLEM — L03.119 CELLULITIS OF LOWER EXTREMITY: Status: RESOLVED | Noted: 2024-07-15 | Resolved: 2025-03-12

## 2025-03-12 PROBLEM — R79.89 ELEVATED TROPONIN: Status: RESOLVED | Noted: 2025-02-14 | Resolved: 2025-03-12

## 2025-03-12 PROCEDURE — 3075F SYST BP GE 130 - 139MM HG: CPT | Performed by: FAMILY MEDICINE

## 2025-03-12 PROCEDURE — 1126F AMNT PAIN NOTED NONE PRSNT: CPT | Performed by: FAMILY MEDICINE

## 2025-03-12 PROCEDURE — 99214 OFFICE O/P EST MOD 30 MIN: CPT | Performed by: FAMILY MEDICINE

## 2025-03-12 PROCEDURE — 1160F RVW MEDS BY RX/DR IN RCRD: CPT | Performed by: FAMILY MEDICINE

## 2025-03-12 PROCEDURE — 1036F TOBACCO NON-USER: CPT | Performed by: FAMILY MEDICINE

## 2025-03-12 PROCEDURE — 3078F DIAST BP <80 MM HG: CPT | Performed by: FAMILY MEDICINE

## 2025-03-12 PROCEDURE — 1123F ACP DISCUSS/DSCN MKR DOCD: CPT | Performed by: FAMILY MEDICINE

## 2025-03-12 PROCEDURE — 1159F MED LIST DOCD IN RCRD: CPT | Performed by: FAMILY MEDICINE

## 2025-03-12 PROCEDURE — G8427 DOCREV CUR MEDS BY ELIG CLIN: HCPCS | Performed by: FAMILY MEDICINE

## 2025-03-12 PROCEDURE — G8417 CALC BMI ABV UP PARAM F/U: HCPCS | Performed by: FAMILY MEDICINE

## 2025-03-12 PROCEDURE — 1090F PRES/ABSN URINE INCON ASSESS: CPT | Performed by: FAMILY MEDICINE

## 2025-03-12 PROCEDURE — G8400 PT W/DXA NO RESULTS DOC: HCPCS | Performed by: FAMILY MEDICINE

## 2025-03-12 PROCEDURE — 3044F HG A1C LEVEL LT 7.0%: CPT | Performed by: FAMILY MEDICINE

## 2025-03-12 RX ORDER — ANTIOX #8/OM3/DHA/EPA/LUT/ZEAX 250-2.5 MG
CAPSULE ORAL EVERY 12 HOURS
COMMUNITY

## 2025-03-12 RX ORDER — LOSARTAN POTASSIUM AND HYDROCHLOROTHIAZIDE 25; 100 MG/1; MG/1
1 TABLET ORAL DAILY
Qty: 90 TABLET | Refills: 3 | Status: SHIPPED | OUTPATIENT
Start: 2025-03-12

## 2025-03-12 ASSESSMENT — ENCOUNTER SYMPTOMS
CHEST TIGHTNESS: 0
APNEA: 0
ABDOMINAL PAIN: 0
ABDOMINAL DISTENTION: 0

## 2025-03-12 NOTE — PROGRESS NOTES
[facility]    History of Present Illness:   Nayeli Jimenez is a 84 y.o. female with history of HTN, GERD, Type 2 diabetes, Allergies, Osteoarthritis, HLD   CC: Follow up  History provided by patient and Records    HPI:    Shoulder Pain: Patient had recent fall and admission followed by Injections that have helped.    Gastroesophageal Reflux:  Current control of Symptoms: Stable  Primary symptoms: heartburn  Hiatal Hernia: No  Current Medications: Nexium  The patient has no history melena or bright red blood in the stools.  The patient avoids high dose aspirin and NSAID therapy.  The patient is aware of diet changes needed, elevating the head of the bed and appropriate use of antacids.      Hypertension Follow up:  The patient reports:  taking medications as instructed, no medication side effects noted, no TIA's, no chest pain on exertion, no dyspnea on exertion, no swelling of ankles, no orthopnea or paroxysmal nocturnal dyspnea.   Notes that she has some lightheadedness in the morning on waking, improves with Clonidine.  BP Readings from Last 3 Encounters:   03/12/25 136/78   02/15/25 120/71   01/22/25 (!) 150/72      Diabetes Follow up: Overall the patient feels well with good energy level.                Current Medications: pioglitazone - 30 MG and Trulicity 1.5 mg.  Insulin dependence: No              Frequency of home glucose testing: Daily              Blood Sugar range at home: <200's                         Last eye exam: In past 12 months.              Last foot exam: This year.              Polyuria, polyphagia or polydipsia: No              Retinopathy: No              Neuropathy SX: No              Low blood sugar symptoms: No              Dietary compliance: compliant most of the time              Medication compliance: compliant most of the time              On ASA: Yes              Tobacco Use: No              Depression: No     Wt Readings from Last 3 Encounters:   03/12/25

## 2025-03-12 NOTE — PROGRESS NOTES
\"Have you been to the ER, urgent care clinic since your last visit?  Hospitalized since your last visit?\"    no    “Have you seen or consulted any other health care providers outside our system since your last visit?”    no      “Have you had a diabetic eye exam?”    NO     Date of last diabetic eye exam: 10/26/2023             Goals that were addressed and/or need to be completed during or after this appointment include   Health Maintenance Due   Topic Date Due    Respiratory Syncytial Virus (RSV) Pregnant or age 60 yrs+ (1 - 1-dose 75+ series) Never done    DTaP/Tdap/Td vaccine (2 - Td or Tdap) 12/14/2021    COVID-19 Vaccine (3 - 2024-25 season) 09/01/2024    Diabetic retinal exam  10/26/2024    Annual Wellness Visit (Medicare)  12/12/2024

## 2025-03-31 DIAGNOSIS — I10 PRIMARY HYPERTENSION: ICD-10-CM

## 2025-03-31 DIAGNOSIS — N18.31 STAGE 3A CHRONIC KIDNEY DISEASE (HCC): ICD-10-CM

## 2025-03-31 DIAGNOSIS — E11.65 TYPE 2 DIABETES MELLITUS WITH HYPERGLYCEMIA, WITHOUT LONG-TERM CURRENT USE OF INSULIN (HCC): ICD-10-CM

## 2025-03-31 DIAGNOSIS — E11.21 TYPE 2 DIABETES MELLITUS WITH NEPHROPATHY (HCC): ICD-10-CM

## 2025-03-31 DIAGNOSIS — E78.00 HYPERCHOLESTEROLEMIA: ICD-10-CM

## 2025-03-31 DIAGNOSIS — E55.9 VITAMIN D DEFICIENCY: ICD-10-CM

## 2025-04-01 ENCOUNTER — RESULTS FOLLOW-UP (OUTPATIENT)
Facility: CLINIC | Age: 85
End: 2025-04-01

## 2025-04-01 LAB
25(OH)D3 SERPL-MCNC: 37.2 NG/ML (ref 30–100)
ALBUMIN SERPL-MCNC: 4.2 G/DL (ref 3.5–5)
ALBUMIN/GLOB SERPL: 1.4 (ref 1.1–2.2)
ALP SERPL-CCNC: 54 U/L (ref 45–117)
ALT SERPL-CCNC: 20 U/L (ref 12–78)
ANION GAP SERPL CALC-SCNC: 10 MMOL/L (ref 2–12)
AST SERPL-CCNC: 19 U/L (ref 15–37)
BILIRUB SERPL-MCNC: 0.7 MG/DL (ref 0.2–1)
BUN SERPL-MCNC: 21 MG/DL (ref 6–20)
BUN/CREAT SERPL: 19 (ref 12–20)
CALCIUM SERPL-MCNC: 10.4 MG/DL (ref 8.5–10.1)
CHLORIDE SERPL-SCNC: 101 MMOL/L (ref 97–108)
CHOLEST SERPL-MCNC: 174 MG/DL
CO2 SERPL-SCNC: 29 MMOL/L (ref 21–32)
CREAT SERPL-MCNC: 1.09 MG/DL (ref 0.55–1.02)
CREAT UR-MCNC: 323 MG/DL
ERYTHROCYTE [DISTWIDTH] IN BLOOD BY AUTOMATED COUNT: 16.6 % (ref 11.5–14.5)
EST. AVERAGE GLUCOSE BLD GHB EST-MCNC: 143 MG/DL
GLOBULIN SER CALC-MCNC: 2.9 G/DL (ref 2–4)
GLUCOSE SERPL-MCNC: 129 MG/DL (ref 65–100)
HBA1C MFR BLD: 6.6 % (ref 4–5.6)
HCT VFR BLD AUTO: 46.8 % (ref 35–47)
HDLC SERPL-MCNC: 65 MG/DL
HDLC SERPL: 2.7 (ref 0–5)
HGB BLD-MCNC: 14.6 G/DL (ref 11.5–16)
LDLC SERPL CALC-MCNC: 76.2 MG/DL (ref 0–100)
MCH RBC QN AUTO: 28.2 PG (ref 26–34)
MCHC RBC AUTO-ENTMCNC: 31.2 G/DL (ref 30–36.5)
MCV RBC AUTO: 90.3 FL (ref 80–99)
MICROALBUMIN UR-MCNC: 142 MG/DL
MICROALBUMIN/CREAT UR-RTO: 440 MG/G (ref 0–30)
NRBC # BLD: 0 K/UL (ref 0–0.01)
NRBC BLD-RTO: 0 PER 100 WBC
PLATELET # BLD AUTO: 283 K/UL (ref 150–400)
PMV BLD AUTO: 10.4 FL (ref 8.9–12.9)
POTASSIUM SERPL-SCNC: 4.1 MMOL/L (ref 3.5–5.1)
PROT SERPL-MCNC: 7.1 G/DL (ref 6.4–8.2)
RBC # BLD AUTO: 5.18 M/UL (ref 3.8–5.2)
SODIUM SERPL-SCNC: 140 MMOL/L (ref 136–145)
TRIGL SERPL-MCNC: 164 MG/DL
VLDLC SERPL CALC-MCNC: 32.8 MG/DL
WBC # BLD AUTO: 6.5 K/UL (ref 3.6–11)

## 2025-04-11 ENCOUNTER — OFFICE VISIT (OUTPATIENT)
Age: 85
End: 2025-04-11
Payer: MEDICARE

## 2025-04-11 VITALS
SYSTOLIC BLOOD PRESSURE: 152 MMHG | WEIGHT: 197.7 LBS | BODY MASS INDEX: 37.33 KG/M2 | TEMPERATURE: 97.8 F | OXYGEN SATURATION: 96 % | DIASTOLIC BLOOD PRESSURE: 74 MMHG | HEIGHT: 61 IN | HEART RATE: 75 BPM

## 2025-04-11 DIAGNOSIS — E78.00 HYPERCHOLESTEROLEMIA: ICD-10-CM

## 2025-04-11 DIAGNOSIS — E11.65 TYPE 2 DIABETES MELLITUS WITH HYPERGLYCEMIA, WITHOUT LONG-TERM CURRENT USE OF INSULIN (HCC): Primary | ICD-10-CM

## 2025-04-11 PROCEDURE — 1123F ACP DISCUSS/DSCN MKR DOCD: CPT | Performed by: INTERNAL MEDICINE

## 2025-04-11 PROCEDURE — 1159F MED LIST DOCD IN RCRD: CPT | Performed by: INTERNAL MEDICINE

## 2025-04-11 PROCEDURE — 1160F RVW MEDS BY RX/DR IN RCRD: CPT | Performed by: INTERNAL MEDICINE

## 2025-04-11 PROCEDURE — 3044F HG A1C LEVEL LT 7.0%: CPT | Performed by: INTERNAL MEDICINE

## 2025-04-11 PROCEDURE — 1036F TOBACCO NON-USER: CPT | Performed by: INTERNAL MEDICINE

## 2025-04-11 PROCEDURE — G8427 DOCREV CUR MEDS BY ELIG CLIN: HCPCS | Performed by: INTERNAL MEDICINE

## 2025-04-11 PROCEDURE — G2211 COMPLEX E/M VISIT ADD ON: HCPCS | Performed by: INTERNAL MEDICINE

## 2025-04-11 PROCEDURE — 1090F PRES/ABSN URINE INCON ASSESS: CPT | Performed by: INTERNAL MEDICINE

## 2025-04-11 PROCEDURE — 1126F AMNT PAIN NOTED NONE PRSNT: CPT | Performed by: INTERNAL MEDICINE

## 2025-04-11 PROCEDURE — G8400 PT W/DXA NO RESULTS DOC: HCPCS | Performed by: INTERNAL MEDICINE

## 2025-04-11 PROCEDURE — 99214 OFFICE O/P EST MOD 30 MIN: CPT | Performed by: INTERNAL MEDICINE

## 2025-04-11 PROCEDURE — 3078F DIAST BP <80 MM HG: CPT | Performed by: INTERNAL MEDICINE

## 2025-04-11 PROCEDURE — G8417 CALC BMI ABV UP PARAM F/U: HCPCS | Performed by: INTERNAL MEDICINE

## 2025-04-11 PROCEDURE — 3077F SYST BP >= 140 MM HG: CPT | Performed by: INTERNAL MEDICINE

## 2025-04-11 RX ORDER — DULAGLUTIDE 3 MG/.5ML
3 INJECTION, SOLUTION SUBCUTANEOUS WEEKLY
Qty: 6 ML | Refills: 3 | Status: SHIPPED | OUTPATIENT
Start: 2025-04-11

## 2025-04-11 RX ORDER — MECLIZINE HYDROCHLORIDE 25 MG/1
12.5 TABLET ORAL 2 TIMES DAILY
COMMUNITY
Start: 2025-04-01

## 2025-04-11 NOTE — PROGRESS NOTES
Nayeli Jimenez is a 84 y.o. female here for   Chief Complaint   Patient presents with    Diabetes       1. Have you been to the ER, urgent care clinic since your last visit?  Hospitalized since your last visit? - 02/24/25- [facility] Er- Fell on right shoulder.    2. Have you seen or consulted any other health care providers outside of the [facility] since your last visit?  Include any pap smears or colon screening.- Orthopedics, & NP in [place] for dizziness    
Assessment/Plan:             1. Type 2 Diabetes Mellitus      Lab Results   Component Value Date    LABA1C 6.6 (H) 03/31/2025      Actos  30  mg: Discontinued December 2020, resumed Actos later as A1c increased to 7.8  Off Trulicity due to backorder  Start Trulicity 3 mg, discontinue pioglitazone 15 mg once the blood glucose improves     FLU annually ,Pneumovax ,aspirin daily,annual eye exam,microalbumin      2.  HTN :  Continue current therapy       3. Mixed Hyperlipidemia  : low carb diet.   Statin intolerance hx,   Zetia      4.Obesity:Body mass index is 37 kg/m².   Discussed about the importance of exercise and carbohydrate portion control.      5.  GI symptoms: work up was negative   It was due to artificial sweetners according to the patient    6.  Mild hypercalcemia: Dehydration, on hydrochlorothiazide  No kidney stones         Thank you for allowing me to participate in the care of this patient.      Ilda Pemberton MD      Patient verbalized understanding

## 2025-04-23 ENCOUNTER — OFFICE VISIT (OUTPATIENT)
Facility: CLINIC | Age: 85
End: 2025-04-23
Payer: MEDICARE

## 2025-04-23 VITALS
BODY MASS INDEX: 37.38 KG/M2 | RESPIRATION RATE: 17 BRPM | SYSTOLIC BLOOD PRESSURE: 131 MMHG | DIASTOLIC BLOOD PRESSURE: 71 MMHG | TEMPERATURE: 98.3 F | HEART RATE: 81 BPM | HEIGHT: 61 IN | WEIGHT: 198 LBS | OXYGEN SATURATION: 96 %

## 2025-04-23 DIAGNOSIS — R42 VERTIGO: Primary | ICD-10-CM

## 2025-04-23 PROCEDURE — G8417 CALC BMI ABV UP PARAM F/U: HCPCS | Performed by: FAMILY MEDICINE

## 2025-04-23 PROCEDURE — G8400 PT W/DXA NO RESULTS DOC: HCPCS | Performed by: FAMILY MEDICINE

## 2025-04-23 PROCEDURE — 1090F PRES/ABSN URINE INCON ASSESS: CPT | Performed by: FAMILY MEDICINE

## 2025-04-23 PROCEDURE — 1123F ACP DISCUSS/DSCN MKR DOCD: CPT | Performed by: FAMILY MEDICINE

## 2025-04-23 PROCEDURE — G8428 CUR MEDS NOT DOCUMENT: HCPCS | Performed by: FAMILY MEDICINE

## 2025-04-23 PROCEDURE — 3078F DIAST BP <80 MM HG: CPT | Performed by: FAMILY MEDICINE

## 2025-04-23 PROCEDURE — 1036F TOBACCO NON-USER: CPT | Performed by: FAMILY MEDICINE

## 2025-04-23 PROCEDURE — 99213 OFFICE O/P EST LOW 20 MIN: CPT | Performed by: FAMILY MEDICINE

## 2025-04-23 PROCEDURE — 3077F SYST BP >= 140 MM HG: CPT | Performed by: FAMILY MEDICINE

## 2025-04-23 RX ORDER — MECLIZINE HYDROCHLORIDE 25 MG/1
12.5 TABLET ORAL 2 TIMES DAILY
Qty: 30 TABLET | Refills: 2 | Status: SHIPPED | OUTPATIENT
Start: 2025-04-23

## 2025-04-23 ASSESSMENT — ENCOUNTER SYMPTOMS
ABDOMINAL PAIN: 0
ABDOMINAL DISTENTION: 0
APNEA: 0
CHEST TIGHTNESS: 0

## 2025-04-23 NOTE — PROGRESS NOTES
Chief Complaint   Patient presents with    Dizziness     Approximately 2 weeks ago. Occurred during therapy while laying. Reports had this episode continuously for approximately 3 days duration.      DEENACALLI Freitas      \"Have you been to the ER, urgent care clinic since your last visit?  Hospitalized since your last visit?\"    NO    “Have you seen or consulted any other health care providers outside of [facility] since your last visit?”    NO            Click Here for Release of Records Request     Health Maintenance Due   Topic Date Due    Respiratory Syncytial Virus (RSV) Pregnant or age 60 yrs+ (1 - 1-dose 75+ series) Never done    DTaP/Tdap/Td vaccine (2 - Td or Tdap) 12/14/2021    COVID-19 Vaccine (3 - 2024-25 season) 09/01/2024    Diabetic retinal exam  10/26/2024    Annual Wellness Visit (Medicare)  12/12/2024

## 2025-04-23 NOTE — PROGRESS NOTES
[facility]    History of Present Illness:   Nayeli Jimenez is a 84 y.o. female with history of HTN, GERD, Type 2 diabetes, Allergies, Osteoarthritis, HLD   CC: Vertigo  History provided by patient and Records    HPI:  Vertigo:  - Onset: several days ago.  - Duration/Timing: continuous lasting for 4 days.  - Frequency: Occurred for 4 days  - Current Symptoms: spinning. denies Tinnitus.   - Associated Symptoms: Endorses None. Denies nausea, vomiting, loss of hearing, tinnitus, headaches, paressthesias, palpitations, and chest pain.  denies loss of consciousness.  - Precipitants: rapid head movements  - Current Vertigo medications: meclizine (Antivert)  - Medications/supplements associated with Vertigo: None    - History of Vertigo: No   - History of environmental allergies: Yes  - History of Migraines: No   - History of CVA/TBI: No   - Recent viral illness: No     Health Maintenance  Health Maintenance Due   Topic Date Due    Respiratory Syncytial Virus (RSV) Pregnant or age 60 yrs+ (1 - 1-dose 75+ series) Never done    DTaP/Tdap/Td vaccine (2 - Td or Tdap) 12/14/2021    COVID-19 Vaccine (3 - 2024-25 season) 09/01/2024    Diabetic retinal exam  10/26/2024    Annual Wellness Visit (Medicare)  12/12/2024       Past Medical, Family, and Social History:     Current Outpatient Medications on File Prior to Visit   Medication Sig Dispense Refill    Dulaglutide (TRULICITY) 3 MG/0.5ML SOAJ Inject 3 mg into the skin once a week Stop 1.5 mg 6 mL 3    Multiple Vitamins-Minerals (PRESERVISION AREDS 2) CAPS every 12 hours      losartan-hydroCHLOROthiazide (HYZAAR) 100-25 MG per tablet Take 1 tablet by mouth daily 90 tablet 3    pioglitazone (ACTOS) 30 MG tablet Take 0.5 tablets by mouth daily 30 tablet 0    ezetimibe (ZETIA) 10 MG tablet Take 1 tablet by mouth nightly 90 tablet 3    fenofibrate (TRICOR) 145 MG tablet Take 1 tablet by mouth daily 90 tablet 3    blood glucose monitor kit and supplies Use as

## 2025-06-16 ENCOUNTER — OFFICE VISIT (OUTPATIENT)
Facility: CLINIC | Age: 85
End: 2025-06-16
Payer: MEDICARE

## 2025-06-16 VITALS
BODY MASS INDEX: 37.76 KG/M2 | TEMPERATURE: 97.3 F | SYSTOLIC BLOOD PRESSURE: 139 MMHG | WEIGHT: 200 LBS | RESPIRATION RATE: 18 BRPM | HEART RATE: 68 BPM | HEIGHT: 61 IN | DIASTOLIC BLOOD PRESSURE: 74 MMHG | OXYGEN SATURATION: 96 %

## 2025-06-16 DIAGNOSIS — N18.31 STAGE 3A CHRONIC KIDNEY DISEASE (HCC): ICD-10-CM

## 2025-06-16 DIAGNOSIS — R42 VERTIGO: ICD-10-CM

## 2025-06-16 DIAGNOSIS — M25.511 ACUTE PAIN OF RIGHT SHOULDER: ICD-10-CM

## 2025-06-16 DIAGNOSIS — K21.9 GASTROESOPHAGEAL REFLUX DISEASE WITHOUT ESOPHAGITIS: ICD-10-CM

## 2025-06-16 DIAGNOSIS — E78.00 HYPERCHOLESTEROLEMIA: ICD-10-CM

## 2025-06-16 DIAGNOSIS — E11.21 TYPE 2 DIABETES MELLITUS WITH NEPHROPATHY (HCC): ICD-10-CM

## 2025-06-16 DIAGNOSIS — J30.1 SEASONAL ALLERGIC RHINITIS DUE TO POLLEN: ICD-10-CM

## 2025-06-16 DIAGNOSIS — I10 PRIMARY HYPERTENSION: Primary | ICD-10-CM

## 2025-06-16 PROCEDURE — 3044F HG A1C LEVEL LT 7.0%: CPT | Performed by: FAMILY MEDICINE

## 2025-06-16 PROCEDURE — 1123F ACP DISCUSS/DSCN MKR DOCD: CPT | Performed by: FAMILY MEDICINE

## 2025-06-16 PROCEDURE — 3075F SYST BP GE 130 - 139MM HG: CPT | Performed by: FAMILY MEDICINE

## 2025-06-16 PROCEDURE — 1160F RVW MEDS BY RX/DR IN RCRD: CPT | Performed by: FAMILY MEDICINE

## 2025-06-16 PROCEDURE — G8417 CALC BMI ABV UP PARAM F/U: HCPCS | Performed by: FAMILY MEDICINE

## 2025-06-16 PROCEDURE — 1036F TOBACCO NON-USER: CPT | Performed by: FAMILY MEDICINE

## 2025-06-16 PROCEDURE — 99214 OFFICE O/P EST MOD 30 MIN: CPT | Performed by: FAMILY MEDICINE

## 2025-06-16 PROCEDURE — G8400 PT W/DXA NO RESULTS DOC: HCPCS | Performed by: FAMILY MEDICINE

## 2025-06-16 PROCEDURE — G2211 COMPLEX E/M VISIT ADD ON: HCPCS | Performed by: FAMILY MEDICINE

## 2025-06-16 PROCEDURE — 3078F DIAST BP <80 MM HG: CPT | Performed by: FAMILY MEDICINE

## 2025-06-16 PROCEDURE — G8427 DOCREV CUR MEDS BY ELIG CLIN: HCPCS | Performed by: FAMILY MEDICINE

## 2025-06-16 PROCEDURE — 1159F MED LIST DOCD IN RCRD: CPT | Performed by: FAMILY MEDICINE

## 2025-06-16 PROCEDURE — 1090F PRES/ABSN URINE INCON ASSESS: CPT | Performed by: FAMILY MEDICINE

## 2025-06-16 RX ORDER — LORATADINE 10 MG/1
10 TABLET ORAL DAILY
Qty: 90 TABLET | Refills: 3 | Status: SHIPPED | OUTPATIENT
Start: 2025-06-16

## 2025-06-16 RX ORDER — CLONIDINE HYDROCHLORIDE 0.1 MG/1
0.1 TABLET ORAL DAILY
Qty: 90 TABLET | Refills: 3 | Status: SHIPPED | OUTPATIENT
Start: 2025-06-16

## 2025-06-16 ASSESSMENT — ENCOUNTER SYMPTOMS: SHORTNESS OF BREATH: 0

## 2025-06-16 NOTE — PROGRESS NOTES
\"Have you been to the ER, urgent care clinic since your last visit?  Hospitalized since your last visit?\"    NO    “Have you seen or consulted any other health care providers outside of [facility] since your last visit?”    NO            Click Here for Release of Records Request   
CAPSULE DAILY 90 capsule 3    dilTIAZem (CARDIZEM CD) 180 MG extended release capsule TAKE 1 CAPSULE DAILY 90 capsule 3    Naproxen Sodium (ALEVE) 220 MG CAPS Take by mouth      blood glucose test strips (ASCENSIA AUTODISC VI;ONE TOUCH ULTRA TEST VI) strip TEST ONCE A DAY DX CODE: E11.65 100 each 3    Omega-3 Fatty Acids (FISH OIL PO) Take 1 capsule by mouth daily      Probiotic Product (PROBIOTIC PO) Take 1 tablet by mouth 2 times daily      aspirin 81 MG EC tablet Take 1 tablet by mouth daily      vitamin D (CHOLECALCIFEROL) 25 MCG (1000 UT) TABS tablet Take 2 tablets by mouth       No current facility-administered medications on file prior to visit.       Patient Active Problem List   Diagnosis    Hypercholesterolemia    Vitamin D deficiency    Gastroesophageal reflux disease without esophagitis    Primary localized osteoarthritis of right knee    Type 2 diabetes mellitus with hyperglycemia, without long-term current use of insulin ([facility])    S/P total knee replacement using cement    Hypertension    Allergic rhinitis    Left knee DJD    Morbid obesity (HCC)    Arthritis    Type 2 diabetes mellitus with nephropathy ([facility])    Severe obesity (BMI 35.0-39.9) with comorbidity ([facility])    Chronic renal disease, stage III ([facility]) [[id number]]       Social History     Socioeconomic History    Marital status:      Spouse name: None    Number of children: None    Years of education: None    Highest education level: None   Tobacco Use    Smoking status: Former     Current packs/day: 0.00     Average packs/day: 1 pack/day for 15.0 years (15.0 ttl pk-yrs)     Types: Cigarettes     Start date: 1973     Quit date: 1988     Years since quittin.4    Smokeless tobacco: Never   Vaping Use    Vaping status: Never Used   Substance and Sexual Activity    Alcohol use: Yes    Drug use: No     Social Drivers of Health     Financial Resource Strain: Low Risk  (2024)    Overall Financial Resource Strain (CARDIA)

## 2025-08-05 DIAGNOSIS — I10 PRIMARY HYPERTENSION: ICD-10-CM

## 2025-08-05 DIAGNOSIS — E78.00 HYPERCHOLESTEROLEMIA: ICD-10-CM

## 2025-08-05 DIAGNOSIS — N18.31 STAGE 3A CHRONIC KIDNEY DISEASE (HCC): ICD-10-CM

## 2025-08-05 DIAGNOSIS — R42 VERTIGO: ICD-10-CM

## 2025-08-05 DIAGNOSIS — E11.21 TYPE 2 DIABETES MELLITUS WITH NEPHROPATHY (HCC): ICD-10-CM

## 2025-08-06 ENCOUNTER — RESULTS FOLLOW-UP (OUTPATIENT)
Facility: CLINIC | Age: 85
End: 2025-08-06

## 2025-08-06 LAB
25(OH)D3 SERPL-MCNC: 70.7 NG/ML (ref 30–100)
ALBUMIN SERPL-MCNC: 4 G/DL (ref 3.5–5.2)
ALBUMIN/GLOB SERPL: 1.5 (ref 1.1–2.2)
ALP SERPL-CCNC: 58 U/L (ref 35–104)
ALT SERPL-CCNC: 14 U/L (ref 10–35)
ANION GAP SERPL CALC-SCNC: 14 MMOL/L (ref 2–14)
AST SERPL-CCNC: 19 U/L (ref 10–35)
BILIRUB SERPL-MCNC: 0.4 MG/DL (ref 0–1.2)
BUN SERPL-MCNC: 23 MG/DL (ref 8–23)
BUN/CREAT SERPL: 24 (ref 12–20)
CALCIUM SERPL-MCNC: 10.4 MG/DL (ref 8.8–10.2)
CHLORIDE SERPL-SCNC: 98 MMOL/L (ref 98–107)
CHOLEST SERPL-MCNC: 157 MG/DL (ref 0–200)
CO2 SERPL-SCNC: 27 MMOL/L (ref 20–29)
CREAT SERPL-MCNC: 0.94 MG/DL (ref 0.6–1)
CREAT UR-MCNC: 178 MG/DL (ref 28–217)
ERYTHROCYTE [DISTWIDTH] IN BLOOD BY AUTOMATED COUNT: 14.6 % (ref 11.5–14.5)
EST. AVERAGE GLUCOSE BLD GHB EST-MCNC: 139 MG/DL
GLOBULIN SER CALC-MCNC: 2.7 G/DL (ref 2–4)
GLUCOSE SERPL-MCNC: 122 MG/DL (ref 65–100)
HBA1C MFR BLD: 6.5 % (ref 4–5.6)
HCT VFR BLD AUTO: 43.6 % (ref 35–47)
HDLC SERPL-MCNC: 53 MG/DL (ref 40–60)
HDLC SERPL: 3
HGB BLD-MCNC: 13.9 G/DL (ref 11.5–16)
LDLC SERPL CALC-MCNC: 66 MG/DL
MCH RBC QN AUTO: 28.4 PG (ref 26–34)
MCHC RBC AUTO-ENTMCNC: 31.9 G/DL (ref 30–36.5)
MCV RBC AUTO: 89 FL (ref 80–99)
MICROALBUMIN UR-MCNC: 61.9 MG/DL
MICROALBUMIN/CREAT UR-RTO: 348 MG/G
NRBC # BLD: 0 K/UL (ref 0–0.01)
NRBC BLD-RTO: 0 PER 100 WBC
PLATELET # BLD AUTO: 285 K/UL (ref 150–400)
PMV BLD AUTO: 10.3 FL (ref 8.9–12.9)
POTASSIUM SERPL-SCNC: 4.2 MMOL/L (ref 3.5–5.1)
PROT SERPL-MCNC: 6.8 G/DL (ref 6.4–8.3)
RBC # BLD AUTO: 4.9 M/UL (ref 3.8–5.2)
SODIUM SERPL-SCNC: 139 MMOL/L (ref 136–145)
TRIGL SERPL-MCNC: 192 MG/DL (ref 0–150)
VLDLC SERPL CALC-MCNC: 38 MG/DL
WBC # BLD AUTO: 6 K/UL (ref 3.6–11)

## 2025-08-12 ENCOUNTER — OFFICE VISIT (OUTPATIENT)
Age: 85
End: 2025-08-12
Payer: MEDICARE

## 2025-08-12 VITALS
HEART RATE: 75 BPM | DIASTOLIC BLOOD PRESSURE: 84 MMHG | OXYGEN SATURATION: 96 % | HEIGHT: 61 IN | WEIGHT: 196.9 LBS | TEMPERATURE: 98.3 F | SYSTOLIC BLOOD PRESSURE: 161 MMHG | BODY MASS INDEX: 37.17 KG/M2

## 2025-08-12 DIAGNOSIS — I10 PRIMARY HYPERTENSION: ICD-10-CM

## 2025-08-12 DIAGNOSIS — E11.65 TYPE 2 DIABETES MELLITUS WITH HYPERGLYCEMIA, WITHOUT LONG-TERM CURRENT USE OF INSULIN (HCC): Primary | ICD-10-CM

## 2025-08-12 DIAGNOSIS — E78.00 HYPERCHOLESTEROLEMIA: ICD-10-CM

## 2025-08-12 DIAGNOSIS — E83.52 HYPERCALCEMIA: ICD-10-CM

## 2025-08-12 PROCEDURE — 1090F PRES/ABSN URINE INCON ASSESS: CPT | Performed by: INTERNAL MEDICINE

## 2025-08-12 PROCEDURE — G8417 CALC BMI ABV UP PARAM F/U: HCPCS | Performed by: INTERNAL MEDICINE

## 2025-08-12 PROCEDURE — G8400 PT W/DXA NO RESULTS DOC: HCPCS | Performed by: INTERNAL MEDICINE

## 2025-08-12 PROCEDURE — 3077F SYST BP >= 140 MM HG: CPT | Performed by: INTERNAL MEDICINE

## 2025-08-12 PROCEDURE — 1160F RVW MEDS BY RX/DR IN RCRD: CPT | Performed by: INTERNAL MEDICINE

## 2025-08-12 PROCEDURE — G8427 DOCREV CUR MEDS BY ELIG CLIN: HCPCS | Performed by: INTERNAL MEDICINE

## 2025-08-12 PROCEDURE — 3079F DIAST BP 80-89 MM HG: CPT | Performed by: INTERNAL MEDICINE

## 2025-08-12 PROCEDURE — 1159F MED LIST DOCD IN RCRD: CPT | Performed by: INTERNAL MEDICINE

## 2025-08-12 PROCEDURE — 99214 OFFICE O/P EST MOD 30 MIN: CPT | Performed by: INTERNAL MEDICINE

## 2025-08-12 PROCEDURE — 1126F AMNT PAIN NOTED NONE PRSNT: CPT | Performed by: INTERNAL MEDICINE

## 2025-08-12 PROCEDURE — G2211 COMPLEX E/M VISIT ADD ON: HCPCS | Performed by: INTERNAL MEDICINE

## 2025-08-12 PROCEDURE — 1036F TOBACCO NON-USER: CPT | Performed by: INTERNAL MEDICINE

## 2025-08-12 PROCEDURE — 1123F ACP DISCUSS/DSCN MKR DOCD: CPT | Performed by: INTERNAL MEDICINE

## 2025-08-12 PROCEDURE — 3044F HG A1C LEVEL LT 7.0%: CPT | Performed by: INTERNAL MEDICINE

## 2025-08-26 DIAGNOSIS — E83.52 HYPERCALCEMIA: ICD-10-CM

## 2025-08-27 LAB
ANION GAP SERPL CALC-SCNC: 14 MMOL/L (ref 2–14)
BUN SERPL-MCNC: 22 MG/DL (ref 8–23)
BUN/CREAT SERPL: 20 (ref 12–20)
CALCIUM SERPL-MCNC: 10.2 MG/DL (ref 8.8–10.2)
CALCIUM SERPL-MCNC: 10.2 MG/DL (ref 8.8–10.2)
CHLORIDE SERPL-SCNC: 98 MMOL/L (ref 98–107)
CO2 SERPL-SCNC: 27 MMOL/L (ref 20–29)
CREAT SERPL-MCNC: 1.05 MG/DL (ref 0.6–1)
GLUCOSE SERPL-MCNC: 121 MG/DL (ref 65–100)
POTASSIUM SERPL-SCNC: 4.3 MMOL/L (ref 3.5–5.1)
PTH-INTACT SERPL-MCNC: 13.8 PG/ML (ref 15–65)
SODIUM SERPL-SCNC: 139 MMOL/L (ref 136–145)

## (undated) DEVICE — ADULT SPO2 SENSOR,REMANUFACTURED,REPROCESSED DEVICE FOR SINGLE USE; REPROCESSED BY COVIDIEN LLC: Brand: NELLCOR

## (undated) DEVICE — SOLIDIFIER MEDC 1200ML -- CONVERT TO 356117

## (undated) DEVICE — GLOVE SURG SZ 85 CRM LTX FREE POLYISOPRENE POLYMER BEAD ANTI

## (undated) DEVICE — CUFF RMFG BP INF SZ 11 DISP -- LAWSON OEM ITEM 238915

## (undated) DEVICE — POLYP TRAP: Brand: TRAPEASE®

## (undated) DEVICE — BASIN EMSIS 16OZ GRAPHITE PLAS KID SHP MOLD GRAD FOR ORAL

## (undated) DEVICE — UNIVERSAL FIXATION CANNULA: Brand: VERSAONE

## (undated) DEVICE — CONTAINER,SPECIMEN,3OZ,OR STRL: Brand: MEDLINE

## (undated) DEVICE — TAPE,CLOTH/SILK,CURAD,3"X10YD,LF,40/CS: Brand: CURAD

## (undated) DEVICE — GLOVE ORANGE PI 7   MSG9070

## (undated) DEVICE — SET ADMIN 16ML TBNG L100IN 2 Y INJ SITE IV PIGGY BK DISP

## (undated) DEVICE — SPECIMEN RETRIEVAL POUCH: Brand: ENDO CATCH GOLD

## (undated) DEVICE — DRAPE,REIN 53X77,STERILE: Brand: MEDLINE

## (undated) DEVICE — ELECTRODE,RADIOTRANSLUCENT,FOAM,3PK: Brand: MEDLINE

## (undated) DEVICE — CANN NASAL O2 CAPNOGRAPHY AD -- FILTERLINE

## (undated) DEVICE — SOLUTION IRRIG 3000ML 0.9% SOD CHL USP UROMATIC PLAS CONT

## (undated) DEVICE — CUSTOM CAST PD STR

## (undated) DEVICE — Z DISCONTINUED USE 2744636  DRESSING AQUACEL 14 IN ALG W3.5XL14IN POLYUR FLM CVR W/ HYDRCOLL

## (undated) DEVICE — BAG SPEC BIOHZRD 10 X 10 IN --

## (undated) DEVICE — SUTURE VCRL SZ 2-0 L36IN ABSRB UD L40MM CT 1/2 CIR J957H

## (undated) DEVICE — SCRUB DRY SURG EZ SCRUB BRUSH PREOPERATIVE GRN

## (undated) DEVICE — BAG BELONG PT PERS CLEAR HANDL

## (undated) DEVICE — BLADELESS OPTICAL TROCAR WITH FIXATION CANNULA: Brand: VERSAONE

## (undated) DEVICE — 1200 GUARD II KIT W/5MM TUBE W/O VAC TUBE: Brand: GUARDIAN

## (undated) DEVICE — 4-PORT MANIFOLD: Brand: NEPTUNE 2

## (undated) DEVICE — TOWEL SURG W17XL27IN STD BLU COT NONFENESTRATED PREWASHED

## (undated) DEVICE — Device

## (undated) DEVICE — REM POLYHESIVE ADULT PATIENT RETURN ELECTRODE: Brand: VALLEYLAB

## (undated) DEVICE — SYR 10ML LUER LOK 1/5ML GRAD --

## (undated) DEVICE — KIT COLON W/ 1.1OZ LUB AND 2 END

## (undated) DEVICE — CONTAINER SPEC 20 ML LID NEUT BUFF FORMALIN 10 % POLYPR STS

## (undated) DEVICE — CATH IV AUTOGRD BC PNK 20GA 25 -- INSYTE

## (undated) DEVICE — STERILE POLYISOPRENE POWDER-FREE SURGICAL GLOVES: Brand: PROTEXIS

## (undated) DEVICE — PREP SKN CHLRAPRP APL 26ML STR --

## (undated) DEVICE — SUTURE VCRL SZ 1 L36IN ABSRB UD L36MM CT-1 1/2 CIR J947H

## (undated) DEVICE — YANKAUER,FLEXIBLE HANDLE,REGLR CAPACITY: Brand: MEDLINE INDUSTRIES, INC.

## (undated) DEVICE — SUTURE VCRL SZ 2 L54IN ABSRB UD L65MM TP-1 1/2 CIR J880T

## (undated) DEVICE — SOL IRRIGATION INJ NACL 0.9% 500ML BTL

## (undated) DEVICE — SUTURE STRATAFIX SYMMETRIC PDS + SZ 1 L18IN ABSRB VLT L48MM SXPP1A400

## (undated) DEVICE — ADULT SPO2 SENSOR: Brand: NELLCOR

## (undated) DEVICE — (D)PREP SKN CHLRAPRP APPL 26ML -- CONVERT TO ITEM 371833

## (undated) DEVICE — GARMENT,MEDLINE,DVT,INT,CALF,MED, GEN2: Brand: MEDLINE

## (undated) DEVICE — TELFA NON-ADHERENT ABSORBENT DRESSING: Brand: TELFA

## (undated) DEVICE — CATH IV AUTOGRD BC BLU 22GA 25 -- INSYTE

## (undated) DEVICE — 3M™ TEGADERM™ TRANSPARENT FILM DRESSING FRAME STYLE, 1624W, 2-3/8 IN X 2-3/4 IN (6 CM X 7 CM), 100/CT 4CT/CASE: Brand: 3M™ TEGADERM™

## (undated) DEVICE — 3M™ CUROS™ DISINFECTING CAP FOR NEEDLELESS CONNECTORS 270/CARTON 20 CARTONS/CASE CFF1-270: Brand: CUROS™

## (undated) DEVICE — SYR 3ML LL TIP 1/10ML GRAD --

## (undated) DEVICE — CLICKLINE SCISSORS INSERT: Brand: CLICKLINE

## (undated) DEVICE — NEEDLE HYPO 22GA L1.5IN BLK S STL HUB POLYPR SHLD REG BVL

## (undated) DEVICE — LIGHT HANDLE: Brand: DEVON

## (undated) DEVICE — BITEBLOCK ENDOSCP 60FR MAXI WHT POLYETH STURDY W/ VELC WVN

## (undated) DEVICE — TOWEL,OR,DSP,ST,BLUE,STD,4/PK,20PK/CS: Brand: MEDLINE

## (undated) DEVICE — CARTRIDGE BNE CEM MIX UNIV TWR VAC ROTOR BRK OFF NOZ W/O

## (undated) DEVICE — SIMPLICITY FLUFF UNDERPAD 23X36, MODERATE: Brand: SIMPLICITY

## (undated) DEVICE — SNARE ENDOSCP M L240CM W27MM SHTH DIA2.4MM CHN 2.8MM OVL

## (undated) DEVICE — DEVON™ KNEE AND BODY STRAP 60" X 3" (1.5 M X 7.6 CM): Brand: DEVON

## (undated) DEVICE — STRYKER PERFORMANCE SERIES SAGITTAL BLADE: Brand: STRYKER PERFORMANCE SERIES

## (undated) DEVICE — SURGICAL PROCEDURE KIT GEN LAPAROSCOPY LF

## (undated) DEVICE — (D)STRIP SKN CLSR 0.5X4IN WHT --

## (undated) DEVICE — TUBING INSUFLTN 10FT LUER -- CONVERT TO ITEM 368568

## (undated) DEVICE — GLOVE SURG SZ 65 L12IN FNGR THK94MIL STD WHT LTX FREE

## (undated) DEVICE — INFECTION CONTROL KIT SYS

## (undated) DEVICE — SYR 50ML SLIP TIP NSAF LF STRL --

## (undated) DEVICE — SUTURE MCRYL SZ 4-0 L27IN ABSRB UD L19MM PS-2 1/2 CIR PRIM Y426H

## (undated) DEVICE — Z DUP USE 2514810 PACK FIX INCL 4 UNIV KNEE 2 THRD HD PIN ATTUNE

## (undated) DEVICE — GLOVE SURG SZ 7 L12IN FNGR THK79MIL GRN LTX FREE

## (undated) DEVICE — KENDALL RADIOLUCENT FOAM MONITORING ELECTRODE -RECTANGULAR SHAPE: Brand: KENDALL

## (undated) DEVICE — BANDAGE COMPR M W6INXL10YD WHT BGE VELC E MTRX HK AND LOOP

## (undated) DEVICE — KENDALL SCD EXPRESS SLEEVES, KNEE LENGTH, MEDIUM: Brand: KENDALL SCD

## (undated) DEVICE — CANNULA CUSH AD W/ 14FT TBG

## (undated) DEVICE — DRAPE,EXTREMITY,89X128,STERILE: Brand: MEDLINE

## (undated) DEVICE — TOTAL JOINT - SMH: Brand: MEDLINE INDUSTRIES, INC.

## (undated) DEVICE — T4 HOOD

## (undated) DEVICE — GLOVE,SURG,SENSICARE,ALOE,LF,PF,7: Brand: MEDLINE

## (undated) DEVICE — SOLUTION IRRIG 1000ML STRL H2O USP PLAS POUR BTL

## (undated) DEVICE — DEVICE TRNSF SPIK STL 2008S] MICROTEK MEDICAL INC]

## (undated) DEVICE — ZIMMER® STERILE DISPOSABLE TOURNIQUET CUFF WITH PLC, DUAL PORT, SINGLE BLADDER, 34 IN. (86 CM)

## (undated) DEVICE — SET GRAV CK VLV NEEDLESS ST 3 GANGED 4WAY STPCOCK HI FLO 10

## (undated) DEVICE — BLADELESS OPTICAL TROCAR WITH FIXATION CANNULA: Brand: VERSAPORT

## (undated) DEVICE — TOTAL TRAY, 16FR 10ML SIL FOLEY, URN: Brand: MEDLINE

## (undated) DEVICE — SYR 20ML LL STRL LF --

## (undated) DEVICE — HANDLE LT SNAP ON ULT DURABLE LENS FOR TRUMPF ALC DISPOSABLE

## (undated) DEVICE — NDL PRT INJ NSAF BLNT 18GX1.5 --

## (undated) DEVICE — CLIP APPLIER: Brand: ENDO CLIP II